# Patient Record
Sex: FEMALE | Race: OTHER | HISPANIC OR LATINO
[De-identification: names, ages, dates, MRNs, and addresses within clinical notes are randomized per-mention and may not be internally consistent; named-entity substitution may affect disease eponyms.]

---

## 2017-06-19 ENCOUNTER — APPOINTMENT (OUTPATIENT)
Dept: INTERNAL MEDICINE | Facility: CLINIC | Age: 47
End: 2017-06-19

## 2017-06-19 ENCOUNTER — OUTPATIENT (OUTPATIENT)
Dept: OUTPATIENT SERVICES | Facility: HOSPITAL | Age: 47
LOS: 1 days | Discharge: HOME | End: 2017-06-19

## 2017-06-19 ENCOUNTER — RESULT REVIEW (OUTPATIENT)
Age: 47
End: 2017-06-19

## 2017-06-19 VITALS
HEART RATE: 78 BPM | DIASTOLIC BLOOD PRESSURE: 76 MMHG | WEIGHT: 100 LBS | HEIGHT: 59 IN | TEMPERATURE: 97 F | SYSTOLIC BLOOD PRESSURE: 113 MMHG | BODY MASS INDEX: 20.16 KG/M2

## 2017-06-19 DIAGNOSIS — R73.9 HYPERGLYCEMIA, UNSPECIFIED: ICD-10-CM

## 2017-06-19 LAB — GLUCOSE SERPL-MCNC: 347 MG/DL

## 2017-06-20 LAB
ALBUMIN SERPL-MCNC: 4.1 G/DL
ALBUMIN/GLOB SERPL: 1.17
ALP SERPL-CCNC: 82 IU/L
ALT SERPL-CCNC: 21 IU/L
ANION GAP SERPL CALC-SCNC: 11 MEQ/L
AST SERPL-CCNC: 21 IU/L
BASOPHILS # BLD: 0.04 TH/MM3
BASOPHILS NFR BLD: 0.5 %
BILIRUB SERPL-MCNC: 0.5 MG/DL
BUN SERPL-MCNC: 17 MG/DL
BUN/CREAT SERPL: 30.9 %
CALCIUM SERPL-MCNC: 9.6 MG/DL
CHLORIDE SERPL-SCNC: 98 MEQ/L
CHOLEST SERPL-MCNC: 292 MG/DL
CO2 SERPL-SCNC: 25 MEQ/L
CREAT SERPL-MCNC: 0.55 MG/DL
CREAT UR-MCNC: 30 MG/DL
DIFFERENTIAL METHOD BLD: NORMAL
EOSINOPHIL # BLD: 0.06 TH/MM3
EOSINOPHIL NFR BLD: 0.7 %
ERYTHROCYTE [DISTWIDTH] IN BLOOD BY AUTOMATED COUNT: 19.1 %
ESTIMATED AVERGAGE GLUCOSE (NORTH): 384 MG/DL
FERRITIN SERPL-MCNC: 4 NG/ML
GFR SERPL CREATININE-BSD FRML MDRD: 118
GLUCOSE SERPL-MCNC: 293 MG/DL
GRANULOCYTES # BLD: 5.33 TH/MM3
GRANULOCYTES NFR BLD: 66.2 %
HBA1C MFR BLD: 15 %
HCT VFR BLD AUTO: 31.8 %
HDLC SERPL-MCNC: 87 MG/DL
HDLC SERPL: 3.36
HGB BLD-MCNC: 9 G/DL
IMM GRANULOCYTES # BLD: 0.01 TH/MM3
IMM GRANULOCYTES NFR BLD: 0.1 %
IRON SERPL-MCNC: 9 UG/DL
LDLC SERPL DIRECT ASSAY-MCNC: 189 MG/DL
LYMPHOCYTES # BLD: 2.11 TH/MM3
LYMPHOCYTES NFR BLD: 26.2 %
MCH RBC QN AUTO: 18.4 PG
MCHC RBC AUTO-ENTMCNC: 28.3 G/DL
MCV RBC AUTO: 65 FL
MICROALBUMIN UR-MCNC: 58.5 MG/DL
MICROALBUMIN/CREAT UR-RTO: 1950 MG/G
MONOCYTES # BLD: 0.51 TH/MM3
MONOCYTES NFR BLD: 6.3 %
PLATELET # BLD: 409 TH/MM3
PMV BLD AUTO: 10.2 FL
POTASSIUM SERPL-SCNC: 4.3 MMOL/L
PROT SERPL-MCNC: 7.6 G/DL
RBC # BLD AUTO: 4.89 MIL/MM3
SODIUM SERPL-SCNC: 134 MEQ/L
TIBC SERPL-MCNC: 611 UG/DL
TRIGL SERPL-MCNC: 94 MG/DL
VLDLC SERPL-MCNC: 18 MG/DL
WBC # BLD: 8.06 TH/MM3

## 2017-06-28 DIAGNOSIS — D64.9 ANEMIA, UNSPECIFIED: ICD-10-CM

## 2017-06-28 DIAGNOSIS — R51 HEADACHE: ICD-10-CM

## 2017-06-28 DIAGNOSIS — E11.9 TYPE 2 DIABETES MELLITUS WITHOUT COMPLICATIONS: ICD-10-CM

## 2017-06-28 DIAGNOSIS — Z23 ENCOUNTER FOR IMMUNIZATION: ICD-10-CM

## 2017-07-05 ENCOUNTER — OUTPATIENT (OUTPATIENT)
Dept: OUTPATIENT SERVICES | Facility: HOSPITAL | Age: 47
LOS: 1 days | Discharge: HOME | End: 2017-07-05

## 2017-07-05 ENCOUNTER — RESULT REVIEW (OUTPATIENT)
Age: 47
End: 2017-07-05

## 2017-07-05 ENCOUNTER — APPOINTMENT (OUTPATIENT)
Dept: ENDOCRINOLOGY | Facility: CLINIC | Age: 47
End: 2017-07-05

## 2017-07-05 VITALS
WEIGHT: 105 LBS | SYSTOLIC BLOOD PRESSURE: 103 MMHG | HEIGHT: 59 IN | HEART RATE: 79 BPM | BODY MASS INDEX: 21.17 KG/M2 | DIASTOLIC BLOOD PRESSURE: 66 MMHG

## 2017-07-06 LAB — GLUCOSE SERPL-MCNC: 174 MG/DL

## 2017-07-14 ENCOUNTER — OUTPATIENT (OUTPATIENT)
Dept: OUTPATIENT SERVICES | Facility: HOSPITAL | Age: 47
LOS: 1 days | Discharge: HOME | End: 2017-07-14

## 2017-08-02 ENCOUNTER — OUTPATIENT (OUTPATIENT)
Dept: OUTPATIENT SERVICES | Facility: HOSPITAL | Age: 47
LOS: 1 days | Discharge: HOME | End: 2017-08-02

## 2017-08-02 ENCOUNTER — OTHER (OUTPATIENT)
Age: 47
End: 2017-08-02

## 2017-08-02 ENCOUNTER — APPOINTMENT (OUTPATIENT)
Dept: PODIATRY | Facility: CLINIC | Age: 47
End: 2017-08-02

## 2017-08-02 VITALS — DIASTOLIC BLOOD PRESSURE: 80 MMHG | TEMPERATURE: 96.5 F | SYSTOLIC BLOOD PRESSURE: 137 MMHG | HEART RATE: 71 BPM

## 2017-08-02 VITALS — HEIGHT: 59 IN | BODY MASS INDEX: 21.17 KG/M2 | WEIGHT: 105 LBS

## 2017-08-24 ENCOUNTER — OUTPATIENT (OUTPATIENT)
Dept: OUTPATIENT SERVICES | Facility: HOSPITAL | Age: 47
LOS: 1 days | Discharge: HOME | End: 2017-08-24

## 2017-08-24 ENCOUNTER — APPOINTMENT (OUTPATIENT)
Dept: INTERNAL MEDICINE | Facility: CLINIC | Age: 47
End: 2017-08-24

## 2017-08-24 ENCOUNTER — RESULT REVIEW (OUTPATIENT)
Age: 47
End: 2017-08-24

## 2017-08-24 VITALS
DIASTOLIC BLOOD PRESSURE: 84 MMHG | SYSTOLIC BLOOD PRESSURE: 159 MMHG | WEIGHT: 103 LBS | BODY MASS INDEX: 20.76 KG/M2 | HEIGHT: 59 IN | HEART RATE: 71 BPM

## 2017-08-24 DIAGNOSIS — E78.00 PURE HYPERCHOLESTEROLEMIA, UNSPECIFIED: ICD-10-CM

## 2017-08-24 DIAGNOSIS — R10.11 RIGHT UPPER QUADRANT PAIN: ICD-10-CM

## 2017-08-24 DIAGNOSIS — E11.65 TYPE 2 DIABETES MELLITUS WITH HYPERGLYCEMIA: ICD-10-CM

## 2017-08-24 DIAGNOSIS — I10 ESSENTIAL (PRIMARY) HYPERTENSION: ICD-10-CM

## 2017-08-24 DIAGNOSIS — R51 HEADACHE: ICD-10-CM

## 2017-08-24 RX ORDER — BLOOD-GLUCOSE METER
W/DEVICE KIT MISCELLANEOUS
Qty: 1 | Refills: 0 | Status: ACTIVE | COMMUNITY
Start: 2017-08-03 | End: 1900-01-01

## 2017-08-24 RX ORDER — BLOOD SUGAR DIAGNOSTIC
STRIP MISCELLANEOUS
Qty: 60 | Refills: 5 | Status: COMPLETED | COMMUNITY
Start: 2017-08-24 | End: 2017-08-24

## 2017-08-25 LAB
CHOLEST SERPL-MCNC: 249 MG/DL
ESTIMATED AVERGAGE GLUCOSE (NORTH): 186 MG/DL
GLUCOSE SERPL-MCNC: 132 MG/DL
HBA1C MFR BLD: 8.1 %
HDLC SERPL-MCNC: 83 MG/DL
HDLC SERPL: 3
LDLC SERPL DIRECT ASSAY-MCNC: 140 MG/DL
TRIGL SERPL-MCNC: 147 MG/DL
VLDLC SERPL-MCNC: 29 MG/DL

## 2017-09-01 ENCOUNTER — RESULT REVIEW (OUTPATIENT)
Age: 47
End: 2017-09-01

## 2017-09-22 ENCOUNTER — OUTPATIENT (OUTPATIENT)
Dept: OUTPATIENT SERVICES | Facility: HOSPITAL | Age: 47
LOS: 1 days | Discharge: HOME | End: 2017-09-22

## 2017-09-22 ENCOUNTER — CLINICAL ADVICE (OUTPATIENT)
Age: 47
End: 2017-09-22

## 2017-09-22 DIAGNOSIS — R10.11 RIGHT UPPER QUADRANT PAIN: ICD-10-CM

## 2017-09-22 DIAGNOSIS — Z12.31 ENCOUNTER FOR SCREENING MAMMOGRAM FOR MALIGNANT NEOPLASM OF BREAST: ICD-10-CM

## 2017-09-27 ENCOUNTER — RESULT REVIEW (OUTPATIENT)
Age: 47
End: 2017-09-27

## 2017-10-06 ENCOUNTER — CLINICAL ADVICE (OUTPATIENT)
Age: 47
End: 2017-10-06

## 2017-10-13 ENCOUNTER — APPOINTMENT (OUTPATIENT)
Dept: GASTROENTEROLOGY | Facility: CLINIC | Age: 47
End: 2017-10-13

## 2017-10-13 ENCOUNTER — OUTPATIENT (OUTPATIENT)
Dept: OUTPATIENT SERVICES | Facility: HOSPITAL | Age: 47
LOS: 1 days | Discharge: HOME | End: 2017-10-13

## 2017-10-13 VITALS
WEIGHT: 106 LBS | SYSTOLIC BLOOD PRESSURE: 132 MMHG | DIASTOLIC BLOOD PRESSURE: 76 MMHG | BODY MASS INDEX: 21.41 KG/M2 | HEART RATE: 87 BPM

## 2017-10-13 DIAGNOSIS — R10.9 UNSPECIFIED ABDOMINAL PAIN: ICD-10-CM

## 2017-11-08 ENCOUNTER — APPOINTMENT (OUTPATIENT)
Dept: PODIATRY | Facility: CLINIC | Age: 47
End: 2017-11-08

## 2017-11-29 ENCOUNTER — RESULT REVIEW (OUTPATIENT)
Age: 47
End: 2017-11-29

## 2017-11-30 ENCOUNTER — OUTPATIENT (OUTPATIENT)
Dept: OUTPATIENT SERVICES | Facility: HOSPITAL | Age: 47
LOS: 1 days | Discharge: HOME | End: 2017-11-30

## 2017-11-30 ENCOUNTER — APPOINTMENT (OUTPATIENT)
Dept: INTERNAL MEDICINE | Facility: CLINIC | Age: 47
End: 2017-11-30

## 2017-11-30 VITALS
BODY MASS INDEX: 21.37 KG/M2 | HEIGHT: 59 IN | HEART RATE: 81 BPM | SYSTOLIC BLOOD PRESSURE: 164 MMHG | DIASTOLIC BLOOD PRESSURE: 93 MMHG | WEIGHT: 106 LBS

## 2017-11-30 VITALS — SYSTOLIC BLOOD PRESSURE: 160 MMHG | DIASTOLIC BLOOD PRESSURE: 90 MMHG

## 2017-11-30 VITALS
HEIGHT: 59 IN | HEART RATE: 81 BPM | SYSTOLIC BLOOD PRESSURE: 160 MMHG | BODY MASS INDEX: 21.37 KG/M2 | WEIGHT: 106 LBS | DIASTOLIC BLOOD PRESSURE: 90 MMHG

## 2017-11-30 VITALS — HEIGHT: 59 IN | HEART RATE: 76 BPM | WEIGHT: 106 LBS | BODY MASS INDEX: 21.37 KG/M2

## 2017-11-30 RX ORDER — BLOOD-GLUCOSE METER
W/DEVICE KIT MISCELLANEOUS
Qty: 1 | Refills: 0 | Status: ACTIVE | COMMUNITY
Start: 2017-11-30 | End: 1900-01-01

## 2017-12-01 LAB
ALBUMIN SERPL-MCNC: 4 G/DL
ALBUMIN/GLOB SERPL: 1.21
ALP SERPL-CCNC: 65 IU/L
ALT SERPL-CCNC: 22 IU/L
ANION GAP SERPL CALC-SCNC: 8 MEQ/L
AST SERPL-CCNC: 21 IU/L
BASOPHILS # BLD: 0.02 TH/MM3
BASOPHILS NFR BLD: 0.2 %
BILIRUB SERPL-MCNC: 0.7 MG/DL
BUN SERPL-MCNC: 15 MG/DL
BUN/CREAT SERPL: 28.3 %
CALCIUM SERPL-MCNC: 10 MG/DL
CHLORIDE SERPL-SCNC: 102 MEQ/L
CO2 SERPL-SCNC: 30 MEQ/L
CREAT SERPL-MCNC: 0.53 MG/DL
DIFFERENTIAL METHOD BLD: NORMAL
EOSINOPHIL # BLD: 0.04 TH/MM3
EOSINOPHIL NFR BLD: 0.4 %
ERYTHROCYTE [DISTWIDTH] IN BLOOD BY AUTOMATED COUNT: 13.6 %
ESTIMATED AVERGAGE GLUCOSE (NORTH): 171 MG/DL
GFR SERPL CREATININE-BSD FRML MDRD: 123
GLUCOSE SERPL-MCNC: 137 MG/DL
GRANULOCYTES # BLD: 7.47 TH/MM3
GRANULOCYTES NFR BLD: 72.8 %
HBA1C MFR BLD: 7.6 %
HCT VFR BLD AUTO: 40.9 %
HGB BLD-MCNC: 13.4 G/DL
IMM GRANULOCYTES # BLD: 0.02 TH/MM3
IMM GRANULOCYTES NFR BLD: 0.2 %
LYMPHOCYTES # BLD: 2.12 TH/MM3
LYMPHOCYTES NFR BLD: 20.6 %
MCH RBC QN AUTO: 30.5 PG
MCHC RBC AUTO-ENTMCNC: 32.8 G/DL
MCV RBC AUTO: 93.2 FL
MONOCYTES # BLD: 0.6 TH/MM3
MONOCYTES NFR BLD: 5.8 %
PLATELET # BLD: 256 TH/MM3
PMV BLD AUTO: 11 FL
POTASSIUM SERPL-SCNC: 3.6 MMOL/L
PROT SERPL-MCNC: 7.3 G/DL
RBC # BLD AUTO: 4.39 MIL/MM3
SODIUM SERPL-SCNC: 140 MEQ/L
WBC # BLD: 10.27 TH/MM3

## 2017-12-04 ENCOUNTER — RESULT REVIEW (OUTPATIENT)
Age: 47
End: 2017-12-04

## 2017-12-09 ENCOUNTER — OUTPATIENT (OUTPATIENT)
Dept: OUTPATIENT SERVICES | Facility: HOSPITAL | Age: 47
LOS: 1 days | Discharge: HOME | End: 2017-12-09

## 2017-12-09 DIAGNOSIS — N92.0 EXCESSIVE AND FREQUENT MENSTRUATION WITH REGULAR CYCLE: ICD-10-CM

## 2017-12-09 DIAGNOSIS — D64.9 ANEMIA, UNSPECIFIED: ICD-10-CM

## 2017-12-11 DIAGNOSIS — D64.9 ANEMIA, UNSPECIFIED: ICD-10-CM

## 2017-12-14 LAB — HUMAN PAPILLOMA VIRUS HR(SIUH): NOT DETECTED

## 2017-12-27 ENCOUNTER — OUTPATIENT (OUTPATIENT)
Dept: OUTPATIENT SERVICES | Facility: HOSPITAL | Age: 47
LOS: 1 days | Discharge: HOME | End: 2017-12-27

## 2017-12-28 LAB
INR PPP: 1
PROTHROMBIN TIME: 10.9 SEC

## 2017-12-29 DIAGNOSIS — D50.9 IRON DEFICIENCY ANEMIA, UNSPECIFIED: ICD-10-CM

## 2017-12-29 DIAGNOSIS — R12 HEARTBURN: ICD-10-CM

## 2017-12-29 DIAGNOSIS — D12.0 BENIGN NEOPLASM OF CECUM: ICD-10-CM

## 2017-12-29 DIAGNOSIS — E78.00 PURE HYPERCHOLESTEROLEMIA, UNSPECIFIED: ICD-10-CM

## 2017-12-29 DIAGNOSIS — D12.3 BENIGN NEOPLASM OF TRANSVERSE COLON: ICD-10-CM

## 2017-12-29 DIAGNOSIS — D12.8 BENIGN NEOPLASM OF RECTUM: ICD-10-CM

## 2017-12-29 DIAGNOSIS — I10 ESSENTIAL (PRIMARY) HYPERTENSION: ICD-10-CM

## 2017-12-29 DIAGNOSIS — E11.42 TYPE 2 DIABETES MELLITUS WITH DIABETIC POLYNEUROPATHY: ICD-10-CM

## 2017-12-29 DIAGNOSIS — K64.8 OTHER HEMORRHOIDS: ICD-10-CM

## 2018-01-02 DIAGNOSIS — K62.1 RECTAL POLYP: ICD-10-CM

## 2018-01-19 ENCOUNTER — OUTPATIENT (OUTPATIENT)
Dept: OUTPATIENT SERVICES | Facility: HOSPITAL | Age: 48
LOS: 1 days | Discharge: HOME | End: 2018-01-19

## 2018-01-19 ENCOUNTER — APPOINTMENT (OUTPATIENT)
Dept: GASTROENTEROLOGY | Facility: CLINIC | Age: 48
End: 2018-01-19

## 2018-01-19 VITALS
WEIGHT: 106 LBS | HEIGHT: 58 IN | BODY MASS INDEX: 22.25 KG/M2 | SYSTOLIC BLOOD PRESSURE: 119 MMHG | DIASTOLIC BLOOD PRESSURE: 71 MMHG | HEART RATE: 68 BPM

## 2018-02-01 DIAGNOSIS — D12.6 BENIGN NEOPLASM OF COLON, UNSPECIFIED: ICD-10-CM

## 2018-02-01 DIAGNOSIS — K29.70 GASTRITIS, UNSPECIFIED, WITHOUT BLEEDING: ICD-10-CM

## 2018-02-27 ENCOUNTER — OUTPATIENT (OUTPATIENT)
Dept: OUTPATIENT SERVICES | Facility: HOSPITAL | Age: 48
LOS: 1 days | Discharge: HOME | End: 2018-02-27

## 2018-02-27 ENCOUNTER — APPOINTMENT (OUTPATIENT)
Dept: OBGYN | Facility: CLINIC | Age: 48
End: 2018-02-27

## 2018-02-27 VITALS
HEIGHT: 58 IN | WEIGHT: 106.38 LBS | DIASTOLIC BLOOD PRESSURE: 68 MMHG | BODY MASS INDEX: 22.33 KG/M2 | SYSTOLIC BLOOD PRESSURE: 120 MMHG

## 2018-02-28 DIAGNOSIS — N93.9 ABNORMAL UTERINE AND VAGINAL BLEEDING, UNSPECIFIED: ICD-10-CM

## 2018-03-15 ENCOUNTER — APPOINTMENT (OUTPATIENT)
Dept: INTERNAL MEDICINE | Facility: CLINIC | Age: 48
End: 2018-03-15

## 2018-03-15 ENCOUNTER — OUTPATIENT (OUTPATIENT)
Dept: OUTPATIENT SERVICES | Facility: HOSPITAL | Age: 48
LOS: 1 days | Discharge: HOME | End: 2018-03-15

## 2018-03-15 VITALS
WEIGHT: 102 LBS | HEART RATE: 80 BPM | BODY MASS INDEX: 21.41 KG/M2 | SYSTOLIC BLOOD PRESSURE: 175 MMHG | HEIGHT: 58 IN | DIASTOLIC BLOOD PRESSURE: 95 MMHG

## 2018-03-15 VITALS — SYSTOLIC BLOOD PRESSURE: 150 MMHG | DIASTOLIC BLOOD PRESSURE: 80 MMHG

## 2018-03-15 DIAGNOSIS — E11.9 TYPE 2 DIABETES MELLITUS WITHOUT COMPLICATIONS: ICD-10-CM

## 2018-03-15 DIAGNOSIS — I10 ESSENTIAL (PRIMARY) HYPERTENSION: ICD-10-CM

## 2018-03-15 DIAGNOSIS — G62.9 POLYNEUROPATHY, UNSPECIFIED: ICD-10-CM

## 2018-03-15 RX ORDER — AMOXICILLIN 500 MG/1
500 TABLET, FILM COATED ORAL TWICE DAILY
Qty: 56 | Refills: 0 | Status: DISCONTINUED | COMMUNITY
Start: 2018-01-19 | End: 2018-03-15

## 2018-03-15 RX ORDER — GLIPIZIDE 2.5 MG/1
2.5 TABLET, FILM COATED, EXTENDED RELEASE ORAL DAILY
Qty: 30 | Refills: 3 | Status: DISCONTINUED | COMMUNITY
Start: 2017-12-01 | End: 2018-03-15

## 2018-03-15 RX ORDER — BISACODYL 5 MG/1
5 TABLET, COATED ORAL
Qty: 4 | Refills: 0 | Status: DISCONTINUED | COMMUNITY
Start: 2017-10-13 | End: 2018-03-15

## 2018-03-15 RX ORDER — CLARITHROMYCIN 500 MG/1
500 TABLET, FILM COATED ORAL TWICE DAILY
Qty: 28 | Refills: 0 | Status: DISCONTINUED | COMMUNITY
Start: 2018-01-19 | End: 2018-03-15

## 2018-03-15 RX ORDER — PANTOPRAZOLE 40 MG/1
40 TABLET, DELAYED RELEASE ORAL DAILY
Qty: 30 | Refills: 2 | Status: DISCONTINUED | COMMUNITY
Start: 2018-01-19 | End: 2018-03-15

## 2018-03-16 ENCOUNTER — OUTPATIENT (OUTPATIENT)
Dept: OUTPATIENT SERVICES | Facility: HOSPITAL | Age: 48
LOS: 1 days | Discharge: HOME | End: 2018-03-16

## 2018-03-16 ENCOUNTER — APPOINTMENT (OUTPATIENT)
Dept: GASTROENTEROLOGY | Facility: CLINIC | Age: 48
End: 2018-03-16

## 2018-03-16 VITALS
BODY MASS INDEX: 22.57 KG/M2 | HEART RATE: 82 BPM | SYSTOLIC BLOOD PRESSURE: 142 MMHG | DIASTOLIC BLOOD PRESSURE: 83 MMHG | WEIGHT: 108 LBS

## 2018-03-20 ENCOUNTER — OUTPATIENT (OUTPATIENT)
Dept: OUTPATIENT SERVICES | Facility: HOSPITAL | Age: 48
LOS: 1 days | Discharge: HOME | End: 2018-03-20

## 2018-03-20 DIAGNOSIS — R92.2 INCONCLUSIVE MAMMOGRAM: ICD-10-CM

## 2018-03-22 LAB — HIV1+2 AB SPEC QL IA.RAPID: NONREACTIVE

## 2018-03-27 ENCOUNTER — OTHER (OUTPATIENT)
Age: 48
End: 2018-03-27

## 2018-04-03 ENCOUNTER — LABORATORY RESULT (OUTPATIENT)
Age: 48
End: 2018-04-03

## 2018-04-03 ENCOUNTER — OUTPATIENT (OUTPATIENT)
Dept: OUTPATIENT SERVICES | Facility: HOSPITAL | Age: 48
LOS: 1 days | Discharge: HOME | End: 2018-04-03

## 2018-04-03 ENCOUNTER — APPOINTMENT (OUTPATIENT)
Dept: OBGYN | Facility: CLINIC | Age: 48
End: 2018-04-03

## 2018-04-03 ENCOUNTER — RESULT CHARGE (OUTPATIENT)
Age: 48
End: 2018-04-03

## 2018-04-03 VITALS — WEIGHT: 109 LBS | BODY MASS INDEX: 22.78 KG/M2 | SYSTOLIC BLOOD PRESSURE: 100 MMHG | DIASTOLIC BLOOD PRESSURE: 60 MMHG

## 2018-04-03 LAB
HCG UR QL: NEGATIVE
QUALITY CONTROL: YES

## 2018-04-05 DIAGNOSIS — N93.9 ABNORMAL UTERINE AND VAGINAL BLEEDING, UNSPECIFIED: ICD-10-CM

## 2018-04-17 ENCOUNTER — APPOINTMENT (OUTPATIENT)
Dept: OBGYN | Facility: CLINIC | Age: 48
End: 2018-04-17

## 2018-04-17 ENCOUNTER — OUTPATIENT (OUTPATIENT)
Dept: OUTPATIENT SERVICES | Facility: HOSPITAL | Age: 48
LOS: 1 days | Discharge: HOME | End: 2018-04-17

## 2018-04-17 VITALS — SYSTOLIC BLOOD PRESSURE: 108 MMHG | WEIGHT: 107 LBS | BODY MASS INDEX: 22.36 KG/M2 | DIASTOLIC BLOOD PRESSURE: 60 MMHG

## 2018-04-19 DIAGNOSIS — N93.9 ABNORMAL UTERINE AND VAGINAL BLEEDING, UNSPECIFIED: ICD-10-CM

## 2018-04-20 ENCOUNTER — APPOINTMENT (OUTPATIENT)
Dept: GASTROENTEROLOGY | Facility: CLINIC | Age: 48
End: 2018-04-20

## 2018-04-20 ENCOUNTER — EMERGENCY (EMERGENCY)
Facility: HOSPITAL | Age: 48
LOS: 0 days | Discharge: HOME | End: 2018-04-21
Attending: EMERGENCY MEDICINE | Admitting: EMERGENCY MEDICINE

## 2018-04-20 VITALS
TEMPERATURE: 98 F | SYSTOLIC BLOOD PRESSURE: 177 MMHG | DIASTOLIC BLOOD PRESSURE: 78 MMHG | OXYGEN SATURATION: 97 % | RESPIRATION RATE: 18 BRPM | HEART RATE: 79 BPM

## 2018-04-20 VITALS
RESPIRATION RATE: 17 BRPM | WEIGHT: 107.81 LBS | DIASTOLIC BLOOD PRESSURE: 74 MMHG | TEMPERATURE: 98 F | SYSTOLIC BLOOD PRESSURE: 158 MMHG | HEART RATE: 79 BPM | OXYGEN SATURATION: 99 %

## 2018-04-20 DIAGNOSIS — R10.31 RIGHT LOWER QUADRANT PAIN: ICD-10-CM

## 2018-04-20 LAB
ALBUMIN SERPL ELPH-MCNC: 4.1 G/DL — SIGNIFICANT CHANGE UP (ref 3.5–5.2)
ALP SERPL-CCNC: 62 U/L — SIGNIFICANT CHANGE UP (ref 30–115)
ALT FLD-CCNC: 16 U/L — SIGNIFICANT CHANGE UP (ref 0–41)
APPEARANCE UR: CLEAR — SIGNIFICANT CHANGE UP
AST SERPL-CCNC: 16 U/L — SIGNIFICANT CHANGE UP (ref 0–41)
BILIRUB SERPL-MCNC: <0.2 MG/DL — SIGNIFICANT CHANGE UP (ref 0.2–1.2)
BILIRUB UR-MCNC: NEGATIVE — SIGNIFICANT CHANGE UP
BUN SERPL-MCNC: 18 MG/DL — SIGNIFICANT CHANGE UP (ref 10–20)
CALCIUM SERPL-MCNC: 9.3 MG/DL — SIGNIFICANT CHANGE UP (ref 8.5–10.1)
CHLORIDE SERPL-SCNC: 98 MMOL/L — SIGNIFICANT CHANGE UP (ref 98–110)
CO2 SERPL-SCNC: 29 MMOL/L — SIGNIFICANT CHANGE UP (ref 17–32)
COLOR SPEC: YELLOW — SIGNIFICANT CHANGE UP
CREAT SERPL-MCNC: 0.6 MG/DL — LOW (ref 0.7–1.5)
DIFF PNL FLD: (no result)
GLUCOSE SERPL-MCNC: 320 MG/DL — HIGH (ref 70–99)
GLUCOSE UR QL: >=1000 MG/DL
KETONES UR-MCNC: NEGATIVE — SIGNIFICANT CHANGE UP
LEUKOCYTE ESTERASE UR-ACNC: NEGATIVE — SIGNIFICANT CHANGE UP
LIDOCAIN IGE QN: 74 U/L — HIGH (ref 7–60)
NITRITE UR-MCNC: NEGATIVE — SIGNIFICANT CHANGE UP
PH UR: 6.5 — SIGNIFICANT CHANGE UP (ref 5–8)
POTASSIUM SERPL-MCNC: 4.2 MMOL/L — SIGNIFICANT CHANGE UP (ref 3.5–5)
POTASSIUM SERPL-SCNC: 4.2 MMOL/L — SIGNIFICANT CHANGE UP (ref 3.5–5)
PROT SERPL-MCNC: 7 G/DL — SIGNIFICANT CHANGE UP (ref 6–8)
PROT UR-MCNC: 100 MG/DL
SODIUM SERPL-SCNC: 137 MMOL/L — SIGNIFICANT CHANGE UP (ref 135–146)
SP GR SPEC: 1.02 — SIGNIFICANT CHANGE UP (ref 1.01–1.03)
UROBILINOGEN FLD QL: 0.2 MG/DL — SIGNIFICANT CHANGE UP (ref 0.2–0.2)

## 2018-04-20 RX ORDER — MORPHINE SULFATE 50 MG/1
6 CAPSULE, EXTENDED RELEASE ORAL ONCE
Qty: 0 | Refills: 0 | Status: DISCONTINUED | OUTPATIENT
Start: 2018-04-20 | End: 2018-04-20

## 2018-04-20 RX ORDER — SODIUM CHLORIDE 9 MG/ML
1000 INJECTION INTRAMUSCULAR; INTRAVENOUS; SUBCUTANEOUS ONCE
Qty: 0 | Refills: 0 | Status: COMPLETED | OUTPATIENT
Start: 2018-04-20 | End: 2018-04-20

## 2018-04-20 RX ADMIN — SODIUM CHLORIDE 3000 MILLILITER(S): 9 INJECTION INTRAMUSCULAR; INTRAVENOUS; SUBCUTANEOUS at 21:00

## 2018-04-20 RX ADMIN — MORPHINE SULFATE 6 MILLIGRAM(S): 50 CAPSULE, EXTENDED RELEASE ORAL at 21:00

## 2018-04-20 RX ADMIN — MORPHINE SULFATE 6 MILLIGRAM(S): 50 CAPSULE, EXTENDED RELEASE ORAL at 23:17

## 2018-04-20 NOTE — ED PROVIDER NOTE - PHYSICAL EXAMINATION
AOx4, Non toxic appearing, NAD. Skin  warm and dry, no acute rash. PERRLA/EOM, conjunctiva and sclera clear. MM moist, no nasal discharge.  Pharynx and TM's unremarkable. Neck supple nt, no meningeal signs. Heart RRR s1s2 nl, no rub/murmur. Lungs- BS equal, CTAB. Abdomen rlq ttp no r/g. Extremities- moves all, +equal distal pulses, sensation wnl, normal ROM. No LE edema, calves nttp b/l.

## 2018-04-20 NOTE — ED ADULT NURSE NOTE - CHPI ED SYMPTOMS NEG
no dysuria/no fever/no hematuria/no diarrhea/no blood in stool/no burning urination/no nausea/no chills/no abdominal distension/no vomiting

## 2018-04-20 NOTE — ED PROVIDER NOTE - NS ED ROS FT
Pt denied fvr/chills, HA, visual changes, dizziness, light headedness, presyncope, LOC, CP, LAMB, PND, SOB, cough, hemoptysis, changes in bowel or bladder habits, LE edema, numbness, weakness, changes in gait.  The pt also denied recent travel outside of the country, recent illnesses, sick contacts, recent airplane trips or periods of immobility/bedbound.

## 2018-04-20 NOTE — ED PROVIDER NOTE - ATTENDING CONTRIBUTION TO CARE
pt c/o 1 week lower ab pain, dysuria. no n, v, d, fever or chills.  no cp or sob. no vb or dc. pt in nad, comfortable, anicteric, neck sup, ctab, rrr, ab soft, tender b/l LQ, no grd or rbt. no cvat. no rash. will check labs and imaging.

## 2018-04-21 LAB
ANION GAP SERPL CALC-SCNC: 10 MMOL/L — SIGNIFICANT CHANGE UP (ref 7–14)
BASOPHILS # BLD AUTO: 0.03 K/UL — SIGNIFICANT CHANGE UP (ref 0–0.2)
BASOPHILS NFR BLD AUTO: 0.5 % — SIGNIFICANT CHANGE UP (ref 0–1)
EOSINOPHIL # BLD AUTO: 0.06 K/UL — SIGNIFICANT CHANGE UP (ref 0–0.7)
EOSINOPHIL NFR BLD AUTO: 0.9 % — SIGNIFICANT CHANGE UP (ref 0–8)
HCT VFR BLD CALC: 37 % — SIGNIFICANT CHANGE UP (ref 37–47)
HGB BLD-MCNC: 12.7 G/DL — SIGNIFICANT CHANGE UP (ref 12–16)
IMM GRANULOCYTES NFR BLD AUTO: 0.3 % — SIGNIFICANT CHANGE UP (ref 0.1–0.3)
LACTATE SERPL-SCNC: 1.2 MMOL/L — SIGNIFICANT CHANGE UP (ref 0.5–2.2)
LYMPHOCYTES # BLD AUTO: 2.12 K/UL — SIGNIFICANT CHANGE UP (ref 1.2–3.4)
LYMPHOCYTES # BLD AUTO: 33.1 % — SIGNIFICANT CHANGE UP (ref 20.5–51.1)
MCHC RBC-ENTMCNC: 30.5 PG — SIGNIFICANT CHANGE UP (ref 27–31)
MCHC RBC-ENTMCNC: 34.3 G/DL — SIGNIFICANT CHANGE UP (ref 32–37)
MCV RBC AUTO: 88.7 FL — SIGNIFICANT CHANGE UP (ref 81–99)
MONOCYTES # BLD AUTO: 0.57 K/UL — SIGNIFICANT CHANGE UP (ref 0.1–0.6)
MONOCYTES NFR BLD AUTO: 8.9 % — SIGNIFICANT CHANGE UP (ref 1.7–9.3)
NEUTROPHILS # BLD AUTO: 3.61 K/UL — SIGNIFICANT CHANGE UP (ref 1.4–6.5)
NEUTROPHILS NFR BLD AUTO: 56.3 % — SIGNIFICANT CHANGE UP (ref 42.2–75.2)
PLATELET # BLD AUTO: 276 K/UL — SIGNIFICANT CHANGE UP (ref 130–400)
RBC # BLD: 4.17 M/UL — LOW (ref 4.2–5.4)
RBC # FLD: 12.7 % — SIGNIFICANT CHANGE UP (ref 11.5–14.5)
WBC # BLD: 6.41 K/UL — SIGNIFICANT CHANGE UP (ref 4.8–10.8)
WBC # FLD AUTO: 6.41 K/UL — SIGNIFICANT CHANGE UP (ref 4.8–10.8)

## 2018-04-21 RX ORDER — ONDANSETRON 8 MG/1
4 TABLET, FILM COATED ORAL ONCE
Qty: 0 | Refills: 0 | Status: DISCONTINUED | OUTPATIENT
Start: 2018-04-21 | End: 2018-04-21

## 2018-05-18 LAB
ANION GAP SERPL CALC-SCNC: 14 MMOL/L
BASOPHILS # BLD AUTO: 0.02 K/UL
BASOPHILS NFR BLD AUTO: 0.2 %
BUN SERPL-MCNC: 14 MG/DL
CALCIUM SERPL-MCNC: 9.7 MG/DL
CHLORIDE SERPL-SCNC: 98 MMOL/L
CHOLEST SERPL-MCNC: 351 MG/DL
CHOLEST/HDLC SERPL: 4.2 RATIO
CO2 SERPL-SCNC: 29 MMOL/L
CREAT SERPL-MCNC: 0.6 MG/DL
CREAT SPEC-SCNC: 90 MG/DL
EOSINOPHIL # BLD AUTO: 0.07 K/UL
EOSINOPHIL NFR BLD AUTO: 0.9 %
GLUCOSE SERPL-MCNC: 166 MG/DL
HBA1C MFR BLD HPLC: 10.4 %
HCT VFR BLD CALC: 41.2 %
HDLC SERPL-MCNC: 84 MG/DL
HGB BLD-MCNC: 13.8 G/DL
IMM GRANULOCYTES NFR BLD AUTO: 0.4 %
LDLC SERPL CALC-MCNC: 264 MG/DL
LYMPHOCYTES # BLD AUTO: 2.76 K/UL
LYMPHOCYTES NFR BLD AUTO: 34.1 %
MAN DIFF?: NORMAL
MCHC RBC-ENTMCNC: 31.1 PG
MCHC RBC-ENTMCNC: 33.5 GM/DL
MCV RBC AUTO: 92.8 FL
MICROALBUMIN 24H UR DL<=1MG/L-MCNC: 137.3 MG/DL
MICROALBUMIN/CREAT 24H UR-RTO: 1526 MG/G
MONOCYTES # BLD AUTO: 0.47 K/UL
MONOCYTES NFR BLD AUTO: 5.8 %
NEUTROPHILS # BLD AUTO: 4.75 K/UL
NEUTROPHILS NFR BLD AUTO: 58.6 %
PLATELET # BLD AUTO: 281 K/UL
POTASSIUM SERPL-SCNC: 3.9 MMOL/L
RBC # BLD: 4.44 M/UL
RBC # FLD: 13.1 %
SODIUM SERPL-SCNC: 141 MMOL/L
TRIGL SERPL-MCNC: 129 MG/DL
WBC # FLD AUTO: 8.1 K/UL

## 2018-09-11 ENCOUNTER — OUTPATIENT (OUTPATIENT)
Dept: OUTPATIENT SERVICES | Facility: HOSPITAL | Age: 48
LOS: 1 days | Discharge: HOME | End: 2018-09-11

## 2018-09-11 ENCOUNTER — APPOINTMENT (OUTPATIENT)
Dept: INTERNAL MEDICINE | Facility: CLINIC | Age: 48
End: 2018-09-11

## 2018-09-11 VITALS
BODY MASS INDEX: 21.62 KG/M2 | TEMPERATURE: 97.1 F | DIASTOLIC BLOOD PRESSURE: 80 MMHG | SYSTOLIC BLOOD PRESSURE: 131 MMHG | HEIGHT: 58 IN | HEART RATE: 80 BPM | WEIGHT: 103 LBS

## 2018-09-11 DIAGNOSIS — Z23 ENCOUNTER FOR IMMUNIZATION: ICD-10-CM

## 2018-09-11 DIAGNOSIS — E11.65 TYPE 2 DIABETES MELLITUS WITH HYPERGLYCEMIA: ICD-10-CM

## 2018-09-11 DIAGNOSIS — R10.31 RIGHT LOWER QUADRANT PAIN: ICD-10-CM

## 2018-09-11 DIAGNOSIS — I10 ESSENTIAL (PRIMARY) HYPERTENSION: ICD-10-CM

## 2018-09-11 DIAGNOSIS — Z87.42 PERSONAL HISTORY OF OTHER DISEASES OF THE FEMALE GENITAL TRACT: ICD-10-CM

## 2018-09-11 DIAGNOSIS — E78.00 PURE HYPERCHOLESTEROLEMIA, UNSPECIFIED: ICD-10-CM

## 2018-09-11 LAB — GLUCOSE BLDC GLUCOMTR-MCNC: 284 MG/DL — HIGH (ref 70–99)

## 2018-09-11 NOTE — REVIEW OF SYSTEMS
[Abdominal Pain] : abdominal pain [Negative] : Heme/Lymph [FreeTextEntry7] : right lower quadrant abdominal pain

## 2018-09-11 NOTE — HISTORY OF PRESENT ILLNESS
[FreeTextEntry1] : 47 YO F with PMH of HTN, DLD, DM II, presents for a routine f/u. Patient says her menstrual bleeding is not as heavy anymore and she has a mammogram scheduled for later this month. She still has some epigastric pain but that has improved from before. Currently, patient has no other complaints. \par

## 2018-09-11 NOTE — ASSESSMENT
[FreeTextEntry1] : 47 YO F with PMH of HTN, DLD, DM II, presents for a routine f/u.\par \par \par H.pylori gastritis on EGD\par - s/p triple therapy\par - Patient needs to repeat stool test to check for H.pylori eradication. \par -Advised to stop omeprazole 2 weeks before taking stool test and not take famotidine one day before test. \par \par HTN\par - BP improved and is now 131/80\par - Patient was advised to take ramipril 2,5 mg from daily to BID on last visit but was taking once daily. \par - recommended low salt diet, monitor BP at home,\par \par DM II with neuropathy\par - Patient reports that she still has burning sensation on b/l feet at night. \par - Last HbA1c was 10.4 in 03/2018.Recommend diabetic education program. \par - c/w metformin, and Actos. Will add glimepiride 1 mg as fingerstick is high (284) in clinic\par - patient was started gabapentin but wasn't taking it. Will resend gabapentin again. \par - Ophthalmology and Podiatry referral provided\par - Will repeat HbA1c and urine microalbumin/Cr\par -diabetic eye exam\par -endocrinology consult\par \par DLD\par - lipid profile Aug 2017: Total cholesterol 351, , HDL 84, .\par - c/w lipitor \par - will repeat lipid profile\par \par Iron Deficiency Anemia likely 2/2 heavy periods.\par - Resolved\par - Last Hb is wnl. \par - c/w Ferrous sulfate\par - f/u Ob/Gyn . \par - Will repeat CBC. \par \par RLQ abdominal pain (improving)\par -Advised to get CT abdomen done which was recommended by GI.\par -s/p EGD in Dec 2017: shows H.pylori gastritis.\par - f/u GI for eradication of H.pylori post triple therapy. \par \par HCM\par - recommended Mammogram and bilateral breast sono\par - Colonoscopy 12/2017: few polyps but normal mucosa on histology, one hyperplastic polyp. No family hx of Colon CA. GI recommended to repeat colonoscopy in 10 years. \par - PAP smear Dec 2017: normal. \par - Will give Flu shot\par - RTC in 6 months. \par

## 2018-09-11 NOTE — PHYSICAL EXAM
[No Acute Distress] : no acute distress [No Respiratory Distress] : no respiratory distress  [Clear to Auscultation] : lungs were clear to auscultation bilaterally [No Accessory Muscle Use] : no accessory muscle use [Normal Rate] : normal rate  [Regular Rhythm] : with a regular rhythm [Normal S1, S2] : normal S1 and S2 [No Edema] : there was no peripheral edema [Soft] : abdomen soft [Non-distended] : non-distended [Normal Bowel Sounds] : normal bowel sounds [No Focal Deficits] : no focal deficits [Normal Affect] : the affect was normal [Supple] : supple [de-identified] : RLQ and epigastric tenderness to palpation  [de-identified] : Lower back pain.

## 2018-09-25 ENCOUNTER — LABORATORY RESULT (OUTPATIENT)
Age: 48
End: 2018-09-25

## 2018-09-25 ENCOUNTER — OUTPATIENT (OUTPATIENT)
Dept: OUTPATIENT SERVICES | Facility: HOSPITAL | Age: 48
LOS: 1 days | Discharge: HOME | End: 2018-09-25

## 2018-09-25 DIAGNOSIS — R92.8 OTHER ABNORMAL AND INCONCLUSIVE FINDINGS ON DIAGNOSTIC IMAGING OF BREAST: ICD-10-CM

## 2018-09-26 LAB
ALBUMIN SERPL ELPH-MCNC: 4.3 G/DL
ALP BLD-CCNC: 71 U/L
ALT SERPL-CCNC: 16 U/L
ANION GAP SERPL CALC-SCNC: 15 MMOL/L
AST SERPL-CCNC: 17 U/L
BASOPHILS # BLD AUTO: 0.03 K/UL
BASOPHILS NFR BLD AUTO: 0.4 %
BILIRUB SERPL-MCNC: 0.3 MG/DL
BUN SERPL-MCNC: 15 MG/DL
CALCIUM SERPL-MCNC: 9.3 MG/DL
CHLORIDE SERPL-SCNC: 102 MMOL/L
CHOLEST SERPL-MCNC: 287 MG/DL
CHOLEST/HDLC SERPL: 3.9 RATIO
CO2 SERPL-SCNC: 24 MMOL/L
CREAT SERPL-MCNC: 0.6 MG/DL
CREAT SPEC-SCNC: 72 MG/DL
EOSINOPHIL # BLD AUTO: 0.06 K/UL
EOSINOPHIL NFR BLD AUTO: 0.9 %
GLUCOSE SERPL-MCNC: 141 MG/DL
HCT VFR BLD CALC: 41.7 %
HDLC SERPL-MCNC: 73 MG/DL
HGB BLD-MCNC: 13.5 G/DL
IMM GRANULOCYTES NFR BLD AUTO: 0.3 %
LDLC SERPL CALC-MCNC: 215 MG/DL
LYMPHOCYTES # BLD AUTO: 2.21 K/UL
LYMPHOCYTES NFR BLD AUTO: 31.6 %
MAN DIFF?: NORMAL
MCHC RBC-ENTMCNC: 29.2 PG
MCHC RBC-ENTMCNC: 32.4 G/DL
MCV RBC AUTO: 90.1 FL
MICROALBUMIN 24H UR DL<=1MG/L-MCNC: 116.2 MG/DL
MICROALBUMIN/CREAT 24H UR-RTO: 1607 MG/G
MONOCYTES # BLD AUTO: 0.5 K/UL
MONOCYTES NFR BLD AUTO: 7.2 %
NEUTROPHILS # BLD AUTO: 4.17 K/UL
NEUTROPHILS NFR BLD AUTO: 59.6 %
PLATELET # BLD AUTO: 302 K/UL
POTASSIUM SERPL-SCNC: 3.9 MMOL/L
PROT SERPL-MCNC: 7.7 G/DL
RBC # BLD: 4.63 M/UL
RBC # FLD: 14 %
SODIUM SERPL-SCNC: 141 MMOL/L
TRIGL SERPL-MCNC: 165 MG/DL
WBC # FLD AUTO: 6.99 K/UL

## 2018-09-28 LAB — H PYLORI AG STL QL: NEGATIVE

## 2018-10-02 ENCOUNTER — OUTPATIENT (OUTPATIENT)
Dept: OUTPATIENT SERVICES | Facility: HOSPITAL | Age: 48
LOS: 1 days | Discharge: HOME | End: 2018-10-02

## 2018-10-02 ENCOUNTER — OTHER (OUTPATIENT)
Age: 48
End: 2018-10-02

## 2018-10-02 DIAGNOSIS — R10.31 RIGHT LOWER QUADRANT PAIN: ICD-10-CM

## 2018-10-03 ENCOUNTER — EMERGENCY (EMERGENCY)
Facility: HOSPITAL | Age: 48
LOS: 0 days | Discharge: HOME | End: 2018-10-03
Attending: EMERGENCY MEDICINE | Admitting: EMERGENCY MEDICINE

## 2018-10-03 VITALS
TEMPERATURE: 96 F | DIASTOLIC BLOOD PRESSURE: 82 MMHG | HEART RATE: 98 BPM | SYSTOLIC BLOOD PRESSURE: 182 MMHG | RESPIRATION RATE: 20 BRPM | OXYGEN SATURATION: 100 %

## 2018-10-03 VITALS
RESPIRATION RATE: 18 BRPM | DIASTOLIC BLOOD PRESSURE: 78 MMHG | HEART RATE: 82 BPM | OXYGEN SATURATION: 98 % | TEMPERATURE: 97 F | SYSTOLIC BLOOD PRESSURE: 160 MMHG

## 2018-10-03 DIAGNOSIS — Z79.84 LONG TERM (CURRENT) USE OF ORAL HYPOGLYCEMIC DRUGS: ICD-10-CM

## 2018-10-03 DIAGNOSIS — Z98.891 HISTORY OF UTERINE SCAR FROM PREVIOUS SURGERY: ICD-10-CM

## 2018-10-03 DIAGNOSIS — E78.00 PURE HYPERCHOLESTEROLEMIA, UNSPECIFIED: ICD-10-CM

## 2018-10-03 DIAGNOSIS — I10 ESSENTIAL (PRIMARY) HYPERTENSION: ICD-10-CM

## 2018-10-03 DIAGNOSIS — K29.70 GASTRITIS, UNSPECIFIED, WITHOUT BLEEDING: ICD-10-CM

## 2018-10-03 DIAGNOSIS — Z98.891 HISTORY OF UTERINE SCAR FROM PREVIOUS SURGERY: Chronic | ICD-10-CM

## 2018-10-03 DIAGNOSIS — R10.9 UNSPECIFIED ABDOMINAL PAIN: ICD-10-CM

## 2018-10-03 DIAGNOSIS — E11.9 TYPE 2 DIABETES MELLITUS WITHOUT COMPLICATIONS: ICD-10-CM

## 2018-10-03 DIAGNOSIS — Z79.899 OTHER LONG TERM (CURRENT) DRUG THERAPY: ICD-10-CM

## 2018-10-03 LAB
ANION GAP SERPL CALC-SCNC: 17 MMOL/L — HIGH (ref 7–14)
BASE EXCESS BLDV CALC-SCNC: 5.4 MMOL/L — HIGH (ref -2–2)
BASOPHILS # BLD AUTO: 0.05 K/UL — SIGNIFICANT CHANGE UP (ref 0–0.2)
BASOPHILS NFR BLD AUTO: 0.3 % — SIGNIFICANT CHANGE UP (ref 0–1)
BUN SERPL-MCNC: 15 MG/DL — SIGNIFICANT CHANGE UP (ref 10–20)
CA-I SERPL-SCNC: 1.18 MMOL/L — SIGNIFICANT CHANGE UP (ref 1.12–1.3)
CALCIUM SERPL-MCNC: 9.7 MG/DL — SIGNIFICANT CHANGE UP (ref 8.5–10.1)
CHLORIDE SERPL-SCNC: 94 MMOL/L — LOW (ref 98–110)
CO2 SERPL-SCNC: 26 MMOL/L — SIGNIFICANT CHANGE UP (ref 17–32)
CREAT SERPL-MCNC: 0.7 MG/DL — SIGNIFICANT CHANGE UP (ref 0.7–1.5)
EOSINOPHIL # BLD AUTO: 0 K/UL — SIGNIFICANT CHANGE UP (ref 0–0.7)
EOSINOPHIL NFR BLD AUTO: 0 % — SIGNIFICANT CHANGE UP (ref 0–8)
GAS PNL BLDV: 140 MMOL/L — SIGNIFICANT CHANGE UP (ref 136–145)
GAS PNL BLDV: SIGNIFICANT CHANGE UP
GLUCOSE SERPL-MCNC: 198 MG/DL — HIGH (ref 70–99)
HCO3 BLDV-SCNC: 33 MMOL/L — HIGH (ref 22–29)
HCT VFR BLD CALC: 37.1 % — SIGNIFICANT CHANGE UP (ref 37–47)
HCT VFR BLDA CALC: 51.3 % — HIGH (ref 34–44)
HGB BLD CALC-MCNC: 16.7 G/DL — SIGNIFICANT CHANGE UP (ref 14–18)
HGB BLD-MCNC: 12.5 G/DL — SIGNIFICANT CHANGE UP (ref 12–16)
IMM GRANULOCYTES NFR BLD AUTO: 0.6 % — HIGH (ref 0.1–0.3)
LACTATE BLDV-MCNC: 1.2 MMOL/L — SIGNIFICANT CHANGE UP (ref 0.5–1.6)
LACTATE SERPL-SCNC: 1.3 MMOL/L — SIGNIFICANT CHANGE UP (ref 0.5–2.2)
LIDOCAIN IGE QN: 47 U/L — SIGNIFICANT CHANGE UP (ref 7–60)
LYMPHOCYTES # BLD AUTO: 1.15 K/UL — LOW (ref 1.2–3.4)
LYMPHOCYTES # BLD AUTO: 6.5 % — LOW (ref 20.5–51.1)
MCHC RBC-ENTMCNC: 28.7 PG — SIGNIFICANT CHANGE UP (ref 27–31)
MCHC RBC-ENTMCNC: 33.7 G/DL — SIGNIFICANT CHANGE UP (ref 32–37)
MCV RBC AUTO: 85.3 FL — SIGNIFICANT CHANGE UP (ref 81–99)
MONOCYTES # BLD AUTO: 0.62 K/UL — HIGH (ref 0.1–0.6)
MONOCYTES NFR BLD AUTO: 3.5 % — SIGNIFICANT CHANGE UP (ref 1.7–9.3)
NEUTROPHILS # BLD AUTO: 15.9 K/UL — HIGH (ref 1.4–6.5)
NEUTROPHILS NFR BLD AUTO: 89.1 % — HIGH (ref 42.2–75.2)
NRBC # BLD: 0 /100 WBCS — SIGNIFICANT CHANGE UP (ref 0–0)
PCO2 BLDV: 58 MMHG — HIGH (ref 41–51)
PH BLDV: 7.36 — SIGNIFICANT CHANGE UP (ref 7.26–7.43)
PLATELET # BLD AUTO: 260 K/UL — SIGNIFICANT CHANGE UP (ref 130–400)
PO2 BLDV: 25 MMHG — SIGNIFICANT CHANGE UP (ref 20–40)
POTASSIUM BLDV-SCNC: 4.6 MMOL/L — SIGNIFICANT CHANGE UP (ref 3.3–5.6)
POTASSIUM SERPL-MCNC: 4.6 MMOL/L — SIGNIFICANT CHANGE UP (ref 3.5–5)
POTASSIUM SERPL-SCNC: 4.6 MMOL/L — SIGNIFICANT CHANGE UP (ref 3.5–5)
RBC # BLD: 4.35 M/UL — SIGNIFICANT CHANGE UP (ref 4.2–5.4)
RBC # FLD: 13.2 % — SIGNIFICANT CHANGE UP (ref 11.5–14.5)
SAO2 % BLDV: 40 % — SIGNIFICANT CHANGE UP
SODIUM SERPL-SCNC: 137 MMOL/L — SIGNIFICANT CHANGE UP (ref 135–146)
WBC # BLD: 17.82 K/UL — HIGH (ref 4.8–10.8)
WBC # FLD AUTO: 17.82 K/UL — HIGH (ref 4.8–10.8)

## 2018-10-03 RX ORDER — ONDANSETRON 8 MG/1
4 TABLET, FILM COATED ORAL ONCE
Qty: 0 | Refills: 0 | Status: COMPLETED | OUTPATIENT
Start: 2018-10-03 | End: 2018-10-03

## 2018-10-03 RX ORDER — FAMOTIDINE 10 MG/ML
1 INJECTION INTRAVENOUS
Qty: 10 | Refills: 0
Start: 2018-10-03 | End: 2018-10-12

## 2018-10-03 RX ORDER — SODIUM CHLORIDE 9 MG/ML
1000 INJECTION INTRAMUSCULAR; INTRAVENOUS; SUBCUTANEOUS ONCE
Qty: 0 | Refills: 0 | Status: DISCONTINUED | OUTPATIENT
Start: 2018-10-03 | End: 2018-10-03

## 2018-10-03 RX ORDER — FAMOTIDINE 10 MG/ML
20 INJECTION INTRAVENOUS ONCE
Qty: 0 | Refills: 0 | Status: COMPLETED | OUTPATIENT
Start: 2018-10-03 | End: 2018-10-03

## 2018-10-03 RX ORDER — SODIUM CHLORIDE 9 MG/ML
1000 INJECTION INTRAMUSCULAR; INTRAVENOUS; SUBCUTANEOUS ONCE
Qty: 0 | Refills: 0 | Status: COMPLETED | OUTPATIENT
Start: 2018-10-03 | End: 2018-10-03

## 2018-10-03 RX ADMIN — ONDANSETRON 4 MILLIGRAM(S): 8 TABLET, FILM COATED ORAL at 16:42

## 2018-10-03 RX ADMIN — SODIUM CHLORIDE 2000 MILLILITER(S): 9 INJECTION INTRAMUSCULAR; INTRAVENOUS; SUBCUTANEOUS at 16:42

## 2018-10-03 RX ADMIN — FAMOTIDINE 20 MILLIGRAM(S): 10 INJECTION INTRAVENOUS at 16:40

## 2018-10-03 NOTE — ED ADULT NURSE REASSESSMENT NOTE - NS ED NURSE REASSESS COMMENT FT1
Pt reassessed A.O times 4 Vs stable report filling slight better ,denies no abdomen pain no SOB no N.V , ambulate steady ,pt is seen evaluate by ED attending clear to go home ready to go home with family members NAD noted at this time . Offered and patient declined

## 2018-10-03 NOTE — ED PROVIDER NOTE - OBJECTIVE STATEMENT
47 female here for multiple complaints. Admits to dizziness and headache with nausea and vomiting x 6 today, constant. Feels lightheaded with walking. Admits to being a poorly controlled diabetic with recent workup for chronic abdominal pain including CT abd / pelvis done yesterday.     PMH: DM HTN iron deficiency anemia migraines  PSH: c section many years ago

## 2018-10-03 NOTE — ED PROVIDER NOTE - MEDICAL DECISION MAKING DETAILS
47 female here for intractable vomiting got labs imaging supportive care and will discharge. Has known untreated H Pylori from prior EGD several months ago, suggest close outpatient Follow up to get results and possible initiation of triple therapy.

## 2018-10-03 NOTE — ED ADULT NURSE NOTE - NSIMPLEMENTINTERV_GEN_ALL_ED
Implemented All Universal Safety Interventions:  [place] to call system. Call bell, personal items and telephone within reach. Instruct patient to call for assistance. Room bathroom lighting operational. Non-slip footwear when patient is off stretcher. Physically safe environment: no spills, clutter or unnecessary equipment. Stretcher in lowest position, wheels locked, appropriate side rails in place.

## 2018-10-03 NOTE — ED PROVIDER NOTE - CARDIOVASCULAR NEGATIVE STATEMENT, MLM
[facility], [place] [address] [facility] [address] CC:parent  no chest pain and no edema.

## 2018-10-05 ENCOUNTER — APPOINTMENT (OUTPATIENT)
Dept: PODIATRY | Facility: CLINIC | Age: 48
End: 2018-10-05

## 2018-10-16 PROBLEM — I10 ESSENTIAL (PRIMARY) HYPERTENSION: Chronic | Status: ACTIVE | Noted: 2018-10-03

## 2018-10-16 PROBLEM — E11.9 TYPE 2 DIABETES MELLITUS WITHOUT COMPLICATIONS: Chronic | Status: ACTIVE | Noted: 2018-10-03

## 2018-10-16 PROBLEM — G62.9 POLYNEUROPATHY, UNSPECIFIED: Chronic | Status: ACTIVE | Noted: 2018-10-03

## 2018-10-16 PROBLEM — E78.00 PURE HYPERCHOLESTEROLEMIA, UNSPECIFIED: Chronic | Status: ACTIVE | Noted: 2018-10-03

## 2018-10-25 ENCOUNTER — OUTPATIENT (OUTPATIENT)
Dept: OUTPATIENT SERVICES | Facility: HOSPITAL | Age: 48
LOS: 1 days | Discharge: HOME | End: 2018-10-25

## 2018-10-25 ENCOUNTER — APPOINTMENT (OUTPATIENT)
Dept: ENDOCRINOLOGY | Facility: CLINIC | Age: 48
End: 2018-10-25

## 2018-10-25 VITALS
HEART RATE: 75 BPM | WEIGHT: 102 LBS | DIASTOLIC BLOOD PRESSURE: 83 MMHG | HEIGHT: 58 IN | BODY MASS INDEX: 21.41 KG/M2 | SYSTOLIC BLOOD PRESSURE: 185 MMHG

## 2018-10-25 DIAGNOSIS — Z98.891 HISTORY OF UTERINE SCAR FROM PREVIOUS SURGERY: Chronic | ICD-10-CM

## 2018-10-31 ENCOUNTER — OTHER (OUTPATIENT)
Age: 48
End: 2018-10-31

## 2018-11-02 ENCOUNTER — APPOINTMENT (OUTPATIENT)
Dept: GASTROENTEROLOGY | Facility: CLINIC | Age: 48
End: 2018-11-02

## 2018-11-02 ENCOUNTER — OUTPATIENT (OUTPATIENT)
Dept: OUTPATIENT SERVICES | Facility: HOSPITAL | Age: 48
LOS: 1 days | Discharge: HOME | End: 2018-11-02

## 2018-11-02 VITALS
WEIGHT: 105 LBS | BODY MASS INDEX: 22.04 KG/M2 | HEART RATE: 89 BPM | SYSTOLIC BLOOD PRESSURE: 150 MMHG | HEIGHT: 58 IN | TEMPERATURE: 97.5 F | DIASTOLIC BLOOD PRESSURE: 82 MMHG

## 2018-11-02 DIAGNOSIS — R10.9 UNSPECIFIED ABDOMINAL PAIN: ICD-10-CM

## 2018-11-02 DIAGNOSIS — Z98.891 HISTORY OF UTERINE SCAR FROM PREVIOUS SURGERY: Chronic | ICD-10-CM

## 2018-11-13 ENCOUNTER — OUTPATIENT (OUTPATIENT)
Dept: OUTPATIENT SERVICES | Facility: HOSPITAL | Age: 48
LOS: 1 days | Discharge: HOME | End: 2018-11-13

## 2018-11-13 ENCOUNTER — APPOINTMENT (OUTPATIENT)
Dept: PODIATRY | Facility: CLINIC | Age: 48
End: 2018-11-13
Payer: COMMERCIAL

## 2018-11-13 VITALS — HEIGHT: 58 IN | WEIGHT: 106 LBS | BODY MASS INDEX: 22.25 KG/M2

## 2018-11-13 VITALS — HEART RATE: 76 BPM | SYSTOLIC BLOOD PRESSURE: 159 MMHG | DIASTOLIC BLOOD PRESSURE: 89 MMHG

## 2018-11-13 DIAGNOSIS — Z98.891 HISTORY OF UTERINE SCAR FROM PREVIOUS SURGERY: Chronic | ICD-10-CM

## 2018-11-13 DIAGNOSIS — G57.90 TYPE 2 DIABETES MELLITUS WITH DIABETIC MONONEUROPATHY: ICD-10-CM

## 2018-11-13 DIAGNOSIS — M79.671 PAIN IN RIGHT FOOT: ICD-10-CM

## 2018-11-13 DIAGNOSIS — E11.41 TYPE 2 DIABETES MELLITUS WITH DIABETIC MONONEUROPATHY: ICD-10-CM

## 2018-11-13 PROCEDURE — 99203 OFFICE O/P NEW LOW 30 MIN: CPT

## 2018-11-20 PROBLEM — E11.41: Status: ACTIVE | Noted: 2017-08-02

## 2018-11-26 DIAGNOSIS — M79.672 PAIN IN LEFT FOOT: ICD-10-CM

## 2018-11-26 DIAGNOSIS — E11.41 TYPE 2 DIABETES MELLITUS WITH DIABETIC MONONEUROPATHY: ICD-10-CM

## 2018-11-26 DIAGNOSIS — Z86.39 PERSONAL HISTORY OF OTHER ENDOCRINE, NUTRITIONAL AND METABOLIC DISEASE: ICD-10-CM

## 2018-11-26 DIAGNOSIS — M79.671 PAIN IN RIGHT FOOT: ICD-10-CM

## 2018-11-26 DIAGNOSIS — M65.9 SYNOVITIS AND TENOSYNOVITIS, UNSPECIFIED: ICD-10-CM

## 2018-11-26 DIAGNOSIS — Z86.19 PERSONAL HISTORY OF OTHER INFECTIOUS AND PARASITIC DISEASES: ICD-10-CM

## 2018-11-26 DIAGNOSIS — M77.9 ENTHESOPATHY, UNSPECIFIED: ICD-10-CM

## 2018-11-27 ENCOUNTER — OUTPATIENT (OUTPATIENT)
Dept: OUTPATIENT SERVICES | Facility: HOSPITAL | Age: 48
LOS: 1 days | Discharge: HOME | End: 2018-11-27

## 2018-11-27 DIAGNOSIS — Z98.891 HISTORY OF UTERINE SCAR FROM PREVIOUS SURGERY: Chronic | ICD-10-CM

## 2018-11-27 DIAGNOSIS — M79.671 PAIN IN RIGHT FOOT: ICD-10-CM

## 2018-11-28 LAB
ALBUMIN SERPL ELPH-MCNC: 4 G/DL
ALP BLD-CCNC: 74 U/L
ALT SERPL-CCNC: 18 U/L
ANION GAP SERPL CALC-SCNC: 16 MMOL/L
AST SERPL-CCNC: 17 U/L
BASOPHILS # BLD AUTO: 0.04 K/UL
BASOPHILS NFR BLD AUTO: 0.6 %
BILIRUB SERPL-MCNC: 0.2 MG/DL
BUN SERPL-MCNC: 11 MG/DL
CALCIUM SERPL-MCNC: 9.2 MG/DL
CHLORIDE SERPL-SCNC: 98 MMOL/L
CO2 SERPL-SCNC: 24 MMOL/L
CREAT SERPL-MCNC: 0.5 MG/DL
EOSINOPHIL # BLD AUTO: 0.06 K/UL
EOSINOPHIL NFR BLD AUTO: 0.9 %
GLUCOSE SERPL-MCNC: 165 MG/DL
HCT VFR BLD CALC: 41.4 %
HGB BLD-MCNC: 13.2 G/DL
IMM GRANULOCYTES NFR BLD AUTO: 0.3 %
LYMPHOCYTES # BLD AUTO: 1.93 K/UL
LYMPHOCYTES NFR BLD AUTO: 28.1 %
MAN DIFF?: NORMAL
MCHC RBC-ENTMCNC: 29.1 PG
MCHC RBC-ENTMCNC: 31.9 G/DL
MCV RBC AUTO: 91.4 FL
MONOCYTES # BLD AUTO: 0.41 K/UL
MONOCYTES NFR BLD AUTO: 6 %
NEUTROPHILS # BLD AUTO: 4.42 K/UL
NEUTROPHILS NFR BLD AUTO: 64.1 %
PLATELET # BLD AUTO: 283 K/UL
POTASSIUM SERPL-SCNC: 4.1 MMOL/L
PROT SERPL-MCNC: 7.3 G/DL
RBC # BLD: 4.53 M/UL
RBC # FLD: 13.4 %
SODIUM SERPL-SCNC: 138 MMOL/L
WBC # FLD AUTO: 6.88 K/UL

## 2018-12-05 ENCOUNTER — OUTPATIENT (OUTPATIENT)
Dept: OUTPATIENT SERVICES | Facility: HOSPITAL | Age: 48
LOS: 1 days | Discharge: HOME | End: 2018-12-05

## 2018-12-05 ENCOUNTER — APPOINTMENT (OUTPATIENT)
Dept: PODIATRY | Facility: CLINIC | Age: 48
End: 2018-12-05
Payer: COMMERCIAL

## 2018-12-05 VITALS
HEIGHT: 58 IN | WEIGHT: 105 LBS | DIASTOLIC BLOOD PRESSURE: 93 MMHG | BODY MASS INDEX: 22.04 KG/M2 | SYSTOLIC BLOOD PRESSURE: 160 MMHG | HEART RATE: 71 BPM

## 2018-12-05 DIAGNOSIS — Z98.891 HISTORY OF UTERINE SCAR FROM PREVIOUS SURGERY: Chronic | ICD-10-CM

## 2018-12-05 PROCEDURE — 99213 OFFICE O/P EST LOW 20 MIN: CPT

## 2018-12-11 ENCOUNTER — OUTPATIENT (OUTPATIENT)
Dept: OUTPATIENT SERVICES | Facility: HOSPITAL | Age: 48
LOS: 1 days | Discharge: HOME | End: 2018-12-11

## 2018-12-11 DIAGNOSIS — Z98.891 HISTORY OF UTERINE SCAR FROM PREVIOUS SURGERY: Chronic | ICD-10-CM

## 2018-12-12 DIAGNOSIS — M79.671 PAIN IN RIGHT FOOT: ICD-10-CM

## 2018-12-12 DIAGNOSIS — E11.42 TYPE 2 DIABETES MELLITUS WITH DIABETIC POLYNEUROPATHY: ICD-10-CM

## 2018-12-12 DIAGNOSIS — M79.672 PAIN IN LEFT FOOT: ICD-10-CM

## 2018-12-12 DIAGNOSIS — B35.1 TINEA UNGUIUM: ICD-10-CM

## 2018-12-19 ENCOUNTER — APPOINTMENT (OUTPATIENT)
Dept: ENDOCRINOLOGY | Facility: CLINIC | Age: 48
End: 2018-12-19

## 2019-01-02 ENCOUNTER — OUTPATIENT (OUTPATIENT)
Dept: OUTPATIENT SERVICES | Facility: HOSPITAL | Age: 49
LOS: 1 days | Discharge: HOME | End: 2019-01-02

## 2019-01-02 ENCOUNTER — APPOINTMENT (OUTPATIENT)
Dept: PODIATRY | Facility: CLINIC | Age: 49
End: 2019-01-02
Payer: COMMERCIAL

## 2019-01-02 VITALS
BODY MASS INDEX: 22.04 KG/M2 | HEART RATE: 78 BPM | WEIGHT: 105 LBS | DIASTOLIC BLOOD PRESSURE: 89 MMHG | HEIGHT: 58 IN | SYSTOLIC BLOOD PRESSURE: 147 MMHG

## 2019-01-02 DIAGNOSIS — Z98.891 HISTORY OF UTERINE SCAR FROM PREVIOUS SURGERY: Chronic | ICD-10-CM

## 2019-01-02 DIAGNOSIS — M77.9 ENTHESOPATHY, UNSPECIFIED: ICD-10-CM

## 2019-01-02 PROCEDURE — 99213 OFFICE O/P EST LOW 20 MIN: CPT

## 2019-01-14 PROBLEM — M77.9 TENDONITIS WITH ENTHESOPATHY: Status: ACTIVE | Noted: 2018-11-20

## 2019-01-15 ENCOUNTER — APPOINTMENT (OUTPATIENT)
Dept: INTERNAL MEDICINE | Facility: CLINIC | Age: 49
End: 2019-01-15

## 2019-01-17 DIAGNOSIS — M79.672 PAIN IN LEFT FOOT: ICD-10-CM

## 2019-01-17 DIAGNOSIS — M65.9 SYNOVITIS AND TENOSYNOVITIS, UNSPECIFIED: ICD-10-CM

## 2019-01-17 DIAGNOSIS — G56.92 UNSPECIFIED MONONEUROPATHY OF LEFT UPPER LIMB: ICD-10-CM

## 2019-01-17 DIAGNOSIS — M77.9 ENTHESOPATHY, UNSPECIFIED: ICD-10-CM

## 2019-02-14 ENCOUNTER — APPOINTMENT (OUTPATIENT)
Dept: PLASTIC SURGERY | Facility: CLINIC | Age: 49
End: 2019-02-14
Payer: SUBSIDIZED

## 2019-02-14 VITALS — HEIGHT: 60 IN | BODY MASS INDEX: 20.62 KG/M2 | WEIGHT: 105 LBS

## 2019-02-14 PROCEDURE — 20550 NJX 1 TENDON SHEATH/LIGAMENT: CPT | Mod: FA

## 2019-02-14 PROCEDURE — 99202 OFFICE O/P NEW SF 15 MIN: CPT | Mod: 25

## 2019-02-14 RX ORDER — SYRINGE-NEEDLE,INSULIN,0.5 ML 28GX1/2"
30G X 1/2" SYRINGE, EMPTY DISPOSABLE MISCELLANEOUS
Qty: 100 | Refills: 1 | Status: DISCONTINUED | COMMUNITY
Start: 2018-10-25 | End: 2019-02-14

## 2019-02-14 RX ORDER — HUMAN INSULIN 100 [IU]/ML
100 INJECTION, SUSPENSION SUBCUTANEOUS TWICE DAILY
Qty: 1 | Refills: 3 | Status: DISCONTINUED | COMMUNITY
Start: 2018-10-25 | End: 2019-02-14

## 2019-02-14 NOTE — PHYSICAL EXAM
[de-identified] : well-developed female, NAD [de-identified] : NC/AT [de-identified] : PERRL [de-identified] : supple [de-identified] : AGUILAR [de-identified] : soft, nontender  [de-identified] : bilateral hands without any evidence of trauma or lacerations\par Left hand- positive Finkelstein test with tenderness to palpation over radial styloid and MC, FROM, sensory exam intact\par Right hand- tenderness to palpation over ulnar wrist, pain with compression on ulnar deviated; painful pronation and supination otherwise FROM \par Negative grind test bilateral thumb base

## 2019-02-14 NOTE — ASSESSMENT
[FreeTextEntry1] : 49 yo RHD diabetic F with bilateral hand pain- left likely related to left De Quervain TS. \par \par - recommend Kenalog injection \par - thumb spica splint\par - NSAIDs\par - bilateral hand X-Ray before next office visit to evaluate suspected OA, rule out ulnar impaction syndrome (right)\par - all questions were answered\par - follow up in 6 weeks

## 2019-02-14 NOTE — HISTORY OF PRESENT ILLNESS
[FreeTextEntry1] : 49 yo RHD F with PMH of HTN, HLD, NIDDM with neuropathy, Anemia, GERD and IBS who presents today for evaluation of bilateral hand pain for the past 3 months. Patient reports left thumb and right wrist episodic pain and stiffness mostly in the morning and at night not related to hand use. Also c/o hand weakness and limited ROM. Denies any hand trauma, tingling of digits or paresthesia. \par \par Occupation: cleaning lady

## 2019-02-14 NOTE — PROCEDURE
[Nl] : None [FreeTextEntry1] : left thumb Dequervain's syndrome [FreeTextEntry2] : kenalog injection of left thumb Dequervain's syndrome [FreeTextEntry3] : cold refrigerant [FreeTextEntry6] : The benefits, risks, and outcomes of kenalog injection were discussed. The risks include infection, hypopigmentation, poor wound healing, fat atrophy, hyperglycemia and dissatisfaction with outcome. The patient understood the risks and elected to proceed with steroid injection.\par \par The left Dequervain's tendon sheath was prepared in sterile fashion. \par The injection site was identified and marked. \par Triamcinolone 1 ml was injected into the extensor tendon sheath without issue. \par The patient tolerated the procedure.

## 2019-03-07 ENCOUNTER — FORM ENCOUNTER (OUTPATIENT)
Age: 49
End: 2019-03-07

## 2019-03-08 ENCOUNTER — OUTPATIENT (OUTPATIENT)
Dept: OUTPATIENT SERVICES | Facility: HOSPITAL | Age: 49
LOS: 1 days | Discharge: HOME | End: 2019-03-08

## 2019-03-08 ENCOUNTER — APPOINTMENT (OUTPATIENT)
Dept: PODIATRY | Facility: CLINIC | Age: 49
End: 2019-03-08
Payer: COMMERCIAL

## 2019-03-08 VITALS
WEIGHT: 106 LBS | HEART RATE: 82 BPM | HEIGHT: 60 IN | BODY MASS INDEX: 20.81 KG/M2 | SYSTOLIC BLOOD PRESSURE: 132 MMHG | DIASTOLIC BLOOD PRESSURE: 79 MMHG

## 2019-03-08 DIAGNOSIS — Z98.891 HISTORY OF UTERINE SCAR FROM PREVIOUS SURGERY: Chronic | ICD-10-CM

## 2019-03-08 DIAGNOSIS — M65.9 SYNOVITIS AND TENOSYNOVITIS, UNSPECIFIED: ICD-10-CM

## 2019-03-08 DIAGNOSIS — M79.641 PAIN IN RIGHT HAND: ICD-10-CM

## 2019-03-08 PROCEDURE — 99213 OFFICE O/P EST LOW 20 MIN: CPT

## 2019-03-15 NOTE — PHYSICAL EXAM
[General Appearance - Alert] : alert [General Appearance - In No Acute Distress] : in no acute distress [Sclera] : the sclera and conjunctiva were normal [PERRL With Normal Accommodation] : pupils were equal in size, round, and reactive to light [Skin Color & Pigmentation] : normal skin color and pigmentation [Skin Turgor] : normal skin turgor [] : no rash [Normal in Appearance] : normal in appearance [Full ROM] : with full range of motion [#1 Diminished] : number 1 was diminished [#2 Diminished] : number 2 was diminished [#6 Diminished] : number 6 was diminished [Deep Tendon Reflexes (DTR)] : deep tendon reflexes were 2+ and symmetric [Sensation] : the sensory exam was normal to light touch and pinprick [No Focal Deficits] : no focal deficits [Oriented To Time, Place, And Person] : oriented to person, place, and time [Impaired Insight] : insight and judgment were intact [Affect] : the affect was normal [2+] : left 2+ [Normal Station and Gait] : the gait and station were normal [Normal] : motor strength was normal in all muscle groups [Normal Motor Tone] : the muscle tone was normal [Involuntary Movements] : no involuntary movements were seen [Vibration Dec.] : normal vibratory sensation at the level of the toes [Position Sense Dec.] : normal position sense at the level of the toes [Diminished Throughout Right Foot] : normal tactile sensation with monofilament testing throughout right foot [Diminished Throughout Left Foot] : normal tactile sensation with monofilament testing throughout left foot [#5 Diminished] : number 5 was normal [#7 Diminished] : number 7 was normal [#8 Diminished] : number 8 was normal [#3 Diminished] : number 3 was normal [#4 Diminished] : number 4 was normal [#9 Diminished] : number 9 was normal [#10 Diminished] : number 10 was normal [Toes] :  capillary refill of the toes was normal [Left Toes] :  capillary refill of the left toes was normal [Right Toes] :  capillary refill of the right toes was normal

## 2019-03-15 NOTE — PROCEDURE
[FreeTextEntry1] : Patient examined, history and chart reviewed\par Rx hepatic function for possible Lamisil treatment \par patient educated on  foot health and maintenance at length \par Follow up in 3 months or PRN\par \par

## 2019-03-15 NOTE — HISTORY OF PRESENT ILLNESS
[FreeTextEntry1] : IZA TIAN   presents on 03/08/2019  for a  foot examination, patient was referred by PCP. Patient complains of n pain of both feet and denies an acute injury.  Patient also complains of thickened nails x 10 that are painful in shoes. Patient denies NVFC and denies SOB.\par

## 2019-03-19 ENCOUNTER — APPOINTMENT (OUTPATIENT)
Dept: PODIATRY | Facility: CLINIC | Age: 49
End: 2019-03-19

## 2019-03-25 ENCOUNTER — APPOINTMENT (OUTPATIENT)
Dept: INTERNAL MEDICINE | Facility: CLINIC | Age: 49
End: 2019-03-25

## 2019-03-25 ENCOUNTER — OUTPATIENT (OUTPATIENT)
Dept: OUTPATIENT SERVICES | Facility: HOSPITAL | Age: 49
LOS: 1 days | Discharge: HOME | End: 2019-03-25

## 2019-03-25 ENCOUNTER — FORM ENCOUNTER (OUTPATIENT)
Age: 49
End: 2019-03-25

## 2019-03-25 VITALS
TEMPERATURE: 97.1 F | DIASTOLIC BLOOD PRESSURE: 88 MMHG | SYSTOLIC BLOOD PRESSURE: 162 MMHG | HEIGHT: 60 IN | WEIGHT: 101 LBS | HEART RATE: 79 BPM | BODY MASS INDEX: 19.83 KG/M2

## 2019-03-25 VITALS — DIASTOLIC BLOOD PRESSURE: 89 MMHG | SYSTOLIC BLOOD PRESSURE: 163 MMHG

## 2019-03-25 VITALS — DIASTOLIC BLOOD PRESSURE: 90 MMHG | SYSTOLIC BLOOD PRESSURE: 154 MMHG

## 2019-03-25 DIAGNOSIS — Z98.891 HISTORY OF UTERINE SCAR FROM PREVIOUS SURGERY: Chronic | ICD-10-CM

## 2019-03-25 DIAGNOSIS — B35.1 TINEA UNGUIUM: ICD-10-CM

## 2019-03-25 LAB
ALBUMIN SERPL ELPH-MCNC: 4.2 G/DL
ALP BLD-CCNC: 85 U/L
ALT SERPL-CCNC: 14 U/L
AST SERPL-CCNC: 13 U/L
BILIRUB DIRECT SERPL-MCNC: <0.2 MG/DL
BILIRUB INDIRECT SERPL-MCNC: >0 MG/DL
BILIRUB SERPL-MCNC: 0.2 MG/DL
GLUCOSE BLDC GLUCOMTR-MCNC: 264 MG/DL — HIGH (ref 70–99)
PROT SERPL-MCNC: 7.2 G/DL

## 2019-03-25 RX ORDER — GLIMEPIRIDE 1 MG/1
1 TABLET ORAL DAILY
Qty: 30 | Refills: 3 | Status: DISCONTINUED | COMMUNITY
Start: 2018-09-11 | End: 2019-03-25

## 2019-03-25 RX ORDER — DICYCLOMINE HYDROCHLORIDE 10 MG/1
10 CAPSULE ORAL 3 TIMES DAILY
Qty: 90 | Refills: 0 | Status: DISCONTINUED | COMMUNITY
Start: 2018-11-02 | End: 2019-03-25

## 2019-03-25 RX ORDER — MELOXICAM 15 MG/1
15 TABLET ORAL DAILY
Qty: 30 | Refills: 2 | Status: DISCONTINUED | COMMUNITY
Start: 2018-12-05 | End: 2019-03-25

## 2019-03-25 RX ORDER — AVOBENZONE, HOMOSALATE, OCTISALATE, OCTOCRYLENE, AND OXYBENZONE 29.4; 147; 49; 25.4; 58.8 MG/ML; MG/ML; MG/ML; MG/ML; MG/ML
51.7 LOTION TOPICAL DAILY
Qty: 30 | Refills: 2 | Status: DISCONTINUED | COMMUNITY
Start: 2018-11-02 | End: 2019-03-25

## 2019-03-25 RX ORDER — DICLOFENAC SODIUM 10 MG/G
1 GEL TOPICAL DAILY
Qty: 1 | Refills: 2 | Status: DISCONTINUED | COMMUNITY
Start: 2018-11-13 | End: 2019-03-25

## 2019-03-25 RX ORDER — DICLOFENAC SODIUM 10 MG/G
1 GEL TOPICAL DAILY
Qty: 1 | Refills: 5 | Status: DISCONTINUED | COMMUNITY
Start: 2019-03-08 | End: 2019-03-25

## 2019-03-25 RX ORDER — OMEPRAZOLE 40 MG/1
40 CAPSULE, DELAYED RELEASE ORAL
Qty: 30 | Refills: 5 | Status: DISCONTINUED | COMMUNITY
Start: 2017-10-13 | End: 2019-03-25

## 2019-03-25 RX ORDER — DICYCLOMINE HYDROCHLORIDE 10 MG/1
10 CAPSULE ORAL 3 TIMES DAILY
Qty: 90 | Refills: 3 | Status: DISCONTINUED | COMMUNITY
Start: 2018-03-16 | End: 2019-03-25

## 2019-03-25 RX ORDER — RAMIPRIL 2.5 MG/1
2.5 CAPSULE ORAL
Qty: 60 | Refills: 5 | Status: DISCONTINUED | COMMUNITY
Start: 2017-06-20 | End: 2019-03-25

## 2019-03-25 RX ORDER — LANCETS 33 GAUGE
EACH MISCELLANEOUS
Qty: 100 | Refills: 5 | Status: DISCONTINUED | COMMUNITY
Start: 2017-11-30 | End: 2019-03-25

## 2019-03-25 RX ORDER — MELOXICAM 15 MG/1
15 TABLET ORAL DAILY
Qty: 30 | Refills: 2 | Status: DISCONTINUED | COMMUNITY
Start: 2018-11-13 | End: 2019-03-25

## 2019-03-25 RX ORDER — PSYLLIUM SEED
28.3 PACKET (EA) ORAL
Qty: 30 | Refills: 2 | Status: DISCONTINUED | COMMUNITY
Start: 2018-03-16 | End: 2019-03-25

## 2019-03-26 ENCOUNTER — OUTPATIENT (OUTPATIENT)
Dept: OUTPATIENT SERVICES | Facility: HOSPITAL | Age: 49
LOS: 1 days | Discharge: HOME | End: 2019-03-26

## 2019-03-26 DIAGNOSIS — Z98.891 HISTORY OF UTERINE SCAR FROM PREVIOUS SURGERY: Chronic | ICD-10-CM

## 2019-03-26 DIAGNOSIS — R92.8 OTHER ABNORMAL AND INCONCLUSIVE FINDINGS ON DIAGNOSTIC IMAGING OF BREAST: ICD-10-CM

## 2019-03-26 LAB
ALBUMIN SERPL ELPH-MCNC: 4.1 G/DL
ALP BLD-CCNC: 84 U/L
ALT SERPL-CCNC: 14 U/L
ANION GAP SERPL CALC-SCNC: 11 MMOL/L
AST SERPL-CCNC: 13 U/L
BASOPHILS # BLD AUTO: 0.04 K/UL
BASOPHILS NFR BLD AUTO: 0.5 %
BILIRUB SERPL-MCNC: 0.2 MG/DL
BUN SERPL-MCNC: 15 MG/DL
CALCIUM SERPL-MCNC: 9.3 MG/DL
CHLORIDE SERPL-SCNC: 101 MMOL/L
CHOLEST SERPL-MCNC: 273 MG/DL
CHOLEST/HDLC SERPL: 3.2 RATIO
CO2 SERPL-SCNC: 27 MMOL/L
CREAT SERPL-MCNC: 0.6 MG/DL
EOSINOPHIL # BLD AUTO: 0.06 K/UL
EOSINOPHIL NFR BLD AUTO: 0.8 %
ESTIMATED AVERAGE GLUCOSE: 338 MG/DL
GLUCOSE SERPL-MCNC: 252 MG/DL
HBA1C MFR BLD HPLC: 13.4 %
HCT VFR BLD CALC: 38.5 %
HDLC SERPL-MCNC: 86 MG/DL
HGB BLD-MCNC: 12.6 G/DL
IMM GRANULOCYTES NFR BLD AUTO: 0.3 %
LDLC SERPL CALC-MCNC: 186 MG/DL
LYMPHOCYTES # BLD AUTO: 2.04 K/UL
LYMPHOCYTES NFR BLD AUTO: 26.1 %
MAN DIFF?: NORMAL
MCHC RBC-ENTMCNC: 28.1 PG
MCHC RBC-ENTMCNC: 32.7 G/DL
MCV RBC AUTO: 85.9 FL
MONOCYTES # BLD AUTO: 0.53 K/UL
MONOCYTES NFR BLD AUTO: 6.8 %
NEUTROPHILS # BLD AUTO: 5.13 K/UL
NEUTROPHILS NFR BLD AUTO: 65.5 %
PLATELET # BLD AUTO: 352 K/UL
POTASSIUM SERPL-SCNC: 4.6 MMOL/L
PROT SERPL-MCNC: 7.2 G/DL
RBC # BLD: 4.48 M/UL
RBC # FLD: 12.7 %
SODIUM SERPL-SCNC: 139 MMOL/L
TRIGL SERPL-MCNC: 108 MG/DL
WBC # FLD AUTO: 7.82 K/UL

## 2019-03-26 RX ORDER — LANCETS 33 GAUGE
EACH MISCELLANEOUS
Qty: 1 | Refills: 5 | Status: ACTIVE | COMMUNITY
Start: 2017-08-24 | End: 1900-01-01

## 2019-03-26 RX ORDER — ISOPROPYL ALCOHOL 0.7 ML/ML
SWAB TOPICAL
Qty: 100 | Refills: 5 | Status: ACTIVE | COMMUNITY
Start: 2017-11-30 | End: 1900-01-01

## 2019-03-28 ENCOUNTER — APPOINTMENT (OUTPATIENT)
Dept: PLASTIC SURGERY | Facility: CLINIC | Age: 49
End: 2019-03-28
Payer: SUBSIDIZED

## 2019-03-28 PROCEDURE — 99213 OFFICE O/P EST LOW 20 MIN: CPT

## 2019-03-28 NOTE — PLAN
[FreeTextEntry1] : H.pylori gastritis on EGD\par - s/p triple therapy\par - H pylori treated stool Ag (-)\par \par HTN\par -bp in office 162/88, HR 79\par - c/w lisinopril 40mg od \par -pt did not take her bp meds before coming here\par -she needs to come back in 1 month and take her bp meds before coming\par \par DM II with neuropathy\par -c/w metformin 1000mg bid, pioglitazone 30mg od, glimeperide 2mg od \par -will start januvia 100mg q24\par - Ophthalmology and Podiatry following, needs Ophthalomogy appt now\par - HbA1c 12.3 in Sept 2018, FS in office 260s\par -repeat bloodwork, pt educated to check FS daily\par -will check anti sukhi 65 and IA-2 since pt is very skinny and has difficulty gaining weight\par \par -last saw endocrinology in Oct 2018, will send again\par -c/w gabapentin 100mg q8 for peripheral neuropathy\par \par DLD\par - lipid profile Aug 2018: Total cholesterol 287, , HDL 73, \par - c/w lipitor 40mg q24\par \par Iron Deficiency Anemia likely 2/2 heavy periods.\par - c/w Ferrous sulfate, hgb wnl from Nov 2018\par \par B/L Hand Pain-left likely related to left deQuervain TS\par -follows with Plastics\par -xray shows inflammatory arthropathy\par \par \par GERD: c/w pepcid 20mg q24\par \par HCM\par - Mammogram last sept 2018, repeat now, birads 3\par - Colonoscopy 12/2017: few polyps but normal mucosa on histology, one hyperplastic polyp. No family hx of Colon CA. GI recommended to repeat colonoscopy in 10 years. \par - PAP smear Dec 2017: normal, needs repeat pap smear\par -check tsh\par - RTC in 1 month\par

## 2019-03-28 NOTE — DATA REVIEWED
[FreeTextEntry1] : \par EXAM: XR HAND MIN 3 VIEWS BI \par \par \par PROCEDURE DATE: 03/08/2019 \par \par \par \par \par INTERPRETATION: CLINICAL HISTORY: Pain. \par \par COMPARISON: None. \par \par TECHNIQUE: 3 views of each hand. \par \par \par FINDINGS: \par \par Right hand: \par No acute fracture or dislocation. Large degenerative cyst/erosion within the \par scaphoid waist. Moderate degenerative change in the radiocarpal and \par triscaphe joints. Moderate to severe degenerative change at the basal joint. \par Severe degenerative change at the third DIP joint with associated marginal \par erosions. Moderate degenerative change in the remaining IP joints. \par \par Vascular calcifications. \par \par Left hand: \par No acute fracture or dislocation. Mild degenerative change in the \par radiocarpal joints, mild to moderate degenerative change at the basal joint. \par Moderate to severe degenerative change throughout the IP joints, most \par significant at the third DIP joint where there are marginal/subchondral \par erosions. Questionable erosions also noted in the second PIP joint. \par \par Vascular calcifications. \par \par \par \par IMPRESSION: \par \par 1. No acute osseous abnormality. \par 2. Erosive degenerative changes in the bilateral third DIP joints, most \par consistent with an underlying inflammatory arthropathy. \par 3. Additional degenerative change in both hands as above. \par \par \par \par \par \par \par ROWDY HULL M.D., ATTENDING RADIOLOGIST \par This document has been electronically signed. Mar 8 2019 2:49PM \par \par \par EXAM: XR FOREARM 2 VIEWS BI \par \par \par PROCEDURE DATE: 03/08/2019 \par \par \par \par \par INTERPRETATION: CLINICAL HISTORY: Pain. \par \par COMPARISON: None. \par \par TECHNIQUE: 2 views of each forearm. \par \par \par FINDINGS: \par \par No acute fracture or dislocation. No significant elbow joint effusion. \par Degenerative changes partially imaged in the wrists and elbows. \par Enthesophytes at the olecranon and lateral epicondyles bilaterally. \par \par Vascular calcifications. \par \par IMPRESSION: \par No acute osseous abnormality \par \par \par \par \par \par \par ROWDY HULL M.D., ATTENDING RADIOLOGIST \par This document has been electronically signed. Mar 8 2019 2:51PM

## 2019-03-28 NOTE — PHYSICAL EXAM
--------------- APPROVED REPORT --------------





EKG Measurement

Heart [id number]

MD [id number]

[id number]

[id number]

[id number]



<Conclusion>

Normal sinus rhythm

Voltage criteria for left ventricular hypertrophy

Nonspecific T wave abnormality

Prolonged QT

Abnormal ECG [No Acute Distress] : no acute distress [Well Nourished] : well nourished [Normal Sclera/Conjunctiva] : normal sclera/conjunctiva [Normal Outer Ear/Nose] : the outer ears and nose were normal in appearance [No Respiratory Distress] : no respiratory distress  [Clear to Auscultation] : lungs were clear to auscultation bilaterally [Normal Rate] : normal rate  [Regular Rhythm] : with a regular rhythm [No Edema] : there was no peripheral edema [Soft] : abdomen soft [Non Tender] : non-tender [No HSM] : no HSM [Normal Bowel Sounds] : normal bowel sounds [No CVA Tenderness] : no CVA  tenderness [No Joint Swelling] : no joint swelling [No Rash] : no rash [Normal Gait] : normal gait [Coordination Grossly Intact] : coordination grossly intact [de-identified] : not overweight

## 2019-03-28 NOTE — HISTORY OF PRESENT ILLNESS
[FreeTextEntry1] : 49 yo RHD F with PMH of HTN, HLD, NIDDM with neuropathy, Anemia, GERD and IBS who presents today for evaluation of bilateral hand pain for the past 3 months. Patient reports left thumb and right wrist episodic pain and stiffness mostly in the morning and at night not related to hand use. Also c/o hand weakness and limited ROM. Denies any hand trauma, tingling of digits or paresthesia. \par \par Occupation: cleaning lady\par \par ---\par Interval history (3/28/19):  #[id number]. here for follow up after bilateral hand and right forearm x-ray.  she reports resolution of left thumb pain s/p 1st extensor compartment kenalog injection.  Complaint of right lateral ulnar-carpal pain.  Denies use of NSAIDS recently

## 2019-03-28 NOTE — PHYSICAL EXAM
[de-identified] : well-appearing, NAD [de-identified] : left hand: negative Finkelstein test without tenderness or radial styloid, FROM\par right hand: tenderness of ulnar wrist at carpus, no crepitus

## 2019-03-28 NOTE — REVIEW OF SYSTEMS
[Dizziness] : dizziness [Fever] : no fever [Night Sweats] : no night sweats [Vision Problems] : no vision problems [Chest Pain] : no chest pain [Palpitations] : no palpitations [Orthopnea] : no orthopnea [Shortness Of Breath] : no shortness of breath [Wheezing] : no wheezing [Cough] : no cough [Abdominal Pain] : no abdominal pain [Joint Pain] : no joint pain [Headache] : no headache [Suicidal] : not suicidal [Insomnia] : no insomnia

## 2019-03-28 NOTE — ASSESSMENT
[FreeTextEntry1] : 49 yo RHD diabetic F with bilateral hand pain related to OA\par Asymptomatic bilateral thumb basal joint\par Resolution of left DeQuervains's syndrome\par \par - c/w thumb spica splint for comfort PRN\par - recommend right soft wrist splint for support, pain likely related to ulnar-carpal OA\par - recommend to start NSAIDs for pain control\par - all questions were answered\par - follow up PRN after NSAID trial

## 2019-03-28 NOTE — HISTORY OF PRESENT ILLNESS
[FreeTextEntry1] : routine followup [de-identified] : 49 YO F with PMH of HTN, DLD, uncontrolled DM II, presents for a routine f/u. She denies any new complaints. She does not take any insulin or measure her FS at home. She states episodes of dizziness where it feels as if she is spinning. It lasts for 20min and is worse with movement. She also endorses some blurry vision, but states it is less now than usual.

## 2019-04-02 LAB
GAD65 AB SER-MCNC: 0.09 NMOL/L
PANC ISLET CELL AB SER QL: NORMAL

## 2019-05-02 ENCOUNTER — APPOINTMENT (OUTPATIENT)
Dept: INTERNAL MEDICINE | Facility: CLINIC | Age: 49
End: 2019-05-02

## 2019-05-22 ENCOUNTER — OTHER (OUTPATIENT)
Age: 49
End: 2019-05-22

## 2019-05-23 ENCOUNTER — APPOINTMENT (OUTPATIENT)
Dept: INTERNAL MEDICINE | Facility: CLINIC | Age: 49
End: 2019-05-23

## 2019-05-23 ENCOUNTER — OUTPATIENT (OUTPATIENT)
Dept: OUTPATIENT SERVICES | Facility: HOSPITAL | Age: 49
LOS: 1 days | Discharge: HOME | End: 2019-05-23

## 2019-05-23 VITALS
SYSTOLIC BLOOD PRESSURE: 159 MMHG | WEIGHT: 101 LBS | TEMPERATURE: 96.3 F | BODY MASS INDEX: 19.83 KG/M2 | DIASTOLIC BLOOD PRESSURE: 98 MMHG | HEART RATE: 73 BPM | HEIGHT: 60 IN

## 2019-05-23 DIAGNOSIS — Z98.891 HISTORY OF UTERINE SCAR FROM PREVIOUS SURGERY: Chronic | ICD-10-CM

## 2019-05-23 NOTE — CHIEF COMPLAINT
[FreeTextEntry1] : \par 49 y/o f   with PMHx HTN, RAMÍREZ, perimenopausal female with a hx of abnormal bleeding s/p endometrial biopsy that showed secretory endometrium- no evidence of hyperplasia or malignancy\par \par Pt was  offered endometrial ablation vs. Mirena IUD at the GYN, however she did not desire treatment. Last PAP 2017 with HPV testing that was negative. She denied any acute abnormalities. She uses 6-8 pads now from 10 last year. Her menses have improved. \par \par Targeted Left Breast Sonogram (3/19)\par \par The below probably benign masses are stable and are as follows:\par \par 2:00 location 3 cm from nipple measuring 0.9 cm x 0.7 cm x 0.6 cm,\par \par 1:00 location 5 to 6 cm from the nipple measuring 0.5 cm x 0.5 cm x 0.2 \par cm,\par \par 10:00 location 5 cm from the nipple measuring 1.2 cm x 0.9 cm x 0.3 cm,\par \par 12:00 location 5 cm from the nipple measuring 0.7 cm x 0.8 cm x 0.3 cm and\par \par retroareolar region measuring 0.8 cm x 1.1 cm x 0.4 cm.\par \par Impression: Stable probably benign mammographically occult left breast \par masses as above.\par \par Recommendation: Follow-up bilateral diagnostic mammogram and ultrasound \par in 6 months.\par \par BI-RADS category 3: Probably Benign\par \par Mammogram ( 3/19)\par Impression: Stable probably benign mammographically occult left breast \par masses as above.\par \par Recommendation: Follow-up bilateral diagnostic mammogram and ultrasound \par in 6 months.\par \par BI-RADS category 3: Probably Benign\par \par \par

## 2019-05-23 NOTE — HISTORY OF PRESENT ILLNESS
[Regular Cycle Intervals] : periods have been regular [Mass (___cm)] : no palpable mass [Nipple Discharge] : no nipple discharge [Normal Amount/Duration] : was abnormal [Spotting Between  Menses] : no spotting between menses

## 2019-05-23 NOTE — DISCUSSION/SUMMARY
[FreeTextEntry1] : 49 y/o f   with PMHx HTN, RAMÍREZ, perimenopausal female with a hx of abnormal bleeding s/p endometrial biopsy\par \par \par # hx of abnormal mammogram Birads 3\par -  On physical exam no masses or findings Robbi\par - repeat diagnostic Mammo and sono in 6 months\par \par # GYN - pelvic exam done, NL adult female\par follow up pap and HPV test\par \par

## 2019-06-25 ENCOUNTER — APPOINTMENT (OUTPATIENT)
Dept: INTERNAL MEDICINE | Facility: CLINIC | Age: 49
End: 2019-06-25

## 2019-06-25 ENCOUNTER — OUTPATIENT (OUTPATIENT)
Dept: OUTPATIENT SERVICES | Facility: HOSPITAL | Age: 49
LOS: 1 days | Discharge: HOME | End: 2019-06-25

## 2019-06-25 VITALS
BODY MASS INDEX: 20.03 KG/M2 | HEIGHT: 60 IN | HEART RATE: 84 BPM | WEIGHT: 102 LBS | SYSTOLIC BLOOD PRESSURE: 116 MMHG | TEMPERATURE: 97.2 F | DIASTOLIC BLOOD PRESSURE: 78 MMHG

## 2019-06-25 DIAGNOSIS — Z86.2 PERSONAL HISTORY OF DISEASES OF THE BLOOD AND BLOOD-FORMING ORGANS AND CERTAIN DISORDERS INVOLVING THE IMMUNE MECHANISM: ICD-10-CM

## 2019-06-25 DIAGNOSIS — M79.641 PAIN IN RIGHT HAND: ICD-10-CM

## 2019-06-25 DIAGNOSIS — Z86.39 PERSONAL HISTORY OF OTHER ENDOCRINE, NUTRITIONAL AND METABOLIC DISEASE: ICD-10-CM

## 2019-06-25 DIAGNOSIS — I10 ESSENTIAL (PRIMARY) HYPERTENSION: ICD-10-CM

## 2019-06-25 DIAGNOSIS — Z98.891 HISTORY OF UTERINE SCAR FROM PREVIOUS SURGERY: Chronic | ICD-10-CM

## 2019-06-25 RX ORDER — GLIMEPIRIDE 2 MG/1
2 TABLET ORAL
Qty: 30 | Refills: 5 | Status: DISCONTINUED | COMMUNITY
Start: 2018-09-26 | End: 2019-06-25

## 2019-06-28 NOTE — PHYSICAL EXAM
[No Acute Distress] : no acute distress [Well Nourished] : well nourished [Well Developed] : well developed [PERRL] : pupils equal round and reactive to light [EOMI] : extraocular movements intact [Supple] : supple [No Lymphadenopathy] : no lymphadenopathy [No Respiratory Distress] : no respiratory distress  [Clear to Auscultation] : lungs were clear to auscultation bilaterally [Regular Rhythm] : with a regular rhythm [Normal S1, S2] : normal S1 and S2 [de-identified] : there is limited range of motion on the left wrist, left PIP, Left DIP due to pain

## 2019-06-28 NOTE — HISTORY OF PRESENT ILLNESS
[[employer] ] : provided by [employer]   [FreeTextEntry1] : [id number] [de-identified] : 49 YO F with PMH of HTN, DLD, uncontrolled DM II, presents for a routine f/u. She mentioned Bilateral hand pain that is worse on the left side. the pain started 3 months ago and is burning in nature. the pain does not radiate anywhere. it is associated with numbness on the left side. its worse in the morning and at night and is associated with morning stiffness that last approximately 15 minutes and then resolve on its own. there is no known exacerbating or relieving factors. She does not take any insulin or measure her FS at home because she is afraid to use the needle on herself. She endorses some blurry vision, but states it is less now than usual.

## 2019-06-28 NOTE — REVIEW OF SYSTEMS
[Vision Problems] : vision problems [Negative] : Cardiovascular [FreeTextEntry3] : occasional blurry vision, will be refered to opthalmology

## 2019-06-28 NOTE — ASSESSMENT
[FreeTextEntry1] : 49 YO F with PMH of HTN, DLD, uncontrolled DM II presented complaining of bilateral wrist pain more on the left side of burning quality and associated with numbness.

## 2019-06-28 NOTE — PLAN
[FreeTextEntry1] : -Stop glimepiride \par -opthalmology referral\par -podiatry referral\par -work up for RA\par -follow up in 3 months

## 2019-07-22 ENCOUNTER — LABORATORY RESULT (OUTPATIENT)
Age: 49
End: 2019-07-22

## 2019-07-22 ENCOUNTER — OUTPATIENT (OUTPATIENT)
Dept: OUTPATIENT SERVICES | Facility: HOSPITAL | Age: 49
LOS: 1 days | Discharge: HOME | End: 2019-07-22
Payer: SUBSIDIZED

## 2019-07-22 DIAGNOSIS — Z98.891 HISTORY OF UTERINE SCAR FROM PREVIOUS SURGERY: Chronic | ICD-10-CM

## 2019-07-22 PROCEDURE — 99213 OFFICE O/P EST LOW 20 MIN: CPT

## 2019-07-25 LAB
M TB IFN-G BLD-IMP: POSITIVE
QUANTIFERON TB PLUS MITOGEN MINUS NIL: 8.38 IU/ML
QUANTIFERON TB PLUS NIL: 0.12 IU/ML
QUANTIFERON TB PLUS TB1 MINUS NIL: 2.91 IU/ML
QUANTIFERON TB PLUS TB2 MINUS NIL: 3.76 IU/ML

## 2019-07-31 DIAGNOSIS — E08.3299: ICD-10-CM

## 2019-07-31 DIAGNOSIS — H52.4 PRESBYOPIA: ICD-10-CM

## 2019-07-31 DIAGNOSIS — H52.13 MYOPIA, BILATERAL: ICD-10-CM

## 2019-08-05 ENCOUNTER — OUTPATIENT (OUTPATIENT)
Dept: OUTPATIENT SERVICES | Facility: HOSPITAL | Age: 49
LOS: 1 days | Discharge: HOME | End: 2019-08-05

## 2019-08-05 ENCOUNTER — APPOINTMENT (OUTPATIENT)
Dept: PODIATRY | Facility: CLINIC | Age: 49
End: 2019-08-05
Payer: SUBSIDIZED

## 2019-08-05 VITALS
WEIGHT: 102 LBS | HEIGHT: 60 IN | BODY MASS INDEX: 20.03 KG/M2 | DIASTOLIC BLOOD PRESSURE: 95 MMHG | HEART RATE: 74 BPM | SYSTOLIC BLOOD PRESSURE: 168 MMHG

## 2019-08-05 DIAGNOSIS — Z98.891 HISTORY OF UTERINE SCAR FROM PREVIOUS SURGERY: Chronic | ICD-10-CM

## 2019-08-05 PROCEDURE — 99212 OFFICE O/P EST SF 10 MIN: CPT

## 2019-08-06 ENCOUNTER — OUTPATIENT (OUTPATIENT)
Dept: OUTPATIENT SERVICES | Facility: HOSPITAL | Age: 49
LOS: 1 days | Discharge: HOME | End: 2019-08-06
Payer: SUBSIDIZED

## 2019-08-06 DIAGNOSIS — Z98.891 HISTORY OF UTERINE SCAR FROM PREVIOUS SURGERY: Chronic | ICD-10-CM

## 2019-08-06 DIAGNOSIS — R76.12 NONSPECIFIC REACTION TO CELL MEDIATED IMMUNITY MEASUREMENT OF GAMMA INTERFERON ANTIGEN RESPONSE WITHOUT ACTIVE TUBERCULOSIS: ICD-10-CM

## 2019-08-06 PROCEDURE — 71048 X-RAY EXAM CHEST 4+ VIEWS: CPT | Mod: 26

## 2019-08-06 NOTE — PHYSICAL EXAM
[General Appearance - Alert] : alert [Sclera] : the sclera and conjunctiva were normal [General Appearance - In No Acute Distress] : in no acute distress [Skin Color & Pigmentation] : normal skin color and pigmentation [PERRL With Normal Accommodation] : pupils were equal in size, round, and reactive to light [] : no rash [Skin Turgor] : normal skin turgor [Normal in Appearance] : normal in appearance [Full ROM] : with full range of motion [#2 Diminished] : number 2 was diminished [#1 Diminished] : number 1 was diminished [#6 Diminished] : number 6 was diminished [Deep Tendon Reflexes (DTR)] : deep tendon reflexes were 2+ and symmetric [No Focal Deficits] : no focal deficits [Sensation] : the sensory exam was normal to light touch and pinprick [Oriented To Time, Place, And Person] : oriented to person, place, and time [Impaired Insight] : insight and judgment were intact [Affect] : the affect was normal [2+] : left 2+ [Normal Station and Gait] : the gait and station were normal [Normal] : motor strength was normal in all muscle groups [Normal Motor Tone] : the muscle tone was normal [Involuntary Movements] : no involuntary movements were seen [Vibration Dec.] : normal vibratory sensation at the level of the toes [Position Sense Dec.] : normal position sense at the level of the toes [Diminished Throughout Right Foot] : normal tactile sensation with monofilament testing throughout right foot [Diminished Throughout Left Foot] : normal tactile sensation with monofilament testing throughout left foot [#5 Diminished] : number 5 was normal [#7 Diminished] : number 7 was normal [#8 Diminished] : number 8 was normal [#3 Diminished] : number 3 was normal [#4 Diminished] : number 4 was normal [#9 Diminished] : number 9 was normal [#10 Diminished] : number 10 was normal [Toes] :  capillary refill of the toes was normal [Left Toes] :  capillary refill of the left toes was normal [Right Toes] :  capillary refill of the right toes was normal

## 2019-08-06 NOTE — HISTORY OF PRESENT ILLNESS
[FreeTextEntry1] : IZA TIAN   presents for foot examination, patient was referred by PCP. Patient  Patient also complains of thickened nails x 10 that are painful in shoes. Patient denies NVFC and denies SOB.\par

## 2019-08-06 NOTE — PROCEDURE
[FreeTextEntry1] : Patient examined, history and chart reviewed\par Rx clotrimazole \par patient educated on  foot health and maintenance at length \par Follow up in 3 months for possible PRN \par \par

## 2019-08-09 ENCOUNTER — OUTPATIENT (OUTPATIENT)
Dept: OUTPATIENT SERVICES | Facility: HOSPITAL | Age: 49
LOS: 1 days | Discharge: HOME | End: 2019-08-09

## 2019-08-09 ENCOUNTER — APPOINTMENT (OUTPATIENT)
Dept: INTERNAL MEDICINE | Facility: CLINIC | Age: 49
End: 2019-08-09

## 2019-08-09 VITALS
BODY MASS INDEX: 20.03 KG/M2 | WEIGHT: 102 LBS | HEART RATE: 70 BPM | DIASTOLIC BLOOD PRESSURE: 96 MMHG | SYSTOLIC BLOOD PRESSURE: 193 MMHG | TEMPERATURE: 97.4 F | HEIGHT: 60 IN

## 2019-08-09 DIAGNOSIS — Z98.891 HISTORY OF UTERINE SCAR FROM PREVIOUS SURGERY: Chronic | ICD-10-CM

## 2019-08-27 NOTE — REVIEW OF SYSTEMS
[Fatigue] : fatigue [Cough] : cough [Abdominal Pain] : abdominal pain [Joint Pain] : joint pain [Joint Stiffness] : joint stiffness [Negative] : Neurological [Fever] : no fever [Night Sweats] : no night sweats

## 2019-08-27 NOTE — ASSESSMENT
[FreeTextEntry1] : 49 YO F with PMH of HTN, DLD, uncontrolled DM II comes to the office for follow up visit.\par \par Right middle lobe opacity on xray chest:\par quantiferon positive\par cough+, wt loss+\par no fever night sweats\par ID referral to rule out Active TB\par \par Joint pain:\par Anti CCP positive\par RA +\par NSAIDS for now\par will refer to Rheumatology after TB work up is done\par \par Uncontrolled Dm:\par continue with pioglitazone, metformin and glimepride, januvia\par follow HbA1c\par referred to Dr Kauffman (Endocrine)\par \par Uncontrolled HTN:\par continue with amlodipine and lisinopril\par patient endorses increased BP contributed by joint pain\par \par DLD\par cw lipitor\par \par RAMÍREZ:\par cw ferrous sulphate\par repeat lab work next visit\par \par peripheral neuropathy:\par gabapentin\par \par HCM:\par mammogram 4 month back BIRADS 2, follow up next month for mammo\par PAP up to date\par follow up in 3 months\par \par \par \par \par \par  \par

## 2019-08-27 NOTE — PHYSICAL EXAM
[No Acute Distress] : no acute distress [Normal] : no jugular venous distention, supple, no lymphadenopathy and the thyroid was normal and there were no nodules present [Clear to Auscultation] : lungs were clear to auscultation bilaterally [Normal S1, S2] : normal S1 and S2 [Declined Breast Exam] : declined breast exam  [Soft] : abdomen soft [Non Tender] : non-tender [Normal Bowel Sounds] : normal bowel sounds [Coordination Grossly Intact] : coordination grossly intact [de-identified] : tenderness present in b/l wrist, rt first DIP swollen, ROM painful.

## 2019-08-27 NOTE — HISTORY OF PRESENT ILLNESS
[de-identified] : 47 YO F with PMH of HTN, DLD, uncontrolled DM II, presents for a routine f/u.\par She was called from the office to follow up today because she was found to have opacity in the chest xray.\par patient was seen 3 months back for multiple joint pain and ordered labs and imaging.\par She still has b/l joint pain, small joints associated with morning stiffness.\par she has been losing weight not documented\par Reports dry cough for last 2 months more in the evening, no fever or chest pain.\par compliant with the medication.\par patient gets occasional abdominal pain, evaluated before with negative study results.\par She had mammogram 4 months back BIRADS 2, has next appointment on september this year.\par \par \par

## 2019-08-29 ENCOUNTER — OUTPATIENT (OUTPATIENT)
Dept: OUTPATIENT SERVICES | Facility: HOSPITAL | Age: 49
LOS: 1 days | Discharge: HOME | End: 2019-08-29

## 2019-08-29 ENCOUNTER — APPOINTMENT (OUTPATIENT)
Dept: RHEUMATOLOGY | Facility: CLINIC | Age: 49
End: 2019-08-29

## 2019-08-29 VITALS
DIASTOLIC BLOOD PRESSURE: 93 MMHG | WEIGHT: 103 LBS | HEIGHT: 60 IN | SYSTOLIC BLOOD PRESSURE: 164 MMHG | BODY MASS INDEX: 20.22 KG/M2 | HEART RATE: 78 BPM | TEMPERATURE: 97.8 F

## 2019-08-29 DIAGNOSIS — M65.9 SYNOVITIS AND TENOSYNOVITIS, UNSPECIFIED: ICD-10-CM

## 2019-08-29 DIAGNOSIS — A04.8 OTHER SPECIFIED BACTERIAL INTESTINAL INFECTIONS: ICD-10-CM

## 2019-08-29 DIAGNOSIS — R76.11 NONSPECIFIC REACTION TO TUBERCULIN SKIN TEST WITHOUT ACTIVE TUBERCULOSIS: ICD-10-CM

## 2019-08-29 DIAGNOSIS — N92.0 EXCESSIVE AND FREQUENT MENSTRUATION WITH REGULAR CYCLE: ICD-10-CM

## 2019-08-29 DIAGNOSIS — Z98.891 HISTORY OF UTERINE SCAR FROM PREVIOUS SURGERY: Chronic | ICD-10-CM

## 2019-08-29 NOTE — ASSESSMENT
[FreeTextEntry1] : Rheumatoid arthritis\par \par 47 YO F with PMH of HTN, DLD, uncontrolled DM II comes to the office for follow up visit.\par \par \par Joint pain:\par Anti CCP positive\par RA +\par NSAIDS for now by PCP\par pending ID consult to rule out active TB because she was unable to get appointment on phone\par we are helping her to schedule for this week \par \par Right middle lobe opacity on xray chest:\par quantiferon positive\par cough+, wt loss+\par no fever night sweats\par ID referral to rule out Active TB\par

## 2019-08-29 NOTE — PHYSICAL EXAM
[General Appearance - Alert] : alert [General Appearance - Well Nourished] : well nourished [Sclera] : the sclera and conjunctiva were normal [PERRL With Normal Accommodation] : pupils were equal in size, round, and reactive to light [Strabismus] : no strabismus was seen [Neck Appearance] : the appearance of the neck was normal [] : the neck was supple [Neck Cervical Mass (___cm)] : no neck mass was observed [Exaggerated Use Of Accessory Muscles For Inspiration] : no accessory muscle use [Respiration, Rhythm And Depth] : normal respiratory rhythm and effort [Auscultation Breath Sounds / Voice Sounds] : lungs were clear to auscultation bilaterally [Heart Sounds] : normal S1 and S2 [Apical Impulse] : the apical impulse was normal [Bowel Sounds] : normal bowel sounds [No CVA Tenderness] : no ~M costovertebral angle tenderness [No Spinal Tenderness] : no spinal tenderness [Skin Color & Pigmentation] : normal skin color and pigmentation [No Focal Deficits] : no focal deficits [Oriented To Time, Place, And Person] : oriented to person, place, and time [Affect] : the affect was normal [Mood] : the mood was normal [FreeTextEntry1] : + TTP over radial styloid (L)

## 2019-08-29 NOTE — HISTORY OF PRESENT ILLNESS
[Arthralgias] : arthralgias [Morning Stiffness] : morning stiffness [None] : The patient is currently asymptomatic [FreeTextEntry1] : 47 YO F with PMH of HTN, DLD, uncontrolled DM II, presents for a referral and initial evaluation of an existing Dx of RA. Anti CCP + and RF +\par patient was seen 3 months back for multiple joint pain and ordered labs and imaging.\par But she has a lung opacity in Xray and quantiferon positive cough+, wt loss+ 2-3 lbs, no fever or night sweats, ID referral to rule out Active TB - that she was unable to make it\par She still has b/l joint pain, small joints associated with morning stiffness.\par she has been losing weight not documented\par Reported dry cough previously for last 2 months more in the evening, no fever or chest pain.\par compliant with the medication.\par patient gets occasional abdominal pain, evaluated before with negative study results.\par She had mammogram 4 months back BIRADS 2, has next appointment on september this year.\par \par

## 2019-08-29 NOTE — REASON FOR VISIT
[Initial Eval - Existing Diagnosis] : an initial evaluation of an existing diagnosis [Family Member] : family member [Source: ______] : History obtained from [unfilled]

## 2019-08-29 NOTE — END OF VISIT
[] : Resident [FreeTextEntry3] : 49 yo female with hx of seropositive RA  sent to ID for eval of latent vs active?? TB. Does have mild dry cough, though no fevers, chills, night sweats. Weight loss of 2- 3 lbs over months. Discussed tx for RA with potential DMARDs once ID eval complete this week.

## 2019-08-30 LAB
CREAT SPEC-SCNC: 52 MG/DL
ESTIMATED AVERAGE GLUCOSE: 237 MG/DL
HBA1C MFR BLD HPLC: 9.9 %
MICROALBUMIN 24H UR DL<=1MG/L-MCNC: 106.9 MG/DL
MICROALBUMIN/CREAT 24H UR-RTO: 2045 MG/G

## 2019-09-05 ENCOUNTER — APPOINTMENT (OUTPATIENT)
Dept: ENDOCRINOLOGY | Facility: CLINIC | Age: 49
End: 2019-09-05

## 2019-09-05 ENCOUNTER — OUTPATIENT (OUTPATIENT)
Dept: OUTPATIENT SERVICES | Facility: HOSPITAL | Age: 49
LOS: 1 days | Discharge: HOME | End: 2019-09-05

## 2019-09-05 VITALS
SYSTOLIC BLOOD PRESSURE: 160 MMHG | WEIGHT: 102.3 LBS | DIASTOLIC BLOOD PRESSURE: 91 MMHG | HEIGHT: 60 IN | HEART RATE: 76 BPM | BODY MASS INDEX: 20.08 KG/M2

## 2019-09-05 DIAGNOSIS — Z98.891 HISTORY OF UTERINE SCAR FROM PREVIOUS SURGERY: Chronic | ICD-10-CM

## 2019-09-05 LAB — GLUCOSE BLDC GLUCOMTR-MCNC: 172 MG/DL — HIGH (ref 70–99)

## 2019-09-05 RX ORDER — PEN NEEDLE, DIABETIC 31 GX3/16"
31G X 5 MM NEEDLE, DISPOSABLE MISCELLANEOUS
Qty: 100 | Refills: 2 | Status: ACTIVE | COMMUNITY
Start: 2019-09-05 | End: 1900-01-01

## 2019-09-06 NOTE — HISTORY OF PRESENT ILLNESS
[FreeTextEntry1] : patient's mother took insulin and  from diabetes, after amputation. patient did not follow my advice one year ago, and never started insulin.

## 2019-09-06 NOTE — ASSESSMENT
[Carbohydrate Consistent Diet] : carbohydrate consistent diet [FreeTextEntry1] : start on a sample of tresiba insulin, patient did not have $ 50. for lantus at [facility], but sasid, she would come back for it. refer for diabetes education. [Diabetes Foot Care] : diabetes foot care [Long Term Vascular Complications] : long term vascular complications of diabetes [Action and use of Insulin] : action and use of short and long-acting insulin [Importance of Diet and Exercise] : importance of diet and exercise to improve glycemic control, achieve weight loss and improve cardiovascular health

## 2019-09-06 NOTE — PHYSICAL EXAM
[Alert] : alert [No Acute Distress] : no acute distress [Well Nourished] : well nourished [Well Developed] : well developed [Normal Sclera/Conjunctiva] : normal sclera/conjunctiva [EOMI] : extra ocular movement intact [Normal Oropharynx] : the oropharynx was normal [No Proptosis] : no proptosis [Thyroid Not Enlarged] : the thyroid was not enlarged [No Thyroid Nodules] : there were no palpable thyroid nodules [No Accessory Muscle Use] : no accessory muscle use [No Respiratory Distress] : no respiratory distress [Clear to Auscultation] : lungs were clear to auscultation bilaterally [Normal Rate] : heart rate was normal  [Normal S1, S2] : normal S1 and S2 [Pedal Pulses Normal] : the pedal pulses are present [Regular Rhythm] : with a regular rhythm [No Edema] : there was no peripheral edema [Normal Bowel Sounds] : normal bowel sounds [Soft] : abdomen soft [Not Tender] : non-tender [Post Cervical Nodes] : posterior cervical nodes [Not Distended] : not distended [Anterior Cervical Nodes] : anterior cervical nodes [Axillary Nodes] : axillary nodes [Normal] : normal and non tender [No Spinal Tenderness] : no spinal tenderness [Spine Straight] : spine straight [Normal Gait] : normal gait [No Stigmata of Cushings Syndrome] : no stigmata of cushings syndrome [Normal Strength/Tone] : muscle strength and tone were normal [No Rash] : no rash [Acanthosis Nigricans] : no acanthosis nigricans [Normal Reflexes] : deep tendon reflexes were 2+ and symmetric [No Tremors] : no tremors [Oriented x3] : oriented to person, place, and time

## 2019-09-09 DIAGNOSIS — E11.22 TYPE 2 DIABETES MELLITUS WITH DIABETIC CHRONIC KIDNEY DISEASE: ICD-10-CM

## 2019-09-09 DIAGNOSIS — E11.65 TYPE 2 DIABETES MELLITUS WITH HYPERGLYCEMIA: ICD-10-CM

## 2019-09-09 DIAGNOSIS — I10 ESSENTIAL (PRIMARY) HYPERTENSION: ICD-10-CM

## 2019-09-10 ENCOUNTER — OUTPATIENT (OUTPATIENT)
Dept: OUTPATIENT SERVICES | Facility: HOSPITAL | Age: 49
LOS: 1 days | Discharge: HOME | End: 2019-09-10
Payer: SUBSIDIZED

## 2019-09-10 DIAGNOSIS — Z98.891 HISTORY OF UTERINE SCAR FROM PREVIOUS SURGERY: Chronic | ICD-10-CM

## 2019-09-10 DIAGNOSIS — R52 PAIN, UNSPECIFIED: ICD-10-CM

## 2019-09-10 PROCEDURE — 71046 X-RAY EXAM CHEST 2 VIEWS: CPT | Mod: 26

## 2019-09-13 ENCOUNTER — OUTPATIENT (OUTPATIENT)
Dept: OUTPATIENT SERVICES | Facility: HOSPITAL | Age: 49
LOS: 1 days | Discharge: HOME | End: 2019-09-13

## 2019-09-13 ENCOUNTER — APPOINTMENT (OUTPATIENT)
Dept: GASTROENTEROLOGY | Facility: CLINIC | Age: 49
End: 2019-09-13
Payer: COMMERCIAL

## 2019-09-13 VITALS
WEIGHT: 102 LBS | DIASTOLIC BLOOD PRESSURE: 79 MMHG | HEIGHT: 60 IN | BODY MASS INDEX: 20.03 KG/M2 | SYSTOLIC BLOOD PRESSURE: 146 MMHG | TEMPERATURE: 97.8 F | HEART RATE: 82 BPM

## 2019-09-13 DIAGNOSIS — Z98.891 HISTORY OF UTERINE SCAR FROM PREVIOUS SURGERY: Chronic | ICD-10-CM

## 2019-09-13 PROCEDURE — 99214 OFFICE O/P EST MOD 30 MIN: CPT

## 2019-09-13 NOTE — HISTORY OF PRESENT ILLNESS
[de-identified] : 47 Y/O F is here for the follow up.\par \par She is having abdominal pain in the all the 9 quadrants radiating to back, Comes on and off, not associated with any kind of food, Does not wake up in the night, No weight loss.\par \par She underwent EGD and Colonoscopy in 2017.\par EGD - Normal esophageal mucosa, Gastritis with Biopsies from the antrum and body showing H.pylori (s/p treatment, eradication confirmed by stool antigen test), mild duodenal inflammation.\par \par Colonoscopy - Rectal hyperplastic polyp. \par Abnormal projection of ileal mucosa in the caecum, for which biopsy were negative.\par \par CT scan of the abdomen and pelvis negative for any intraabdominal pathology in oct 2018\par -US RUQ negative for gallstones\par \par Patient continuous to have abdominal pain

## 2019-09-13 NOTE — ASSESSMENT
[FreeTextEntry1] : 47 Y/O F is here for the follow up.\par \par She is having abdominal pain in the all the 9 quadrants radiating to back, Comes on and off, not associated with any kind of food, Does not wake up in the night, No weight loss.\par \par She underwent EGD and Colonoscopy in 2017.\par EGD - Normal esophageal mucosa, Gastritis with Biopsies from the antrum and body showing H.pylori (s/p treatment, eradication confirmed by stool antigen test), mild duodenal inflammation.\par \par Colonoscopy - Rectal hyperplastic polyp. \par Abnormal projection of ileal mucosa in the caecum, for which biopsy were negative.\par \par CT scan of the abdomen and pelvis negative for any intraabdominal pathology in oct 2018\par -US RUQ negative for gallstones\par \par Patient continuous to have abdominal pain\par \par Looks most likely centrally mediated pain syndrome\par rec: bentyl 10 mg PO tid\par colonoscopy in 5-7 years\par F/up with PMD

## 2019-09-13 NOTE — PHYSICAL EXAM
[General Appearance - Alert] : alert [General Appearance - In No Acute Distress] : in no acute distress [Sclera] : the sclera and conjunctiva were normal [Neck Appearance] : the appearance of the neck was normal [Outer Ear] : the ears and nose were normal in appearance [Apical Impulse] : the apical impulse was normal [] : no respiratory distress [Arterial Pulses Carotid] : carotid pulses were normal with no bruits [Bowel Sounds] : normal bowel sounds [Abdomen Soft] : soft [No CVA Tenderness] : no ~M costovertebral angle tenderness [Abnormal Walk] : normal gait [Skin Color & Pigmentation] : normal skin color and pigmentation [Oriented To Time, Place, And Person] : oriented to person, place, and time [Cranial Nerves] : cranial nerves 2-12 were intact

## 2019-09-19 ENCOUNTER — OUTPATIENT (OUTPATIENT)
Dept: OUTPATIENT SERVICES | Facility: HOSPITAL | Age: 49
LOS: 1 days | Discharge: HOME | End: 2019-09-19

## 2019-09-19 ENCOUNTER — APPOINTMENT (OUTPATIENT)
Dept: RHEUMATOLOGY | Facility: CLINIC | Age: 49
End: 2019-09-19
Payer: COMMERCIAL

## 2019-09-19 VITALS
SYSTOLIC BLOOD PRESSURE: 173 MMHG | HEART RATE: 72 BPM | HEIGHT: 60 IN | TEMPERATURE: 97.5 F | BODY MASS INDEX: 20.62 KG/M2 | WEIGHT: 105 LBS | DIASTOLIC BLOOD PRESSURE: 90 MMHG

## 2019-09-19 DIAGNOSIS — Z98.891 HISTORY OF UTERINE SCAR FROM PREVIOUS SURGERY: Chronic | ICD-10-CM

## 2019-09-19 PROCEDURE — 99214 OFFICE O/P EST MOD 30 MIN: CPT

## 2019-09-20 LAB
ALBUMIN SERPL ELPH-MCNC: 4.4 G/DL
ALP BLD-CCNC: 74 U/L
ALT SERPL-CCNC: 17 U/L
ANION GAP SERPL CALC-SCNC: 14 MMOL/L
AST SERPL-CCNC: 20 U/L
BASOPHILS # BLD AUTO: 0.04 K/UL
BASOPHILS NFR BLD AUTO: 0.5 %
BILIRUB SERPL-MCNC: 0.3 MG/DL
BUN SERPL-MCNC: 18 MG/DL
CALCIUM SERPL-MCNC: 10 MG/DL
CHLORIDE SERPL-SCNC: 100 MMOL/L
CO2 SERPL-SCNC: 26 MMOL/L
CREAT SERPL-MCNC: 0.6 MG/DL
EOSINOPHIL # BLD AUTO: 0.03 K/UL
EOSINOPHIL NFR BLD AUTO: 0.4 %
ERYTHROCYTE [SEDIMENTATION RATE] IN BLOOD BY WESTERGREN METHOD: 61 MM/HR
GLUCOSE SERPL-MCNC: 114 MG/DL
HCT VFR BLD CALC: 40.1 %
HGB BLD-MCNC: 12.9 G/DL
IMM GRANULOCYTES NFR BLD AUTO: 0.3 %
LYMPHOCYTES # BLD AUTO: 2.54 K/UL
LYMPHOCYTES NFR BLD AUTO: 34.4 %
MAN DIFF?: NORMAL
MCHC RBC-ENTMCNC: 27.7 PG
MCHC RBC-ENTMCNC: 32.2 G/DL
MCV RBC AUTO: 86.2 FL
MONOCYTES # BLD AUTO: 0.61 K/UL
MONOCYTES NFR BLD AUTO: 8.3 %
NEUTROPHILS # BLD AUTO: 4.14 K/UL
NEUTROPHILS NFR BLD AUTO: 56.1 %
PLATELET # BLD AUTO: 298 K/UL
POTASSIUM SERPL-SCNC: 4.3 MMOL/L
PROT SERPL-MCNC: 7.9 G/DL
RBC # BLD: 4.65 M/UL
RBC # FLD: 15.4 %
SODIUM SERPL-SCNC: 140 MMOL/L
WBC # FLD AUTO: 7.38 K/UL

## 2019-09-20 NOTE — PHYSICAL EXAM
[General Appearance - Alert] : alert [General Appearance - In No Acute Distress] : in no acute distress [General Appearance - Well Nourished] : well nourished [General Appearance - Well Developed] : well developed [Sclera] : the sclera and conjunctiva were normal [Outer Ear] : the ears and nose were normal in appearance [Hearing Threshold Finger Rub Not Love] : hearing was normal [Both Tympanic Membranes Were Examined] : both tympanic membranes were normal [Nasal Cavity] : the nasal mucosa and septum were normal [] : no respiratory distress [Respiration, Rhythm And Depth] : normal respiratory rhythm and effort [Auscultation Breath Sounds / Voice Sounds] : lungs were clear to auscultation bilaterally [Heart Sounds] : normal S1 and S2 [Skin Color & Pigmentation] : normal skin color and pigmentation [Skin Turgor] : normal skin turgor [Cranial Nerves] : cranial nerves 2-12 were intact [Sensation] : the sensory exam was normal to light touch and pinprick [Oriented To Time, Place, And Person] : oriented to person, place, and time [Impaired Insight] : insight and judgment were intact [Affect] : the affect was normal [Memory Recent] : recent memory was not impaired [PERRL With Normal Accommodation] : pupils were equal in size, round, and reactive to light [Heart Rate And Rhythm] : heart rate was normal and rhythm regular [FreeTextEntry1] : TJ 11, SJ 2. + swelling at R 3rd and 4th PIP, tenderness at R 1st-5th PIPs, L 2nd-5th PIPs, bilateral wrists. Normal examination of elbows, shoulders, knees, ankles. No tenderness with MTP squeeze bilaterally.

## 2019-09-20 NOTE — END OF VISIT
[FreeTextEntry3] : 47 yo F with seropositive, erosive RA without prior treatment returning for follow up. CDAI 28. At last OV provider noted + quant gold with abnormal pulmonary imaging and active pulmonary symptoms - referred to ID. Clear CXR when repeated with resolution of symptoms. Missed ID appointment. + active RA but able to complete ADLs and function well. No need for low dose steroids to control symptoms more acutely - will start HCQ as this is not immunosuppressive. HCQ may help with symptoms but has not been shown to slow radiographic progression of disease. Will need advancement in therapy after eval by ID. Reviewed potential side effects with patient. Referred to ophthalmology. Follow up 4-6 weeks.  [] : Resident

## 2019-09-20 NOTE — ASSESSMENT
[FreeTextEntry1] : # RA\par -Patient still symptomatic not responding to NSAIDs\par - Will start Hydroxychloroquine and refer to ophthalmology for checkup\par - Will need to follow up with ID for potential treatment of latent TB before advancing immunosuppression (important especially if TNFi are needed)\par -Follow up in 4-6 weeks\par

## 2019-09-20 NOTE — HISTORY OF PRESENT ILLNESS
[___ Month(s) Ago] : [unfilled] month(s) ago [FreeTextEntry1] : 48 year old lady with PMH of HTN, DLD, DM II, recently diagnosed seropositive erosive RA presenting for follow up.\par \par Patient still complains of multiple bilateral hand joint pain that does not improve with NSAIDs.\par Patient was found to have positive QuantiFERON-TB and right lung opacity with cough 3 months ago. There was a concern that she may have active TB and she was referred to Dr Roman. He ordered a CXR which showed resolution of opacity and patient is no longer having cough. \par She has not seen him since CXR was done (skipped appointment).\par PGA 9/10

## 2019-09-29 ENCOUNTER — FORM ENCOUNTER (OUTPATIENT)
Age: 49
End: 2019-09-29

## 2019-09-30 ENCOUNTER — OUTPATIENT (OUTPATIENT)
Dept: OUTPATIENT SERVICES | Facility: HOSPITAL | Age: 49
LOS: 1 days | Discharge: HOME | End: 2019-09-30
Payer: OTHER GOVERNMENT

## 2019-09-30 DIAGNOSIS — R92.8 OTHER ABNORMAL AND INCONCLUSIVE FINDINGS ON DIAGNOSTIC IMAGING OF BREAST: ICD-10-CM

## 2019-09-30 DIAGNOSIS — Z98.891 HISTORY OF UTERINE SCAR FROM PREVIOUS SURGERY: Chronic | ICD-10-CM

## 2019-09-30 PROCEDURE — G0279: CPT | Mod: 26

## 2019-09-30 PROCEDURE — 77066 DX MAMMO INCL CAD BI: CPT | Mod: 26

## 2019-09-30 PROCEDURE — 76642 ULTRASOUND BREAST LIMITED: CPT | Mod: 26,LT

## 2019-10-08 ENCOUNTER — OUTPATIENT (OUTPATIENT)
Dept: OUTPATIENT SERVICES | Facility: HOSPITAL | Age: 49
LOS: 1 days | Discharge: HOME | End: 2019-10-08

## 2019-10-08 ENCOUNTER — APPOINTMENT (OUTPATIENT)
Dept: INTERNAL MEDICINE | Facility: CLINIC | Age: 49
End: 2019-10-08
Payer: COMMERCIAL

## 2019-10-08 VITALS
WEIGHT: 101 LBS | SYSTOLIC BLOOD PRESSURE: 151 MMHG | BODY MASS INDEX: 19.83 KG/M2 | HEART RATE: 74 BPM | DIASTOLIC BLOOD PRESSURE: 82 MMHG | HEIGHT: 60 IN | TEMPERATURE: 96.8 F

## 2019-10-08 DIAGNOSIS — I10 ESSENTIAL (PRIMARY) HYPERTENSION: ICD-10-CM

## 2019-10-08 DIAGNOSIS — Z98.891 HISTORY OF UTERINE SCAR FROM PREVIOUS SURGERY: Chronic | ICD-10-CM

## 2019-10-08 PROCEDURE — 99213 OFFICE O/P EST LOW 20 MIN: CPT | Mod: GC

## 2019-10-08 RX ORDER — HYDROCHLOROTHIAZIDE 12.5 MG/1
12.5 TABLET ORAL
Qty: 90 | Refills: 3 | Status: DISCONTINUED | COMMUNITY
Start: 2019-09-05 | End: 2019-10-08

## 2019-10-08 NOTE — PHYSICAL EXAM
[No Acute Distress] : no acute distress [Well Nourished] : well nourished [Well Developed] : well developed [Normal Sclera/Conjunctiva] : normal sclera/conjunctiva [EOMI] : extraocular movements intact [PERRL] : pupils equal round and reactive to light [No JVD] : no jugular venous distention [No Lymphadenopathy] : no lymphadenopathy [No Respiratory Distress] : no respiratory distress  [No Accessory Muscle Use] : no accessory muscle use [Clear to Auscultation] : lungs were clear to auscultation bilaterally [Normal Rate] : normal rate  [Regular Rhythm] : with a regular rhythm [Normal S1, S2] : normal S1 and S2 [No Murmur] : no murmur heard [Soft] : abdomen soft [Non Tender] : non-tender [Non-distended] : non-distended

## 2019-10-08 NOTE — ASSESSMENT
[FreeTextEntry1] : This is a 49 YO F patient with PMHx of DLD, DM, HTN, RA presenting to the hospital for F/U\par \par Rheumatoid arthritis\par -Continue with the hydroxychloroquine\par -patient is improving clinically\par -latent TB? positive quantiferon TB test\par - ID referral\par -f/u ophthalmo\par \par DM:\par -Decrease lantus to 22 unit at bed time\par -F/U HBA1C in 5 weeks\par \par HTN:\par -Continue with amlodipine 10 mg and lisinopril 40 mg\par -stop Hydrochlorothiazide and start chlorothalidone 25 mg PO once dauly \par \par -Flu shot today \par -uptodate on mammogram\par -uptodate on vaccination

## 2019-10-08 NOTE — HISTORY OF PRESENT ILLNESS
[FreeTextEntry1] : F/U [de-identified] : 49 YO F with PMH of HTN, DLD, DM II, presents for a routine f/u.\par The patient has been complaining of joint pain, swelling and morning stiffness for few hours. She has been following up with the rheumatology clinic who made the diagnosis of seropositive RA and started her on hydrochloroquine. The patient experienced improvement of her symptoms but still complaining of right first metatarsal head pain most likely secondary to osteoarthritic changes. The patient had a previous positive Quantiferon TB test and might need treatment for latent tb before advancement with the treatment for RA if she is still symptomatic. She had a BIRAD2 mammogram results few months back. But as per the pateint she is following up with it. as per the patient said that there is no need for any intervention.

## 2019-10-31 ENCOUNTER — OUTPATIENT (OUTPATIENT)
Dept: OUTPATIENT SERVICES | Facility: HOSPITAL | Age: 49
LOS: 1 days | Discharge: HOME | End: 2019-10-31

## 2019-10-31 DIAGNOSIS — Z98.891 HISTORY OF UTERINE SCAR FROM PREVIOUS SURGERY: Chronic | ICD-10-CM
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
## 2019-10-31 DIAGNOSIS — Z00.01 ENCOUNTER FOR GENERAL ADULT MEDICAL EXAMINATION WITH ABNORMAL FINDINGS: ICD-10-CM

## 2019-11-01 LAB
ESTIMATED AVERAGE GLUCOSE: 174 MG/DL
HBA1C MFR BLD HPLC: 7.7 %

## 2019-11-07 ENCOUNTER — OTHER (OUTPATIENT)
Age: 49
End: 2019-11-07

## 2019-11-11 ENCOUNTER — OUTPATIENT (OUTPATIENT)
Dept: OUTPATIENT SERVICES | Facility: HOSPITAL | Age: 49
LOS: 1 days | Discharge: HOME | End: 2019-11-11

## 2019-11-11 ENCOUNTER — APPOINTMENT (OUTPATIENT)
Dept: PODIATRY | Facility: CLINIC | Age: 49
End: 2019-11-11
Payer: COMMERCIAL

## 2019-11-11 VITALS
DIASTOLIC BLOOD PRESSURE: 90 MMHG | SYSTOLIC BLOOD PRESSURE: 158 MMHG | WEIGHT: 101 LBS | HEIGHT: 60 IN | BODY MASS INDEX: 19.83 KG/M2 | HEART RATE: 97 BPM

## 2019-11-11 DIAGNOSIS — Z98.891 HISTORY OF UTERINE SCAR FROM PREVIOUS SURGERY: Chronic | ICD-10-CM

## 2019-11-11 PROCEDURE — 99213 OFFICE O/P EST LOW 20 MIN: CPT

## 2019-11-12 ENCOUNTER — RX CHANGE (OUTPATIENT)
Age: 49
End: 2019-11-12

## 2019-11-12 RX ORDER — CLOTRIMAZOLE 1 %
1 SPRAY, NON-AEROSOL (ML) TOPICAL TWICE DAILY
Qty: 1 | Refills: 1 | Status: DISCONTINUED | COMMUNITY
Start: 2019-08-05 | End: 2019-11-12

## 2019-11-13 ENCOUNTER — OUTPATIENT (OUTPATIENT)
Dept: OUTPATIENT SERVICES | Facility: HOSPITAL | Age: 49
LOS: 1 days | Discharge: HOME | End: 2019-11-13

## 2019-11-13 ENCOUNTER — APPOINTMENT (OUTPATIENT)
Dept: INTERNAL MEDICINE | Facility: CLINIC | Age: 49
End: 2019-11-13
Payer: COMMERCIAL

## 2019-11-13 VITALS
SYSTOLIC BLOOD PRESSURE: 163 MMHG | RESPIRATION RATE: 16 BRPM | DIASTOLIC BLOOD PRESSURE: 83 MMHG | BODY MASS INDEX: 19.83 KG/M2 | WEIGHT: 101 LBS | HEIGHT: 60 IN | HEART RATE: 80 BPM

## 2019-11-13 DIAGNOSIS — Z83.3 FAMILY HISTORY OF DIABETES MELLITUS: ICD-10-CM

## 2019-11-13 DIAGNOSIS — Z98.891 HISTORY OF UTERINE SCAR FROM PREVIOUS SURGERY: Chronic | ICD-10-CM

## 2019-11-13 PROCEDURE — 99213 OFFICE O/P EST LOW 20 MIN: CPT | Mod: GC

## 2019-11-13 RX ORDER — PIOGLITAZONE HYDROCHLORIDE 30 MG/1
30 TABLET ORAL DAILY
Qty: 30 | Refills: 5 | Status: DISCONTINUED | COMMUNITY
Start: 2017-06-20 | End: 2019-11-13

## 2019-11-13 RX ORDER — BLOOD SUGAR DIAGNOSTIC
STRIP MISCELLANEOUS
Qty: 1 | Refills: 5 | Status: ACTIVE | COMMUNITY
Start: 2017-11-30 | End: 1900-01-01

## 2019-11-14 NOTE — ASSESSMENT
[FreeTextEntry1] : This is a 48 year old F pt with PMHx of DLD, DM, HTN, RA presenting to the clinic for a follow up\par \par #RA\par  -The patient is currently asymptomatic\par -F/U with rheum\par \par #DMII\par -Well control \par -HBA1c is 7.7 will repeat in 3 months\par \par #HTN\par -Not well controlled\par -Added Atenolol 25 mg PO daily\par -Secdondary HTN work up \par \par #Health maintainance\par -F/U in 3 months\par -Vaccine up to sourav\par -Mammogram up to date

## 2019-11-14 NOTE — HISTORY OF PRESENT ILLNESS
[FreeTextEntry1] : F/U [de-identified] : 49 YO F with PMH of HTN, DLD, DM II and seropositive rheumatoid arthritis on hydroxychloroquine.  presents for a routine f/u.. The patient experienced improvement of her symptoms significantly since the last time. She is currently asymptomatic and does not complain of anything \par

## 2019-11-18 DIAGNOSIS — Z83.3 FAMILY HISTORY OF DIABETES MELLITUS: ICD-10-CM

## 2019-11-20 DIAGNOSIS — E11.9 TYPE 2 DIABETES MELLITUS WITHOUT COMPLICATIONS: ICD-10-CM

## 2019-11-20 NOTE — PROCEDURE
[FreeTextEntry1] : Patient examined, history and chart reviewed\par Rx penlac \par patient educated on  foot health and maintenance at length \par Follow up in 3 months for possible PRN \par \par

## 2019-11-20 NOTE — END OF VISIT
[] : Resident [Resident] : Resident [FreeTextEntry3] : agree with above note.  \par Was present and instructing resident during visit. \par All Procedure performed under direct supervision.  \par MIKE Boyd DPM\par  [FreeTextEntry2] : agree with above note.  \par Was present and instructing resident during visit. \par All Procedure performed under direct supervision.  \par IMKE Boyd DPM\par

## 2019-11-20 NOTE — HISTORY OF PRESENT ILLNESS
[FreeTextEntry1] : IZA TIAN   presents for foot examination, patient was referred by PCP. Patient  Patient also complains of thickened nails x 10 that are painful in shoes. Patient denies NVFC and denies SOB.\par 
osteoarthritis right hip  CAPRINI SCORE    AGE RELATED RISK FACTORS                                                       MOBILITY RELATED FACTORS  [ ] Age 41-60 years                                            (1 Point)                  [ ] Bed rest                                                        (1 Point)  [x ] Age: 61-74 years                                           (2 Points)                [ ] Plaster cast                                                   (2 Points)  [ ] Age= 75 years                                              (3 Points)                 [ ] Bed bound for more than 72 hours                   (2 Points)    DISEASE RELATED RISK FACTORS                                               GENDER SPECIFIC FACTORS  [ ] Edema in the lower extremities                       (1 Point)                  [ ] Pregnancy                                                     (1 Point)  [ ] Varicose veins                                               (1 Point)                  [ ] Post-partum < 6 weeks                                   (1 Point)             [ x] BMI > 25 Kg/m2                                            (1 Point)                  [ ] Hormonal therapy  or oral contraception            (1 Point)                 [ ] Sepsis (in the previous month)                        (1 Point)                  [ ] History of pregnancy complications  [ ] Pneumonia or serious lung disease                                               [ ] Unexplained or recurrent                       (1 Point)           (in the previous month)                               (1 Point)  [ ] Abnormal pulmonary function test                     (1 Point)                 SURGERY RELATED RISK FACTORS  [ ] Acute myocardial infarction                              (1 Point)                 [ ]  Section                                            (1 Point)  [ ] Congestive heart failure (in the previous month)  (1 Point)                 [ ] Minor surgery                                                 (1 Point)   [ ] Inflammatory bowel disease                             (1 Point)                 [ ] Arthroscopic surgery                                        (2 Points)  [ ] Central venous access                                    (2 Points)                [ ] General surgery lasting more than 45 minutes   (2 Points)       [ ] Stroke (in the previous month)                          (5 Points)               [x ] Elective arthroplasty                                        (5 Points)                                                                                                                                               HEMATOLOGY RELATED FACTORS                                                 TRAUMA RELATED RISK FACTORS  [ ] Prior episodes of VTE                                     (3 Points)                 [ ] Fracture of the hip, pelvis, or leg                       (5 Points)  [ ] Positive family history for VTE                         (3 Points)                 [ ] Acute spinal cord injury (in the previous month)  (5 Points)  [ ] Prothrombin 09503 A                                      (3 Points)                 [ ] Paralysis  (less than 1 month)                          (5 Points)  [ ] Factor V Leiden                                             (3 Points)                 [ ] Multiple Trauma within 1 month                         (5 Points)  [ ] Lupus anticoagulants                                     (3 Points)                                                           [ ] Anticardiolipin antibodies                                (3 Points)                                                       [ ] High homocysteine in the blood                      (3 Points)                                             [ ] Other congenital or acquired thrombophilia       (3 Points)                                                [ ] Heparin induced thrombocytopenia                  (3 Points)                                          Total Score [     8     ]

## 2019-11-20 NOTE — PHYSICAL EXAM
[General Appearance - Alert] : alert [General Appearance - In No Acute Distress] : in no acute distress [Sclera] : the sclera and conjunctiva were normal [PERRL With Normal Accommodation] : pupils were equal in size, round, and reactive to light [Skin Color & Pigmentation] : normal skin color and pigmentation [] : no rash [Skin Turgor] : normal skin turgor [Normal in Appearance] : normal in appearance [Full ROM] : with full range of motion [#1 Diminished] : number 1 was diminished [#2 Diminished] : number 2 was diminished [#6 Diminished] : number 6 was diminished [Sensation] : the sensory exam was normal to light touch and pinprick [Deep Tendon Reflexes (DTR)] : deep tendon reflexes were 2+ and symmetric [No Focal Deficits] : no focal deficits [Oriented To Time, Place, And Person] : oriented to person, place, and time [Impaired Insight] : insight and judgment were intact [Affect] : the affect was normal [2+] : left 2+ [Normal] : motor strength was normal in all muscle groups [Normal Motor Tone] : the muscle tone was normal [Normal Station and Gait] : the gait and station were normal [Involuntary Movements] : no involuntary movements were seen [Vibration Dec.] : normal vibratory sensation at the level of the toes [Position Sense Dec.] : normal position sense at the level of the toes [Diminished Throughout Left Foot] : normal tactile sensation with monofilament testing throughout left foot [Diminished Throughout Right Foot] : normal tactile sensation with monofilament testing throughout right foot [#5 Diminished] : number 5 was normal [#8 Diminished] : number 8 was normal [#7 Diminished] : number 7 was normal [#3 Diminished] : number 3 was normal [#9 Diminished] : number 9 was normal [#4 Diminished] : number 4 was normal [#10 Diminished] : number 10 was normal [Toes] :  capillary refill of the toes was normal [Left Toes] :  capillary refill of the left toes was normal [Right Toes] :  capillary refill of the right toes was normal

## 2019-11-21 ENCOUNTER — OUTPATIENT (OUTPATIENT)
Dept: OUTPATIENT SERVICES | Facility: HOSPITAL | Age: 49
LOS: 1 days | Discharge: HOME | End: 2019-11-21

## 2019-11-21 ENCOUNTER — APPOINTMENT (OUTPATIENT)
Dept: RHEUMATOLOGY | Facility: CLINIC | Age: 49
End: 2019-11-21
Payer: COMMERCIAL

## 2019-11-21 VITALS
WEIGHT: 102 LBS | SYSTOLIC BLOOD PRESSURE: 159 MMHG | BODY MASS INDEX: 20.03 KG/M2 | HEART RATE: 80 BPM | DIASTOLIC BLOOD PRESSURE: 90 MMHG | TEMPERATURE: 97.7 F | HEIGHT: 60 IN

## 2019-11-21 DIAGNOSIS — Z98.891 HISTORY OF UTERINE SCAR FROM PREVIOUS SURGERY: Chronic | ICD-10-CM

## 2019-11-21 PROCEDURE — 99214 OFFICE O/P EST MOD 30 MIN: CPT

## 2019-11-21 NOTE — PHYSICAL EXAM
[General Appearance - Alert] : alert [General Appearance - In No Acute Distress] : in no acute distress [Sclera] : the sclera and conjunctiva were normal [Extraocular Movements] : extraocular movements were intact [Neck Appearance] : the appearance of the neck was normal [Respiration, Rhythm And Depth] : normal respiratory rhythm and effort [Auscultation Breath Sounds / Voice Sounds] : lungs were clear to auscultation bilaterally [Heart Rate And Rhythm] : heart rate was normal and rhythm regular [Heart Sounds] : normal S1 and S2 [Abdomen Soft] : soft [Abdomen Tenderness] : non-tender [Motor Tone] : muscle strength and tone were normal [] : no rash [No Focal Deficits] : no focal deficits [Oriented To Time, Place, And Person] : oriented to person, place, and time [FreeTextEntry1] : Normal bulk and tone

## 2019-11-21 NOTE — END OF VISIT
[] : Resident [FreeTextEntry3] : 49 yo F seropositive, erosive RA without prior treatment returning for follow up. PGA 4/10/PhGA 3/10, TJ 2 (L wrist, R 4th PIP), SJ 1 (R 4th PIP) = CDAI 10. Not yet seen by ID for + quant gold with abnormal pulmonary imaging (repeated clear CXR). Still has cough intermittently, but no b symptoms. Pt will be given number for ID appointment again at checkout. Referred to ophthalmology at last OV. Given Prevnar 13 vaccine today (already had flu shot this season). Will give HCQ more time since she has benefited so far from addition after just 1 month of therapy (may take 3-6 months for full effect), but given seropositive erosive nature of disease will likely add MTX to regimen at next OV in 2 months (HCQ not shown to slow radiographic progression of RA).

## 2019-11-21 NOTE — ASSESSMENT
[FreeTextEntry1] : 48 year old female with a PMH of HTN, DM2, DLD, presents for follow up of seropositive erosive RA. \par \par #Seropositive Erosive Rheumatoid Arthritis\par -symtpoms improving on hydrochloroquine, will continue with therapy\par -may consider addition of methotrexate as patient has erosive RA\par -rule out TB, patient referred to ID\par -patient given Prevnar 13 vaccine today\par -patient received flu vaccine in October \par \par RTC: 2 months or PRN

## 2019-11-21 NOTE — HISTORY OF PRESENT ILLNESS
[FreeTextEntry1] : 48 year old female with a PMH of HTN, DM2, DLD, presents for follow up of seropositive erosive RA. \par Pt states her hand pain has improved significantly since commencing therapy with hydroxychloroquine. \par The duration of morning stiffness has gone down to 10-15min, and the pain is now a 4/10 (down from a 8/10). She states the pain is in her MCP b/l, particularly the MCP on the right (4th digit). She works as a ; states pain improves when she works. Pt also has concern over TB; positive quantifieron in past with opacity. Patient still needs to follow up with I/D. \par \par

## 2019-11-21 NOTE — REVIEW OF SYSTEMS
[Cough] : cough [As Noted in HPI] : as noted in HPI [Arthralgias] : arthralgias [Joint Pain] : joint pain [Joint Swelling] : joint swelling [Joint Stiffness] : joint stiffness [Fever] : no fever [Chills] : no chills [Recent Weight Gain (___ Lbs)] : no recent weight gain [Recent Weight Loss (___ Lbs)] : no recent weight loss [Eye Pain] : no eye pain [Red Eyes] : eyes not red [Sore Throat] : no sore throat [Shortness Of Breath] : no shortness of breath [Wheezing] : no wheezing [Abdominal Pain] : no abdominal pain [Vomiting] : no vomiting [Proptosis] : no proptosis [Hot Flashes] : no hot flashes [Easy Bleeding] : no tendency for easy bleeding [Easy Bruising] : no tendency for easy bruising [FreeTextEntry9] : improving

## 2019-11-22 DIAGNOSIS — E11.9 TYPE 2 DIABETES MELLITUS WITHOUT COMPLICATIONS: ICD-10-CM

## 2019-11-23 ENCOUNTER — FORM ENCOUNTER (OUTPATIENT)
Age: 49
End: 2019-11-23

## 2019-11-24 ENCOUNTER — OUTPATIENT (OUTPATIENT)
Dept: OUTPATIENT SERVICES | Facility: HOSPITAL | Age: 49
LOS: 1 days | Discharge: HOME | End: 2019-11-24
Payer: SUBSIDIZED

## 2019-11-24 DIAGNOSIS — Z98.891 HISTORY OF UTERINE SCAR FROM PREVIOUS SURGERY: Chronic | ICD-10-CM

## 2019-11-24 DIAGNOSIS — I10 ESSENTIAL (PRIMARY) HYPERTENSION: ICD-10-CM

## 2019-11-24 PROCEDURE — 76770 US EXAM ABDO BACK WALL COMP: CPT | Mod: 26,59

## 2019-11-24 PROCEDURE — 93976 VASCULAR STUDY: CPT | Mod: 26

## 2019-11-26 ENCOUNTER — OUTPATIENT (OUTPATIENT)
Dept: OUTPATIENT SERVICES | Facility: HOSPITAL | Age: 49
LOS: 1 days | Discharge: HOME | End: 2019-11-26

## 2019-11-26 DIAGNOSIS — E11.9 TYPE 2 DIABETES MELLITUS WITHOUT COMPLICATIONS: ICD-10-CM

## 2019-11-26 DIAGNOSIS — B35.1 TINEA UNGUIUM: ICD-10-CM

## 2019-11-26 DIAGNOSIS — Z98.891 HISTORY OF UTERINE SCAR FROM PREVIOUS SURGERY: Chronic | ICD-10-CM

## 2019-11-26 DIAGNOSIS — E11.42 TYPE 2 DIABETES MELLITUS WITH DIABETIC POLYNEUROPATHY: ICD-10-CM

## 2019-12-12 ENCOUNTER — OUTPATIENT (OUTPATIENT)
Dept: OUTPATIENT SERVICES | Facility: HOSPITAL | Age: 49
LOS: 1 days | Discharge: HOME | End: 2019-12-12

## 2019-12-12 ENCOUNTER — APPOINTMENT (OUTPATIENT)
Dept: ENDOCRINOLOGY | Facility: CLINIC | Age: 49
End: 2019-12-12

## 2019-12-12 VITALS
SYSTOLIC BLOOD PRESSURE: 151 MMHG | HEART RATE: 80 BPM | BODY MASS INDEX: 20.22 KG/M2 | WEIGHT: 103 LBS | DIASTOLIC BLOOD PRESSURE: 85 MMHG | HEIGHT: 60 IN

## 2019-12-12 DIAGNOSIS — Z86.39 PERSONAL HISTORY OF OTHER ENDOCRINE, NUTRITIONAL AND METABOLIC DISEASE: ICD-10-CM

## 2019-12-12 DIAGNOSIS — Z98.891 HISTORY OF UTERINE SCAR FROM PREVIOUS SURGERY: Chronic | ICD-10-CM

## 2019-12-12 NOTE — ASSESSMENT
[FreeTextEntry1] : 48 year old female with PMH of HTN, DLD, and DM2 presenting for three month follow up.  She was started on insulin at her last visit due to uncontrolled DM.\par \par #DM\par -cont insulin 10 units daily, cont to monitor sugars twice daily\par -counseled on diabetes education\par -cont metformin,and pioglitazone\par -following with podiatry and eye doctor\par -HbA1c improved to 7.7 from 9.9\par \par #HTN\par -cont current meds\par \par #DLD\par -cont statin\par \par Return visit in 6 months with lab work and HbA1c [Carbohydrate Consistent Diet] : carbohydrate consistent diet [Diabetes Foot Care] : diabetes foot care [Long Term Vascular Complications] : long term vascular complications of diabetes [Importance of Diet and Exercise] : importance of diet and exercise to improve glycemic control, achieve weight loss and improve cardiovascular health

## 2019-12-12 NOTE — PHYSICAL EXAM
[Alert] : alert [No Acute Distress] : no acute distress [Well Nourished] : well nourished [Well Developed] : well developed [Normal Sclera/Conjunctiva] : normal sclera/conjunctiva [EOMI] : extra ocular movement intact [No Proptosis] : no proptosis [Normal Oropharynx] : the oropharynx was normal [Thyroid Not Enlarged] : the thyroid was not enlarged [No Thyroid Nodules] : there were no palpable thyroid nodules [No Respiratory Distress] : no respiratory distress [No Accessory Muscle Use] : no accessory muscle use [Clear to Auscultation] : lungs were clear to auscultation bilaterally [Normal Rate] : heart rate was normal  [Normal S1, S2] : normal S1 and S2 [Regular Rhythm] : with a regular rhythm [Pedal Pulses Normal] : the pedal pulses are present [No Edema] : there was no peripheral edema [Normal Bowel Sounds] : normal bowel sounds [Soft] : abdomen soft [Not Tender] : non-tender [Not Distended] : not distended [Post Cervical Nodes] : posterior cervical nodes [Anterior Cervical Nodes] : anterior cervical nodes [Axillary Nodes] : axillary nodes [Normal] : normal and non tender [No Spinal Tenderness] : no spinal tenderness [Spine Straight] : spine straight [No Stigmata of Cushings Syndrome] : no stigmata of cushings syndrome [Normal Gait] : normal gait [Normal Strength/Tone] : muscle strength and tone were normal [No Rash] : no rash [Normal Reflexes] : deep tendon reflexes were 2+ and symmetric [No Tremors] : no tremors [Oriented x3] : oriented to person, place, and time [Acanthosis Nigricans] : no acanthosis nigricans

## 2019-12-12 NOTE — HISTORY OF PRESENT ILLNESS
[FreeTextEntry1] : 48 year old female with PMH of HTN, DLD, and DM2 presenting for three month follow up.  She was started on insulin at her last visit due to uncontrolled DM.  She was injecting 14 units of lantus but experienced episodes of hypoglycemia so she is now injecting 10 units daily.  She reports no more episodes of hypoglycemia.  Her fasting numbers are between 100-110 in the AM and 130-150 two hours after meals.  She is also on Metformin 1000mg BID, Pioglitizone 30mg daily, .  She reports taking all of her medications as prescribed.  Her HbA1c was 9 .9 in September and came down to 7.7 at the end of October.  She also follows with the podiatrist, eye doctor, and nutritionist.\par

## 2020-01-30 ENCOUNTER — OUTPATIENT (OUTPATIENT)
Dept: OUTPATIENT SERVICES | Facility: HOSPITAL | Age: 50
LOS: 1 days | Discharge: HOME | End: 2020-01-30
Payer: SUBSIDIZED

## 2020-01-30 DIAGNOSIS — Z98.891 HISTORY OF UTERINE SCAR FROM PREVIOUS SURGERY: Chronic | ICD-10-CM

## 2020-01-30 LAB
BASOPHILS # BLD AUTO: 0.04 K/UL
BASOPHILS NFR BLD AUTO: 0.7 %
CHOLEST SERPL-MCNC: 281 MG/DL
EOSINOPHIL # BLD AUTO: 0.07 K/UL
EOSINOPHIL NFR BLD AUTO: 1.2 %
ESTIMATED AVERAGE GLUCOSE: 249 MG/DL
HBA1C MFR BLD HPLC: 10.3 %
HCT VFR BLD CALC: 38.7 %
HGB BLD-MCNC: 12.3 G/DL
IMM GRANULOCYTES NFR BLD AUTO: 0.3 %
LYMPHOCYTES # BLD AUTO: 1.89 K/UL
LYMPHOCYTES NFR BLD AUTO: 32 %
MAN DIFF?: NORMAL
MCHC RBC-ENTMCNC: 27 PG
MCHC RBC-ENTMCNC: 31.8 G/DL
MCV RBC AUTO: 84.9 FL
MONOCYTES # BLD AUTO: 0.48 K/UL
MONOCYTES NFR BLD AUTO: 8.1 %
NEUTROPHILS # BLD AUTO: 3.4 K/UL
NEUTROPHILS NFR BLD AUTO: 57.7 %
PLATELET # BLD AUTO: 337 K/UL
RBC # BLD: 4.56 M/UL
RBC # FLD: 13 %
WBC # FLD AUTO: 5.9 K/UL

## 2020-01-30 PROCEDURE — 92014 COMPRE OPH EXAM EST PT 1/>: CPT

## 2020-01-31 LAB — ALDOSTERONE SERUM: 16.2 NG/DL

## 2020-02-04 ENCOUNTER — OUTPATIENT (OUTPATIENT)
Dept: OUTPATIENT SERVICES | Facility: HOSPITAL | Age: 50
LOS: 1 days | Discharge: HOME | End: 2020-02-04

## 2020-02-04 ENCOUNTER — APPOINTMENT (OUTPATIENT)
Dept: PODIATRY | Facility: CLINIC | Age: 50
End: 2020-02-04
Payer: COMMERCIAL

## 2020-02-04 DIAGNOSIS — Z98.891 HISTORY OF UTERINE SCAR FROM PREVIOUS SURGERY: Chronic | ICD-10-CM

## 2020-02-04 DIAGNOSIS — E11.42 TYPE 2 DIABETES MELLITUS WITH DIABETIC POLYNEUROPATHY: ICD-10-CM

## 2020-02-04 DIAGNOSIS — M79.671 PAIN IN RIGHT FOOT: ICD-10-CM

## 2020-02-04 DIAGNOSIS — M79.672 PAIN IN LEFT FOOT: ICD-10-CM

## 2020-02-04 PROCEDURE — 99213 OFFICE O/P EST LOW 20 MIN: CPT

## 2020-02-07 ENCOUNTER — EMERGENCY (EMERGENCY)
Facility: HOSPITAL | Age: 50
LOS: 0 days | Discharge: HOME | End: 2020-02-07
Attending: EMERGENCY MEDICINE | Admitting: EMERGENCY MEDICINE
Payer: SUBSIDIZED

## 2020-02-07 VITALS
SYSTOLIC BLOOD PRESSURE: 150 MMHG | DIASTOLIC BLOOD PRESSURE: 74 MMHG | HEART RATE: 99 BPM | RESPIRATION RATE: 18 BRPM | TEMPERATURE: 98 F | OXYGEN SATURATION: 99 %

## 2020-02-07 DIAGNOSIS — J18.9 PNEUMONIA, UNSPECIFIED ORGANISM: ICD-10-CM

## 2020-02-07 DIAGNOSIS — R10.12 LEFT UPPER QUADRANT PAIN: ICD-10-CM

## 2020-02-07 DIAGNOSIS — I10 ESSENTIAL (PRIMARY) HYPERTENSION: ICD-10-CM

## 2020-02-07 DIAGNOSIS — Z98.891 HISTORY OF UTERINE SCAR FROM PREVIOUS SURGERY: Chronic | ICD-10-CM

## 2020-02-07 DIAGNOSIS — N30.90 CYSTITIS, UNSPECIFIED WITHOUT HEMATURIA: ICD-10-CM

## 2020-02-07 LAB
ALBUMIN SERPL ELPH-MCNC: 3.8 G/DL — SIGNIFICANT CHANGE UP (ref 3.5–5.2)
ALP SERPL-CCNC: 82 U/L — SIGNIFICANT CHANGE UP (ref 30–115)
ALT FLD-CCNC: 15 U/L — SIGNIFICANT CHANGE UP (ref 0–41)
ANION GAP SERPL CALC-SCNC: 11 MMOL/L — SIGNIFICANT CHANGE UP (ref 7–14)
APPEARANCE UR: CLEAR — SIGNIFICANT CHANGE UP
AST SERPL-CCNC: 15 U/L — SIGNIFICANT CHANGE UP (ref 0–41)
BACTERIA # UR AUTO: ABNORMAL
BASE EXCESS BLDV CALC-SCNC: 2.9 MMOL/L — HIGH (ref -2–2)
BASOPHILS # BLD AUTO: 0.02 K/UL — SIGNIFICANT CHANGE UP (ref 0–0.2)
BASOPHILS NFR BLD AUTO: 0.2 % — SIGNIFICANT CHANGE UP (ref 0–1)
BILIRUB SERPL-MCNC: <0.2 MG/DL — SIGNIFICANT CHANGE UP (ref 0.2–1.2)
BILIRUB UR-MCNC: NEGATIVE — SIGNIFICANT CHANGE UP
BUN SERPL-MCNC: 25 MG/DL — HIGH (ref 10–20)
CA-I SERPL-SCNC: 1.23 MMOL/L — SIGNIFICANT CHANGE UP (ref 1.12–1.3)
CALCIUM SERPL-MCNC: 9.1 MG/DL — SIGNIFICANT CHANGE UP (ref 8.5–10.1)
CHLORIDE SERPL-SCNC: 102 MMOL/L — SIGNIFICANT CHANGE UP (ref 98–110)
CO2 SERPL-SCNC: 24 MMOL/L — SIGNIFICANT CHANGE UP (ref 17–32)
COLOR SPEC: YELLOW — SIGNIFICANT CHANGE UP
CREAT SERPL-MCNC: 0.8 MG/DL — SIGNIFICANT CHANGE UP (ref 0.7–1.5)
DIFF PNL FLD: ABNORMAL
EOSINOPHIL # BLD AUTO: 0.03 K/UL — SIGNIFICANT CHANGE UP (ref 0–0.7)
EOSINOPHIL NFR BLD AUTO: 0.3 % — SIGNIFICANT CHANGE UP (ref 0–8)
EPI CELLS # UR: 1 /HPF — SIGNIFICANT CHANGE UP (ref 0–5)
GAS PNL BLDV: 139 MMOL/L — SIGNIFICANT CHANGE UP (ref 136–145)
GAS PNL BLDV: SIGNIFICANT CHANGE UP
GLUCOSE SERPL-MCNC: 319 MG/DL — HIGH (ref 70–99)
GLUCOSE UR QL: ABNORMAL
HCG SERPL QL: NEGATIVE — SIGNIFICANT CHANGE UP
HCO3 BLDV-SCNC: 29 MMOL/L — SIGNIFICANT CHANGE UP (ref 22–29)
HCT VFR BLD CALC: 34.8 % — LOW (ref 37–47)
HCT VFR BLDA CALC: 37.6 % — SIGNIFICANT CHANGE UP (ref 34–44)
HGB BLD CALC-MCNC: 12.3 G/DL — LOW (ref 14–18)
HGB BLD-MCNC: 11.3 G/DL — LOW (ref 12–16)
HYALINE CASTS # UR AUTO: 3 /LPF — SIGNIFICANT CHANGE UP (ref 0–7)
IMM GRANULOCYTES NFR BLD AUTO: 0.3 % — SIGNIFICANT CHANGE UP (ref 0.1–0.3)
KETONES UR-MCNC: NEGATIVE — SIGNIFICANT CHANGE UP
LACTATE BLDV-MCNC: 0.7 MMOL/L — SIGNIFICANT CHANGE UP (ref 0.5–1.6)
LACTATE SERPL-SCNC: 0.7 MMOL/L — SIGNIFICANT CHANGE UP (ref 0.7–2)
LEUKOCYTE ESTERASE UR-ACNC: ABNORMAL
LIDOCAIN IGE QN: 56 U/L — SIGNIFICANT CHANGE UP (ref 7–60)
LYMPHOCYTES # BLD AUTO: 1.4 K/UL — SIGNIFICANT CHANGE UP (ref 1.2–3.4)
LYMPHOCYTES # BLD AUTO: 13.8 % — LOW (ref 20.5–51.1)
MCHC RBC-ENTMCNC: 27.8 PG — SIGNIFICANT CHANGE UP (ref 27–31)
MCHC RBC-ENTMCNC: 32.5 G/DL — SIGNIFICANT CHANGE UP (ref 32–37)
MCV RBC AUTO: 85.5 FL — SIGNIFICANT CHANGE UP (ref 81–99)
MONOCYTES # BLD AUTO: 0.6 K/UL — SIGNIFICANT CHANGE UP (ref 0.1–0.6)
MONOCYTES NFR BLD AUTO: 5.9 % — SIGNIFICANT CHANGE UP (ref 1.7–9.3)
NEUTROPHILS # BLD AUTO: 8.03 K/UL — HIGH (ref 1.4–6.5)
NEUTROPHILS NFR BLD AUTO: 79.5 % — HIGH (ref 42.2–75.2)
NITRITE UR-MCNC: NEGATIVE — SIGNIFICANT CHANGE UP
NRBC # BLD: 0 /100 WBCS — SIGNIFICANT CHANGE UP (ref 0–0)
PCO2 BLDV: 52 MMHG — HIGH (ref 41–51)
PH BLDV: 7.36 — SIGNIFICANT CHANGE UP (ref 7.26–7.43)
PH UR: 6 — SIGNIFICANT CHANGE UP (ref 5–8)
PLATELET # BLD AUTO: 265 K/UL — SIGNIFICANT CHANGE UP (ref 130–400)
PO2 BLDV: 30 MMHG — SIGNIFICANT CHANGE UP (ref 20–40)
POTASSIUM BLDV-SCNC: 4.1 MMOL/L — SIGNIFICANT CHANGE UP (ref 3.3–5.6)
POTASSIUM SERPL-MCNC: 4.2 MMOL/L — SIGNIFICANT CHANGE UP (ref 3.5–5)
POTASSIUM SERPL-SCNC: 4.2 MMOL/L — SIGNIFICANT CHANGE UP (ref 3.5–5)
PROT SERPL-MCNC: 7 G/DL — SIGNIFICANT CHANGE UP (ref 6–8)
PROT UR-MCNC: ABNORMAL
RBC # BLD: 4.07 M/UL — LOW (ref 4.2–5.4)
RBC # FLD: 12.8 % — SIGNIFICANT CHANGE UP (ref 11.5–14.5)
RBC CASTS # UR COMP ASSIST: 25 /HPF — HIGH (ref 0–4)
RENIN ACTIVITY, PLASMA: 1.62 NG/ML/HR
SAO2 % BLDV: 52 % — SIGNIFICANT CHANGE UP
SODIUM SERPL-SCNC: 137 MMOL/L — SIGNIFICANT CHANGE UP (ref 135–146)
SP GR SPEC: 1.02 — SIGNIFICANT CHANGE UP (ref 1.01–1.02)
UROBILINOGEN FLD QL: SIGNIFICANT CHANGE UP
WBC # BLD: 10.11 K/UL — SIGNIFICANT CHANGE UP (ref 4.8–10.8)
WBC # FLD AUTO: 10.11 K/UL — SIGNIFICANT CHANGE UP (ref 4.8–10.8)
WBC UR QL: 43 /HPF — HIGH (ref 0–5)

## 2020-02-07 PROCEDURE — 99285 EMERGENCY DEPT VISIT HI MDM: CPT

## 2020-02-07 PROCEDURE — 74177 CT ABD & PELVIS W/CONTRAST: CPT | Mod: 26

## 2020-02-07 RX ORDER — MORPHINE SULFATE 50 MG/1
4 CAPSULE, EXTENDED RELEASE ORAL ONCE
Refills: 0 | Status: DISCONTINUED | OUTPATIENT
Start: 2020-02-07 | End: 2020-02-07

## 2020-02-07 RX ORDER — ONDANSETRON 8 MG/1
4 TABLET, FILM COATED ORAL ONCE
Refills: 0 | Status: COMPLETED | OUTPATIENT
Start: 2020-02-07 | End: 2020-02-07

## 2020-02-07 RX ORDER — CEFPODOXIME PROXETIL 100 MG
1 TABLET ORAL
Qty: 14 | Refills: 0
Start: 2020-02-07 | End: 2020-02-13

## 2020-02-07 RX ORDER — SODIUM CHLORIDE 9 MG/ML
1000 INJECTION INTRAMUSCULAR; INTRAVENOUS; SUBCUTANEOUS ONCE
Refills: 0 | Status: COMPLETED | OUTPATIENT
Start: 2020-02-07 | End: 2020-02-07

## 2020-02-07 RX ADMIN — ONDANSETRON 4 MILLIGRAM(S): 8 TABLET, FILM COATED ORAL at 14:50

## 2020-02-07 RX ADMIN — SODIUM CHLORIDE 1000 MILLILITER(S): 9 INJECTION INTRAMUSCULAR; INTRAVENOUS; SUBCUTANEOUS at 14:50

## 2020-02-07 RX ADMIN — MORPHINE SULFATE 4 MILLIGRAM(S): 50 CAPSULE, EXTENDED RELEASE ORAL at 14:50

## 2020-02-07 NOTE — ED PROVIDER NOTE - CLINICAL SUMMARY MEDICAL DECISION MAKING FREE TEXT BOX
50 y/o F with HTN, dyslipidemia, DM, iron deficiency, anemia, presents with abd pain x 3 days. According to pt, the abd pain is a gradual onset that is R sided radiating to the L mid-abd; the pain is intermittent with no exacerbating or relieving factors. No fevers, n/v/d. Pt has a hx of . No other abd surgeries. labs imaging reviewed. 48 y/o F with HTN, dyslipidemia, DM, iron deficiency, anemia, presents with abd pain x 3 days. According to pt, the abd pain is a gradual onset that is R sided radiating to the L mid-abd; the pain is intermittent with no exacerbating or relieving factors. No fevers, n/v/d. Pt has a hx of . No other abd surgeries. labs imaging reviewed. Pt feeling improved, tolerating PO, abdomen soft, non-tender, non-distended, no rebound, no guarding. Aware of all results, given a copy of all available results, comfortable with discharge and follow-up outpatient, strict return precautions given. Endorses understanding of all of this and aware that they can return at any time for new or concerning symptoms. No further questions or concerns at this time

## 2020-02-07 NOTE — ED ADULT NURSE NOTE - NSIMPLEMENTINTERV_GEN_ALL_ED
Implemented All Universal Safety Interventions:  [place] to call system. Call bell, personal items and telephone within reach. Instruct patient to call for assistance. Room bathroom lighting operational. Non-slip footwear when patient is off stretcher. Physically safe environment: no spills, clutter or unnecessary equipment. Stretcher in lowest position, wheels locked, appropriate side rails in place.

## 2020-02-07 NOTE — ED PROVIDER NOTE - PATIENT PORTAL LINK FT
You can access the FollowMyHealth Patient Portal offered by [facility] by registering at the following website: [email or web address]. By joining [facility]’s FollowMyHealth portal, you will also be able to view your health information using other applications (apps) compatible with our system.

## 2020-02-07 NOTE — ED PROVIDER NOTE - PHYSICAL EXAMINATION
Constitutional: Well appearing. No acute distress. Non toxic.   Eyes: PERRLA. Extraocular movements intact, no entrapment. Conjunctiva normal.   ENT: No nasal discharge. Moist mucus membranes.  Neck: Supple, non tender, full range of motion.  CV: RRR no murmurs, rubs, or gallops. +S1S2.   Pulm: Clear to auscultation bilaterally. Normal work of breathing.  Abd: Soft, ND, TTP on periumbilical area and LUQ, +BS.   Ext: Warm and well perfused x4, moving all extremities, no edema.   Psy: Cooperative, appropriate.   Skin: Warm, dry, no rash  Neuro: CN2-12 grossly intact no sensory or motor deficits throughout, no drift, no ataxia.

## 2020-02-07 NOTE — ED PROVIDER NOTE - OBJECTIVE STATEMENT
50 y/o F with HTN, dyslipidemia, DM, iron deficiency, anemia, presents with abd pain x 3 days. According to pt, the abd pain is a gradual onset that is R sided radiating to the L mid-abd; the pain is intermittent with no exacerbating or relieving factors. No fevers, n/v/d. Pt has a hx of . No other abd surgeries.

## 2020-02-07 NOTE — ED PROVIDER NOTE - NS ED ROS FT
Constitutional:  See HPI.   Eyes:  No visual changes, eye pain or discharge.  ENMT:  No hearing changes, pain, discharge or infections. No neck pain or stiffness.  Cardiac:  No chest pain, SOB or edema. No chest pain with exertion.  Respiratory:  No cough or respiratory distress. No hemoptysis.  GI:  + abdominal pain. No nausea, vomiting, diarrhea,  :  No dysuria, frequency, hematuria  MS:  No joint pain or back pain.  Neuro:  No LOC. No headache or weakness.    Skin:  No skin rash.  Except as in HPI, all other review of systems is negative

## 2020-02-07 NOTE — ED ADULT NURSE NOTE - PAIN RATING/NUMBER SCALE (0-10): REST
Patient: Rosetta Hemphill    Procedure Summary     Date Anesthesia Start Anesthesia Stop Room / Location    08/18/17 1350 1540 [facility]       Procedure Diagnosis Surgeon Provider    PARTIAL HIP REPLACEMENT (Left Hip) Subcapital fracture of hip, left, closed, initial encounter  (Subcapital fracture of hip, left, closed, initial encounter [S72.012A]) MD Julián Kessler MD          Anesthesia Type: general  Last vitals  BP   121/59 (08/18/17 1615)    Temp   36.5 °C (97.7 °F) (08/18/17 1545)    Pulse   71 (08/18/17 1545)   Resp   16 (08/18/17 1615)    SpO2   94 % (08/18/17 1545)      Post Anesthesia Care and Evaluation    Patient location during evaluation: PACU  Patient participation: complete - patient participated  Level of consciousness: awake and alert  Pain management: adequate  Airway patency: patent  Anesthetic complications: No anesthetic complications    Cardiovascular status: acceptable  Respiratory status: acceptable  Hydration status: acceptable    Comments: --------------------            08/18/17 1615     --------------------   BP:       121/59     Pulse:               Resp:       16       Temp:                SpO2:               --------------------      
5

## 2020-02-07 NOTE — ED PROVIDER NOTE - NSFOLLOWUPINSTRUCTIONS_ED_ALL_ED_FT
Urinary Tract Infection    A urinary tract infection (UTI) is an infection of any part of the urinary tract, which includes the kidneys, ureters, bladder, and urethra. Risk factors include ignoring your need to urinate, wiping back to front if female, being an uncircumcised male, and having diabetes or a weak immune system. Symptoms include frequent urination, pain or burning with urination, foul smelling urine, cloudy urine, pain in the lower abdomen, blood in the urine, and fever. If you were prescribed an antibiotic medicine, take it as told by your health care provider. Do not stop taking the antibiotic even if you start to feel better.    SEEK IMMEDIATE MEDICAL CARE IF YOU HAVE THE FOLLOWING SYMPTOMS: severe back or abdominal pain, inability to keep fluids or medicine down, dizziness/lightheadedness, or a change in mental status.        Pneumonia    Pneumonia is an infection of the lungs. Pneumonia may be caused by bacteria, viruses, or funguses. Symptoms include coughing, fever, chest pain when breathing deeply or coughing, shortness of breath, fatigue, or muscle aches. Pneumonia can be diagnosed with a medical history and physical exam, as well as other tests which may include a chest X-ray. If you were prescribed an antibiotic medicine, take it as told by your health care provider and do not stop taking the antibiotic even if you start to feel better. Do not use tobacco products, including cigarettes, chewing tobacco, and e-cigarettes.    SEEK IMMEDIATE MEDICAL CARE IF YOU HAVE THE FOLLOWING SYMPTOMS: worsening shortness of breath, worsening chest pain, coughing up blood, change in mental status, lightheadedness/dizziness.

## 2020-02-11 NOTE — END OF VISIT
[] : Resident [Resident] : Resident [FreeTextEntry2] : agree with above note.  \par Was present and instructing resident during visit. \par All Procedure performed under direct supervision.  \par MIKE Boyd DPM\par  [FreeTextEntry3] : agree with above note.  \par Was present and instructing resident during visit. \par All Procedure performed under direct supervision.  \par MIKE Boyd DPM\par

## 2020-02-11 NOTE — PHYSICAL EXAM
[Sclera] : the sclera and conjunctiva were normal [General Appearance - In No Acute Distress] : in no acute distress [General Appearance - Alert] : alert [Skin Turgor] : normal skin turgor [Skin Color & Pigmentation] : normal skin color and pigmentation [PERRL With Normal Accommodation] : pupils were equal in size, round, and reactive to light [] : no rash [Normal in Appearance] : normal in appearance [Full ROM] : with full range of motion [#1 Diminished] : number 1 was diminished [#2 Diminished] : number 2 was diminished [#6 Diminished] : number 6 was diminished [Sensation] : the sensory exam was normal to light touch and pinprick [Deep Tendon Reflexes (DTR)] : deep tendon reflexes were 2+ and symmetric [No Focal Deficits] : no focal deficits [Oriented To Time, Place, And Person] : oriented to person, place, and time [Affect] : the affect was normal [Impaired Insight] : insight and judgment were intact [2+] : left 2+ [Normal Station and Gait] : the gait and station were normal [Normal] : motor strength was normal in all muscle groups [Normal Motor Tone] : the muscle tone was normal [Involuntary Movements] : no involuntary movements were seen [Vibration Dec.] : normal vibratory sensation at the level of the toes [Position Sense Dec.] : normal position sense at the level of the toes [Diminished Throughout Right Foot] : normal tactile sensation with monofilament testing throughout right foot [Diminished Throughout Left Foot] : normal tactile sensation with monofilament testing throughout left foot [#7 Diminished] : number 7 was normal [#5 Diminished] : number 5 was normal [#8 Diminished] : number 8 was normal [#3 Diminished] : number 3 was normal [#4 Diminished] : number 4 was normal [#9 Diminished] : number 9 was normal [#10 Diminished] : number 10 was normal [Toes] :  capillary refill of the toes was normal [Left Toes] :  capillary refill of the left toes was normal [Right Toes] :  capillary refill of the right toes was normal

## 2020-02-20 ENCOUNTER — APPOINTMENT (OUTPATIENT)
Dept: RHEUMATOLOGY | Facility: CLINIC | Age: 50
End: 2020-02-20
Payer: COMMERCIAL

## 2020-02-20 ENCOUNTER — OUTPATIENT (OUTPATIENT)
Dept: OUTPATIENT SERVICES | Facility: HOSPITAL | Age: 50
LOS: 1 days | Discharge: HOME | End: 2020-02-20

## 2020-02-20 VITALS
TEMPERATURE: 97.9 F | WEIGHT: 100 LBS | SYSTOLIC BLOOD PRESSURE: 154 MMHG | DIASTOLIC BLOOD PRESSURE: 86 MMHG | BODY MASS INDEX: 19.63 KG/M2 | HEART RATE: 79 BPM | HEIGHT: 60 IN

## 2020-02-20 DIAGNOSIS — Z98.891 HISTORY OF UTERINE SCAR FROM PREVIOUS SURGERY: Chronic | ICD-10-CM

## 2020-02-20 DIAGNOSIS — R76.12 NONSPECIFIC REACTION TO CELL MEDIATED IMMUNITY MEASUREMENT OF GAMMA INTERFERON ANTIGEN RESPONSE WITHOUT ACTIVE TUBERCULOSIS: ICD-10-CM

## 2020-02-20 DIAGNOSIS — M06.9 RHEUMATOID ARTHRITIS, UNSPECIFIED: ICD-10-CM

## 2020-02-20 PROCEDURE — 99214 OFFICE O/P EST MOD 30 MIN: CPT | Mod: GC

## 2020-02-20 NOTE — HISTORY OF PRESENT ILLNESS
[FreeTextEntry1] : 47 yo F with a PMH of HTN, DM2, DLD, presents for follow up of seropositive erosive RA here today for f/u. Last seen Nov 2019. \par \par Pt states her hand pain has improved significantly since commencing therapy with hydroxychloroquine. \par The duration of morning stiffness ~ 10-15min, with minimal pain. She states the pain is in her MCP b/l, particularly the MCP on the right (4th digit). She works as a ; states pain improves when she works. Pt also has concern over TB; positive Quantifieron in past with opacity. Pt reportedly followed up with ID in January (note not in our system) without recommendations for LTBI treatment. \par

## 2020-02-20 NOTE — PHYSICAL EXAM
[General Appearance - Well Nourished] : well nourished [General Appearance - In No Acute Distress] : in no acute distress [General Appearance - Alert] : alert [General Appearance - Well Developed] : well developed [General Appearance - Well-Appearing] : healthy appearing [Heart Sounds] : normal S1 and S2 [Auscultation Breath Sounds / Voice Sounds] : lungs were clear to auscultation bilaterally [Abdomen Tenderness] : non-tender [Bowel Sounds] : normal bowel sounds [Motor Tone] : muscle strength and tone were normal [Sclera] : the sclera and conjunctiva were normal [Neck Appearance] : the appearance of the neck was normal [Respiration, Rhythm And Depth] : normal respiratory rhythm and effort [Heart Rate And Rhythm] : heart rate was normal and rhythm regular [Skin Lesions] : no skin lesions [] : no rash [Mood] : the mood was normal [FreeTextEntry1] : Normal muscle bulk/tone

## 2020-02-20 NOTE — END OF VISIT
[] : Resident [FreeTextEntry3] : 50 yo F with seropositive, erosive RA returning for follow up doing well on HCQ monotherapy without reports of prolonged AM stiffness, joint swelling, and has minimal arthralgias. Reports being seen by ID for + quant gold with abnormal pulmonary imaging (repeated clear CXR) - hadn't been advised to have LTBI treatment although OV still needed. Had updated ophthalmology exam in January with recommendation for f/u in 6 months. Given Prevnar 13 vaccine at last OV and had flu shot this season - should have pneumovax at next OV. Pt prefers to stay on HCQ given + benefit - will continue for now (consider dose decrease at next OV for weight adjustment), but given seropositive erosive nature of disease would highly consider transition to/addition of MTX to regimen in the future. Given mammogram referral, should maintain age appropriate malignancy screenings. Follow up 3 months.

## 2020-02-20 NOTE — ASSESSMENT
[FreeTextEntry1] : 47 yo F with a PMH of HTN, DM2, DLD and seropositive erosive RA. \par \par #Seropositive Erosive Rheumatoid Arthritis\par -Symptoms improving on hydrochloroquine, will continue with therapy. Pt would like to stay on hydrochloroquine.\par -Will highly consider switching to methotrexate in the future as patient has erosive RA although will continue HCQ monotherapy 2/2 pt preference \par -will need OV note from ID (note not in our system) - per patient no treatment needed for LTBI\par -Following with opthalmology, seen in January\par -Received Prevnar 13 vaccine Nov 2019\par -Will need Prevnar 23 next visit \par -patient received flu vaccine in October 2019\par - Referred for mammography, otherwise UTD age appropriate malignancy screenings\par \par RTC: 3 months or PRN. \par

## 2020-02-24 ENCOUNTER — APPOINTMENT (OUTPATIENT)
Dept: INTERNAL MEDICINE | Facility: CLINIC | Age: 50
End: 2020-02-24
Payer: COMMERCIAL

## 2020-02-24 ENCOUNTER — OUTPATIENT (OUTPATIENT)
Dept: OUTPATIENT SERVICES | Facility: HOSPITAL | Age: 50
LOS: 1 days | Discharge: HOME | End: 2020-02-24

## 2020-02-24 VITALS
TEMPERATURE: 96.8 F | HEART RATE: 89 BPM | HEIGHT: 60 IN | DIASTOLIC BLOOD PRESSURE: 92 MMHG | BODY MASS INDEX: 19.63 KG/M2 | SYSTOLIC BLOOD PRESSURE: 160 MMHG | WEIGHT: 100 LBS

## 2020-02-24 DIAGNOSIS — R10.13 EPIGASTRIC PAIN: ICD-10-CM

## 2020-02-24 DIAGNOSIS — Z98.891 HISTORY OF UTERINE SCAR FROM PREVIOUS SURGERY: Chronic | ICD-10-CM

## 2020-02-24 PROCEDURE — 99213 OFFICE O/P EST LOW 20 MIN: CPT | Mod: GC

## 2020-02-24 NOTE — PHYSICAL EXAM
[No Acute Distress] : no acute distress [Well Developed] : well developed [Normal Outer Ear/Nose] : the outer ears and nose were normal in appearance [Normal Oropharynx] : the oropharynx was normal [No JVD] : no jugular venous distention [No Respiratory Distress] : no respiratory distress  [No Lymphadenopathy] : no lymphadenopathy [No Accessory Muscle Use] : no accessory muscle use [Clear to Auscultation] : lungs were clear to auscultation bilaterally [Normal Rate] : normal rate  [Regular Rhythm] : with a regular rhythm [Normal S1, S2] : normal S1 and S2 [Non Tender] : non-tender [Soft] : abdomen soft [Non-distended] : non-distended

## 2020-02-25 DIAGNOSIS — R10.13 EPIGASTRIC PAIN: ICD-10-CM

## 2020-02-25 DIAGNOSIS — R10.9 UNSPECIFIED ABDOMINAL PAIN: ICD-10-CM

## 2020-02-25 LAB
ALBUMIN SERPL ELPH-MCNC: 4.1 G/DL
ALP BLD-CCNC: 86 U/L
ALT SERPL-CCNC: 13 U/L
ANION GAP SERPL CALC-SCNC: 14 MMOL/L
AST SERPL-CCNC: 16 U/L
BASOPHILS # BLD AUTO: 0.05 K/UL
BASOPHILS NFR BLD AUTO: 0.8 %
BILIRUB SERPL-MCNC: 0.3 MG/DL
BUN SERPL-MCNC: 22 MG/DL
CALCIUM SERPL-MCNC: 9.5 MG/DL
CHLORIDE SERPL-SCNC: 96 MMOL/L
CO2 SERPL-SCNC: 25 MMOL/L
CREAT SERPL-MCNC: 0.8 MG/DL
EOSINOPHIL # BLD AUTO: 0.04 K/UL
EOSINOPHIL NFR BLD AUTO: 0.6 %
ESTIMATED AVERAGE GLUCOSE: 309 MG/DL
GLUCOSE SERPL-MCNC: 362 MG/DL
HBA1C MFR BLD HPLC: 12.4 %
HCT VFR BLD CALC: 38.6 %
HGB BLD-MCNC: 12.1 G/DL
IMM GRANULOCYTES NFR BLD AUTO: 0.2 %
LYMPHOCYTES # BLD AUTO: 1.9 K/UL
LYMPHOCYTES NFR BLD AUTO: 29.5 %
MAN DIFF?: NORMAL
MCHC RBC-ENTMCNC: 26.5 PG
MCHC RBC-ENTMCNC: 31.3 G/DL
MCV RBC AUTO: 84.5 FL
MONOCYTES # BLD AUTO: 0.42 K/UL
MONOCYTES NFR BLD AUTO: 6.5 %
NEUTROPHILS # BLD AUTO: 4.01 K/UL
NEUTROPHILS NFR BLD AUTO: 62.4 %
PLATELET # BLD AUTO: 307 K/UL
POTASSIUM SERPL-SCNC: 4.6 MMOL/L
PROT SERPL-MCNC: 7.5 G/DL
RBC # BLD: 4.57 M/UL
RBC # FLD: 13.2 %
SODIUM SERPL-SCNC: 135 MMOL/L
WBC # FLD AUTO: 6.43 K/UL

## 2020-02-26 NOTE — ASSESSMENT
[FreeTextEntry1] : This is a 48 year old F pt with PMHx of DLD, DM, HTN, RA presenting to the clinic for a follow up\par \par #RA\par  -The patient is currently asymptomatic\par -On Hydroxychloroquine\par -Follow up with Rheum\par -The patient is being treated \par \par #DMII\par -Poorly controlled \par -HBA1c is 10.3 today will repeat in 3 months\par -Counseled regarding diet \par \par #HTN\par -Not well controlled\par -Added Atenolol 50 mg PO daily\par -Secdondary HTN work up \par \par #Health maintainance\par -F/U in 3 months\par -Vaccine up to date\par -Mammogram scheduled for march \par

## 2020-02-26 NOTE — HISTORY OF PRESENT ILLNESS
[de-identified] : 49 YO F with PMH of HTN, DLD, uncontrolled DM II, presents for a routine f/u.\par She still has b/l joint pain, small joints associated with morning stiffness. and she was diagnosed with seropositive RA and she is following with the rheum clinic and currently on hydroxychloroquine \par she has been losing weight not documented\par patient gets occasional abdominal pain, evaluated before with negative study results.\par She was sent for mammography. and she is up to date.

## 2020-02-26 NOTE — REVIEW OF SYSTEMS
[Abdominal Pain] : abdominal pain [Negative] : Genitourinary [Nausea] : no nausea [Constipation] : no constipation [Diarrhea] : diarrhea [Vomiting] : no vomiting [Heartburn] : no heartburn [Melena] : no melena [FreeTextEntry7] : generalized abdominal pain

## 2020-03-31 ENCOUNTER — RESULT REVIEW (OUTPATIENT)
Age: 50
End: 2020-03-31

## 2020-03-31 ENCOUNTER — OUTPATIENT (OUTPATIENT)
Dept: OUTPATIENT SERVICES | Facility: HOSPITAL | Age: 50
LOS: 1 days | Discharge: HOME | End: 2020-03-31
Payer: OTHER GOVERNMENT

## 2020-03-31 DIAGNOSIS — Z98.891 HISTORY OF UTERINE SCAR FROM PREVIOUS SURGERY: Chronic | ICD-10-CM

## 2020-03-31 DIAGNOSIS — R92.8 OTHER ABNORMAL AND INCONCLUSIVE FINDINGS ON DIAGNOSTIC IMAGING OF BREAST: ICD-10-CM

## 2020-03-31 PROCEDURE — 76642 ULTRASOUND BREAST LIMITED: CPT | Mod: 26,LT

## 2020-05-06 ENCOUNTER — APPOINTMENT (OUTPATIENT)
Dept: PODIATRY | Facility: CLINIC | Age: 50
End: 2020-05-06

## 2020-05-07 ENCOUNTER — APPOINTMENT (OUTPATIENT)
Dept: INTERNAL MEDICINE | Facility: CLINIC | Age: 50
End: 2020-05-07

## 2020-05-29 ENCOUNTER — APPOINTMENT (OUTPATIENT)
Dept: RHEUMATOLOGY | Facility: CLINIC | Age: 50
End: 2020-05-29
Payer: COMMERCIAL

## 2020-05-29 ENCOUNTER — OUTPATIENT (OUTPATIENT)
Dept: OUTPATIENT SERVICES | Facility: HOSPITAL | Age: 50
LOS: 1 days | Discharge: HOME | End: 2020-05-29

## 2020-05-29 DIAGNOSIS — Z98.891 HISTORY OF UTERINE SCAR FROM PREVIOUS SURGERY: Chronic | ICD-10-CM

## 2020-05-29 DIAGNOSIS — M06.9 RHEUMATOID ARTHRITIS, UNSPECIFIED: ICD-10-CM

## 2020-05-29 PROCEDURE — 99443: CPT | Mod: GC

## 2020-06-11 ENCOUNTER — OUTPATIENT (OUTPATIENT)
Dept: OUTPATIENT SERVICES | Facility: HOSPITAL | Age: 50
LOS: 1 days | Discharge: HOME | End: 2020-06-11

## 2020-06-11 ENCOUNTER — APPOINTMENT (OUTPATIENT)
Dept: ENDOCRINOLOGY | Facility: CLINIC | Age: 50
End: 2020-06-11

## 2020-06-11 DIAGNOSIS — M54.5 LOW BACK PAIN: ICD-10-CM

## 2020-06-11 DIAGNOSIS — Z98.891 HISTORY OF UTERINE SCAR FROM PREVIOUS SURGERY: Chronic | ICD-10-CM

## 2020-06-11 RX ORDER — SITAGLIPTIN 100 MG/1
100 TABLET, FILM COATED ORAL DAILY
Qty: 30 | Refills: 5 | Status: DISCONTINUED | COMMUNITY
Start: 2019-03-25 | End: 2020-06-11

## 2020-06-13 NOTE — HISTORY OF PRESENT ILLNESS
[FreeTextEntry1] : telephonic  visit. patient lost her job, stopped taking all medications including insulin, and has no money to get insulin from [employer] which is $50.00 for 5 pens.

## 2020-06-13 NOTE — ASSESSMENT
[Diabetes Foot Care] : diabetes foot care [Long Term Vascular Complications] : long term vascular complications of diabetes [Carbohydrate Consistent Diet] : carbohydrate consistent diet [Importance of Diet and Exercise] : importance of diet and exercise to improve glycemic control, achieve weight loss and improve cardiovascular health [Hypoglycemia Management] : hypoglycemia management [FreeTextEntry1] : patient stopped all prescriptions due to lack of money. wants physical therapy referral for low backache, and chest xray for chest pain.

## 2020-07-06 NOTE — ED ADULT NURSE NOTE - CINV DISCH MEDS REVIEWED YN
Yes
Pt will ambulate with RW independently 150 feet in 3-4 sessions . Negotiate 15 steps with 1 handrail and cane independently WBAT  LE in 3-4 sessions

## 2020-08-17 ENCOUNTER — RESULT CHARGE (OUTPATIENT)
Age: 50
End: 2020-08-17

## 2020-08-18 ENCOUNTER — APPOINTMENT (OUTPATIENT)
Dept: INTERNAL MEDICINE | Facility: CLINIC | Age: 50
End: 2020-08-18
Payer: COMMERCIAL

## 2020-08-18 ENCOUNTER — OUTPATIENT (OUTPATIENT)
Dept: OUTPATIENT SERVICES | Facility: HOSPITAL | Age: 50
LOS: 1 days | Discharge: HOME | End: 2020-08-18
Payer: SUBSIDIZED

## 2020-08-18 VITALS
BODY MASS INDEX: 20.22 KG/M2 | HEIGHT: 60 IN | DIASTOLIC BLOOD PRESSURE: 85 MMHG | TEMPERATURE: 96.9 F | SYSTOLIC BLOOD PRESSURE: 143 MMHG | HEART RATE: 78 BPM | WEIGHT: 103 LBS

## 2020-08-18 DIAGNOSIS — Z98.891 HISTORY OF UTERINE SCAR FROM PREVIOUS SURGERY: Chronic | ICD-10-CM

## 2020-08-18 DIAGNOSIS — G89.29 LEFT LOWER QUADRANT PAIN: ICD-10-CM

## 2020-08-18 DIAGNOSIS — R10.32 LEFT LOWER QUADRANT PAIN: ICD-10-CM

## 2020-08-18 DIAGNOSIS — Z86.2 PERSONAL HISTORY OF DISEASES OF THE BLOOD AND BLOOD-FORMING ORGANS AND CERTAIN DISORDERS INVOLVING THE IMMUNE MECHANISM: ICD-10-CM

## 2020-08-18 DIAGNOSIS — R10.11 RIGHT UPPER QUADRANT PAIN: ICD-10-CM

## 2020-08-18 DIAGNOSIS — Z86.39 PERSONAL HISTORY OF OTHER ENDOCRINE, NUTRITIONAL AND METABOLIC DISEASE: ICD-10-CM

## 2020-08-18 LAB — GLUCOSE BLDC GLUCOMTR-MCNC: 178 MG/DL — HIGH (ref 70–99)

## 2020-08-18 PROCEDURE — 93010 ELECTROCARDIOGRAM REPORT: CPT

## 2020-08-18 PROCEDURE — 99213 OFFICE O/P EST LOW 20 MIN: CPT | Mod: GC

## 2020-08-26 ENCOUNTER — APPOINTMENT (OUTPATIENT)
Dept: NUTRITION | Facility: CLINIC | Age: 50
End: 2020-08-26

## 2020-08-26 ENCOUNTER — OUTPATIENT (OUTPATIENT)
Dept: OUTPATIENT SERVICES | Facility: HOSPITAL | Age: 50
LOS: 1 days | Discharge: HOME | End: 2020-08-26

## 2020-08-26 DIAGNOSIS — Z98.891 HISTORY OF UTERINE SCAR FROM PREVIOUS SURGERY: Chronic | ICD-10-CM

## 2020-08-27 LAB
ALBUMIN SERPL ELPH-MCNC: 4.1 G/DL
ALP BLD-CCNC: 68 U/L
ALT SERPL-CCNC: 14 U/L
ANION GAP SERPL CALC-SCNC: 14 MMOL/L
AST SERPL-CCNC: 17 U/L
BASOPHILS # BLD AUTO: 0.02 K/UL
BASOPHILS NFR BLD AUTO: 0.4 %
BILIRUB SERPL-MCNC: <0.2 MG/DL
BUN SERPL-MCNC: 26 MG/DL
CALCIUM SERPL-MCNC: 9.1 MG/DL
CHLORIDE SERPL-SCNC: 104 MMOL/L
CO2 SERPL-SCNC: 21 MMOL/L
CREAT SERPL-MCNC: 0.8 MG/DL
EOSINOPHIL # BLD AUTO: 0.07 K/UL
EOSINOPHIL NFR BLD AUTO: 1.3 %
ESTIMATED AVERAGE GLUCOSE: 249 MG/DL
GLUCOSE SERPL-MCNC: 190 MG/DL
HBA1C MFR BLD HPLC: 10.3 %
HCT VFR BLD CALC: 34.3 %
HGB BLD-MCNC: 11 G/DL
IMM GRANULOCYTES NFR BLD AUTO: 0.2 %
LYMPHOCYTES # BLD AUTO: 1.94 K/UL
LYMPHOCYTES NFR BLD AUTO: 35.4 %
MAN DIFF?: NORMAL
MCHC RBC-ENTMCNC: 27.9 PG
MCHC RBC-ENTMCNC: 32.1 G/DL
MCV RBC AUTO: 87.1 FL
MONOCYTES # BLD AUTO: 0.66 K/UL
MONOCYTES NFR BLD AUTO: 12 %
NEUTROPHILS # BLD AUTO: 2.78 K/UL
NEUTROPHILS NFR BLD AUTO: 50.7 %
PLATELET # BLD AUTO: 267 K/UL
POTASSIUM SERPL-SCNC: 4.7 MMOL/L
PROT SERPL-MCNC: 6.9 G/DL
RBC # BLD: 3.94 M/UL
RBC # FLD: 13.8 %
SODIUM SERPL-SCNC: 139 MMOL/L
WBC # FLD AUTO: 5.48 K/UL

## 2020-08-28 DIAGNOSIS — R10.32 LEFT LOWER QUADRANT PAIN: ICD-10-CM

## 2020-08-28 DIAGNOSIS — Z86.2 PERSONAL HISTORY OF DISEASES OF THE BLOOD AND BLOOD-FORMING ORGANS AND CERTAIN DISORDERS INVOLVING THE IMMUNE MECHANISM: ICD-10-CM

## 2020-08-28 DIAGNOSIS — R10.11 RIGHT UPPER QUADRANT PAIN: ICD-10-CM

## 2020-08-28 DIAGNOSIS — I10 ESSENTIAL (PRIMARY) HYPERTENSION: ICD-10-CM

## 2020-08-28 DIAGNOSIS — Z86.39 PERSONAL HISTORY OF OTHER ENDOCRINE, NUTRITIONAL AND METABOLIC DISEASE: ICD-10-CM

## 2020-09-02 DIAGNOSIS — E11.9 TYPE 2 DIABETES MELLITUS WITHOUT COMPLICATIONS: ICD-10-CM

## 2020-09-04 NOTE — ASSESSMENT
[FreeTextEntry1] : This is a 48 year old F pt with PMHx of DLD, DM, HTN, RA presenting to the clinic for a follow up\par \par #Chest pain\par -Most likely costochondritis, as the pain is reproducible \par -patient does not have typical chest pain, EKG normal \par -Start on Motrin for the pain \par \par #RA\par  -The patient is currently asymptomatic\par -On Hydroxychloroquine\par -Follow up with Rheum\par \par \par #DMII\par -Poorly controlled \par -HBA1c is 12.4 in Feb 2020. Will need a repeat\par -Counseled regarding diet \par \par #HTN\par -Not well controlled\par -Added Atenolol 50 mg PO daily\par -Continue with lisinopril  \par \par #Health maintainance\par -F/U in 3 months\par -Vaccine up to date\par -Mammogram referral given  \par

## 2020-09-04 NOTE — HISTORY OF PRESENT ILLNESS
[de-identified] : 49 YO F with PMH of HTN, DLD, uncontrolled DM II, presents for a routine f/u.\par She does not complain of joint pain any more \par she complains of chest sided that is left sided chest pain that feels like tightness the start at the left side and then radiate to the arm bit. sometimes its felt on the right side too. The patient stated that it can develop with exertion or with rest. Not associated with any other symptoms. pain is not exacerbated by anything and patient stated that it disappears on it own. Patient stated that sometimes she feels palpitations that she did not seek medical attention for

## 2020-09-04 NOTE — PHYSICAL EXAM
[No Acute Distress] : no acute distress [Well Nourished] : well nourished [No JVD] : no jugular venous distention [Well Developed] : well developed [Supple] : supple [No Lymphadenopathy] : no lymphadenopathy [No Respiratory Distress] : no respiratory distress  [Clear to Auscultation] : lungs were clear to auscultation bilaterally [Normal Rate] : normal rate  [Normal S1, S2] : normal S1 and S2 [No CVA Tenderness] : no CVA  tenderness [No Joint Swelling] : no joint swelling [de-identified] : Reproducible chest wall pain

## 2020-09-04 NOTE — REVIEW OF SYSTEMS
[Chest Pain] : chest pain [Palpitations] : palpitations [Negative] : Gastrointestinal [Fever] : no fever [Chills] : no chills [Fatigue] : no fatigue [Leg Claudication] : no leg claudication [Lower Ext Edema] : no lower extremity edema [Shortness Of Breath] : no shortness of breath [Wheezing] : no wheezing [Cough] : no cough [FreeTextEntry5] : as in HPI

## 2020-09-15 ENCOUNTER — APPOINTMENT (OUTPATIENT)
Dept: PODIATRY | Facility: CLINIC | Age: 50
End: 2020-09-15
Payer: COMMERCIAL

## 2020-09-15 ENCOUNTER — OUTPATIENT (OUTPATIENT)
Dept: OUTPATIENT SERVICES | Facility: HOSPITAL | Age: 50
LOS: 1 days | Discharge: HOME | End: 2020-09-15

## 2020-09-15 DIAGNOSIS — E11.21 TYPE 2 DIABETES MELLITUS WITH DIABETIC NEPHROPATHY: ICD-10-CM

## 2020-09-15 DIAGNOSIS — M79.672 PAIN IN LEFT FOOT: ICD-10-CM

## 2020-09-15 DIAGNOSIS — B35.1 TINEA UNGUIUM: ICD-10-CM

## 2020-09-15 DIAGNOSIS — M79.671 PAIN IN RIGHT FOOT: ICD-10-CM

## 2020-09-15 DIAGNOSIS — M77.41 METATARSALGIA, RIGHT FOOT: ICD-10-CM

## 2020-09-15 DIAGNOSIS — Z98.891 HISTORY OF UTERINE SCAR FROM PREVIOUS SURGERY: Chronic | ICD-10-CM

## 2020-09-15 PROCEDURE — 99213 OFFICE O/P EST LOW 20 MIN: CPT | Mod: 25

## 2020-09-15 PROCEDURE — 11721 DEBRIDE NAIL 6 OR MORE: CPT

## 2020-09-27 NOTE — PROCEDURE
[FreeTextEntry1] : Patient examined, history and chart reviewed\par rx  xray for right foot pain \par Debridement of all diseased nails x 10\par Patient tolerated procedure well\par patient educated on diabetic foot health and maintenance at length \par Follow up in 3 months or PRN\par \par

## 2020-09-27 NOTE — PHYSICAL EXAM
[General Appearance - Alert] : alert [General Appearance - In No Acute Distress] : in no acute distress [Sclera] : the sclera and conjunctiva were normal [PERRL With Normal Accommodation] : pupils were equal in size, round, and reactive to light [Skin Color & Pigmentation] : normal skin color and pigmentation [Skin Turgor] : normal skin turgor [] : no rash [Normal in Appearance] : normal in appearance [Full ROM] : with full range of motion [Vibration Dec.] : normal vibratory sensation at the level of the toes [Position Sense Dec.] : normal position sense at the level of the toes [Diminished Throughout Right Foot] : normal tactile sensation with monofilament testing throughout right foot [Diminished Throughout Left Foot] : normal tactile sensation with monofilament testing throughout left foot [#1 Diminished] : number 1 was diminished [#2 Diminished] : number 2 was diminished [#3 Diminished] : number 3 was normal [#4 Diminished] : number 4 was normal [#5 Diminished] : number 5 was diminished [#6 Diminished] : number 6 was diminished [#7 Diminished] : number 7 was diminished [#8 Diminished] : number 8 was diminished [#9 Diminished] : number 9 was normal [#10 Diminished] : number 10 was normal [Deep Tendon Reflexes (DTR)] : deep tendon reflexes were 2+ and symmetric [Sensation] : the sensory exam was normal to light touch and pinprick [No Focal Deficits] : no focal deficits [Oriented To Time, Place, And Person] : oriented to person, place, and time [Impaired Insight] : insight and judgment were intact [Affect] : the affect was normal [2+] : left 2+ [Toes] :  capillary refill of the toes was normal [Left Toes] :  capillary refill of the left toes was normal [Right Toes] :  capillary refill of the right toes was normal [Normal Station and Gait] : the gait and station were normal [Normal] : motor strength was normal in all muscle groups [Normal Motor Tone] : the muscle tone was normal [Involuntary Movements] : no involuntary movements were seen

## 2020-09-30 ENCOUNTER — OUTPATIENT (OUTPATIENT)
Dept: OUTPATIENT SERVICES | Facility: HOSPITAL | Age: 50
LOS: 1 days | Discharge: HOME | End: 2020-09-30
Payer: SUBSIDIZED

## 2020-09-30 ENCOUNTER — APPOINTMENT (OUTPATIENT)
Dept: NUTRITION | Facility: CLINIC | Age: 50
End: 2020-09-30

## 2020-09-30 ENCOUNTER — OUTPATIENT (OUTPATIENT)
Dept: OUTPATIENT SERVICES | Facility: HOSPITAL | Age: 50
LOS: 1 days | Discharge: HOME | End: 2020-09-30

## 2020-09-30 DIAGNOSIS — M79.671 PAIN IN RIGHT FOOT: ICD-10-CM

## 2020-09-30 DIAGNOSIS — Z98.891 HISTORY OF UTERINE SCAR FROM PREVIOUS SURGERY: Chronic | ICD-10-CM

## 2020-09-30 DIAGNOSIS — M79.672 PAIN IN LEFT FOOT: ICD-10-CM

## 2020-09-30 DIAGNOSIS — E11.9 TYPE 2 DIABETES MELLITUS WITHOUT COMPLICATIONS: ICD-10-CM

## 2020-09-30 DIAGNOSIS — M77.41 METATARSALGIA, RIGHT FOOT: ICD-10-CM

## 2020-09-30 PROCEDURE — 73630 X-RAY EXAM OF FOOT: CPT | Mod: 26,50

## 2020-10-03 ENCOUNTER — OUTPATIENT (OUTPATIENT)
Dept: OUTPATIENT SERVICES | Facility: HOSPITAL | Age: 50
LOS: 1 days | Discharge: HOME | End: 2020-10-03
Payer: OTHER GOVERNMENT

## 2020-10-03 ENCOUNTER — RESULT REVIEW (OUTPATIENT)
Age: 50
End: 2020-10-03

## 2020-10-03 DIAGNOSIS — Z98.891 HISTORY OF UTERINE SCAR FROM PREVIOUS SURGERY: Chronic | ICD-10-CM

## 2020-10-03 DIAGNOSIS — Z12.31 ENCOUNTER FOR SCREENING MAMMOGRAM FOR MALIGNANT NEOPLASM OF BREAST: ICD-10-CM

## 2020-10-03 PROCEDURE — 77063 BREAST TOMOSYNTHESIS BI: CPT | Mod: 26

## 2020-10-03 PROCEDURE — 77067 SCR MAMMO BI INCL CAD: CPT | Mod: 26

## 2020-10-21 ENCOUNTER — APPOINTMENT (OUTPATIENT)
Dept: PODIATRY | Facility: CLINIC | Age: 50
End: 2020-10-21
Payer: COMMERCIAL

## 2020-10-21 ENCOUNTER — OUTPATIENT (OUTPATIENT)
Dept: OUTPATIENT SERVICES | Facility: HOSPITAL | Age: 50
LOS: 1 days | Discharge: HOME | End: 2020-10-21

## 2020-10-21 DIAGNOSIS — Z98.891 HISTORY OF UTERINE SCAR FROM PREVIOUS SURGERY: Chronic | ICD-10-CM

## 2020-10-21 DIAGNOSIS — G56.92 UNSPECIFIED MONONEUROPATHY OF LEFT UPPER LIMB: ICD-10-CM

## 2020-10-21 DIAGNOSIS — M79.673 PAIN IN UNSPECIFIED FOOT: ICD-10-CM

## 2020-10-21 DIAGNOSIS — I73.9 PERIPHERAL VASCULAR DISEASE, UNSPECIFIED: ICD-10-CM

## 2020-10-21 PROCEDURE — 99213 OFFICE O/P EST LOW 20 MIN: CPT

## 2020-10-30 PROBLEM — M79.673 HEEL PAIN: Status: ACTIVE | Noted: 2020-10-30

## 2020-10-30 PROBLEM — G56.92: Status: ACTIVE | Noted: 2019-01-02

## 2020-10-30 NOTE — PHYSICAL EXAM
[General Appearance - Alert] : alert [General Appearance - In No Acute Distress] : in no acute distress [Sclera] : the sclera and conjunctiva were normal [PERRL With Normal Accommodation] : pupils were equal in size, round, and reactive to light [Skin Color & Pigmentation] : normal skin color and pigmentation [Skin Turgor] : normal skin turgor [] : no rash [Normal in Appearance] : normal in appearance [Full ROM] : with full range of motion [2+] : 2+ in the dorsalis pedis [Vibration Dec.] : normal vibratory sensation at the level of the toes [Position Sense Dec.] : normal position sense at the level of the toes [Diminished Throughout Right Foot] : normal tactile sensation with monofilament testing throughout right foot [Diminished Throughout Left Foot] : normal tactile sensation with monofilament testing throughout left foot [#1 Diminished] : number 1 was diminished [#2 Diminished] : number 2 was diminished [#3 Diminished] : number 3 was normal [#4 Diminished] : number 4 was normal [#5 Diminished] : number 5 was diminished [#6 Diminished] : number 6 was diminished [#7 Diminished] : number 7 was diminished [#8 Diminished] : number 8 was diminished [#9 Diminished] : number 9 was normal [#10 Diminished] : number 10 was normal [Deep Tendon Reflexes (DTR)] : deep tendon reflexes were 2+ and symmetric [Sensation] : the sensory exam was normal to light touch and pinprick [No Focal Deficits] : no focal deficits [Oriented To Time, Place, And Person] : oriented to person, place, and time [Impaired Insight] : insight and judgment were intact [Affect] : the affect was normal [1+] : left 1+ [Toes] :  capillary refill of the toes was normal [Left Toes] :  capillary refill of the left toes was normal [Right Toes] :  capillary refill of the right toes was normal [Normal Station and Gait] : the gait and station were normal [Normal] : motor strength was normal in all muscle groups [Normal Motor Tone] : the muscle tone was normal [Involuntary Movements] : no involuntary movements were seen

## 2020-10-30 NOTE — HISTORY OF PRESENT ILLNESS
[FreeTextEntry1] : IZA HAYANDRYBHANU   presents for foot examination, patient was referred by PCP. Patient. Patient complains of pain of b/l Plantar heels.   Patient denies NVFC and denies SOB.\par

## 2020-11-04 ENCOUNTER — APPOINTMENT (OUTPATIENT)
Dept: NUTRITION | Facility: CLINIC | Age: 50
End: 2020-11-04

## 2020-11-08 NOTE — REVIEW OF SYSTEMS
Encounter Date: 2020       History     Chief Complaint   Patient presents with    CORC     39 yo  at 39 weeks presents for outpatient cervical ripening. She reports good fetal movement, no contractions, loss of fluid or vaginal bleeding.  R/B/A of CORC trial discussed, consents signed after questions answered.        Review of patient's allergies indicates:  No Known Allergies  Past Medical History:   Diagnosis Date    Abnormal Pap smear of cervix 2014    colposcopy     Past Surgical History:   Procedure Laterality Date    BREAST BIOPSY  2011    left    COLPOSCOPY       Family History   Problem Relation Age of Onset    Coronary artery disease Maternal Grandfather     Breast cancer Neg Hx     Colon cancer Neg Hx     Ovarian cancer Neg Hx      Social History     Tobacco Use    Smoking status: Never Smoker    Smokeless tobacco: Never Used   Substance Use Topics    Alcohol use: Not Currently     Alcohol/week: 1.8 standard drinks     Types: 1 Glasses of wine, 1 Standard drinks or equivalent per week    Drug use: No     Review of Systems   Constitutional: Negative for fever.   HENT: Negative for sore throat.    Respiratory: Negative for shortness of breath.    Cardiovascular: Negative for chest pain.   Gastrointestinal: Positive for abdominal distention (Gravid). Negative for abdominal pain and nausea.   Genitourinary: Negative for dysuria, vaginal bleeding and vaginal discharge.   Musculoskeletal: Negative for back pain.   Skin: Negative for rash.   Neurological: Negative for weakness.   Hematological: Does not bruise/bleed easily.       Physical Exam     Initial Vitals   BP Pulse Resp Temp SpO2   -- -- -- -- --      MAP       --         Physical Exam    Constitutional: She appears well-developed and well-nourished. No distress.   HENT:   Head: Normocephalic and atraumatic.   Cardiovascular: Normal rate and regular rhythm.   Pulmonary/Chest: No respiratory distress.   Abdominal: Soft.    Genitourinary:    Vagina normal.     Neurological: She is alert and oriented to person, place, and time.   Psychiatric: She has a normal mood and affect.     OB LABOR EXAM:   Pre-Term Labor: No.   Membranes ruptured: No.   Method: Sterile vaginal exam per MD.   Vaginal Bleeding: none present.   Engagement: engaged.   Dilatation: 1.     Effacement: 90%.   Amniotic Fluid Color: no fluid.     Comments: FHT:reassuring, 135 bpm, mod BTB variability, accels present, no decels  TOCO: occasional       ED Course   Obtain Fetal nonstress test (NST)    Date/Time: 2020 1:29 PM  Performed by: Brigitte Avalos MD  Authorized by: Brigitte Avalos MD     Nonstress Test:     Variability:  6-25 BPM    Decelerations:  None    Accelerations:  15 bpm    Baseline:  135    Uterine Irritability: No      Contractions:  Not present  Biophysical Profile:     Nonstress Test Interpretation: reactive        Labs Reviewed   SARS-COV-2 RNA AMPLIFICATION, QUAL          Imaging Results    None          Medical Decision Making:   Initial Assessment:   39 yo  at 39 for CORC  -COVID negative  -reactive NST  -randomized to rangel placed at 1:15 pain 6/10  -discharged to home, will await call from L+D charge                             Clinical Impression:       ICD-10-CM ICD-9-CM   1. Encounter for elective induction of labor  Z34.90 V22.1   2. 39 weeks gestation of pregnancy  Z3A.39 V22.2                          ED Disposition Condition    Discharge Stable        ED Prescriptions     None        Follow-up Information    None                                      Brigitte Avalos MD  [id number]     [Negative] : Heme/Lymph

## 2020-11-16 ENCOUNTER — OUTPATIENT (OUTPATIENT)
Dept: OUTPATIENT SERVICES | Facility: HOSPITAL | Age: 50
LOS: 1 days | Discharge: HOME | End: 2020-11-16
Payer: SUBSIDIZED

## 2020-11-16 ENCOUNTER — APPOINTMENT (OUTPATIENT)
Dept: OPHTHALMOLOGY | Facility: CLINIC | Age: 50
End: 2020-11-16

## 2020-11-16 DIAGNOSIS — Z98.891 HISTORY OF UTERINE SCAR FROM PREVIOUS SURGERY: Chronic | ICD-10-CM

## 2020-11-16 PROCEDURE — 92134 CPTRZ OPH DX IMG PST SGM RTA: CPT | Mod: 26

## 2020-11-16 PROCEDURE — 92014 COMPRE OPH EXAM EST PT 1/>: CPT

## 2020-11-18 ENCOUNTER — APPOINTMENT (OUTPATIENT)
Dept: PODIATRY | Facility: CLINIC | Age: 50
End: 2020-11-18
Payer: COMMERCIAL

## 2020-11-18 ENCOUNTER — OUTPATIENT (OUTPATIENT)
Dept: OUTPATIENT SERVICES | Facility: HOSPITAL | Age: 50
LOS: 1 days | Discharge: HOME | End: 2020-11-18

## 2020-11-18 DIAGNOSIS — Z98.891 HISTORY OF UTERINE SCAR FROM PREVIOUS SURGERY: Chronic | ICD-10-CM

## 2020-11-18 DIAGNOSIS — E11.3291 TYPE 2 DIABETES MELLITUS WITH MILD NONPROLIFERATIVE DIABETIC RETINOPATHY WITHOUT MACULAR EDEMA, RIGHT EYE: ICD-10-CM

## 2020-11-18 DIAGNOSIS — R25.2 CRAMP AND SPASM: ICD-10-CM

## 2020-11-18 DIAGNOSIS — H40.019 OPEN ANGLE WITH BORDERLINE FINDINGS, LOW RISK, UNSPECIFIED EYE: ICD-10-CM

## 2020-11-18 DIAGNOSIS — E10.65 TYPE 1 DIABETES MELLITUS WITH HYPERGLYCEMIA: ICD-10-CM

## 2020-11-18 DIAGNOSIS — H04.123 DRY EYE SYNDROME OF BILATERAL LACRIMAL GLANDS: ICD-10-CM

## 2020-11-18 PROCEDURE — 99213 OFFICE O/P EST LOW 20 MIN: CPT

## 2020-11-20 ENCOUNTER — APPOINTMENT (OUTPATIENT)
Dept: RHEUMATOLOGY | Facility: CLINIC | Age: 50
End: 2020-11-20
Payer: COMMERCIAL

## 2020-11-20 ENCOUNTER — OUTPATIENT (OUTPATIENT)
Dept: OUTPATIENT SERVICES | Facility: HOSPITAL | Age: 50
LOS: 1 days | Discharge: HOME | End: 2020-11-20

## 2020-11-20 VITALS
HEART RATE: 86 BPM | BODY MASS INDEX: 19.63 KG/M2 | SYSTOLIC BLOOD PRESSURE: 160 MMHG | HEIGHT: 60 IN | DIASTOLIC BLOOD PRESSURE: 83 MMHG | WEIGHT: 100 LBS

## 2020-11-20 DIAGNOSIS — Z98.891 HISTORY OF UTERINE SCAR FROM PREVIOUS SURGERY: Chronic | ICD-10-CM

## 2020-11-20 PROCEDURE — 99215 OFFICE O/P EST HI 40 MIN: CPT | Mod: GC

## 2020-11-20 NOTE — HISTORY OF PRESENT ILLNESS
[FreeTextEntry1] : 49 year old female with a history of seropositive erosive RA on Plaquenil here for follow up.\par \par \par Patient reports pain in her left 1st CMC area, right elbow and wrist along with MCPs and PIPs. Pain is 8/10, AM stiffness around 30-45 minutes and she notices swelling as well. OTC nsaids provide some relief. Activity makes it worse. Denies visual disturbances, skin rashes, dyspnea or chest pain. She has an appointment upcoming to see the eye doctor for annual Plaquenil screen.

## 2020-11-20 NOTE — ASSESSMENT
[FreeTextEntry1] : 49 year old female with a history of seropositive, erosive RA on Plaquenil here for follow up.\par \par \par 1. RA, seropositive, erosive\par \par TJ: 8 SJ: 0 Patient global: 8 Physician global: 4 CDAI: 20 moderate disease activity\par -Start on Methotrexate 15 mg weekly and folic acid 1 mg daily. Risks and benefits discussed in detail and she agrees to start\par -Continue Plaquenil\par -F/u 1 month and check labs\par

## 2020-11-20 NOTE — PHYSICAL EXAM
[General Appearance - Alert] : alert [General Appearance - In No Acute Distress] : in no acute distress [Sclera] : the sclera and conjunctiva were normal [PERRL With Normal Accommodation] : pupils were equal in size, round, and reactive to light [Extraocular Movements] : extraocular movements were intact [Outer Ear] : the ears and nose were normal in appearance [Oropharynx] : the oropharynx was normal [Neck Appearance] : the appearance of the neck was normal [Neck Cervical Mass (___cm)] : no neck mass was observed [Jugular Venous Distention Increased] : there was no jugular-venous distention [Thyroid Diffuse Enlargement] : the thyroid was not enlarged [Thyroid Nodule] : there were no palpable thyroid nodules [Auscultation Breath Sounds / Voice Sounds] : lungs were clear to auscultation bilaterally [Heart Rate And Rhythm] : heart rate was normal and rhythm regular [Heart Sounds] : normal S1 and S2 [Heart Sounds Gallop] : no gallops [Murmurs] : no murmurs [Heart Sounds Pericardial Friction Rub] : no pericardial rub [Full Pulse] : the pedal pulses are present [Edema] : there was no peripheral edema [Bowel Sounds] : normal bowel sounds [Abdomen Soft] : soft [Abdomen Tenderness] : non-tender [Abdomen Mass (___ Cm)] : no abdominal mass palpated [FreeTextEntry1] : Right MCP 2-5 tender. Left 2nd MCP and PIP tender. Bilateral wrists tender. No obvious swelling otherwise. [Skin Color & Pigmentation] : normal skin color and pigmentation [Skin Turgor] : normal skin turgor [] : no rash [Sensation] : the sensory exam was normal to light touch and pinprick [No Focal Deficits] : no focal deficits [Affect] : the affect was normal [Mood] : the mood was normal

## 2020-11-24 NOTE — PROCEDURE
[FreeTextEntry1] : Patient examined, history and chart reviewed\par Discussed risk of DM2 including Calluses, Pre-ulcerative lesions, ulcers, infections, bone infections, and amputation\par patient educated on diabetic foot health and maintenance at length \par Follow up in 3 months or PRN\par \par \par

## 2020-11-24 NOTE — HISTORY OF PRESENT ILLNESS
[FreeTextEntry1] : IZA TIAN   presents for foot examination, patient was referred by PCP. Patient. Patient complains of pain of b/l Plantar heels.   Patient also complains of bilateral leg cramping Patient denies NVFC and denies SOB.\par

## 2020-11-30 ENCOUNTER — APPOINTMENT (OUTPATIENT)
Dept: VASCULAR SURGERY | Facility: CLINIC | Age: 50
End: 2020-11-30
Payer: COMMERCIAL

## 2020-11-30 VITALS — SYSTOLIC BLOOD PRESSURE: 163 MMHG | DIASTOLIC BLOOD PRESSURE: 91 MMHG | TEMPERATURE: 97.1 F

## 2020-11-30 DIAGNOSIS — M79.672 PAIN IN LEFT FOOT: ICD-10-CM

## 2020-11-30 DIAGNOSIS — E11.9 TYPE 2 DIABETES MELLITUS WITHOUT COMPLICATIONS: ICD-10-CM

## 2020-11-30 DIAGNOSIS — R25.2 CRAMP AND SPASM: ICD-10-CM

## 2020-11-30 DIAGNOSIS — I70.213 ATHEROSCLEROSIS OF NATIVE ARTERIES OF EXTREMITIES WITH INTERMITTENT CLAUDICATION, BILATERAL LEGS: ICD-10-CM

## 2020-11-30 DIAGNOSIS — M79.671 PAIN IN RIGHT FOOT: ICD-10-CM

## 2020-11-30 PROCEDURE — 93925 LOWER EXTREMITY STUDY: CPT

## 2020-11-30 PROCEDURE — 99203 OFFICE O/P NEW LOW 30 MIN: CPT

## 2020-11-30 PROCEDURE — 99072 ADDL SUPL MATRL&STAF TM PHE: CPT

## 2020-11-30 NOTE — HISTORY OF PRESENT ILLNESS
[FreeTextEntry1] : The patient is a 48 yo female who has left worse than right calf pain when she walks

## 2020-12-04 DIAGNOSIS — M05.79 RHEUMATOID ARTHRITIS WITH RHEUMATOID FACTOR OF MULTIPLE SITES WITHOUT ORGAN OR SYSTEMS INVOLVEMENT: ICD-10-CM

## 2020-12-04 DIAGNOSIS — Z79.899 OTHER LONG TERM (CURRENT) DRUG THERAPY: ICD-10-CM

## 2020-12-04 DIAGNOSIS — M25.50 PAIN IN UNSPECIFIED JOINT: ICD-10-CM

## 2020-12-09 ENCOUNTER — OUTPATIENT (OUTPATIENT)
Dept: OUTPATIENT SERVICES | Facility: HOSPITAL | Age: 50
LOS: 1 days | Discharge: HOME | End: 2020-12-09

## 2020-12-09 ENCOUNTER — APPOINTMENT (OUTPATIENT)
Dept: INTERNAL MEDICINE | Facility: CLINIC | Age: 50
End: 2020-12-09
Payer: COMMERCIAL

## 2020-12-09 VITALS
OXYGEN SATURATION: 98 % | SYSTOLIC BLOOD PRESSURE: 173 MMHG | DIASTOLIC BLOOD PRESSURE: 88 MMHG | WEIGHT: 97 LBS | HEIGHT: 60 IN | HEART RATE: 80 BPM | BODY MASS INDEX: 19.04 KG/M2 | TEMPERATURE: 97.6 F

## 2020-12-09 DIAGNOSIS — Z86.2 PERSONAL HISTORY OF DISEASES OF THE BLOOD AND BLOOD-FORMING ORGANS AND CERTAIN DISORDERS INVOLVING THE IMMUNE MECHANISM: ICD-10-CM

## 2020-12-09 DIAGNOSIS — Z98.891 HISTORY OF UTERINE SCAR FROM PREVIOUS SURGERY: Chronic | ICD-10-CM

## 2020-12-09 DIAGNOSIS — Z87.898 PERSONAL HISTORY OF OTHER SPECIFIED CONDITIONS: ICD-10-CM

## 2020-12-09 DIAGNOSIS — R07.89 OTHER CHEST PAIN: ICD-10-CM

## 2020-12-09 PROCEDURE — 99213 OFFICE O/P EST LOW 20 MIN: CPT | Mod: GC

## 2020-12-09 RX ORDER — ATORVASTATIN CALCIUM 40 MG/1
40 TABLET, FILM COATED ORAL
Qty: 90 | Refills: 3 | Status: DISCONTINUED | COMMUNITY
Start: 2017-06-20 | End: 2020-12-09

## 2020-12-09 RX ORDER — CICLOPIROX 80 MG/ML
8 SOLUTION TOPICAL
Qty: 1 | Refills: 5 | Status: DISCONTINUED | COMMUNITY
Start: 2019-11-12 | End: 2020-12-09

## 2020-12-09 RX ORDER — AMLODIPINE BESYLATE 10 MG/1
10 TABLET ORAL DAILY
Qty: 90 | Refills: 2 | Status: DISCONTINUED | COMMUNITY
Start: 2019-09-05 | End: 2020-12-09

## 2020-12-09 RX ORDER — ATENOLOL 50 MG/1
50 TABLET ORAL DAILY
Qty: 30 | Refills: 3 | Status: DISCONTINUED | COMMUNITY
Start: 2020-02-24 | End: 2020-12-09

## 2020-12-09 RX ORDER — FAMOTIDINE 40 MG/1
40 TABLET, FILM COATED ORAL DAILY
Qty: 30 | Refills: 5 | Status: DISCONTINUED | COMMUNITY
Start: 2018-09-11 | End: 2020-12-09

## 2020-12-09 RX ORDER — GABAPENTIN 100 MG/1
100 CAPSULE ORAL 3 TIMES DAILY
Qty: 90 | Refills: 5 | Status: DISCONTINUED | COMMUNITY
Start: 2017-11-30 | End: 2020-12-09

## 2020-12-09 RX ORDER — DICYCLOMINE HYDROCHLORIDE 10 MG/1
10 CAPSULE ORAL 3 TIMES DAILY
Qty: 90 | Refills: 3 | Status: DISCONTINUED | COMMUNITY
Start: 2019-09-13 | End: 2020-12-09

## 2020-12-09 RX ORDER — ATENOLOL 25 MG/1
25 TABLET ORAL DAILY
Qty: 30 | Refills: 3 | Status: DISCONTINUED | COMMUNITY
Start: 2019-11-13 | End: 2020-12-09

## 2020-12-09 RX ORDER — NAPROXEN 500 MG/1
500 TABLET ORAL 3 TIMES DAILY
Qty: 90 | Refills: 0 | Status: DISCONTINUED | COMMUNITY
Start: 2019-08-09 | End: 2020-12-09

## 2020-12-09 RX ORDER — INSULIN GLARGINE 100 [IU]/ML
100 INJECTION, SOLUTION SUBCUTANEOUS
Qty: 1 | Refills: 5 | Status: DISCONTINUED | COMMUNITY
Start: 2019-09-05 | End: 2020-12-09

## 2020-12-10 DIAGNOSIS — M06.9 RHEUMATOID ARTHRITIS, UNSPECIFIED: ICD-10-CM

## 2020-12-10 DIAGNOSIS — R07.89 OTHER CHEST PAIN: ICD-10-CM

## 2020-12-10 DIAGNOSIS — E11.21 TYPE 2 DIABETES MELLITUS WITH DIABETIC NEPHROPATHY: ICD-10-CM

## 2020-12-10 DIAGNOSIS — Z00.00 ENCOUNTER FOR GENERAL ADULT MEDICAL EXAMINATION WITHOUT ABNORMAL FINDINGS: ICD-10-CM

## 2020-12-10 DIAGNOSIS — Z86.2 PERSONAL HISTORY OF DISEASES OF THE BLOOD AND BLOOD-FORMING ORGANS AND CERTAIN DISORDERS INVOLVING THE IMMUNE MECHANISM: ICD-10-CM

## 2020-12-10 DIAGNOSIS — I10 ESSENTIAL (PRIMARY) HYPERTENSION: ICD-10-CM

## 2020-12-31 NOTE — HISTORY OF PRESENT ILLNESS
[Family Member] : family member [FreeTextEntry1] : Follow up [de-identified] : Patient is a 50yo female with PMH of hypertension, diabetes mellitus (uncontrolled), dyslipidemia, and rheumatoid arthritis who presented to the clinic today for 3 month follow-up. During her previous visit, she complained of chest pain that was reproducible on exam. The chest pain has since resolved. She does complain of

## 2020-12-31 NOTE — ASSESSMENT
[FreeTextEntry1] : Patient is a 50yo female with PMH of hypertension, diabetes mellitus (uncontrolled), dyslipidemia, and rheumatoid arthritis who presented to the clinic today for 3 month follow-up.\par \par #History of anemia\par - Hemoglobin has been slowly trending down since 3/2018 from 13.8 to 11.0\par - Patient was previously taking ferrous sulfate 325mg once daily but is no longer taking it\par - Will repeat blood work at next appointment in 3 months, including ferritin, TIBC, and CBC\par \par #Atypical chest pain, resolved\par - Patient denies any chest pain at this time\par \par #Hypertension, uncontrolled\par - Patient is currently on lisinopril 40mg, chlorthalidone 25mg, and atenolol 50mg\par - US Renal showed no evidence of renal artery stenosis\par - Renin and angiotensin were within normal limits\par - Increase atenolol to 100mg once daily\par \par #Diabetes mellitus, uncontrolled\par - Hemoglobin A1c 8/18/2020: 10.3\par - A1c is trending down (12.4 on 2/24/2020)\par - Continue with metformin 1000mg twice daily and pioglitazone 45mg once daily\par - Recently followed up with endocrinology (Dr. Kauffman) and podiatry (Dr. Boyd)\par - Repeat A1c at next appointment\par \par #Rheumatoid arthritis, seropositive, erosive\par - Patient complaining of active arthritic joint pain today\par - Continue hydroxychloroquine 200mg twice daily\par - Patient was recently started on methotrexate and folic acid with prednisone taper by Dr. Mayo\par - Patient to follow up with Dr. Mayo in 2 weeks\par \par #Jonathon care maintenance\par - Mammogram 10/3/2020: BiRads 2\par - Patient received influenza vaccine today\par - Pap smear performed 2019: within normal limits\par - Colonoscopy 2017: within normal limits. Repeat in 2027\par - Return to clinic in 3 months or PRN

## 2020-12-31 NOTE — PHYSICAL EXAM
[No Acute Distress] : no acute distress [Well Nourished] : well nourished [Well Developed] : well developed [Well-Appearing] : well-appearing [Normal Sclera/Conjunctiva] : normal sclera/conjunctiva [PERRL] : pupils equal round and reactive to light [EOMI] : extraocular movements intact [Normal Outer Ear/Nose] : the outer ears and nose were normal in appearance [Normal Oropharynx] : the oropharynx was normal [No JVD] : no jugular venous distention [No Lymphadenopathy] : no lymphadenopathy [Supple] : supple [Thyroid Normal, No Nodules] : the thyroid was normal and there were no nodules present [No Respiratory Distress] : no respiratory distress  [No Accessory Muscle Use] : no accessory muscle use [Clear to Auscultation] : lungs were clear to auscultation bilaterally [Normal Rate] : normal rate  [Regular Rhythm] : with a regular rhythm [Normal S1, S2] : normal S1 and S2 [No Murmur] : no murmur heard [No Carotid Bruits] : no carotid bruits [No Abdominal Bruit] : a ~M bruit was not heard ~T in the abdomen [No Varicosities] : no varicosities [Pedal Pulses Present] : the pedal pulses are present [No Edema] : there was no peripheral edema [No Palpable Aorta] : no palpable aorta [No Extremity Clubbing/Cyanosis] : no extremity clubbing/cyanosis [Soft] : abdomen soft [Non Tender] : non-tender [Non-distended] : non-distended [No Masses] : no abdominal mass palpated [No HSM] : no HSM [Normal Bowel Sounds] : normal bowel sounds [Normal Posterior Cervical Nodes] : no posterior cervical lymphadenopathy [Normal Anterior Cervical Nodes] : no anterior cervical lymphadenopathy [No CVA Tenderness] : no CVA  tenderness [No Spinal Tenderness] : no spinal tenderness [Grossly Normal Strength/Tone] : grossly normal strength/tone [No Rash] : no rash [Coordination Grossly Intact] : coordination grossly intact [No Focal Deficits] : no focal deficits [Normal Gait] : normal gait [Normal Affect] : the affect was normal [Normal Insight/Judgement] : insight and judgment were intact [de-identified] : Wrists tender bilaterally, right MCPs tender, one left MCP tender

## 2021-02-23 ENCOUNTER — OUTPATIENT (OUTPATIENT)
Dept: OUTPATIENT SERVICES | Facility: HOSPITAL | Age: 51
LOS: 1 days | Discharge: HOME | End: 2021-02-23

## 2021-02-23 ENCOUNTER — APPOINTMENT (OUTPATIENT)
Dept: PODIATRY | Facility: CLINIC | Age: 51
End: 2021-02-23
Payer: COMMERCIAL

## 2021-02-23 DIAGNOSIS — Z98.891 HISTORY OF UTERINE SCAR FROM PREVIOUS SURGERY: Chronic | ICD-10-CM

## 2021-02-23 PROCEDURE — 99213 OFFICE O/P EST LOW 20 MIN: CPT

## 2021-02-26 NOTE — PROCEDURE
[FreeTextEntry1] : Patient examined, history and chart reviewed\par Rx muscle relaxer for leg cramping\par Discussed risk of DM2 including Calluses, Pre-ulcerative lesions, ulcers, infections, bone infections, and amputation\par patient educated on diabetic foot health and maintenance at length \par Follow up in 3 months or PRN\par \par \par

## 2021-03-15 ENCOUNTER — APPOINTMENT (OUTPATIENT)
Dept: OPHTHALMOLOGY | Facility: CLINIC | Age: 51
End: 2021-03-15

## 2021-03-15 ENCOUNTER — OUTPATIENT (OUTPATIENT)
Dept: OUTPATIENT SERVICES | Facility: HOSPITAL | Age: 51
LOS: 1 days | Discharge: HOME | End: 2021-03-15
Payer: SUBSIDIZED

## 2021-03-15 DIAGNOSIS — Z98.891 HISTORY OF UTERINE SCAR FROM PREVIOUS SURGERY: Chronic | ICD-10-CM

## 2021-03-15 PROCEDURE — 92012 INTRM OPH EXAM EST PATIENT: CPT

## 2021-03-15 PROCEDURE — 92134 CPTRZ OPH DX IMG PST SGM RTA: CPT | Mod: 26

## 2021-03-18 DIAGNOSIS — E11.3291 TYPE 2 DIABETES MELLITUS WITH MILD NONPROLIFERATIVE DIABETIC RETINOPATHY WITHOUT MACULAR EDEMA, RIGHT EYE: ICD-10-CM

## 2021-03-18 DIAGNOSIS — H40.019 OPEN ANGLE WITH BORDERLINE FINDINGS, LOW RISK, UNSPECIFIED EYE: ICD-10-CM

## 2021-03-18 DIAGNOSIS — H04.123 DRY EYE SYNDROME OF BILATERAL LACRIMAL GLANDS: ICD-10-CM

## 2021-03-18 DIAGNOSIS — E10.65 TYPE 1 DIABETES MELLITUS WITH HYPERGLYCEMIA: ICD-10-CM

## 2021-03-30 ENCOUNTER — OUTPATIENT (OUTPATIENT)
Dept: OUTPATIENT SERVICES | Facility: HOSPITAL | Age: 51
LOS: 1 days | Discharge: HOME | End: 2021-03-30

## 2021-03-30 ENCOUNTER — APPOINTMENT (OUTPATIENT)
Dept: PODIATRY | Facility: CLINIC | Age: 51
End: 2021-03-30
Payer: MEDICAID

## 2021-03-30 ENCOUNTER — APPOINTMENT (OUTPATIENT)
Dept: INTERNAL MEDICINE | Facility: CLINIC | Age: 51
End: 2021-03-30
Payer: COMMERCIAL

## 2021-03-30 VITALS
HEIGHT: 60 IN | OXYGEN SATURATION: 99 % | SYSTOLIC BLOOD PRESSURE: 122 MMHG | WEIGHT: 96.9 LBS | BODY MASS INDEX: 19.02 KG/M2 | DIASTOLIC BLOOD PRESSURE: 80 MMHG | HEART RATE: 81 BPM | TEMPERATURE: 97.6 F

## 2021-03-30 DIAGNOSIS — Z98.891 HISTORY OF UTERINE SCAR FROM PREVIOUS SURGERY: Chronic | ICD-10-CM

## 2021-03-30 PROCEDURE — 99213 OFFICE O/P EST LOW 20 MIN: CPT

## 2021-03-30 PROCEDURE — 99213 OFFICE O/P EST LOW 20 MIN: CPT | Mod: GC

## 2021-03-31 DIAGNOSIS — E11.42 TYPE 2 DIABETES MELLITUS WITH DIABETIC POLYNEUROPATHY: ICD-10-CM

## 2021-03-31 DIAGNOSIS — E11.9 TYPE 2 DIABETES MELLITUS WITHOUT COMPLICATIONS: ICD-10-CM

## 2021-03-31 DIAGNOSIS — M72.2 PLANTAR FASCIAL FIBROMATOSIS: ICD-10-CM

## 2021-03-31 NOTE — PHYSICAL EXAM
[General Appearance - Alert] : alert [General Appearance - In No Acute Distress] : in no acute distress [Sclera] : the sclera and conjunctiva were normal [PERRL With Normal Accommodation] : pupils were equal in size, round, and reactive to light [Skin Color & Pigmentation] : normal skin color and pigmentation [Skin Turgor] : normal skin turgor [] : no rash [Normal in Appearance] : normal in appearance [Full ROM] : with full range of motion [2+] : 2+ in the dorsalis pedis [#1 Diminished] : number 1 was diminished [#2 Diminished] : number 2 was diminished [#6 Diminished] : number 6 was diminished [Sensation] : the sensory exam was normal to light touch and pinprick [Deep Tendon Reflexes (DTR)] : deep tendon reflexes were 2+ and symmetric [No Focal Deficits] : no focal deficits [Oriented To Time, Place, And Person] : oriented to person, place, and time [Impaired Insight] : insight and judgment were intact [Affect] : the affect was normal [1+] : left 1+ [Normal Station and Gait] : the gait and station were normal [Normal] : motor strength was normal in all muscle groups [Normal Motor Tone] : the muscle tone was normal [Involuntary Movements] : no involuntary movements were seen [Vibration Dec.] : normal vibratory sensation at the level of the toes [Position Sense Dec.] : normal position sense at the level of the toes [Diminished Throughout Right Foot] : normal tactile sensation with monofilament testing throughout right foot [Diminished Throughout Left Foot] : normal tactile sensation with monofilament testing throughout left foot [#5 Diminished] : number 5 was normal [#7 Diminished] : number 7 was normal [#8 Diminished] : number 8 was normal [#3 Diminished] : number 3 was normal [#4 Diminished] : number 4 was normal [#9 Diminished] : number 9 was normal [#10 Diminished] : number 10 was normal [Toes] :  capillary refill of the toes was normal [Left Toes] :  capillary refill of the left toes was normal [Right Toes] :  capillary refill of the right toes was normal

## 2021-03-31 NOTE — PROCEDURE
[FreeTextEntry1] : Patient examined, history and chart reviewed\par Rx Diclofenac; Advised to apply as needed; \par Discussed risk of DM2 including Calluses, Pre-ulcerative lesions, ulcers, infections, bone infections, and amputation\par patient educated on diabetic foot health and maintenance at length \par Follow up in 3 months or PRN\par \par \par

## 2021-04-02 ENCOUNTER — APPOINTMENT (OUTPATIENT)
Dept: OPHTHALMOLOGY | Facility: CLINIC | Age: 51
End: 2021-04-02

## 2021-04-02 ENCOUNTER — OUTPATIENT (OUTPATIENT)
Dept: OUTPATIENT SERVICES | Facility: HOSPITAL | Age: 51
LOS: 1 days | Discharge: HOME | End: 2021-04-02
Payer: MEDICAID

## 2021-04-02 DIAGNOSIS — Z98.891 HISTORY OF UTERINE SCAR FROM PREVIOUS SURGERY: Chronic | ICD-10-CM

## 2021-04-02 PROCEDURE — 92004 COMPRE OPH EXAM NEW PT 1/>: CPT

## 2021-04-02 PROCEDURE — 92202 OPSCPY EXTND ON/MAC DRAW: CPT

## 2021-04-02 PROCEDURE — 92134 CPTRZ OPH DX IMG PST SGM RTA: CPT | Mod: 26

## 2021-04-05 NOTE — HISTORY OF PRESENT ILLNESS
[FreeTextEntry1] : "pain in my neck." [de-identified] : 49 y/o F with PMHx of Type II Diabetes, HTN, Rheumatoid Arthritis, Anemia, DLD, presents for follow up and complaining of neck pain. Pt states that she has had intermittent neck pain x3 weeks, located to the back of her left neck, described as aching, 6/10, non-radiating, worsened by movement, with no relieving factors. She states she also has chronic joint pain from her rheumatoid arthritis for which she follows with Rheumatologist Dr. Mayo. Pt denies any new HAs, vision changes, ear pain, numbness, tingling, neck masses, difficulty breathing, chest pain, palpitations, abdominal pain, nausea, vomiting, diarrhea, urinary frequency, dysuria, or muscle weakness.

## 2021-04-05 NOTE — ASSESSMENT
[FreeTextEntry1] : 49 y/o F with PMHx of Type II Diabetes, HTN, Rheumatoid Arthritis, Anemia, DLD, presents for follow up and complaining of neck pain.\par \par #Neck Pain\par -Muscular strain vs ligamentous instability due to rheumatoid arthritis. \par -Counseled on rest and ice/applying heating pads.\par -Will send prescription for muscle relaxant to take as needed.  \par \par #HTN, controlled\par -BP in the office 122/80 (reports similar readings at home)\par -Pt is tolerating atenolol 50 mg and Lisinopril 40 mg well. \par -C/w current medication regimen and measuring BP at home. \par \par #Diabetes mellitus, uncontrolled\par - Hemoglobin A1c 8/18/2020: 10.3\par - A1c is trending down (12.4 on 2/24/2020)\par - Continue with metformin 1000mg twice daily and pioglitazone 45mg once daily\par - Followed up with Podiatry today and Follows with Dr. Kauffman for endocrinology. \par -Saw Opthalmology last week.\par -Instruct to continue with yearly opthalmology and podiatry appointments. \par - Will repeat A1c. \par -Will refill her diabetes medications. \par \par #Rheumatoid arthritis, seropositive, erosive\par - Pt complains of ongoing joint pain but sxs improving. \par -Pt finished Prednisone taper and methotrexate course. \par -Next appointment with Rheumatology is in 2 days. \par \par #Jonathon care maintenance\par - Mammogram 10/3/2020: BiRads 2\par - Patient received influenza vaccine today\par - Pap smear performed 2019: within normal limits\par - Colonoscopy 2017: within normal limits. Repeat in 2027\par -Will repeat cbc, cmp, lipid panel, HbA1c, iron studies. \par - Return to clinic in 3 months or PRN. \par

## 2021-04-05 NOTE — PHYSICAL EXAM
[Well Nourished] : well nourished [Well Developed] : well developed [Normal Sclera/Conjunctiva] : normal sclera/conjunctiva [PERRL] : pupils equal round and reactive to light [Normal Outer Ear/Nose] : the outer ears and nose were normal in appearance [No JVD] : no jugular venous distention [No Respiratory Distress] : no respiratory distress  [No Accessory Muscle Use] : no accessory muscle use [Clear to Auscultation] : lungs were clear to auscultation bilaterally [Normal Rate] : normal rate  [Regular Rhythm] : with a regular rhythm [No Varicosities] : no varicosities [No Edema] : there was no peripheral edema [Soft] : abdomen soft [Non Tender] : non-tender [Grossly Normal Strength/Tone] : grossly normal strength/tone [No Rash] : no rash [Normal Affect] : the affect was normal [de-identified] : + TTP to left suboccipital muscles with associated hypertonicity.  [de-identified] : wrists TTP bilaterally, DIP joints TTP, bilateral knees TTP, and right elbow is TTP.

## 2021-04-05 NOTE — REVIEW OF SYSTEMS
[Joint Pain] : joint pain [Muscle Pain] : muscle pain [Fever] : no fever [Chills] : no chills [Night Sweats] : no night sweats [Pain] : no pain [Earache] : no earache [Sore Throat] : no sore throat [Chest Pain] : no chest pain [Palpitations] : no palpitations [Lower Ext Edema] : no lower extremity edema [Shortness Of Breath] : no shortness of breath [Wheezing] : no wheezing [Cough] : no cough [Abdominal Pain] : no abdominal pain [Nausea] : no nausea [Constipation] : no constipation [Diarrhea] : diarrhea [Vomiting] : no vomiting [Dysuria] : no dysuria [Skin Rash] : no skin rash [Dizziness] : no dizziness [Easy Bleeding] : no easy bleeding [Easy Bruising] : no easy bruising

## 2021-04-07 DIAGNOSIS — E78.00 PURE HYPERCHOLESTEROLEMIA, UNSPECIFIED: ICD-10-CM

## 2021-04-07 DIAGNOSIS — I10 ESSENTIAL (PRIMARY) HYPERTENSION: ICD-10-CM

## 2021-04-07 DIAGNOSIS — E11.42 TYPE 2 DIABETES MELLITUS WITH DIABETIC POLYNEUROPATHY: ICD-10-CM

## 2021-04-08 DIAGNOSIS — H04.123 DRY EYE SYNDROME OF BILATERAL LACRIMAL GLANDS: ICD-10-CM

## 2021-04-08 DIAGNOSIS — E11.3291 TYPE 2 DIABETES MELLITUS WITH MILD NONPROLIFERATIVE DIABETIC RETINOPATHY WITHOUT MACULAR EDEMA, RIGHT EYE: ICD-10-CM

## 2021-04-08 DIAGNOSIS — E10.65 TYPE 1 DIABETES MELLITUS WITH HYPERGLYCEMIA: ICD-10-CM

## 2021-04-08 DIAGNOSIS — H40.019 OPEN ANGLE WITH BORDERLINE FINDINGS, LOW RISK, UNSPECIFIED EYE: ICD-10-CM

## 2021-04-14 NOTE — PROCEDURE
English
[FreeTextEntry1] : Patient examined, history and chart reviewed\par Discussed foot care in detail \par Rx Medrol dose pack for heel pain \par Given Referral for vascular due to exercise intolerance \par Follow up in 1 month \par

## 2021-04-23 ENCOUNTER — APPOINTMENT (OUTPATIENT)
Dept: OPHTHALMOLOGY | Facility: CLINIC | Age: 51
End: 2021-04-23

## 2021-05-28 ENCOUNTER — APPOINTMENT (OUTPATIENT)
Dept: OPHTHALMOLOGY | Facility: CLINIC | Age: 51
End: 2021-05-28

## 2021-06-07 LAB
ALBUMIN SERPL ELPH-MCNC: 4.3 G/DL
ALP BLD-CCNC: 79 U/L
ALT SERPL-CCNC: 16 U/L
ANION GAP SERPL CALC-SCNC: 15 MMOL/L
AST SERPL-CCNC: 17 U/L
BASOPHILS # BLD AUTO: 0.02 K/UL
BASOPHILS NFR BLD AUTO: 0.3 %
BILIRUB SERPL-MCNC: <0.2 MG/DL
BUN SERPL-MCNC: 25 MG/DL
CALCIUM SERPL-MCNC: 10.1 MG/DL
CHLORIDE SERPL-SCNC: 97 MMOL/L
CHOLEST SERPL-MCNC: 307 MG/DL
CO2 SERPL-SCNC: 25 MMOL/L
CREAT SERPL-MCNC: 0.9 MG/DL
EOSINOPHIL # BLD AUTO: 0.06 K/UL
EOSINOPHIL NFR BLD AUTO: 0.9 %
ESTIMATED AVERAGE GLUCOSE: 338 MG/DL
GLUCOSE SERPL-MCNC: 295 MG/DL
HBA1C MFR BLD HPLC: 13.4 %
HCT VFR BLD CALC: 34.9 %
HDLC SERPL-MCNC: 85 MG/DL
HGB BLD-MCNC: 11 G/DL
IMM GRANULOCYTES NFR BLD AUTO: 0.3 %
LDLC SERPL CALC-MCNC: 197 MG/DL
LYMPHOCYTES # BLD AUTO: 2.14 K/UL
LYMPHOCYTES NFR BLD AUTO: 30.6 %
MAN DIFF?: NORMAL
MCHC RBC-ENTMCNC: 27 PG
MCHC RBC-ENTMCNC: 31.5 G/DL
MCV RBC AUTO: 85.7 FL
MONOCYTES # BLD AUTO: 0.42 K/UL
MONOCYTES NFR BLD AUTO: 6 %
NEUTROPHILS # BLD AUTO: 4.33 K/UL
NEUTROPHILS NFR BLD AUTO: 61.9 %
NONHDLC SERPL-MCNC: 222 MG/DL
PLATELET # BLD AUTO: 280 K/UL
POTASSIUM SERPL-SCNC: 5.4 MMOL/L
PROT SERPL-MCNC: 7.6 G/DL
RBC # BLD: 4.07 M/UL
RBC # FLD: 14.1 %
SODIUM SERPL-SCNC: 137 MMOL/L
TRIGL SERPL-MCNC: 238 MG/DL
TSH SERPL-ACNC: 1.01 UIU/ML
WBC # FLD AUTO: 6.99 K/UL

## 2021-07-29 ENCOUNTER — NON-APPOINTMENT (OUTPATIENT)
Age: 51
End: 2021-07-29

## 2021-07-29 ENCOUNTER — APPOINTMENT (OUTPATIENT)
Dept: INTERNAL MEDICINE | Facility: CLINIC | Age: 51
End: 2021-07-29
Payer: COMMERCIAL

## 2021-07-29 ENCOUNTER — OUTPATIENT (OUTPATIENT)
Dept: OUTPATIENT SERVICES | Facility: HOSPITAL | Age: 51
LOS: 1 days | Discharge: HOME | End: 2021-07-29

## 2021-07-29 VITALS
BODY MASS INDEX: 18.65 KG/M2 | WEIGHT: 95 LBS | TEMPERATURE: 97 F | SYSTOLIC BLOOD PRESSURE: 162 MMHG | OXYGEN SATURATION: 97 % | HEART RATE: 76 BPM | HEIGHT: 60 IN | DIASTOLIC BLOOD PRESSURE: 91 MMHG

## 2021-07-29 DIAGNOSIS — E11.21 TYPE 2 DIABETES MELLITUS WITH DIABETIC NEPHROPATHY: ICD-10-CM

## 2021-07-29 DIAGNOSIS — I10 ESSENTIAL (PRIMARY) HYPERTENSION: ICD-10-CM

## 2021-07-29 DIAGNOSIS — Z98.891 HISTORY OF UTERINE SCAR FROM PREVIOUS SURGERY: Chronic | ICD-10-CM

## 2021-07-29 DIAGNOSIS — Z00.00 ENCOUNTER FOR GENERAL ADULT MEDICAL EXAMINATION WITHOUT ABNORMAL FINDINGS: ICD-10-CM

## 2021-07-29 DIAGNOSIS — E78.00 PURE HYPERCHOLESTEROLEMIA, UNSPECIFIED: ICD-10-CM

## 2021-07-29 PROCEDURE — 99213 OFFICE O/P EST LOW 20 MIN: CPT | Mod: GC

## 2021-07-29 RX ORDER — CHLORTHALIDONE 25 MG/1
25 TABLET ORAL DAILY
Qty: 60 | Refills: 0 | Status: COMPLETED | COMMUNITY
Start: 2019-10-08 | End: 2021-07-29

## 2021-07-29 RX ORDER — METHOCARBAMOL 500 MG/1
500 TABLET, FILM COATED ORAL
Qty: 30 | Refills: 2 | Status: COMPLETED | COMMUNITY
Start: 2020-11-18 | End: 2021-07-29

## 2021-07-29 RX ORDER — GLIPIZIDE 5 MG/1
5 TABLET ORAL DAILY
Qty: 60 | Refills: 3 | Status: DISCONTINUED | COMMUNITY
Start: 2021-07-29 | End: 2021-07-29

## 2021-07-29 RX ORDER — CHLORHEXIDINE GLUCONATE 4 %
325 (65 FE) LIQUID (ML) TOPICAL TWICE DAILY
Qty: 60 | Refills: 5 | Status: COMPLETED | COMMUNITY
Start: 2017-06-20 | End: 2021-07-29

## 2021-08-06 ENCOUNTER — OUTPATIENT (OUTPATIENT)
Dept: OUTPATIENT SERVICES | Facility: HOSPITAL | Age: 51
LOS: 1 days | Discharge: HOME | End: 2021-08-06

## 2021-08-06 ENCOUNTER — APPOINTMENT (OUTPATIENT)
Dept: RHEUMATOLOGY | Facility: CLINIC | Age: 51
End: 2021-08-06
Payer: MEDICAID

## 2021-08-06 ENCOUNTER — NON-APPOINTMENT (OUTPATIENT)
Age: 51
End: 2021-08-06

## 2021-08-06 DIAGNOSIS — Z79.899 OTHER LONG TERM (CURRENT) DRUG THERAPY: ICD-10-CM

## 2021-08-06 DIAGNOSIS — Z98.891 HISTORY OF UTERINE SCAR FROM PREVIOUS SURGERY: Chronic | ICD-10-CM

## 2021-08-06 DIAGNOSIS — M18.12 UNILATERAL PRIMARY OSTEOARTHRITIS OF FIRST CARPOMETACARPAL JOINT, LEFT HAND: ICD-10-CM

## 2021-08-06 DIAGNOSIS — M05.741 RHEUMATOID ARTHRITIS WITH RHEUMATOID FACTOR OF RIGHT HAND WITHOUT ORGAN OR SYSTEMS INVOLVEMENT: ICD-10-CM

## 2021-08-06 DIAGNOSIS — M77.11 LATERAL EPICONDYLITIS, RIGHT ELBOW: ICD-10-CM

## 2021-08-06 PROCEDURE — 99214 OFFICE O/P EST MOD 30 MIN: CPT | Mod: GC

## 2021-08-06 RX ORDER — PREDNISONE 5 MG/1
5 TABLET ORAL
Qty: 70 | Refills: 0 | Status: DISCONTINUED | COMMUNITY
Start: 2020-11-20 | End: 2021-08-06

## 2021-08-06 RX ORDER — FOLIC ACID 1 MG/1
1 TABLET ORAL
Qty: 90 | Refills: 3 | Status: DISCONTINUED | COMMUNITY
Start: 2020-11-20 | End: 2021-08-06

## 2021-08-06 RX ORDER — METHOTREXATE 2.5 MG/1
2.5 TABLET ORAL
Qty: 24 | Refills: 2 | Status: DISCONTINUED | COMMUNITY
Start: 2020-11-20 | End: 2021-08-06

## 2021-08-06 RX ORDER — HYDROXYCHLOROQUINE SULFATE 200 MG/1
200 TABLET, FILM COATED ORAL DAILY
Qty: 90 | Refills: 1 | Status: ACTIVE | OUTPATIENT
Start: 2019-09-19

## 2021-08-07 NOTE — HISTORY OF PRESENT ILLNESS
[FreeTextEntry1] : followup [de-identified] : 49 y/o F with PMHx of Type II Diabetes, HTN, Rheumatoid Arthritis, Anemia, DLD, presents for follow up. Patient was complaining of neck pain last visit but says its much better now. \par \par Last tuesday patient had an episode of dizziness described as room spinning. 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 This does not happen often but happens sometimes and worsened by certain head movements. \par \par A1c and lipid profile worsened. Patient does not exercise much and eats a lot of rice.

## 2021-08-07 NOTE — ASSESSMENT
[FreeTextEntry1] : 51 y/o F with PMHx of Type II Diabetes, HTN, Rheumatoid Arthritis, Anemia, DLD, presents for follow up and complaining of neck pain.\par \par #HTN, controlled\par -BP in the office 122/80 (reports similar readings at home)\par -Pt is tolerating atenolol 50 mg and Lisinopril 40 mg well. \par -C/w current medication regimen and measuring BP at home. \par \par #HLD \par - , , \par - start lipitor 80mg hs \par \par #Diabetes mellitus, uncontrolled\par - Hemoglobin A1c 3/2021: 13.4 (worsened) \par - c/w metformin 1000mg twice daily and pioglitazone 45mg once daily\par - instructed to follow up with endocrine Dr. Kauffman \par - counselled on diet and lowering carbs\par - Instruct to continue with yearly opthalmology and podiatry appointments. \par - Will repeat A1c. \par \par #dizziness likely secondary to BPPV \par - worsened by certain head movements\par - counselled on avoiding rapid head movements \par \par #Rheumatoid arthritis, seropositive, erosive\par - Pt complains of ongoing joint pain but sxs improving. \par - on methotrexate 15mg weekly \par \par #Neck Pain - improved\par -Muscular strain vs ligamentous instability due to rheumatoid arthritis. \par -Counseled on rest and ice/applying heating pads.\par -was taking muscle relaxants \par \par #Jonathon care maintenance\par - Mammogram 10/3/2020: BiRads 2; repeat ordered \par - Pap smear performed 2019: within normal limits; will give referral \par - Colonoscopy 2017: within normal limits. Repeat in 2027\par - Return to clinic in 3 months or PRN. \par \par summary: f/u 3 months, routine labs, start chlorthalidone/atorvastatin, endo f/u, gyn referral, mammogram

## 2021-09-14 ENCOUNTER — APPOINTMENT (OUTPATIENT)
Dept: PODIATRY | Facility: CLINIC | Age: 51
End: 2021-09-14
Payer: MEDICAID

## 2021-09-14 ENCOUNTER — OUTPATIENT (OUTPATIENT)
Dept: OUTPATIENT SERVICES | Facility: HOSPITAL | Age: 51
LOS: 1 days | Discharge: HOME | End: 2021-09-14

## 2021-09-14 DIAGNOSIS — M77.42 METATARSALGIA, RIGHT FOOT: ICD-10-CM

## 2021-09-14 DIAGNOSIS — R25.2 CRAMP AND SPASM: ICD-10-CM

## 2021-09-14 DIAGNOSIS — M77.41 METATARSALGIA, RIGHT FOOT: ICD-10-CM

## 2021-09-14 DIAGNOSIS — Z98.891 HISTORY OF UTERINE SCAR FROM PREVIOUS SURGERY: Chronic | ICD-10-CM

## 2021-09-14 PROCEDURE — 99213 OFFICE O/P EST LOW 20 MIN: CPT

## 2021-09-20 PROBLEM — R25.2 CRAMPING OF FEET: Status: ACTIVE | Noted: 2020-11-18

## 2021-09-20 PROBLEM — M77.41 METATARSALGIA OF BOTH FEET: Status: ACTIVE | Noted: 2020-09-15

## 2021-09-20 NOTE — PHYSICAL EXAM
[General Appearance - Alert] : alert [General Appearance - In No Acute Distress] : in no acute distress [General Appearance - Well Nourished] : well nourished [General Appearance - Well Developed] : well developed [Sensation] : the sensory exam was normal to light touch and pinprick [No Focal Deficits] : no focal deficits [Deep Tendon Reflexes (DTR)] : deep tendon reflexes were 2+ and symmetric [de-identified] : No pain on BL feet [FreeTextEntry1] : Mild xerosis on BL feet;

## 2021-09-20 NOTE — HISTORY OF PRESENT ILLNESS
[FreeTextEntry1] : CC: "I am here to check my both feet for diabetic foot check up"\par HPI:\par Mrs. Díaz is a 50 yr old female pt who presents for regular diabetic foot check up; eval BL feet;

## 2021-09-20 NOTE — ASSESSMENT
[FreeTextEntry1] : Assessment: Mild xerosis on R plantar foot;\par Plan:\par Pt seen and eval\par Rx Amlactin for R foot xerosis\par F/u in 6 month for diabetic foot check up;

## 2021-09-21 DIAGNOSIS — R25.2 CRAMP AND SPASM: ICD-10-CM

## 2021-09-21 DIAGNOSIS — B35.1 TINEA UNGUIUM: ICD-10-CM

## 2021-09-21 DIAGNOSIS — I73.9 PERIPHERAL VASCULAR DISEASE, UNSPECIFIED: ICD-10-CM

## 2021-09-21 DIAGNOSIS — M77.41 METATARSALGIA, RIGHT FOOT: ICD-10-CM

## 2021-09-21 DIAGNOSIS — M79.671 PAIN IN RIGHT FOOT: ICD-10-CM

## 2021-09-21 DIAGNOSIS — M79.672 PAIN IN LEFT FOOT: ICD-10-CM

## 2021-09-21 DIAGNOSIS — E11.42 TYPE 2 DIABETES MELLITUS WITH DIABETIC POLYNEUROPATHY: ICD-10-CM

## 2021-10-18 ENCOUNTER — RESULT REVIEW (OUTPATIENT)
Age: 51
End: 2021-10-18

## 2021-10-18 ENCOUNTER — OUTPATIENT (OUTPATIENT)
Dept: OUTPATIENT SERVICES | Facility: HOSPITAL | Age: 51
LOS: 1 days | Discharge: HOME | End: 2021-10-18
Payer: MEDICAID

## 2021-10-18 DIAGNOSIS — Z98.891 HISTORY OF UTERINE SCAR FROM PREVIOUS SURGERY: Chronic | ICD-10-CM

## 2021-10-18 DIAGNOSIS — Z12.31 ENCOUNTER FOR SCREENING MAMMOGRAM FOR MALIGNANT NEOPLASM OF BREAST: ICD-10-CM

## 2021-10-18 PROCEDURE — 77063 BREAST TOMOSYNTHESIS BI: CPT | Mod: 26

## 2021-10-18 PROCEDURE — 77067 SCR MAMMO BI INCL CAD: CPT | Mod: 26

## 2021-10-28 ENCOUNTER — OUTPATIENT (OUTPATIENT)
Dept: OUTPATIENT SERVICES | Facility: HOSPITAL | Age: 51
LOS: 1 days | Discharge: HOME | End: 2021-10-28

## 2021-10-28 ENCOUNTER — APPOINTMENT (OUTPATIENT)
Dept: ENDOCRINOLOGY | Facility: CLINIC | Age: 51
End: 2021-10-28

## 2021-10-28 DIAGNOSIS — E11.42 TYPE 2 DIABETES MELLITUS WITH DIABETIC POLYNEUROPATHY: ICD-10-CM

## 2021-10-28 DIAGNOSIS — M77.41 METATARSALGIA, RIGHT FOOT: ICD-10-CM

## 2021-10-28 DIAGNOSIS — I73.9 PERIPHERAL VASCULAR DISEASE, UNSPECIFIED: ICD-10-CM

## 2021-10-28 DIAGNOSIS — R25.2 CRAMP AND SPASM: ICD-10-CM

## 2021-10-28 DIAGNOSIS — B35.1 TINEA UNGUIUM: ICD-10-CM

## 2021-10-28 DIAGNOSIS — Z98.891 HISTORY OF UTERINE SCAR FROM PREVIOUS SURGERY: Chronic | ICD-10-CM

## 2021-10-28 LAB — GLUCOSE BLDC GLUCOMTR-MCNC: 203 MG/DL — HIGH (ref 70–99)

## 2021-10-28 NOTE — HISTORY OF PRESENT ILLNESS
[FreeTextEntry1] : gross weight loss and cachexia, most likely due to insulin deficiency. patient has no health insurance, and no ability to buy insulin from [facility].

## 2021-10-28 NOTE — ASSESSMENT
[FreeTextEntry1] : referred to [facility] for free diabetes medications. [Diabetes Foot Care] : diabetes foot care [Long Term Vascular Complications] : long term vascular complications of diabetes [Carbohydrate Consistent Diet] : carbohydrate consistent diet [Importance of Diet and Exercise] : importance of diet and exercise to improve glycemic control, achieve weight loss and improve cardiovascular health [Retinopathy Screening] : Patient was referred to ophthalmology for retinopathy screening

## 2021-11-03 ENCOUNTER — APPOINTMENT (OUTPATIENT)
Dept: INTERNAL MEDICINE | Facility: CLINIC | Age: 51
End: 2021-11-03

## 2021-11-09 ENCOUNTER — RESULT REVIEW (OUTPATIENT)
Age: 51
End: 2021-11-09

## 2021-11-09 ENCOUNTER — OUTPATIENT (OUTPATIENT)
Dept: OUTPATIENT SERVICES | Facility: HOSPITAL | Age: 51
LOS: 1 days | Discharge: HOME | End: 2021-11-09
Payer: MEDICAID

## 2021-11-09 DIAGNOSIS — R92.8 OTHER ABNORMAL AND INCONCLUSIVE FINDINGS ON DIAGNOSTIC IMAGING OF BREAST: ICD-10-CM

## 2021-11-09 DIAGNOSIS — Z98.891 HISTORY OF UTERINE SCAR FROM PREVIOUS SURGERY: Chronic | ICD-10-CM

## 2021-11-09 PROCEDURE — 77065 DX MAMMO INCL CAD UNI: CPT | Mod: 26,LT

## 2021-11-09 PROCEDURE — 76642 ULTRASOUND BREAST LIMITED: CPT | Mod: 26,LT

## 2021-11-09 PROCEDURE — G0279: CPT | Mod: 26

## 2021-11-10 ENCOUNTER — NON-APPOINTMENT (OUTPATIENT)
Age: 51
End: 2021-11-10

## 2021-11-11 ENCOUNTER — APPOINTMENT (OUTPATIENT)
Dept: INTERNAL MEDICINE | Facility: CLINIC | Age: 51
End: 2021-11-11
Payer: COMMERCIAL

## 2021-11-11 ENCOUNTER — OUTPATIENT (OUTPATIENT)
Dept: OUTPATIENT SERVICES | Facility: HOSPITAL | Age: 51
LOS: 1 days | Discharge: HOME | End: 2021-11-11

## 2021-11-11 VITALS
WEIGHT: 91 LBS | HEART RATE: 73 BPM | TEMPERATURE: 97.3 F | SYSTOLIC BLOOD PRESSURE: 167 MMHG | HEIGHT: 60 IN | DIASTOLIC BLOOD PRESSURE: 88 MMHG | OXYGEN SATURATION: 99 % | BODY MASS INDEX: 17.87 KG/M2

## 2021-11-11 DIAGNOSIS — N92.4 EXCESSIVE BLEEDING IN THE PREMENOPAUSAL PERIOD: ICD-10-CM

## 2021-11-11 DIAGNOSIS — Z98.891 HISTORY OF UTERINE SCAR FROM PREVIOUS SURGERY: Chronic | ICD-10-CM

## 2021-11-11 PROCEDURE — 99213 OFFICE O/P EST LOW 20 MIN: CPT | Mod: GC

## 2021-11-11 NOTE — PHYSICAL EXAM
[Appropriately responsive] : appropriately responsive [No Lymphadenopathy] : no lymphadenopathy [Regular Rate Rhythm] : regular rate rhythm [No Murmurs] : no murmurs [Clear to Auscultation B/L] : clear to auscultation bilaterally [Soft] : soft [Non-tender] : non-tender [Non-distended] : non-distended [No Mass] : no mass [Oriented x3] : oriented x3

## 2021-11-12 ENCOUNTER — OUTPATIENT (OUTPATIENT)
Dept: OUTPATIENT SERVICES | Facility: HOSPITAL | Age: 51
LOS: 1 days | Discharge: HOME | End: 2021-11-12

## 2021-11-12 ENCOUNTER — APPOINTMENT (OUTPATIENT)
Dept: RHEUMATOLOGY | Facility: CLINIC | Age: 51
End: 2021-11-12
Payer: MEDICAID

## 2021-11-12 VITALS
HEIGHT: 60 IN | BODY MASS INDEX: 17.87 KG/M2 | OXYGEN SATURATION: 99 % | HEART RATE: 77 BPM | DIASTOLIC BLOOD PRESSURE: 69 MMHG | TEMPERATURE: 97.4 F | WEIGHT: 91 LBS | SYSTOLIC BLOOD PRESSURE: 125 MMHG

## 2021-11-12 DIAGNOSIS — M79.642 PAIN IN RIGHT HAND: ICD-10-CM

## 2021-11-12 DIAGNOSIS — Z98.891 HISTORY OF UTERINE SCAR FROM PREVIOUS SURGERY: Chronic | ICD-10-CM

## 2021-11-12 DIAGNOSIS — M79.641 PAIN IN RIGHT HAND: ICD-10-CM

## 2021-11-12 PROCEDURE — 99214 OFFICE O/P EST MOD 30 MIN: CPT | Mod: GC

## 2021-11-12 RX ORDER — FOLIC ACID 1 MG/1
1 TABLET ORAL
Qty: 90 | Refills: 0 | Status: ACTIVE | COMMUNITY
Start: 2021-11-12 | End: 1900-01-01

## 2021-11-12 NOTE — END OF VISIT
[] : Resident [FreeTextEntry3] : 50 year old female with seropositive, erosive RA here for follow up. She hand pains with prolonged AM stiffness. Continue Plaquenil 200 mg daily, re-start MTX 6 tabs weekly with FA 1 mg daily. Use Voltaren, continue Tylenol PRN. F/u in 3 months and check monitoring labs at that time [Time Spent: ___ minutes] : I have spent [unfilled] minutes of time on the encounter.

## 2021-11-12 NOTE — ASSESSMENT
[FreeTextEntry1] : 50 F, O[place]i speaking, w/ PMHx of seropositive (+RF, +CCP) erosive RA on Plaquenil , HTN, DMII, HLD, and anemia presents for follow up. \par \par 1. RA, seropositive (RF, CCP), erosive\par - c/w Plaquenil 200 mg daily.\par - Will resume MTX (6 pills daily with FA)\par - Voltaren gel PRN for pain\par - repeat blood work within three months \par \par 2. Tennis elbow, right\par - Elbow brace\par \par 3. 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 \par \par RTC in three months

## 2021-11-12 NOTE — HISTORY OF PRESENT ILLNESS
[FreeTextEntry1] : 50 F, Belizean speaking, w/ PMHx of seropositive (+RF, +CCP) erosive RA on Plaquenil , HTN, DMII, HLD, Tennis Elbow, and anemia presents for three month follow up. She continues to experience morning stiffness greater 30 mins daily with MCP joint pain and Right hand thumb pain. Patient also endorses chronic right elbow pain but states that it improves with PRN Tylenol. She otherwise denies any acute weight loss, fevers, fatigue, sob, ulcers, or recent illness.

## 2021-11-12 NOTE — PHYSICAL EXAM
[General Appearance - Alert] : alert [General Appearance - In No Acute Distress] : in no acute distress [Sclera] : the sclera and conjunctiva were normal [Outer Ear] : the ears and nose were normal in appearance [Hearing Threshold Finger Rub Not [place]] : hearing was normal [Neck Appearance] : the appearance of the neck was normal [Exaggerated Use Of Accessory Muscles For Inspiration] : no accessory muscle use [FreeTextEntry1] : Left 3rd DIP heberden node. No swelling or synovitis. Left 1st CMC tender [] : no rash

## 2021-11-13 NOTE — HISTORY OF PRESENT ILLNESS
[FreeTextEntry1] : 50 F w/  w/ PMHx of HTN, DMII, RA, HLD, and anemia presents for Routine pap smear\par - Menarche  at age 9, LMP 10/13/2021\par - reports heavy flow with every menstrual cycle, no pain\par - she was offered endometrial ablation vs Mirena IUD but pt refused

## 2021-11-13 NOTE — PLAN
[FreeTextEntry1] : 50 F w/  w/ PMHx of HTN, DMII, RA, HLD, and anemia presents for Routine pap smear\par \par # menorrhagia\par - Menarche  at age 9, LMP 10/13/2021\par - reports heavy flow with every menstrual cycle, no pain\par - she was offered endometrial ablation vs Mirena IUD but pt refused; Today refused again\par - s/p endometrial bx secretory endometrium- no evidence of hyperplasia or malignancy\par - Transvaginal US\par - repeat Endometrial Bx\par \par # weight loss\par - colonoscopy  - polypectomy, abnormal ilieal mucosal projection s/p bx - negative \par \par # Papsmear screening \par - Thin prep and HPV\par \par # abnormal mammogram \par - Mammogram and US 2021 on left: BiRADS 3 - repeat q6 mo\par \par # HTN - uncontrolled\par - on lisinopril 40mg qd, on chlorthalidone 25mg qd, atenolol 100mg qd\par - reduced atenolol to 50mg qd\par - start nifedipine 30mg qd

## 2021-11-13 NOTE — REVIEW OF SYSTEMS
[Fever] : no fever [Chills] : no chills [Fatigue] : no fatigue [Dyspnea] : no dyspnea [Chest Pain] : no chest pain [Palpitations] : no palpitations [Lower Ext Edema] : no lower extremity edema [Abdominal Pain] : no abdominal pain [Constipation] : no constipation [Diarrhea] : diarrhea [Vomiting] : no vomiting [Melena] : no melena [Bloating] : no bloating [Urgency] : no urgency [Nausea] : no nausea [Frequency] : no frequency [Dysuria] : no dysuria [Pelvic pain] : no pelvic pain [Abn Vaginal bleeding] : no abnormal vaginal bleeding [Breast Pain] : no breast pain [Back Pain] : no back pain [Headache] : no headache [Dizziness] : no dizziness

## 2021-11-15 ENCOUNTER — APPOINTMENT (OUTPATIENT)
Dept: INTERNAL MEDICINE | Facility: CLINIC | Age: 51
End: 2021-11-15

## 2021-12-26 LAB
CYTOLOGY CVX/VAG DOC THIN PREP: NORMAL
HPV HIGH+LOW RISK DNA PNL CVX: NOT DETECTED

## 2022-03-15 ENCOUNTER — OUTPATIENT (OUTPATIENT)
Dept: OUTPATIENT SERVICES | Facility: HOSPITAL | Age: 52
LOS: 1 days | Discharge: HOME | End: 2022-03-15

## 2022-03-15 ENCOUNTER — APPOINTMENT (OUTPATIENT)
Dept: PODIATRY | Facility: CLINIC | Age: 52
End: 2022-03-15
Payer: MEDICAID

## 2022-03-15 DIAGNOSIS — M79.671 PAIN IN RIGHT FOOT: ICD-10-CM

## 2022-03-15 DIAGNOSIS — L20.9 ATOPIC DERMATITIS, UNSPECIFIED: ICD-10-CM

## 2022-03-15 DIAGNOSIS — L29.9 PRURITUS, UNSPECIFIED: ICD-10-CM

## 2022-03-15 DIAGNOSIS — Z98.891 HISTORY OF UTERINE SCAR FROM PREVIOUS SURGERY: Chronic | ICD-10-CM

## 2022-03-15 PROCEDURE — 99213 OFFICE O/P EST LOW 20 MIN: CPT

## 2022-03-15 NOTE — REVIEW OF SYSTEMS
[As Noted in HPI] : as noted in HPI [Skin Lesions] : skin lesion [Itching] : itching [Negative] : Musculoskeletal

## 2022-03-15 NOTE — HISTORY OF PRESENT ILLNESS
[Sneakers] : jem [FreeTextEntry1] : CC: "Redness and foot check-up"\par HPI:\par -Mrs. Díaz is a 50 yr old female pt who presents for regular diabetic foot check up; eval BL feet. \par -Also c/o new redness R foot, onset 1 week ago.  Reports itchiness. \par -States rendess has increased and decreased in size. Denies pain/change in shoegear.\par

## 2022-03-15 NOTE — PHYSICAL EXAM
[General Appearance - Alert] : alert [General Appearance - In No Acute Distress] : in no acute distress [General Appearance - Well Nourished] : well nourished [General Appearance - Well Developed] : well developed [Ankle Swelling Bilaterally] : bilaterally  [2+] : left foot dorsalis pedis 2+ [Sensation] : the sensory exam was normal to light touch and pinprick [No Focal Deficits] : no focal deficits [Deep Tendon Reflexes (DTR)] : deep tendon reflexes were 2+ and symmetric [Oriented To Time, Place, And Person] : oriented to person, place, and time [Impaired Insight] : insight and judgment were intact [Affect] : the affect was normal [Ankle Swelling (On Exam)] : not present [Varicose Veins Of Lower Extremities] : not present [] : not present [de-identified] : No pain on BL feet [Foot Ulcer] : no foot ulcer [Skin Induration] : no skin induration [FreeTextEntry1] : Mild xerosis on BL feet; \par Erythema with scaling, Right posterolateral heel\par

## 2022-03-15 NOTE — ASSESSMENT
[Verbal] : verbal [Good - alert, interested, motivated] : Good - alert, interested, motivated [Demonstrates independently] : demonstrates independently [FreeTextEntry1] : Assessment: \par -Xerosis\par -Contact Dermatitis, R foot\par \par Plan:\par -Pt seen and eval\par -Rx Amlactin\par -Rx oral & topical steroid (to be sent in to Pharmacy by Dr. Boyd) for dermatitis \par -RTC 2 weeks

## 2022-04-01 ENCOUNTER — APPOINTMENT (OUTPATIENT)
Dept: PODIATRY | Facility: CLINIC | Age: 52
End: 2022-04-01

## 2022-05-06 ENCOUNTER — OUTPATIENT (OUTPATIENT)
Dept: OUTPATIENT SERVICES | Facility: HOSPITAL | Age: 52
LOS: 1 days | Discharge: HOME | End: 2022-05-06

## 2022-05-06 ENCOUNTER — APPOINTMENT (OUTPATIENT)
Dept: PODIATRY | Facility: CLINIC | Age: 52
End: 2022-05-06
Payer: MEDICAID

## 2022-05-06 VITALS
DIASTOLIC BLOOD PRESSURE: 81 MMHG | HEART RATE: 84 BPM | OXYGEN SATURATION: 97 % | HEIGHT: 60 IN | SYSTOLIC BLOOD PRESSURE: 151 MMHG

## 2022-05-06 DIAGNOSIS — B35.1 TINEA UNGUIUM: ICD-10-CM

## 2022-05-06 DIAGNOSIS — L30.9 DERMATITIS, UNSPECIFIED: ICD-10-CM

## 2022-05-06 DIAGNOSIS — M79.671 PAIN IN RIGHT FOOT: ICD-10-CM

## 2022-05-06 DIAGNOSIS — B35.3 TINEA PEDIS: ICD-10-CM

## 2022-05-06 DIAGNOSIS — Z98.891 HISTORY OF UTERINE SCAR FROM PREVIOUS SURGERY: Chronic | ICD-10-CM

## 2022-05-06 PROCEDURE — 99213 OFFICE O/P EST LOW 20 MIN: CPT

## 2022-05-06 NOTE — HISTORY OF PRESENT ILLNESS
[Sneakers] : jem [FreeTextEntry1] : CC: "Redness and foot check-up"\par HPI:\par -50F c/o lesions to b/l feet, onset 3 weeks ago\par -Denies any new contacts\par -Reports itchiness & pain 8-9/10, minor swelling\par -Has not taken any pain medication\par -States betamethasone and oral steroid did not help\par -No other pedal complaints

## 2022-05-06 NOTE — PHYSICAL EXAM
[General Appearance - Alert] : alert [General Appearance - In No Acute Distress] : in no acute distress [General Appearance - Well Nourished] : well nourished [General Appearance - Well Developed] : well developed [Ankle Swelling Bilaterally] : bilaterally  [2+] : left foot dorsalis pedis 2+ [Sensation] : the sensory exam was normal to light touch and pinprick [No Focal Deficits] : no focal deficits [Deep Tendon Reflexes (DTR)] : deep tendon reflexes were 2+ and symmetric [Oriented To Time, Place, And Person] : oriented to person, place, and time [Impaired Insight] : insight and judgment were intact [Affect] : the affect was normal [Normal Foot/Ankle] : Both lower extremities were exposed and visualized. Standing exam demonstrates normal foot posture and alignment. Hindfoot exam shows no hindfoot valgus or varus [Skin Turgor] : normal skin turgor [Ankle Swelling (On Exam)] : not present [Varicose Veins Of Lower Extremities] : not present [] : not present [de-identified] : No pain on BL feet [Foot Ulcer] : no foot ulcer [Skin Induration] : no skin induration [FreeTextEntry1] : Mild xerosis on BL feet; \par Erythema with scaling to Right medial ankle and posterior heel\par Scattered papule-like lesions to dorsal toes with hyperpigmentation to periphery;

## 2022-05-06 NOTE — ASSESSMENT
[Verbal] : verbal [Good - alert, interested, motivated] : Good - alert, interested, motivated [Demonstrates independently] : demonstrates independently [FreeTextEntry1] : Assessment: \par -Xerosis\par -Tinea pedis\par -Onychomycosis\par \par Plan:\par -Pt seen and eval\par -Rx topical Clotrimazole\par -Rx PO fluconazole\par -Refer to Dermatology \par -RTC 2 weeks

## 2022-05-10 ENCOUNTER — OUTPATIENT (OUTPATIENT)
Dept: OUTPATIENT SERVICES | Facility: HOSPITAL | Age: 52
LOS: 1 days | Discharge: HOME | End: 2022-05-10

## 2022-05-10 ENCOUNTER — APPOINTMENT (OUTPATIENT)
Dept: INTERNAL MEDICINE | Facility: CLINIC | Age: 52
End: 2022-05-10
Payer: MEDICAID

## 2022-05-10 VITALS
HEIGHT: 60 IN | SYSTOLIC BLOOD PRESSURE: 136 MMHG | TEMPERATURE: 98.1 F | HEART RATE: 90 BPM | WEIGHT: 84.44 LBS | DIASTOLIC BLOOD PRESSURE: 84 MMHG | OXYGEN SATURATION: 96 % | BODY MASS INDEX: 16.58 KG/M2

## 2022-05-10 DIAGNOSIS — Z98.891 HISTORY OF UTERINE SCAR FROM PREVIOUS SURGERY: Chronic | ICD-10-CM

## 2022-05-10 PROCEDURE — 99214 OFFICE O/P EST MOD 30 MIN: CPT | Mod: GC

## 2022-05-10 RX ORDER — NIFEDIPINE 30 MG/1
30 TABLET, EXTENDED RELEASE ORAL DAILY
Qty: 30 | Refills: 3 | Status: DISCONTINUED | COMMUNITY
Start: 2021-11-11 | End: 2022-05-10

## 2022-05-10 RX ORDER — CHLORTHALIDONE 25 MG/1
25 TABLET ORAL DAILY
Qty: 60 | Refills: 2 | Status: DISCONTINUED | COMMUNITY
Start: 2021-07-29 | End: 2022-05-10

## 2022-05-10 NOTE — ASSESSMENT
[FreeTextEntry1] : \par #HTN, uncontrolled\par -Pt is tolerating atenolol 50 mg and Lisinopril 40 mg well, restart\par \par \par #HLD \par - restart lipitor 80mg hs \par \par #Diabetes mellitus, uncontrolled\par - blood today, refilled meds\par - c/w metformin 1000mg twice daily and pioglitazone 45mg once daily\par \par #Rheumatoid arthritis, seropositive, erosive\par - see rheum\par - previously on methotrexate 15mg weekly \par \par #Neck Pain - improved\par -resolved\par \par #Jonathon care maintenance\par - Mammogram 11/21\par - Pap smear performed 2022\par - Colonoscopy 2017: within normal limits. Repeat in 2027\par - Return to clinic in 3 months or PRN. \par

## 2022-05-10 NOTE — HISTORY OF PRESENT ILLNESS
[FreeTextEntry1] : f/u htn [de-identified] : here for dm and htn f/u\par stoopped taking meds many months ago because she ran out\par didn’t call for refills\par

## 2022-05-11 ENCOUNTER — EMERGENCY (EMERGENCY)
Facility: HOSPITAL | Age: 52
LOS: 0 days | Discharge: HOME | End: 2022-05-11
Attending: STUDENT IN AN ORGANIZED HEALTH CARE EDUCATION/TRAINING PROGRAM | Admitting: STUDENT IN AN ORGANIZED HEALTH CARE EDUCATION/TRAINING PROGRAM
Payer: MEDICAID

## 2022-05-11 VITALS
RESPIRATION RATE: 16 BRPM | OXYGEN SATURATION: 98 % | DIASTOLIC BLOOD PRESSURE: 94 MMHG | TEMPERATURE: 97 F | HEART RATE: 92 BPM | SYSTOLIC BLOOD PRESSURE: 193 MMHG | WEIGHT: 84 LBS

## 2022-05-11 VITALS
SYSTOLIC BLOOD PRESSURE: 184 MMHG | TEMPERATURE: 98 F | HEART RATE: 82 BPM | DIASTOLIC BLOOD PRESSURE: 97 MMHG | RESPIRATION RATE: 16 BRPM | OXYGEN SATURATION: 98 %

## 2022-05-11 DIAGNOSIS — Z98.891 HISTORY OF UTERINE SCAR FROM PREVIOUS SURGERY: Chronic | ICD-10-CM

## 2022-05-11 DIAGNOSIS — I10 ESSENTIAL (PRIMARY) HYPERTENSION: ICD-10-CM

## 2022-05-11 DIAGNOSIS — E11.65 TYPE 2 DIABETES MELLITUS WITH HYPERGLYCEMIA: ICD-10-CM

## 2022-05-11 DIAGNOSIS — Z79.84 LONG TERM (CURRENT) USE OF ORAL HYPOGLYCEMIC DRUGS: ICD-10-CM

## 2022-05-11 DIAGNOSIS — R63.1 POLYDIPSIA: ICD-10-CM

## 2022-05-11 LAB
ALBUMIN SERPL ELPH-MCNC: 4 G/DL — SIGNIFICANT CHANGE UP (ref 3.5–5.2)
ALP SERPL-CCNC: 77 U/L — SIGNIFICANT CHANGE UP (ref 30–115)
ALT FLD-CCNC: 50 U/L — HIGH (ref 0–41)
ANION GAP SERPL CALC-SCNC: 13 MMOL/L — SIGNIFICANT CHANGE UP (ref 7–14)
AST SERPL-CCNC: 29 U/L — SIGNIFICANT CHANGE UP (ref 0–41)
B-OH-BUTYR SERPL-SCNC: 0.7 MMOL/L — HIGH
BASOPHILS # BLD AUTO: 0.04 K/UL — SIGNIFICANT CHANGE UP (ref 0–0.2)
BASOPHILS NFR BLD AUTO: 0.5 % — SIGNIFICANT CHANGE UP (ref 0–1)
BILIRUB SERPL-MCNC: 0.3 MG/DL — SIGNIFICANT CHANGE UP (ref 0.2–1.2)
BUN SERPL-MCNC: 33 MG/DL — HIGH (ref 10–20)
CALCIUM SERPL-MCNC: 10 MG/DL — SIGNIFICANT CHANGE UP (ref 8.5–10.1)
CHLORIDE SERPL-SCNC: 95 MMOL/L — LOW (ref 98–110)
CO2 SERPL-SCNC: 24 MMOL/L — SIGNIFICANT CHANGE UP (ref 17–32)
CREAT SERPL-MCNC: 0.6 MG/DL — LOW (ref 0.7–1.5)
EGFR: 107 ML/MIN/1.73M2 — SIGNIFICANT CHANGE UP
EOSINOPHIL # BLD AUTO: 0.02 K/UL — SIGNIFICANT CHANGE UP (ref 0–0.7)
EOSINOPHIL NFR BLD AUTO: 0.3 % — SIGNIFICANT CHANGE UP (ref 0–8)
GLUCOSE SERPL-MCNC: 386 MG/DL — HIGH (ref 70–99)
HCT VFR BLD CALC: 33.4 % — LOW (ref 37–47)
HGB BLD-MCNC: 11.7 G/DL — LOW (ref 12–16)
IMM GRANULOCYTES NFR BLD AUTO: 0.6 % — HIGH (ref 0.1–0.3)
LYMPHOCYTES # BLD AUTO: 1.48 K/UL — SIGNIFICANT CHANGE UP (ref 1.2–3.4)
LYMPHOCYTES # BLD AUTO: 18.6 % — LOW (ref 20.5–51.1)
MCHC RBC-ENTMCNC: 30.4 PG — SIGNIFICANT CHANGE UP (ref 27–31)
MCHC RBC-ENTMCNC: 35 G/DL — SIGNIFICANT CHANGE UP (ref 32–37)
MCV RBC AUTO: 86.8 FL — SIGNIFICANT CHANGE UP (ref 81–99)
MONOCYTES # BLD AUTO: 0.58 K/UL — SIGNIFICANT CHANGE UP (ref 0.1–0.6)
MONOCYTES NFR BLD AUTO: 7.3 % — SIGNIFICANT CHANGE UP (ref 1.7–9.3)
NEUTROPHILS # BLD AUTO: 5.8 K/UL — SIGNIFICANT CHANGE UP (ref 1.4–6.5)
NEUTROPHILS NFR BLD AUTO: 72.7 % — SIGNIFICANT CHANGE UP (ref 42.2–75.2)
NRBC # BLD: 0 /100 WBCS — SIGNIFICANT CHANGE UP (ref 0–0)
PLATELET # BLD AUTO: 228 K/UL — SIGNIFICANT CHANGE UP (ref 130–400)
POTASSIUM SERPL-MCNC: 4.5 MMOL/L — SIGNIFICANT CHANGE UP (ref 3.5–5)
POTASSIUM SERPL-SCNC: 4.5 MMOL/L — SIGNIFICANT CHANGE UP (ref 3.5–5)
PROT SERPL-MCNC: 6.8 G/DL — SIGNIFICANT CHANGE UP (ref 6–8)
RBC # BLD: 3.85 M/UL — LOW (ref 4.2–5.4)
RBC # FLD: 14.9 % — HIGH (ref 11.5–14.5)
SODIUM SERPL-SCNC: 132 MMOL/L — LOW (ref 135–146)
WBC # BLD: 7.97 K/UL — SIGNIFICANT CHANGE UP (ref 4.8–10.8)
WBC # FLD AUTO: 7.97 K/UL — SIGNIFICANT CHANGE UP (ref 4.8–10.8)

## 2022-05-11 PROCEDURE — 71046 X-RAY EXAM CHEST 2 VIEWS: CPT | Mod: 26

## 2022-05-11 PROCEDURE — 99284 EMERGENCY DEPT VISIT MOD MDM: CPT

## 2022-05-11 PROCEDURE — 93010 ELECTROCARDIOGRAM REPORT: CPT

## 2022-05-11 RX ORDER — SODIUM CHLORIDE 9 MG/ML
1000 INJECTION, SOLUTION INTRAVENOUS ONCE
Refills: 0 | Status: COMPLETED | OUTPATIENT
Start: 2022-05-11 | End: 2022-05-11

## 2022-05-11 RX ADMIN — SODIUM CHLORIDE 1000 MILLILITER(S): 9 INJECTION, SOLUTION INTRAVENOUS at 19:09

## 2022-05-11 NOTE — ED PROVIDER NOTE - ATTENDING APP SHARED VISIT CONTRIBUTION OF CARE
51 hx HTN, DM  pt was recently dx w/ DM and started on meds by pcp yesteray. pt had elevated FS today and was ref to ED for eval. + increased thirst. no polyuria. no f/c/cp/sob/n/v/d    vss  gen- NAD, aaox3  card-rrr  lungs-ctab, no wheezing or rhonchi  abd-sntnd, no guarding or rebound  neuro- full str/sensation, cn ii-xii grossly intact, normal coordination    overall well appearing  will get screening labs, r/o dka  will provide supportive care, serial exam and ED observation period

## 2022-05-11 NOTE — ED PROVIDER NOTE - CLINICAL SUMMARY MEDICAL DECISION MAKING FREE TEXT BOX
labs performed, pt hyperglycemic w/o e/o dka. glucose treated w/ fluids. encouraged pt to continue home meds and advised diet change and close pcp f/u

## 2022-05-11 NOTE — ED PROVIDER NOTE - NS ED ATTENDING STATEMENT MOD
This was a shared visit with the VINNIE. I reviewed and verified the documentation and independently performed the documented:

## 2022-05-11 NOTE — ED PROVIDER NOTE - PATIENT PORTAL LINK FT
You can access the FollowMyHealth Patient Portal offered by [facility] by registering at the following website: [email or web address]. By joining [facility]’s FollowMyHealth portal, you will also be able to view your health information using other applications (apps) compatible with our system.

## 2022-05-11 NOTE — ED ADULT TRIAGE NOTE - CHIEF COMPLAINT QUOTE
sent to ED by PMD for elevated blood sugar on blood work. denies any s/s of hyperglycemia or hypertension

## 2022-05-11 NOTE — ED PROVIDER NOTE - OBJECTIVE STATEMENT
51 year old female with pmhx of htn, and dm presents for elevated glucose at home. Pt was started on diabetes medication few days ago. + polydipsia. no abd pain, nausea, vomiting, diarrhea, fever, chills, urinary symptoms, chest pain or sob.

## 2022-05-11 NOTE — ED ADULT NURSE NOTE - OBJECTIVE STATEMENT
Pt a&ox3, in ED for hyperglycemia and hypertension, denies any other symptoms, no N/V/D, no SOB, unlabored breathing, equal rise & fall, able to speak in full sentences, denies pain, PMH of HTN, DM and HLD, on medications.

## 2022-05-12 ENCOUNTER — NON-APPOINTMENT (OUTPATIENT)
Age: 52
End: 2022-05-12

## 2022-05-12 DIAGNOSIS — I10 ESSENTIAL (PRIMARY) HYPERTENSION: ICD-10-CM

## 2022-05-18 LAB
25(OH)D3 SERPL-MCNC: 24 NG/ML
ALBUMIN SERPL ELPH-MCNC: 4.1 G/DL
ALBUMIN SERPL ELPH-MCNC: 4.2 G/DL
ALP BLD-CCNC: 74 U/L
ALP BLD-CCNC: 80 U/L
ALT SERPL-CCNC: 17 U/L
ALT SERPL-CCNC: 55 U/L
ANION GAP SERPL CALC-SCNC: 13 MMOL/L
ANION GAP SERPL CALC-SCNC: 16 MMOL/L
AST SERPL-CCNC: 18 U/L
AST SERPL-CCNC: 39 U/L
BASOPHILS # BLD AUTO: 0.04 K/UL
BASOPHILS # BLD AUTO: 0.04 K/UL
BASOPHILS NFR BLD AUTO: 0.6 %
BASOPHILS NFR BLD AUTO: 0.8 %
BILIRUB SERPL-MCNC: 0.3 MG/DL
BILIRUB SERPL-MCNC: 0.3 MG/DL
BUN SERPL-MCNC: 17 MG/DL
BUN SERPL-MCNC: 29 MG/DL
CALCIUM SERPL-MCNC: 10 MG/DL
CALCIUM SERPL-MCNC: 9.8 MG/DL
CHLORIDE SERPL-SCNC: 94 MMOL/L
CHLORIDE SERPL-SCNC: 97 MMOL/L
CHOLEST SERPL-MCNC: 330 MG/DL
CHOLEST SERPL-MCNC: 393 MG/DL
CO2 SERPL-SCNC: 25 MMOL/L
CO2 SERPL-SCNC: 26 MMOL/L
CREAT SERPL-MCNC: 0.7 MG/DL
CREAT SERPL-MCNC: 0.7 MG/DL
CREAT SPEC-SCNC: 84 MG/DL
CREAT/PROT UR: 4 RATIO
EGFR: 103 ML/MIN/1.73M2
EOSINOPHIL # BLD AUTO: 0 K/UL
EOSINOPHIL # BLD AUTO: 0.08 K/UL
EOSINOPHIL NFR BLD AUTO: 0 %
EOSINOPHIL NFR BLD AUTO: 1.6 %
ESTIMATED AVERAGE GLUCOSE: 309 MG/DL
ESTIMATED AVERAGE GLUCOSE: >398 MG/DL
GLUCOSE SERPL-MCNC: 204 MG/DL
GLUCOSE SERPL-MCNC: 505 MG/DL
HBA1C MFR BLD HPLC: 12.4 %
HBA1C MFR BLD HPLC: >15.5 %
HCT VFR BLD CALC: 35.1 %
HCT VFR BLD CALC: 38.7 %
HDLC SERPL-MCNC: 100 MG/DL
HDLC SERPL-MCNC: 125 MG/DL
HGB BLD-MCNC: 11.3 G/DL
HGB BLD-MCNC: 12.7 G/DL
IMM GRANULOCYTES NFR BLD AUTO: 0.2 %
IMM GRANULOCYTES NFR BLD AUTO: 0.4 %
LDLC SERPL CALC-MCNC: 227 MG/DL
LDLC SERPL CALC-MCNC: 248 MG/DL
LYMPHOCYTES # BLD AUTO: 1.15 K/UL
LYMPHOCYTES # BLD AUTO: 1.75 K/UL
LYMPHOCYTES NFR BLD AUTO: 16.4 %
LYMPHOCYTES NFR BLD AUTO: 34.4 %
MAN DIFF?: NORMAL
MAN DIFF?: NORMAL
MCHC RBC-ENTMCNC: 29.2 PG
MCHC RBC-ENTMCNC: 29.6 PG
MCHC RBC-ENTMCNC: 32.2 G/DL
MCHC RBC-ENTMCNC: 32.8 G/DL
MCV RBC AUTO: 89 FL
MCV RBC AUTO: 91.9 FL
MONOCYTES # BLD AUTO: 0.39 K/UL
MONOCYTES # BLD AUTO: 0.46 K/UL
MONOCYTES NFR BLD AUTO: 6.6 %
MONOCYTES NFR BLD AUTO: 7.7 %
NEUTROPHILS # BLD AUTO: 2.81 K/UL
NEUTROPHILS # BLD AUTO: 5.32 K/UL
NEUTROPHILS NFR BLD AUTO: 55.3 %
NEUTROPHILS NFR BLD AUTO: 76 %
NONHDLC SERPL-MCNC: 230 MG/DL
NONHDLC SERPL-MCNC: 268 MG/DL
PLATELET # BLD AUTO: 230 K/UL
PLATELET # BLD AUTO: 245 K/UL
POTASSIUM SERPL-SCNC: 4.3 MMOL/L
POTASSIUM SERPL-SCNC: 4.7 MMOL/L
PROT SERPL-MCNC: 7 G/DL
PROT SERPL-MCNC: 7.4 G/DL
PROT UR-MCNC: 335 MG/DLG/24H
RBC # BLD: 3.82 M/UL
RBC # BLD: 4.35 M/UL
RBC # FLD: 12.9 %
RBC # FLD: 15.4 %
SODIUM SERPL-SCNC: 132 MMOL/L
SODIUM SERPL-SCNC: 139 MMOL/L
TRIGL SERPL-MCNC: 104 MG/DL
TRIGL SERPL-MCNC: 117 MG/DL
TSH SERPL-ACNC: 0.59 UIU/ML
TSH SERPL-ACNC: 1.11 UIU/ML
WBC # FLD AUTO: 5.08 K/UL
WBC # FLD AUTO: 7 K/UL

## 2022-05-27 ENCOUNTER — OUTPATIENT (OUTPATIENT)
Dept: OUTPATIENT SERVICES | Facility: HOSPITAL | Age: 52
LOS: 1 days | Discharge: HOME | End: 2022-05-27

## 2022-05-27 ENCOUNTER — APPOINTMENT (OUTPATIENT)
Dept: PODIATRY | Facility: CLINIC | Age: 52
End: 2022-05-27
Payer: MEDICAID

## 2022-05-27 DIAGNOSIS — Z98.891 HISTORY OF UTERINE SCAR FROM PREVIOUS SURGERY: Chronic | ICD-10-CM

## 2022-05-27 PROCEDURE — 99213 OFFICE O/P EST LOW 20 MIN: CPT

## 2022-05-31 DIAGNOSIS — B35.1 TINEA UNGUIUM: ICD-10-CM

## 2022-05-31 DIAGNOSIS — B35.3 TINEA PEDIS: ICD-10-CM

## 2022-05-31 DIAGNOSIS — L03.90 CELLULITIS, UNSPECIFIED: ICD-10-CM

## 2022-05-31 NOTE — HISTORY OF PRESENT ILLNESS
[Sneakers] : jem [FreeTextEntry1] : CC: "Redness and foot check-up"\par HPI:\par -50F c/o lesions to b/l feet; was eval 3 weeks ago, clotromazole, fluconazole and diclofenac sent to [facility]\par -Per pt, pt could get diclofenac but was never be able to get fluconazole and clotromazole; \par -Reports itchiness & pain 8-9/10, minor swelling\par -No other pedal complaints

## 2022-05-31 NOTE — PHYSICAL EXAM
[General Appearance - Alert] : alert [General Appearance - In No Acute Distress] : in no acute distress [General Appearance - Well Nourished] : well nourished [General Appearance - Well Developed] : well developed [Ankle Swelling Bilaterally] : bilaterally  [2+] : left foot dorsalis pedis 2+ [Normal Foot/Ankle] : Both lower extremities were exposed and visualized. Standing exam demonstrates normal foot posture and alignment. Hindfoot exam shows no hindfoot valgus or varus [Skin Turgor] : normal skin turgor [Sensation] : the sensory exam was normal to light touch and pinprick [No Focal Deficits] : no focal deficits [Deep Tendon Reflexes (DTR)] : deep tendon reflexes were 2+ and symmetric [Oriented To Time, Place, And Person] : oriented to person, place, and time [Impaired Insight] : insight and judgment were intact [Affect] : the affect was normal [Ankle Swelling (On Exam)] : not present [Varicose Veins Of Lower Extremities] : not present [] : not present [de-identified] : No pain on BL feet [Foot Ulcer] : no foot ulcer [Skin Induration] : no skin induration [FreeTextEntry1] : Mild xerosis on BL feet; \par Erythema with scaling to Right medial ankle and posterior heel\par Scattered papule-like lesions to dorsal toes with hyperpigmentation to periphery;

## 2022-05-31 NOTE — ASSESSMENT
[Verbal] : verbal [Good - alert, interested, motivated] : Good - alert, interested, motivated [Demonstrates independently] : demonstrates independently [FreeTextEntry1] : Assessment: \par -Xerosis\par -Tinea pedis\par -Onychomycosis\par \par Plan:\par -Pt seen and eval\par -Order for topical Clotrimazole and PO fluconazole still active; educated pt to pick them up on [facility] and use it daily basis\par -Pt will see Dermatologist in a week; will f/u;\par -F/u in 2 weeks, will discuss about dermatology eval and foot status

## 2022-06-03 ENCOUNTER — APPOINTMENT (OUTPATIENT)
Dept: RHEUMATOLOGY | Facility: CLINIC | Age: 52
End: 2022-06-03

## 2022-06-10 ENCOUNTER — OUTPATIENT (OUTPATIENT)
Dept: OUTPATIENT SERVICES | Facility: HOSPITAL | Age: 52
LOS: 1 days | Discharge: HOME | End: 2022-06-10

## 2022-06-10 ENCOUNTER — APPOINTMENT (OUTPATIENT)
Dept: PODIATRY | Facility: CLINIC | Age: 52
End: 2022-06-10
Payer: MEDICAID

## 2022-06-10 DIAGNOSIS — Z98.891 HISTORY OF UTERINE SCAR FROM PREVIOUS SURGERY: Chronic | ICD-10-CM

## 2022-06-10 PROCEDURE — 99213 OFFICE O/P EST LOW 20 MIN: CPT

## 2022-06-10 RX ORDER — UREA 40 G/100G
40 CREAM TOPICAL DAILY
Qty: 1 | Refills: 6 | Status: ACTIVE | COMMUNITY
Start: 2022-06-10 | End: 1900-01-01

## 2022-06-13 NOTE — ASSESSMENT
[FreeTextEntry1] : Assessment: \par -Xerosis\par -Tinea pedis\par -Onychomycosis\par \par Plan:\par -Pt seen and eval\par -Order for topical Clotrimazole and PO fluconazole still active; educated pt to pick them up on [facility] and use it daily basis\par -Pt will see Dermatologist in a week; will f/u;\par -F/u in 2 weeks, will discuss about dermatology eval and foot status [Verbal] : verbal [Good - alert, interested, motivated] : Good - alert, interested, motivated [Demonstrates independently] : demonstrates independently

## 2022-06-13 NOTE — PHYSICAL EXAM
[General Appearance - Alert] : alert [General Appearance - In No Acute Distress] : in no acute distress [General Appearance - Well Nourished] : well nourished [General Appearance - Well Developed] : well developed [Ankle Swelling (On Exam)] : not present [Ankle Swelling Bilaterally] : bilaterally  [Varicose Veins Of Lower Extremities] : bilaterally [] : on both lower extremities [2+] : left foot dorsalis pedis 2+ [Normal Foot/Ankle] : Both lower extremities were exposed and visualized. Standing exam demonstrates normal foot posture and alignment. Hindfoot exam shows no hindfoot valgus or varus [de-identified] : No pain on BL feet [Skin Turgor] : normal skin turgor [Foot Ulcer] : no foot ulcer [Skin Induration] : no skin induration [FreeTextEntry1] : Mild xerosis on BL feet; \par Erythema with scaling to Right medial ankle and posterior heel\par Scattered papule-like lesions to dorsal toes with hyperpigmentation to periphery; [Sensation] : the sensory exam was normal to light touch and pinprick [No Focal Deficits] : no focal deficits [Deep Tendon Reflexes (DTR)] : deep tendon reflexes were 2+ and symmetric [Oriented To Time, Place, And Person] : oriented to person, place, and time [Impaired Insight] : insight and judgment were intact [Affect] : the affect was normal

## 2022-06-14 DIAGNOSIS — B35.3 TINEA PEDIS: ICD-10-CM

## 2022-06-14 DIAGNOSIS — M79.671 PAIN IN RIGHT FOOT: ICD-10-CM

## 2022-06-14 DIAGNOSIS — M79.672 PAIN IN LEFT FOOT: ICD-10-CM

## 2022-06-14 DIAGNOSIS — L29.9 PRURITUS, UNSPECIFIED: ICD-10-CM

## 2022-07-08 ENCOUNTER — APPOINTMENT (OUTPATIENT)
Dept: PODIATRY | Facility: CLINIC | Age: 52
End: 2022-07-08

## 2022-07-29 ENCOUNTER — EMERGENCY (EMERGENCY)
Facility: HOSPITAL | Age: 52
LOS: 0 days | Discharge: HOME | End: 2022-07-29
Attending: EMERGENCY MEDICINE | Admitting: EMERGENCY MEDICINE

## 2022-07-29 VITALS
TEMPERATURE: 97 F | WEIGHT: 82.01 LBS | RESPIRATION RATE: 18 BRPM | HEART RATE: 101 BPM | SYSTOLIC BLOOD PRESSURE: 179 MMHG | OXYGEN SATURATION: 99 % | DIASTOLIC BLOOD PRESSURE: 83 MMHG

## 2022-07-29 DIAGNOSIS — R07.89 OTHER CHEST PAIN: ICD-10-CM
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
## 2022-07-29 DIAGNOSIS — Z98.891 HISTORY OF UTERINE SCAR FROM PREVIOUS SURGERY: Chronic | ICD-10-CM
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
## 2022-07-29 DIAGNOSIS — M25.512 PAIN IN LEFT SHOULDER: ICD-10-CM

## 2022-07-29 DIAGNOSIS — M25.521 PAIN IN RIGHT ELBOW: ICD-10-CM

## 2022-07-29 DIAGNOSIS — S49.90XA UNSPECIFIED INJURY OF SHOULDER AND UPPER ARM, UNSPECIFIED ARM, INITIAL ENCOUNTER: ICD-10-CM

## 2022-07-29 DIAGNOSIS — I10 ESSENTIAL (PRIMARY) HYPERTENSION: ICD-10-CM

## 2022-07-29 DIAGNOSIS — Z79.84 LONG TERM (CURRENT) USE OF ORAL HYPOGLYCEMIC DRUGS: ICD-10-CM

## 2022-07-29 DIAGNOSIS — E11.9 TYPE 2 DIABETES MELLITUS WITHOUT COMPLICATIONS: ICD-10-CM

## 2022-07-29 DIAGNOSIS — E78.5 HYPERLIPIDEMIA, UNSPECIFIED: ICD-10-CM

## 2022-07-29 DIAGNOSIS — G62.9 POLYNEUROPATHY, UNSPECIFIED: ICD-10-CM

## 2022-07-29 PROCEDURE — 73030 X-RAY EXAM OF SHOULDER: CPT | Mod: 26,RT

## 2022-07-29 PROCEDURE — 71046 X-RAY EXAM CHEST 2 VIEWS: CPT | Mod: 26

## 2022-07-29 PROCEDURE — 73080 X-RAY EXAM OF ELBOW: CPT | Mod: 26,RT

## 2022-07-29 PROCEDURE — 93010 ELECTROCARDIOGRAM REPORT: CPT

## 2022-07-29 PROCEDURE — 99284 EMERGENCY DEPT VISIT MOD MDM: CPT

## 2022-07-29 RX ORDER — IBUPROFEN 200 MG
600 TABLET ORAL ONCE
Refills: 0 | Status: COMPLETED | OUTPATIENT
Start: 2022-07-29 | End: 2022-07-29

## 2022-07-29 RX ORDER — KETOROLAC TROMETHAMINE 30 MG/ML
1 SYRINGE (ML) INJECTION
Qty: 20 | Refills: 0
Start: 2022-07-29 | End: 2022-08-07

## 2022-07-29 RX ADMIN — Medication 600 MILLIGRAM(S): at 17:09

## 2022-07-29 NOTE — ED PROCEDURE NOTE - NS ED PERI NEURO NEG
Pre-application: Motor, sensory, and vascular responses intact in the injured extremity./Post-application: Motor, sensory, and vascular responses intact in the injured extremity./The patient/caregiver verbalized understanding of how to care for the injured extremity with splint negative...

## 2022-07-29 NOTE — ED PROVIDER NOTE - CARE PROVIDER_API CALL
Hugo Rose)  Orthopaedic Surgery  [address] [place] [address]  Phone: [phone]  Fax: [phone]  Follow Up Time: 7-10 Days

## 2022-07-29 NOTE — ED ADULT NURSE NOTE - OBJECTIVE STATEMENT
Pt reports to intermittent RUE pain x3mo. States it has been constant for 3 days. Denies known trauma.

## 2022-07-29 NOTE — ED PROVIDER NOTE - PATIENT PORTAL LINK FT
You can access the FollowMyHealth Patient Portal offered by [facility] by registering at the following website: [email or web address]. By joining [employer]’s FollowMyHealth portal, you will also be able to view your health information using other applications (apps) compatible with our system.

## 2022-07-29 NOTE — ED PROVIDER NOTE - PHYSICAL EXAMINATION
VITAL SIGNS: I have reviewed nursing notes and confirm.  CONSTITUTIONAL: well-appearing, non-toxic, NAD  SKIN: Warm dry, normal skin turgor  HEAD: NCAT  EYES: EOMI, PERRLA, no scleral icterus  ENT: Moist mucous membranes, normal pharynx with no erythema or exudates  NECK: Supple; non tender. Full ROM. No cervical LAD  CARD: RRR, no murmurs, rubs or gallops  RESP: clear to ausculation b/l.  No rales, rhonchi, or wheezing.  ABD: soft, + BS, non-tender, non-distended, no rebound or guarding. No CVA tenderness  EXT: limited ROM of right shoulder due to pain. Trouble with extension and abduction. Passive ROm normal and this was improved upon re-examination after NSAID.  NEURO: normal motor. normal sensory.   PSYCH: Cooperative, appropriate.

## 2022-07-29 NOTE — ED ADULT NURSE NOTE - TEMPLATE LIST FOR HEAD TO TOE ASSESSMENT
Immediate Brief Procedure Note    Patient Name: Gurwinder Montenegro  YOB: 1993  DATE OF PROCEDURE: 2/5/2022  PROCEDURALIST: Jonatan Corral MD  ASSISTANT(S): None  ANESTHESIA TYPE: Moderate sedation  ANESTHESIOLOGIST: Anesthesiologist: Rohan Rose MD    PROCEDURE PERFORMED: Suprapubic Catheter Placement    Pre-procedure Dx:   Patient Active Problem List   Diagnosis   • Hemiparesis (CMS/HCC)   • Elevated CPK   • Elevated LFTs   • Chronic static encephalopathy   • SAS (sleep apnea syndrome)   • History of seizures   • Pressure ulcer   • Fever   • Sepsis, unspecified   • Altered mental status   • Autism       Post-procedure Dx: Same    Findings: Suprapubic catheter placement    Estimated Blood Loss: Less than 5 ml     Complications: None    Specimens Removed: None    
General

## 2022-07-29 NOTE — ED PROVIDER NOTE - NS ED ROS FT
Constitutional:  No fever, chills, lethargy, or abnormal weight loss  Eyes:  No eye pain or visual changes  ENMT: No nasal discharge, no toothache, no sore throat. No neck pain or stiffness  Cardiac:  No palpitations.  Respiratory:  No cough or respiratory distress.   GI:  No nausea, vomiting, diarrhea or abdominal pain.  :  No dysuria, frequency or burning.  MS:  + Right shoulder pain, + right elbow pain, + pain to right upper chest  Neuro:  No headache. No numbness, weakness, or tingling.   Skin:  No skin rash.  Except as documented in the HPI,  all other systems are negative.

## 2022-07-29 NOTE — ED PROVIDER NOTE - NSFOLLOWUPINSTRUCTIONS_ED_ALL_ED_FT
Henna instrucciones de cuidado  Puede lastimarse el hombro si lo usa demasiado jenise georgina actividad, jin la pesca o el béisbol. También puede ocurrir jin parte del desgaste diario del envejecimiento. Las lesiones en el hombro pueden tardar en sanar, adri agarwal hombro debería mejorar con el tiempo.    Agarwal médico puede recomendarle un cabestrillo para descansar agarwal hombro. Si se lesionó el hombro, es posible que necesite pruebas y tratamiento.    La atención de seguimiento es georgina parte clave de agarwal tratamiento y seguridad. Asegúrese de programar y asistir a todas las citas y llame a la línea de asesoramiento de agarwal médico o enfermera (811 en la mayoría de las provincias y territorios) si tiene problemas. También es georgina buena idea conocer los resultados de henna pruebas y mantener georgina lista de los medicamentos que sol.    ¿Cómo puedes cuidarte en casa?  [place] los analgésicos exactamente jin se le indique.  Si el médico le recetó un medicamento para el dolor, tómelo según lo prescrito.  Si no está tomando un analgésico recetado, pregúntele a agarwal médico si puede rui un medicamento de venta verónica.  No tome dos o más analgésicos al mismo tiempo a menos que el médico se lo indique. Muchos analgésicos contienen acetaminofén, que es Tylenol. Demasiado acetaminofén (Tylenol) puede ser dañino.  Si agarwal médico le recomienda que use un cabestrillo, úselo según las indicaciones. No se lo quite antes de que agarwal médico se lo indique.  Coloque hielo o georgina compresa fría en el área adolorida jenise 10 a 20 minutos a la vez. Coloque un paño melton entre el hielo y agarwal piel.  Si no hay hinchazón, puede colocar calor húmedo, georgina almohadilla térmica o un paño tibio en el hombro. Algunos médicos sugieren alternar entre frío y calor.  Descanse el hombro jenise unos días. Si agarwal médico lo recomienda, puede comenzar a ejercitar suavemente el hombro, adri no levante nada pesado.  ¿Cuándo debe llamar para pedir ayuda?    Llame al 911 en cualquier momento que crea que puede necesitar atención de emergencia. Por ejemplo, llame si:    Tiene dolor o presión en el pecho. [place] puede ocurrir con:  Transpiración.  Dificultad para respirar.  Náuseas o vómitos.  Dolor que se extiende desde el pecho hasta el shanell, la mandíbula o daisy o ambos hombros o brazos.  Mareos o aturdimiento.  Un pulso rápido o irregular.  Después de llamar al 911, mastique 1 aspirina para adultos. Espere georgina ambulancia. No intente conducir usted mismo.  Agarwal brazo o mano está frío o pálido o cambia de color.  Llame a la línea telefónica de agarwal médico o enfermera ahora o busque atención médica inmediata si:    Tiene signos de infección, jin:  Aumento del dolor, hinchazón, calor o enrojecimiento en el hombro.  Vetas valdez que salen de un lugar en agarwal hombro.  Pus que sale de un área de agarwal hombro.  Ganglios linfáticos inflamados en el shanell, las axilas o la jude.  Georgina fiebre.  Esté atento a los cambios en agarwal shelly y asegúrese de comunicarse con la línea telefónica de agarwal médico o enfermera si:    No puedes usar tu hombro.  Agarwal hombro no mejora jin se esperaba.      Your Care Instructions  You can hurt your shoulder by using it too much during an activity, such as fishing or baseball. It can also happen as part of the everyday wear and tear of getting older. Shoulder injuries can be slow to heal, but your shoulder should get better with time.    Your doctor may recommend a sling to rest your shoulder. If you have injured your shoulder, you may need testing and treatment.    Follow-up care is a key part of your treatment and safety. Be sure to make and go to all appointments, and call your doctor or nurse advice line (384 in most provinces and territories) if you are having problems. It's also a good idea to know your test results and keep a list of the medicines you take.    How can you care for yourself at home?  Take pain medicines exactly as directed.  If the doctor gave you a prescription medicine for pain, take it as prescribed.  If you are not taking a prescription pain medicine, ask your doctor if you can take an over-the-counter medicine.  Do not take two or more pain medicines at the same time unless the doctor told you to. Many pain medicines contain acetaminophen, which is Tylenol. Too much acetaminophen (Tylenol) can be harmful.  If your doctor recommends that you wear a sling, use it as directed. Do not take it off before your doctor tells you to.  Put ice or a cold pack on the sore area for 10 to 20 minutes at a time. Put a thin cloth between the ice and your skin.  If there is no swelling, you can put moist heat, a heating pad, or a warm cloth on your shoulder. Some doctors suggest alternating between hot and cold.  Rest your shoulder for a few days. If your doctor recommends it, you can then begin gentle exercise of the shoulder, but do not lift anything heavy.  When should you call for help?  	  Call 911 anytime you think you may need emergency care. For example, call if:    You have chest pain or pressure. This may occur with:  Sweating.  Shortness of breath.  Nausea or vomiting.  Pain that spreads from the chest to the neck, jaw, or one or both shoulders or arms.  Dizziness or light-headedness.  A fast or uneven pulse.  After calling 911, chew 1 adult-strength aspirin. Wait for an ambulance. Do not try to drive yourself.  Your arm or hand is cool or pale or changes colour.  Call your doctor or nurse call line now or seek immediate medical care if:    You have signs of infection, such as:  Increased pain, swelling, warmth, or redness in your shoulder.  Red streaks leading from a place on your shoulder.  Pus draining from an area of your shoulder.  Swollen lymph nodes in your neck, armpits, or groin.  A fever.  Watch closely for changes in your health, and be sure to contact your doctor or nurse call line if:    You cannot use your shoulder.  Your shoulder does not get better as expected.

## 2022-07-29 NOTE — ED PROVIDER NOTE - CARE PROVIDERS DIRECT ADDRESSES
[email or web address]

## 2022-07-29 NOTE — ED PROVIDER NOTE - CLINICAL SUMMARY MEDICAL DECISION MAKING FREE TEXT BOX
Patient presents with right shoulder pain and pain with painful range of motion.  Patient has pain with extension at shoulder and AB duction.  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  This is not related to ACS her pain is reproducible.  Passive range of motion is normal.  Patient on appropriate imaging that showed no acute fracture. This is likely from overuse.  RICE therapy discussed.  Will discharge with orthopedic follow-up and anti-inflammatory course.  Sling placed.    I discussed with patient need for possible MRI for further work up for their symptoms and how this was not possible in the Emergency Department at this time.  Follow up being provided to have further evaluation for this test given.

## 2022-07-29 NOTE — ED PROVIDER NOTE - OBJECTIVE STATEMENT
51-year-old female past medical history hypertension, diabetes, dyslipidemia who presents to the emergency department with right shoulder pain the past couple days.  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  No fever, chills, recent illness.  Patient denies any shortness of breath.  Patient also has pain to right elbow.  Patient has been compliant with medications denies any issues with these joints in the past.  No direct trauma.  Patient reports pain at 6 out of 10, sharp in nature, worse with movement, and improved with rest.

## 2022-08-16 ENCOUNTER — APPOINTMENT (OUTPATIENT)
Dept: INTERNAL MEDICINE | Facility: CLINIC | Age: 52
End: 2022-08-16

## 2022-08-16 ENCOUNTER — OUTPATIENT (OUTPATIENT)
Dept: OUTPATIENT SERVICES | Facility: HOSPITAL | Age: 52
LOS: 1 days | Discharge: HOME | End: 2022-08-16

## 2022-08-16 VITALS
BODY MASS INDEX: 16.69 KG/M2 | HEART RATE: 80 BPM | TEMPERATURE: 98 F | DIASTOLIC BLOOD PRESSURE: 80 MMHG | WEIGHT: 85 LBS | HEIGHT: 60 IN | SYSTOLIC BLOOD PRESSURE: 135 MMHG | OXYGEN SATURATION: 98 %

## 2022-08-16 DIAGNOSIS — Z98.891 HISTORY OF UTERINE SCAR FROM PREVIOUS SURGERY: Chronic | ICD-10-CM

## 2022-08-16 PROCEDURE — 99214 OFFICE O/P EST MOD 30 MIN: CPT | Mod: GC

## 2022-08-16 NOTE — HISTORY OF PRESENT ILLNESS
[FreeTextEntry1] : pt here for follow-up [de-identified] : Pt here for refills, says shes been taking the meds i prescribed. Endorses R delgado. R elbow. R wrist, B/L hand and b/l foot pains. Did not see rheum as requested

## 2022-08-16 NOTE — PHYSICAL EXAM
[No Acute Distress] : no acute distress [No Respiratory Distress] : no respiratory distress  [Normal Rate] : normal rate  [Soft] : abdomen soft [No HSM] : no HSM [No Rash] : no rash [Coordination Grossly Intact] : coordination grossly intact

## 2022-08-16 NOTE — ASSESSMENT
[FreeTextEntry1] : #HTN, uncontrolled\par - approaching goal, repeat bloodwork\par \par \par #HLD \par -  lipitor 80mg hs \par \par #Diabetes mellitus, uncontrolled\par - c/w metformin 1000mg twice daily and pioglitazone 45mg once daily\par - check a1c today now that on meds\par \par #Rheumatoid arthritis, seropositive, erosive\par - see rheum!!!\par - previously on methotrexate 15mg weekly but has not taken in a long time\par \par #Neck Pain\par -resolved\par \par #Jonathon care maintenance\par - Mammogram 11/21, refer again next visit\par - Pap smear performed 2022\par - Colonoscopy 2017: within normal limits. Repeat in 2027\par - Return to clinic in 3 months or PRN. \par

## 2022-08-17 LAB
ALBUMIN SERPL ELPH-MCNC: 4.3 G/DL
ALP BLD-CCNC: 115 U/L
ALT SERPL-CCNC: 19 U/L
ANION GAP SERPL CALC-SCNC: 14 MMOL/L
AST SERPL-CCNC: 18 U/L
BASOPHILS # BLD AUTO: 0.01 K/UL
BASOPHILS NFR BLD AUTO: 0.1 %
BILIRUB SERPL-MCNC: <0.2 MG/DL
BUN SERPL-MCNC: 41 MG/DL
CALCIUM SERPL-MCNC: 9.7 MG/DL
CHLORIDE SERPL-SCNC: 83 MMOL/L
CHOLEST SERPL-MCNC: 402 MG/DL
CO2 SERPL-SCNC: 24 MMOL/L
CREAT SERPL-MCNC: 0.9 MG/DL
CREAT SPEC-SCNC: 7 MG/DL
EGFR: 76 ML/MIN/1.73M2
EOSINOPHIL # BLD AUTO: 0 K/UL
EOSINOPHIL NFR BLD AUTO: 0 %
ESTIMATED AVERAGE GLUCOSE: >398 MG/DL
GLUCOSE SERPL-MCNC: 842 MG/DL
HBA1C MFR BLD HPLC: >15.5 %
HCT VFR BLD CALC: 33 %
HDLC SERPL-MCNC: 110 MG/DL
HGB BLD-MCNC: 10.8 G/DL
IMM GRANULOCYTES NFR BLD AUTO: 0.3 %
LDLC SERPL CALC-MCNC: 245 MG/DL
LYMPHOCYTES # BLD AUTO: 0.69 K/UL
LYMPHOCYTES NFR BLD AUTO: 7.1 %
MAN DIFF?: NORMAL
MCHC RBC-ENTMCNC: 29.3 PG
MCHC RBC-ENTMCNC: 32.7 G/DL
MCV RBC AUTO: 89.4 FL
MICROALBUMIN 24H UR DL<=1MG/L-MCNC: 12.4 MG/DL
MICROALBUMIN/CREAT 24H UR-RTO: 1825 MG/G
MONOCYTES # BLD AUTO: 0.2 K/UL
MONOCYTES NFR BLD AUTO: 2.1 %
NEUTROPHILS # BLD AUTO: 8.81 K/UL
NEUTROPHILS NFR BLD AUTO: 90.4 %
NONHDLC SERPL-MCNC: 292 MG/DL
PLATELET # BLD AUTO: 395 K/UL
POTASSIUM SERPL-SCNC: 5.6 MMOL/L
PROT SERPL-MCNC: 7.5 G/DL
RBC # BLD: 3.69 M/UL
RBC # FLD: 12.3 %
SODIUM SERPL-SCNC: 121 MMOL/L
TRIGL SERPL-MCNC: 248 MG/DL
TSH SERPL-ACNC: 0.45 UIU/ML
WBC # FLD AUTO: 9.74 K/UL

## 2022-08-19 DIAGNOSIS — I10 ESSENTIAL (PRIMARY) HYPERTENSION: ICD-10-CM

## 2022-08-19 DIAGNOSIS — E11.42 TYPE 2 DIABETES MELLITUS WITH DIABETIC POLYNEUROPATHY: ICD-10-CM

## 2022-08-19 DIAGNOSIS — E78.00 PURE HYPERCHOLESTEROLEMIA, UNSPECIFIED: ICD-10-CM

## 2022-08-19 DIAGNOSIS — E11.00 TYPE 2 DIABETES MELLITUS WITH HYPEROSMOLARITY WITHOUT NONKETOTIC HYPERGLYCEMIC-HYPEROSMOLAR COMA (NKHHC): ICD-10-CM

## 2022-08-24 ENCOUNTER — OUTPATIENT (OUTPATIENT)
Dept: OUTPATIENT SERVICES | Facility: HOSPITAL | Age: 52
LOS: 1 days | Discharge: HOME | End: 2022-08-24

## 2022-08-24 ENCOUNTER — APPOINTMENT (OUTPATIENT)
Dept: ENDOCRINOLOGY | Facility: CLINIC | Age: 52
End: 2022-08-24

## 2022-08-24 VITALS
HEIGHT: 60 IN | BODY MASS INDEX: 16.69 KG/M2 | WEIGHT: 85 LBS | SYSTOLIC BLOOD PRESSURE: 123 MMHG | HEART RATE: 77 BPM | DIASTOLIC BLOOD PRESSURE: 74 MMHG

## 2022-08-24 DIAGNOSIS — Z98.891 HISTORY OF UTERINE SCAR FROM PREVIOUS SURGERY: Chronic | ICD-10-CM

## 2022-08-24 LAB — GLUCOSE BLDC GLUCOMTR-MCNC: 498 MG/DL — CRITICAL HIGH (ref 70–99)

## 2022-08-24 RX ORDER — BLOOD SUGAR DIAGNOSTIC
STRIP MISCELLANEOUS 3 TIMES DAILY
Qty: 1 | Refills: 5 | Status: ACTIVE | COMMUNITY
Start: 2022-08-24 | End: 1900-01-01

## 2022-08-24 RX ORDER — LANCETS
EACH MISCELLANEOUS
Qty: 100 | Refills: 3 | Status: ACTIVE | COMMUNITY
Start: 2022-08-24 | End: 1900-01-01

## 2022-08-24 RX ORDER — BLOOD-GLUCOSE METER
W/DEVICE KIT MISCELLANEOUS
Qty: 1 | Refills: 0 | Status: ACTIVE | COMMUNITY
Start: 2022-08-24 | End: 1900-01-01

## 2022-08-24 NOTE — HISTORY OF PRESENT ILLNESS
[FreeTextEntry1] : incredible non compliance, not seen for 2.5 years, dr landaverde started her on insulin, several years ago, never took it, and is now in dire shape.

## 2022-08-24 NOTE — ASSESSMENT
[FreeTextEntry1] : TOTAL AND UTTER SELF NEGLECT BEFORE, BUT NOW START AGAIN WITH TRESIBA 10 UNITS DAILY, AND ADMELOG 3 TIMES DAILY.. she uses blood glucose readibngs to adjust insulin dosage according to sliding scale, and carbohydrate counting. [Diabetes Foot Care] : diabetes foot care [Long Term Vascular Complications] : long term vascular complications of diabetes [Carbohydrate Consistent Diet] : carbohydrate consistent diet [Importance of Diet and Exercise] : importance of diet and exercise to improve glycemic control, achieve weight loss and improve cardiovascular health [Exercise/Effect on Glucose] : exercise/effect on glucose [Hypoglycemia Management] : hypoglycemia management [Retinopathy Screening] : Patient was referred to ophthalmology for retinopathy screening

## 2022-09-13 ENCOUNTER — APPOINTMENT (OUTPATIENT)
Dept: ORTHOPEDIC SURGERY | Facility: CLINIC | Age: 52
End: 2022-09-13

## 2022-10-07 ENCOUNTER — APPOINTMENT (OUTPATIENT)
Dept: RHEUMATOLOGY | Facility: CLINIC | Age: 52
End: 2022-10-07

## 2022-10-07 ENCOUNTER — OUTPATIENT (OUTPATIENT)
Dept: OUTPATIENT SERVICES | Facility: HOSPITAL | Age: 52
LOS: 1 days | Discharge: HOME | End: 2022-10-07

## 2022-10-07 VITALS
TEMPERATURE: 98.4 F | HEIGHT: 60 IN | SYSTOLIC BLOOD PRESSURE: 157 MMHG | WEIGHT: 80 LBS | OXYGEN SATURATION: 97 % | DIASTOLIC BLOOD PRESSURE: 90 MMHG | HEART RATE: 53 BPM | BODY MASS INDEX: 15.71 KG/M2

## 2022-10-07 DIAGNOSIS — Z98.891 HISTORY OF UTERINE SCAR FROM PREVIOUS SURGERY: Chronic | ICD-10-CM

## 2022-10-07 DIAGNOSIS — M06.9 RHEUMATOID ARTHRITIS, UNSPECIFIED: ICD-10-CM

## 2022-10-07 PROCEDURE — 99215 OFFICE O/P EST HI 40 MIN: CPT | Mod: GC

## 2022-10-07 NOTE — PHYSICAL EXAM

## 2022-10-07 NOTE — REVIEW OF SYSTEMS
[Arthralgias] : arthralgias [Joint Pain] : joint pain [Joint Stiffness] : joint stiffness [Negative] : Genitourinary

## 2022-10-07 NOTE — ASSESSMENT
[FreeTextEntry1] : Patient is a 51 F, [place] citizen speaking, w/ PMHx of seropositive (+RF, +CCP) erosive RA on Plaquenil , HTN, DMII, HLD, Tennis Elbow, and anemia presents for rheum follow up.\par \par #Rheumatoid arthiritis , seropositive (RF, CCP), erosive\par -patient is not compliant with medical management. has very poor follow up\par - patient reportedly never took Plaquenil 200 mg daily and MTX (6 pills daily with FA) because she did not have any prescription \par - patient is a potential candidate for rituximab therapy, would need prior authorization, check hepatitis panel and quantiferon TB\par -rtc in 3 month or prn\par

## 2022-10-07 NOTE — HISTORY OF PRESENT ILLNESS
[FreeTextEntry1] : Patient is a 51 F, [place] speaking, w/ PMHx of seropositive (+RF, +CCP) erosive RA on Plaquenil , HTN, DMII, HLD, Tennis Elbow, and anemia presents for rheum follow up. Patient reports a lot of pain in her right shoulder, left wrist, left hand MCP joints, right sole of her foot and right knee. patient endorses morning stiffness for 30 minutes, pain will gets better during the day, then before she goes bed pain would get worse. patient reports physical exercise makes the pain better, sitting around makes the pain worse. denies acute weight loss, fevers, fatigue, sob, ulcers, or recent illness. patient reports she currently takes no medications.\par \par Her BP was noted to be elevated today\par \par [place]  [place]it [id number] utilized

## 2022-10-07 NOTE — END OF VISIT
[] : Resident [FreeTextEntry3] : 50 y/o woman with h/o seropositive RA +RF +CCP and non-adherence to prior medications, as well as severe uncontrolled diabetes and significant hyperlipidemia, presents for follow-up. Pt was previously seen in rheum clinic but has not followed up consistently; last appt was in November 2021. Pt was prescribed methotrexate multiple times in the past, but she says she never took either methotrexate or any other medications for RA because there were no prescriptions for her in the pharmacy? Pt reports pain in her MCPs, PIPs as well as her DIPs and R shoulder pain, which is worst in the morning x 30 mins with stiffness, and improves with activity. Pt's exam today is notable for prominent Heberden's nodes and CMC squaring, as well as TTP in the MCPs, PIPs, pain with ROM in wrists and elbows, limited abduction in the shoulders to approx 90 degrees b/l, and bony prominence of the b/l knees with pain on ROM. Pt had x-rays of her hands and feet in 2018 which demonstrated erosive changes in the DIPs ?erosive OA? and OA in the feet. At this time, pt continues to experience symptoms which are concerning for active RA, although some of her symptoms may also be related to OA, particularly her DIP symptoms and possibly her knees. She is reluctant to take a daily or weekly medication for her symptoms, and would prefer to try an infrequent infusion as possible.\par - f/u quantiferon (previously positive) and hepatitis B titers\par - If the above labwork is negative and/or pt is treated for latent TB, would consider rituximab 1000 mg q 14 days x 2 doses every 6 months\par - f/u in 1 month [Time Spent: ___ minutes] : I have spent [unfilled] minutes of time on the encounter.

## 2022-10-14 ENCOUNTER — OUTPATIENT (OUTPATIENT)
Dept: OUTPATIENT SERVICES | Facility: HOSPITAL | Age: 52
LOS: 1 days | Discharge: HOME | End: 2022-10-14

## 2022-10-14 ENCOUNTER — APPOINTMENT (OUTPATIENT)
Dept: NUTRITION | Facility: CLINIC | Age: 52
End: 2022-10-14

## 2022-10-14 DIAGNOSIS — Z98.891 HISTORY OF UTERINE SCAR FROM PREVIOUS SURGERY: Chronic | ICD-10-CM

## 2022-10-19 ENCOUNTER — NON-APPOINTMENT (OUTPATIENT)
Age: 52
End: 2022-10-19

## 2022-10-20 ENCOUNTER — NON-APPOINTMENT (OUTPATIENT)
Age: 52
End: 2022-10-20

## 2022-10-26 ENCOUNTER — APPOINTMENT (OUTPATIENT)
Dept: NUTRITION | Facility: CLINIC | Age: 52
End: 2022-10-26

## 2022-10-26 ENCOUNTER — OUTPATIENT (OUTPATIENT)
Dept: OUTPATIENT SERVICES | Facility: HOSPITAL | Age: 52
LOS: 1 days | Discharge: HOME | End: 2022-10-26

## 2022-10-26 DIAGNOSIS — Z98.891 HISTORY OF UTERINE SCAR FROM PREVIOUS SURGERY: Chronic | ICD-10-CM

## 2022-10-28 ENCOUNTER — NON-APPOINTMENT (OUTPATIENT)
Age: 52
End: 2022-10-28

## 2022-11-01 DIAGNOSIS — E11.9 TYPE 2 DIABETES MELLITUS WITHOUT COMPLICATIONS: ICD-10-CM

## 2022-11-11 ENCOUNTER — NON-APPOINTMENT (OUTPATIENT)
Age: 52
End: 2022-11-11

## 2022-11-14 ENCOUNTER — APPOINTMENT (OUTPATIENT)
Dept: NUTRITION | Facility: CLINIC | Age: 52
End: 2022-11-14

## 2022-11-14 ENCOUNTER — APPOINTMENT (OUTPATIENT)
Dept: PODIATRY | Facility: CLINIC | Age: 52
End: 2022-11-14

## 2022-11-14 ENCOUNTER — OUTPATIENT (OUTPATIENT)
Dept: OUTPATIENT SERVICES | Facility: HOSPITAL | Age: 52
LOS: 1 days | Discharge: HOME | End: 2022-11-14

## 2022-11-14 DIAGNOSIS — Z98.891 HISTORY OF UTERINE SCAR FROM PREVIOUS SURGERY: Chronic | ICD-10-CM

## 2022-11-14 DIAGNOSIS — L30.9 DERMATITIS, UNSPECIFIED: ICD-10-CM

## 2022-11-14 DIAGNOSIS — I73.9 PERIPHERAL VASCULAR DISEASE, UNSPECIFIED: ICD-10-CM

## 2022-11-14 PROCEDURE — 99213 OFFICE O/P EST LOW 20 MIN: CPT

## 2022-11-14 RX ORDER — CRISABOROLE 20 MG/G
2 OINTMENT TOPICAL
Qty: 1 | Refills: 3 | Status: ACTIVE | COMMUNITY
Start: 2022-11-14 | End: 1900-01-01

## 2022-11-20 ENCOUNTER — EMERGENCY (EMERGENCY)
Facility: HOSPITAL | Age: 52
LOS: 0 days | Discharge: HOME | End: 2022-11-21
Attending: STUDENT IN AN ORGANIZED HEALTH CARE EDUCATION/TRAINING PROGRAM | Admitting: STUDENT IN AN ORGANIZED HEALTH CARE EDUCATION/TRAINING PROGRAM

## 2022-11-20 VITALS
DIASTOLIC BLOOD PRESSURE: 91 MMHG | HEART RATE: 71 BPM | RESPIRATION RATE: 18 BRPM | WEIGHT: 76.94 LBS | OXYGEN SATURATION: 99 % | SYSTOLIC BLOOD PRESSURE: 173 MMHG | TEMPERATURE: 96 F

## 2022-11-20 DIAGNOSIS — M79.89 OTHER SPECIFIED SOFT TISSUE DISORDERS: ICD-10-CM

## 2022-11-20 DIAGNOSIS — Z98.891 HISTORY OF UTERINE SCAR FROM PREVIOUS SURGERY: Chronic | ICD-10-CM

## 2022-11-20 DIAGNOSIS — Z79.84 LONG TERM (CURRENT) USE OF ORAL HYPOGLYCEMIC DRUGS: ICD-10-CM

## 2022-11-20 DIAGNOSIS — M79.671 PAIN IN RIGHT FOOT: ICD-10-CM

## 2022-11-20 DIAGNOSIS — E11.9 TYPE 2 DIABETES MELLITUS WITHOUT COMPLICATIONS: ICD-10-CM

## 2022-11-20 DIAGNOSIS — B35.3 TINEA PEDIS: ICD-10-CM

## 2022-11-20 PROCEDURE — 99283 EMERGENCY DEPT VISIT LOW MDM: CPT

## 2022-11-21 RX ORDER — FLUCONAZOLE 150 MG/1
1 TABLET ORAL
Qty: 6 | Refills: 0
Start: 2022-11-21

## 2022-11-21 RX ORDER — TERBINAFINE HCL 250 MG
1 TABLET ORAL
Qty: 28 | Refills: 0
Start: 2022-11-21 | End: 2022-12-04

## 2022-11-21 NOTE — ED PROVIDER NOTE - PHYSICAL EXAMINATION
CONSTITUTIONAL: Well-developed; well-nourished; in no acute distress.   SKIN: warm, dry  HEAD: Normocephalic; atraumatic.  EYES: PERRL, EOMI, no conjunctival erythema  ENT: No nasal discharge; airway clear.  NECK: Supple; non tender.  CARD: S1, S2 normal; no murmurs, gallops, or rubs. Regular rate and rhythm.   RESP: No wheezes, rales or rhonchi.  ABD: soft ntnd  EXT: Normal ROM.  No clubbing, cyanosis or edema. Peeling skin in right foot, most likely fungal   LYMPH: No acute cervical adenopathy.  NEURO: Alert, oriented, grossly unremarkable  PSYCH: Cooperative, appropriate.

## 2022-11-21 NOTE — ED PROVIDER NOTE - CARE PROVIDER_API CALL
Ramon Boyd (DPM)  Podiatric Medicine and Surgery  [address], [address], [address]  [place] [address]  Phone: [phone]  Fax: [phone]  Follow Up Time:

## 2022-11-21 NOTE — ED ADULT NURSE NOTE - EXTENSIONS OF SELF_ADULT
Omar is here with her dtr for [facility] and is requesting an increase in her headache medication.  See order for imitrex 100 mg, will try that. Also had an emergency root canal last week and I am renewing her Norco as well, only had 10 pills 1 month ago.   See orders.   Rosario Casillas MD   
None

## 2022-11-21 NOTE — ED PROVIDER NOTE - NS ED ROS FT
Constitutional:  See HPI.   Eyes:  No visual changes, eye pain or discharge.  ENMT:  No hearing changes, pain, discharge or infections. No neck pain or stiffness.  Cardiac:  No chest pain, SOB or edema. No chest pain with exertion.  Respiratory:  No cough or respiratory distress. No hemoptysis.  GI:  No nausea, vomiting, diarrhea, abdominal pain.  :  No dysuria, frequency, hematuria  MS:  No joint pain or back pain.  Neuro:  No LOC. No headache or weakness.    Skin:  No skin rash. + peeling skin  Except as in HPI, all other review of systems is negative

## 2022-11-21 NOTE — ED PROVIDER NOTE - OBJECTIVE STATEMENT
Patient with hx of DM p/w peeling skin to bilateral feet. Patient states she has had right foot swelling, pain, skin peeling x 1 week, was started on topical antifungal and topical steroid cream by Dr. Boyd her podiatrist. no fevers. no constitutional signs. no trauma. no oral mucosal involvement.

## 2022-11-21 NOTE — ED PROVIDER NOTE - NSFOLLOWUPINSTRUCTIONS_ED_ALL_ED_FT
Skin Yeast Infection    WHAT YOU NEED TO KNOW:    Yeast is normally present on the skin. Infection happens when you have too much yeast, or when it gets into a cut on your skin. Certain types of mold and fungus can cause a yeast infection. A skin yeast infection can appear anywhere on your skin or nail beds. Skin yeast infections are usually found on warm, moist parts of the body. Examples include between skin folds or under the breasts.    DISCHARGE INSTRUCTIONS:    Return to the emergency department if:   •You have signs of infection, such as pus, warmth or red streaks coming from the wound, or a fever.          Call your doctor if:   •Your symptoms worsen or do not get better within 7 to 10 days.      •You have new or returning signs of a skin yeast infection after treatment.      •You have questions or concerns about your condition or care.      Medicines:   •Antifungal medicine may be given as a cream, ointment, or pill.      •Take your medicine as directed. Contact your healthcare provider if you think your medicine is not helping or if you have side effects. Tell your provider if you are allergic to any medicine. Keep a list of the medicines, vitamins, and herbs you take. Include the amounts, and when and why you take them. Bring the list or the pill bottles to follow-up visits. Carry your medicine list with you in case of an emergency.      Care for the skin near the infection: You may only have discolored patches of skin, or areas that are dry and flaking. Care for these skin problems as directed by your healthcare provider. If you have painful skin or an open sore, you will need to protect the skin and prevent damage. You will also need to keep the skin dry as much as possible. Ask your healthcare provider how to care for your skin while the infection clears. The following are general guidelines for caring for painful or open skin:  •Keep the skin clean. Ask your healthcare provider if you should wash with mild soap and water. Do not use soap that contains alcohol. Alcohol can dry and irritate the skin and make symptoms worse. Your baby's healthcare provider may tell you to use diaper cream or ointment when you change his or her diaper. This will protect the skin and prevent moisture from collecting.      •Keep the skin dry. Pat the area dry with a towel. Do not rub, because this may irritate the skin. If you have a skin yeast infection between skin folds, lift the top part gently and hold it while you dry between your skin folds. Always dry your feet completely after you swim or bathe, including between your toes. Dry your skin if you are sweating from exercise or exposure to heat. Use a clean towel each time to prevent spreading or continuing the infection.      •Keep the skin protected. Ask your healthcare provider if you should cover the area with a bandage or leave it open. Check your skin each day to make sure you do not have new or worsening problems. You may need to have someone check the skin if you cannot see the area easily.      Prevent another skin yeast infection:   •Do not share clothing or towels      •Wear shower shoes if you need to use a public shower      •Dry your feet completely after you bathe, and apply antifungal powder or cream as directed      •Put on socks before you get dressed so you do not spread fungus from your feet      •Wear light clothing that allows air to get to your skin      •Manage your weight to prevent skin folds where yeast can collect      •Manage diabetes      •Use antibiotics correctly to prevent antibiotic resistance      •Change your baby's diaper often, and keep the area clean and dry as much as possible      •Use a diaper cream or ointment that contains zinc oxide or dimethicone on your baby's diaper area as directed      Follow up with your doctor as directed: Write down your questions so you remember to ask them during your visits.       © Copyright [employer] 2022

## 2022-11-21 NOTE — ED ADULT NURSE NOTE - TEMPLATE LIST FOR HEAD TO TOE ASSESSMENT
VITAL SIGNS    Telemetry:  SR 60-80   Vital Signs Last 24 Hrs  T(C): 36.6 (12-06-21 @ 10:00), Max: 36.7 (12-05-21 @ 13:30)  T(F): 97.9 (12-06-21 @ 10:00), Max: 98.1 (12-05-21 @ 19:47)  HR: 73 (12-06-21 @ 10:00) (73 - 108)  BP: 92/62 (12-06-21 @ 10:00) (83/59 - 92/67)  RR: 17 (12-06-21 @ 10:00) (17 - 18)  SpO2: 99% (12-06-21 @ 10:00) (96% - 99%)            12-05 @ 07:01  -  12-06 @ 07:00  --------------------------------------------------------  IN: 760 mL / OUT: 1170 mL / NET: -410 mL    Daily   Admit Wt: Drug Dosing Weight  Height (cm): 165.1 (02 Dec 2021 13:55)  Weight (kg): 53.6 (02 Dec 2021 13:55)  BMI (kg/m2): 19.7 (02 Dec 2021 13:55)  BSA (m2): 1.58 (02 Dec 2021 13:55)    MEDICATIONS  aspirin  chewable 81 milliGRAM(s) Oral daily  ciprofloxacin     Tablet 500 milliGRAM(s) Oral every 12 hours  hydrALAZINE 25 milliGRAM(s) Oral every 8 hours  influenza  Vaccine (HIGH DOSE) 0.7 milliLiter(s) IntraMuscular once  lactobacillus acidophilus 1 Tablet(s) Oral daily  magnesium oxide 400 milliGRAM(s) Oral every 12 hours  methimazole 10 milliGRAM(s) Oral daily  metoclopramide   Syrup 5 milliGRAM(s) Oral three times a day before meals  metoprolol succinate ER 50 milliGRAM(s) Oral daily  mirtazapine 7.5 milliGRAM(s) Oral at bedtime  multivitamin 1 Tablet(s) Oral daily  pantoprazole    Tablet 40 milliGRAM(s) Oral two times a day  sertraline 100 milliGRAM(s) Oral daily  spironolactone 25 milliGRAM(s) Oral daily  vancomycin    Solution 125 milliGRAM(s) Oral every 12 hours      >>> <<<  PHYSICAL EXAM  Subjective: NAD  Neurology: alert and oriented x 3, nonfocal, no gross deficits  CV :s1s2  Sternal Wound :  CDI , Stable  Lungs:CTA b/l  Abdomen: soft, NT,ND, ( +)BM driveline site c/d/i  :  voiding  Extremities: -c/c/e       LABS  12-06    132<L>  |  97  |  15  ----------------------------<  93  3.8   |  21<L>  |  0.63    Ca    9.8      06 Dec 2021 05:45                                   10.9   7.74  )-----------( 232      ( 06 Dec 2021 05:45 )             34.1                 PAST MEDICAL & SURGICAL HISTORY:  CHF (congestive heart failure)    CAD (coronary artery disease)    Depression    Pleural effusion    History of 2019 novel coronavirus disease (COVID-19)  april 2020    Hemorrhoids    Bleeding hemorrhoids    Peripheral arterial disease    Claudication    BPH with urinary obstruction    ACC/AHA stage D systolic heart failure    Anticoagulation goal of INR 2.0 to 2.5    Falls    Clavicle fracture    CKD (chronic kidney disease), stage III    Iron deficiency anemia    H/O epistaxis    Vertigo    GI bleed    S/P TVR (tricuspid valve repair)    S/P ventricular assist device    S/P endoscopy    H/O tracheostomy         
General

## 2022-11-21 NOTE — ED PROVIDER NOTE - PATIENT PORTAL LINK FT
You can access the FollowMyHealth Patient Portal offered by [facility] by registering at the following website: [email or web address]. By joining [employer]’s FollowMyHealth portal, you will also be able to view your health information using other applications (apps) compatible with our system.

## 2022-11-22 ENCOUNTER — EMERGENCY (EMERGENCY)
Facility: HOSPITAL | Age: 52
LOS: 0 days | Discharge: HOME | End: 2022-11-23
Attending: EMERGENCY MEDICINE | Admitting: STUDENT IN AN ORGANIZED HEALTH CARE EDUCATION/TRAINING PROGRAM

## 2022-11-22 ENCOUNTER — NON-APPOINTMENT (OUTPATIENT)
Age: 52
End: 2022-11-22

## 2022-11-22 VITALS
DIASTOLIC BLOOD PRESSURE: 96 MMHG | OXYGEN SATURATION: 99 % | RESPIRATION RATE: 20 BRPM | SYSTOLIC BLOOD PRESSURE: 160 MMHG | TEMPERATURE: 98 F | HEART RATE: 62 BPM

## 2022-11-22 DIAGNOSIS — R21 RASH AND OTHER NONSPECIFIC SKIN ERUPTION: ICD-10-CM

## 2022-11-22 DIAGNOSIS — Z98.891 HISTORY OF UTERINE SCAR FROM PREVIOUS SURGERY: Chronic | ICD-10-CM

## 2022-11-22 DIAGNOSIS — E11.65 TYPE 2 DIABETES MELLITUS WITH HYPERGLYCEMIA: ICD-10-CM

## 2022-11-22 DIAGNOSIS — E83.42 HYPOMAGNESEMIA: ICD-10-CM

## 2022-11-22 DIAGNOSIS — E78.00 PURE HYPERCHOLESTEROLEMIA, UNSPECIFIED: ICD-10-CM

## 2022-11-22 DIAGNOSIS — B35.1 TINEA UNGUIUM: ICD-10-CM

## 2022-11-22 DIAGNOSIS — Z79.84 LONG TERM (CURRENT) USE OF ORAL HYPOGLYCEMIC DRUGS: ICD-10-CM

## 2022-11-22 DIAGNOSIS — I10 ESSENTIAL (PRIMARY) HYPERTENSION: ICD-10-CM

## 2022-11-22 DIAGNOSIS — B35.3 TINEA PEDIS: ICD-10-CM

## 2022-11-22 DIAGNOSIS — E11.42 TYPE 2 DIABETES MELLITUS WITH DIABETIC POLYNEUROPATHY: ICD-10-CM

## 2022-11-22 LAB
ALBUMIN SERPL ELPH-MCNC: 3.7 G/DL — SIGNIFICANT CHANGE UP (ref 3.5–5.2)
ALP SERPL-CCNC: 98 U/L — SIGNIFICANT CHANGE UP (ref 30–115)
ALT FLD-CCNC: 70 U/L — HIGH (ref 0–41)
ANION GAP SERPL CALC-SCNC: 7 MMOL/L — SIGNIFICANT CHANGE UP (ref 7–14)
AST SERPL-CCNC: 53 U/L — HIGH (ref 0–41)
B-OH-BUTYR SERPL-SCNC: <0.2 MMOL/L — SIGNIFICANT CHANGE UP
BASE EXCESS BLDV CALC-SCNC: 3 MMOL/L — SIGNIFICANT CHANGE UP (ref -2–3)
BILIRUB SERPL-MCNC: <0.2 MG/DL — SIGNIFICANT CHANGE UP (ref 0.2–1.2)
BUN SERPL-MCNC: 29 MG/DL — HIGH (ref 10–20)
CA-I SERPL-SCNC: 1.25 MMOL/L — SIGNIFICANT CHANGE UP (ref 1.15–1.33)
CALCIUM SERPL-MCNC: 9.5 MG/DL — SIGNIFICANT CHANGE UP (ref 8.4–10.5)
CHLORIDE SERPL-SCNC: 103 MMOL/L — SIGNIFICANT CHANGE UP (ref 98–110)
CO2 SERPL-SCNC: 32 MMOL/L — SIGNIFICANT CHANGE UP (ref 17–32)
CREAT SERPL-MCNC: 0.6 MG/DL — LOW (ref 0.7–1.5)
EGFR: 107 ML/MIN/1.73M2 — SIGNIFICANT CHANGE UP
GAS PNL BLDV: 133 MMOL/L — LOW (ref 136–145)
GAS PNL BLDV: SIGNIFICANT CHANGE UP
GLUCOSE SERPL-MCNC: 309 MG/DL — HIGH (ref 70–99)
HCO3 BLDV-SCNC: 33 MMOL/L — HIGH (ref 22–29)
HCT VFR BLDA CALC: 64 % — CRITICAL HIGH (ref 39–51)
HGB BLD CALC-MCNC: 21.2 G/DL — CRITICAL HIGH (ref 12.6–17.4)
LACTATE BLDV-MCNC: 0.9 MMOL/L — SIGNIFICANT CHANGE UP (ref 0.5–2)
MAGNESIUM SERPL-MCNC: 1.7 MG/DL — LOW (ref 1.8–2.4)
PCO2 BLDV: 69 MMHG — HIGH (ref 39–42)
PH BLDV: 7.29 — LOW (ref 7.32–7.43)
PO2 BLDV: 29 MMHG — SIGNIFICANT CHANGE UP
POTASSIUM BLDV-SCNC: 3.9 MMOL/L — SIGNIFICANT CHANGE UP (ref 3.5–5.1)
POTASSIUM SERPL-MCNC: 4.1 MMOL/L — SIGNIFICANT CHANGE UP (ref 3.5–5)
POTASSIUM SERPL-SCNC: 4.1 MMOL/L — SIGNIFICANT CHANGE UP (ref 3.5–5)
PROT SERPL-MCNC: 6.1 G/DL — SIGNIFICANT CHANGE UP (ref 6–8)
SAO2 % BLDV: 45.4 % — SIGNIFICANT CHANGE UP
SODIUM SERPL-SCNC: 142 MMOL/L — SIGNIFICANT CHANGE UP (ref 135–146)

## 2022-11-22 PROCEDURE — 99284 EMERGENCY DEPT VISIT MOD MDM: CPT

## 2022-11-22 PROCEDURE — 93925 LOWER EXTREMITY STUDY: CPT | Mod: 26

## 2022-11-22 PROCEDURE — 73610 X-RAY EXAM OF ANKLE: CPT | Mod: 26,RT

## 2022-11-22 PROCEDURE — 73630 X-RAY EXAM OF FOOT: CPT | Mod: 26,RT

## 2022-11-22 RX ORDER — MAGNESIUM SULFATE 500 MG/ML
2 VIAL (ML) INJECTION ONCE
Refills: 0 | Status: COMPLETED | OUTPATIENT
Start: 2022-11-22 | End: 2022-11-22

## 2022-11-22 RX ADMIN — Medication 25 GRAM(S): at 23:41

## 2022-11-22 NOTE — ED PROVIDER NOTE - ATTENDING APP SHARED VISIT CONTRIBUTION OF CARE
Patient with hx of DM p/w peeling skin to bilateral feet. Patient states she has had right foot swelling, pain, skin peeling x 1 week, was started on topical antifungal and topical steroid cream by Dr. Boyd her podiatrist. was evaluated yesterday in ED, discharged on oral fluconazole. no fevers. no constitutional signs. no trauma. no oral mucosal involvement.     vss, nontoxic, well appearing, pink conj, anicteric, MMM, neck supple, CTAB, RRR, equal radial pulses bilat, abd soft/nt/nd, no cva tend. no calves tend, mild edema to the right foot, peeling skin to soles, no warmth, no cellulitis. DP pulses 2+ bilaterally.

## 2022-11-22 NOTE — ED PROVIDER NOTE - NSPTACCESSSVCSAPPTDETAILS_ED_ALL_ED_FT
Podiatry: tx-resistant onychomycosis & bl tinea pedis, h/o DM, needs urgent f/u, follows with Dr. Boyd  Vascular: pt also needs Vascular f/u to obtain results of arterial duplex performed while in ED (no reading available yet upon pt discharge from ED)

## 2022-11-22 NOTE — ED PROVIDER NOTE - NSFOLLOWUPINSTRUCTIONS_ED_ALL_ED_FT
Please follow up with your primary care doctor and dermatologist in 1-3 days   Please be aware of any new or worsening signs or symptoms that should prompt your return to the ER.      Rash    A rash is a change in the color of the skin. A rash can also change the way your skin feels. There are many different conditions and factors that can cause a rash, most of which are not dangerous. Make sure to follow up with your primary care physician or a dermatologist as instructed by your health care provider.    SEEK IMMEDIATE MEDICAL CARE IF YOU HAVE THE FOLLOWING SYMPTOMS: fever, blisters, a rash inside your mouth, or altered mental status.          NAIL FUNGUS - General Information     Nail Fungus    WHAT YOU NEED TO KNOW:    What is nail fungus? Nail fungus, or onychomycosis, is a fungal infection in your toenail or fingernail. Nail fungus is more common in toenails than fingernails. The cause of the infection may not be known.    What increases my risk for nail fungus?     Athlete's foot      Age 60 years or older      Conditions such as diabetes, circulation problems, or a weak immune system      Wearing heavy work boots that make your feet warm and sweaty      Walking barefoot in locker rooms or public showers      Working in a job where your hands are often wet (dishwashers, house )      An unhealthy nail or underlying nail deformity    What are the signs and symptoms of nail fungus?     Nails that curl up or down or are misshapen      Discolored (often white, yellow, or brown) nails      Fragile or cracked nails      Thick nails or nails with rough, jagged edges      Nail that is  from the nail bed      Tenderness or pain in the affected nail    How is nail fungus diagnosed? Your healthcare provider can usually diagnose nail fungus by examining your nails. A small nail clipping may be examined or sent to a lab to test for infection.    How is nail fungus treated? Antifungal medicine is a pill that treats a fungal infection. You may need to take this medicine for up to 12 weeks. Topical treatments include creams and polishes that you apply to the top of your nail. You may need to use topical treatments for up to 1 year before you see positive results. Ask your healthcare provider for more information about antifungal medicine.    How can I manage my symptoms?     Use antifungal sprays or powders. You can buy these at your local drugstore.       Keep your nails short and file down any thick areas. Use separate nail trimmers and files for infected nails and healthy nails. If you go to a salon to get your nails done, bring your own nail files and trimmers.    How can I help prevent nail fungus?     Dry your feet with a towel or hair dryer after you bathe.      Do not wear tight-fitting shoes or shoes that pinch your toes. Avoid shoes made from rubber or plastic.      Wear socks that absorb moisture. This includes socks make of wool, nylon, or polypropylene. Do not wear cotton socks. Change your socks if they are damp from sweat or your feet get wet. Put on dry, clean socks every day.       Do not go barefoot in locker rooms or public showers.      Do not use nail polish or artificial nails such as acrylic or gel nails.       Wear gloves that are waterproof if you work with water.     When should I contact my healthcare provider? You have questions or concerns about your condition or care.    CARE AGREEMENT:    You have the right to help plan your care. Learn about your health condition and how it may be treated. Discuss treatment options with your healthcare providers to decide what care you want to receive. You always have the right to refuse treatment.        © Copyright [employer] 2020       back to top                      © Copyright [employer] 2020 Please follow up with your primary care doctor and dermatologist in 1-3 days   Please be aware of any new or worsening signs or symptoms that should prompt your return to the ER.    Our Emergency Department Referral Coordinators will be reaching out ot you in the next 24-48 hours from 9:00am to 5:00pm (Monday to Friday) with a follow up appointment. Please expect a phone call from the hospital in that time frame. If you do not receive a call or if you have any questions or concerns, you can reach them at [phone] or [phone].    Rash    A rash is a change in the color of the skin. A rash can also change the way your skin feels. There are many different conditions and factors that can cause a rash, most of which are not dangerous. Make sure to follow up with your primary care physician or a dermatologist as instructed by your health care provider.    SEEK IMMEDIATE MEDICAL CARE IF YOU HAVE THE FOLLOWING SYMPTOMS: fever, blisters, a rash inside your mouth, or altered mental status.          NAIL FUNGUS - General Information     Nail Fungus    WHAT YOU NEED TO KNOW:    What is nail fungus? Nail fungus, or onychomycosis, is a fungal infection in your toenail or fingernail. Nail fungus is more common in toenails than fingernails. The cause of the infection may not be known.    What increases my risk for nail fungus?     Athlete's foot      Age 60 years or older      Conditions such as diabetes, circulation problems, or a weak immune system      Wearing heavy work boots that make your feet warm and sweaty      Walking barefoot in locker rooms or public showers      Working in a job where your hands are often wet (dishwashers, house )      An unhealthy nail or underlying nail deformity    What are the signs and symptoms of nail fungus?     Nails that curl up or down or are misshapen      Discolored (often white, yellow, or brown) nails      Fragile or cracked nails      Thick nails or nails with rough, jagged edges      Nail that is  from the nail bed      Tenderness or pain in the affected nail    How is nail fungus diagnosed? Your healthcare provider can usually diagnose nail fungus by examining your nails. A small nail clipping may be examined or sent to a lab to test for infection.    How is nail fungus treated? Antifungal medicine is a pill that treats a fungal infection. You may need to take this medicine for up to 12 weeks. Topical treatments include creams and polishes that you apply to the top of your nail. You may need to use topical treatments for up to 1 year before you see positive results. Ask your healthcare provider for more information about antifungal medicine.    How can I manage my symptoms?     Use antifungal sprays or powders. You can buy these at your local drugstore.       Keep your nails short and file down any thick areas. Use separate nail trimmers and files for infected nails and healthy nails. If you go to a salon to get your nails done, bring your own nail files and trimmers.    How can I help prevent nail fungus?     Dry your feet with a towel or hair dryer after you bathe.      Do not wear tight-fitting shoes or shoes that pinch your toes. Avoid shoes made from rubber or plastic.      Wear socks that absorb moisture. This includes socks make of wool, nylon, or polypropylene. Do not wear cotton socks. Change your socks if they are damp from sweat or your feet get wet. Put on dry, clean socks every day.       Do not go barefoot in locker rooms or public showers.      Do not use nail polish or artificial nails such as acrylic or gel nails.       Wear gloves that are waterproof if you work with water.     When should I contact my healthcare provider? You have questions or concerns about your condition or care.    CARE AGREEMENT:    You have the right to help plan your care. Learn about your health condition and how it may be treated. Discuss treatment options with your healthcare providers to decide what care you want to receive. You always have the right to refuse treatment.        © Copyright [employer] 2020       back to top                      © Copyright [employer] 2020

## 2022-11-22 NOTE — ED PROVIDER NOTE - NS ED ROS FT
Review of Systems:  	•	CONSTITUTIONAL: no fever, no diaphoresis, no chills  	•	SKIN: +rash to b/l feet  	•	RESPIRATORY: no shortness of breath   	•	CARDIAC: no chest pain   	•	GI: no abd pain, no nausea, no vomiting   	•	MUSCULOSKELETAL: no joint paint, no swelling, no redness  	•	NEUROLOGIC: no weakness, no headache, no paresthesias   	•	PSYCH: no anxiety, non suicidal, non homicidal, no hallucination, no depression

## 2022-11-22 NOTE — ED PROVIDER NOTE - PROGRESS NOTE DETAILS
DC: Patient endorsed to Dr. Mantilla pending labs, reassessment, disposition. podiatry aware, ginna to follow up with podiatry and primary care doctor. patient with scheduled appt with podiatry and dermatology for current issue, educated on s/s to be aware of that should prompt return to the ER - patient verbalizes understanding and will fu with pmd.    spoke with ronan regarding arterial duplex, patient with b/l dp/pt pulses, to follow up outpatient.

## 2022-11-22 NOTE — ED PROVIDER NOTE - CLINICAL SUMMARY MEDICAL DECISION MAKING FREE TEXT BOX
Authored by Dr. Zayda Mantilla: s/o to me by Dr. Summers - 51F pmh dm, hl p/w bl foot pain, R>L x 3 wks despite anti-fungal & steroid cream as prescribed by Pods Dr. Boyd when sx began, seen in ED yest for same & course of weekly oral fluconazole x 6 wks added to regimen, s/o to me pending labs as resulted (hyperglycema / no dka), XR imaging which did not show any fxs, FBs or SQ gas, arterial duplex which was completed but will not be read until AM, and Podiatry consult who advised ok for pt f/u as outpt - pt reassessed by me, all results d/w pt & her daughter @ bedside & copies given, advised to c/w creams & oral fluconazole as prescribed, strict return precautions discussed, close op f/u with Podiatry - EMR reviewed and pt has Podiatry & Derm appts scheduled for Dec 2022, advised to attempt earlier f/u, pt's info sent to referral program - pt also advised of pending reading for vascular study performed while in ED, gave pt Vascular f/u to obtain results, info also sent to referral program Authored by Dr. Zayda Mantilla: s/o to me by Dr. Summers - 51F pmh dm, hl p/w bl foot pain, R>L x 3 wks despite anti-fungal & steroid cream as prescribed by Pods Dr. Boyd when sx began, seen in ED yest for same & course of weekly oral fluconazole x 6 wks added to regimen, s/o to me pending labs as resulted (hyperglycema / no dka, hypomag repleted), XR imaging which did not show any fxs, FBs or SQ gas, arterial duplex which was completed but will not be read until AM, and Podiatry consult who advised ok for pt f/u as outpt - pt reassessed by me, all results d/w pt & her daughter @ bedside & copies given, advised to c/w creams & oral fluconazole as prescribed, strict return precautions discussed, close op f/u with Podiatry - EMR reviewed and pt has Podiatry & Derm appts scheduled for Dec 2022, advised to attempt earlier f/u, pt's info sent to referral program - pt also advised of pending reading for vascular study performed while in ED, gave pt Vascular f/u to obtain results, info also sent to referral program

## 2022-11-22 NOTE — ED PROVIDER NOTE - OBJECTIVE STATEMENT
51 year old female, past medical history dm, htn, hld, peripheral neuropathy, who presents with rash. patient with recent podiatry evaluation for sx started on topical anti-fungal and hydrocortisone with minimal improvement. patient evaluated in ed for similar sx and started on oral antifungal meds. patient with persistent sx prompting ed eval. denies f/c, chest pain, shortness of breath, abd pain, nausea/vomiting/diarrhea. patient has scheduled appt with podiatry and dermatology. no falls.

## 2022-11-22 NOTE — ED PROVIDER NOTE - PHYSICAL EXAMINATION
CONSTITUTIONAL: Well-developed; well-nourished; in no acute distress, nontoxic appearing  SKIN: RLE: +onchomyocosis to great toe with overlying hyperpigmentation and dryness/scaling to medial/lateral ankle, no weeping/drainage/erythema. LLE: +dryness to dorsum of L foot.   ENT: MMM  CARD: S1, S2 normal, no murmur  RESP: No wheezes, rales or rhonchi. Good air movement bilaterally  EXT: Normal ROM. Pulses 2+   NEURO: awake, alert, following commands, oriented, grossly unremarkable. No Focal deficits. GCS 15.   PSYCH: Cooperative, appropriate.

## 2022-11-22 NOTE — ED PROVIDER NOTE - CARE PLAN
Assessment and plan of treatment:	likely fungal infection  labs.  podiatry input.   1 Principal Discharge DX:	Fungal toenail infection  Assessment and plan of treatment:	likely fungal infection  labs.  podiatry input.   Principal Discharge DX:	Fungal toenail infection  Assessment and plan of treatment:	likely fungal infection  labs.  podiatry input.  Secondary Diagnosis:	Tinea pedis  Secondary Diagnosis:	Hypomagnesemia  Secondary Diagnosis:	Hyperglycemia

## 2022-11-22 NOTE — ED PROVIDER NOTE - PROVIDER TOKENS
PROVIDER:[TOKEN:[[id number]],FOLLOWUP:[1-3 Days]],PROVIDER:[TOKEN:[[id number]],FOLLOWUP:[1-3 Days]] PROVIDER:[TOKEN:[[id number]],FOLLOWUP:[1-3 Days]],PROVIDER:[TOKEN:[[id number]],FOLLOWUP:[1-3 Days]],PROVIDER:[TOKEN:[[id number]],FOLLOWUP:[7-10 Days]]

## 2022-11-22 NOTE — ED ADULT NURSE NOTE - BREATHING, MLM
Limit your salt intake to 1/2 tbs daily.  Limited your fluid intake to 64 oz daily.    Spontaneous, unlabored and symmetrical

## 2022-11-22 NOTE — ED PROVIDER NOTE - CARE PROVIDER_API CALL
Monico Dockery (DO)  Medicine  [address], [address] [place] [address]  Phone: [phone]  Fax: [phone]  Follow Up Time: 1-3 Days    Ramon Boyd (DPM)  Podiatric Medicine and Surgery  [address], [address], [address]  [place] [address]  Phone: [phone]  Fax: [phone]  Follow Up Time: 1-3 Days   Monico Dockery (DO)  Medicine  [address], [address]st [place] [address]  Phone: [phone]  Fax: [phone]  Follow Up Time: 1-3 Days    Ramon Boyd)  Podiatric Medicine and Surgery  [address], [address], [address]  [place] [address]  Phone: [phone]  Fax: [phone]  Follow Up Time: 1-3 Days    Chirag Raygoza)  Surgery; Vascular Surgery  [address] [place] [address]  Phone: [phone]  Fax: [phone]  Follow Up Time: 7-10 Days

## 2022-11-22 NOTE — ED PROVIDER NOTE - PATIENT PORTAL LINK FT
You can access the FollowMyHealth Patient Portal offered by [facility] by registering at the following website: [email or web address]. By joining [employer]’s FollowMyHealth portal, you will also be able to view your health information using other applications (apps) compatible with our system.

## 2022-11-22 NOTE — ED PROVIDER NOTE - CARE PROVIDERS DIRECT ADDRESSES
[email or web address],DirectAddress_Unknown,[email or web address]

## 2022-11-23 ENCOUNTER — APPOINTMENT (OUTPATIENT)
Dept: ENDOCRINOLOGY | Facility: CLINIC | Age: 52
End: 2022-11-23

## 2022-11-23 ENCOUNTER — APPOINTMENT (OUTPATIENT)
Dept: NUTRITION | Facility: CLINIC | Age: 52
End: 2022-11-23

## 2022-11-23 ENCOUNTER — OUTPATIENT (OUTPATIENT)
Dept: OUTPATIENT SERVICES | Facility: HOSPITAL | Age: 52
LOS: 1 days | Discharge: HOME | End: 2022-11-23

## 2022-11-23 VITALS
TEMPERATURE: 98 F | OXYGEN SATURATION: 99 % | RESPIRATION RATE: 18 BRPM | SYSTOLIC BLOOD PRESSURE: 142 MMHG | DIASTOLIC BLOOD PRESSURE: 88 MMHG | HEART RATE: 68 BPM

## 2022-11-23 VITALS
WEIGHT: 86.6 LBS | HEART RATE: 71 BPM | SYSTOLIC BLOOD PRESSURE: 135 MMHG | BODY MASS INDEX: 16.91 KG/M2 | DIASTOLIC BLOOD PRESSURE: 76 MMHG

## 2022-11-23 DIAGNOSIS — Z98.891 HISTORY OF UTERINE SCAR FROM PREVIOUS SURGERY: Chronic | ICD-10-CM

## 2022-11-23 PROBLEM — I73.9 PVD (PERIPHERAL VASCULAR DISEASE): Status: ACTIVE | Noted: 2020-10-21

## 2022-11-23 PROBLEM — L30.9 ECZEMA: Status: ACTIVE | Noted: 2022-11-14

## 2022-11-23 LAB
BASOPHILS # BLD AUTO: 0.02 K/UL — SIGNIFICANT CHANGE UP (ref 0–0.2)
BASOPHILS NFR BLD AUTO: 0.3 % — SIGNIFICANT CHANGE UP (ref 0–1)
EOSINOPHIL # BLD AUTO: 0.02 K/UL — SIGNIFICANT CHANGE UP (ref 0–0.7)
EOSINOPHIL NFR BLD AUTO: 0.3 % — SIGNIFICANT CHANGE UP (ref 0–8)
HCT VFR BLD CALC: 38.6 % — SIGNIFICANT CHANGE UP (ref 37–47)
HGB BLD-MCNC: 12.5 G/DL — SIGNIFICANT CHANGE UP (ref 12–16)
IMM GRANULOCYTES NFR BLD AUTO: 0.5 % — HIGH (ref 0.1–0.3)
LYMPHOCYTES # BLD AUTO: 1.86 K/UL — SIGNIFICANT CHANGE UP (ref 1.2–3.4)
LYMPHOCYTES # BLD AUTO: 28.9 % — SIGNIFICANT CHANGE UP (ref 20.5–51.1)
MCHC RBC-ENTMCNC: 28.6 PG — SIGNIFICANT CHANGE UP (ref 27–31)
MCHC RBC-ENTMCNC: 32.4 G/DL — SIGNIFICANT CHANGE UP (ref 32–37)
MCV RBC AUTO: 88.3 FL — SIGNIFICANT CHANGE UP (ref 81–99)
MONOCYTES # BLD AUTO: 0.51 K/UL — SIGNIFICANT CHANGE UP (ref 0.1–0.6)
MONOCYTES NFR BLD AUTO: 7.9 % — SIGNIFICANT CHANGE UP (ref 1.7–9.3)
NEUTROPHILS # BLD AUTO: 4 K/UL — SIGNIFICANT CHANGE UP (ref 1.4–6.5)
NEUTROPHILS NFR BLD AUTO: 62.1 % — SIGNIFICANT CHANGE UP (ref 42.2–75.2)
NRBC # BLD: 0 /100 WBCS — SIGNIFICANT CHANGE UP (ref 0–0)
PLATELET # BLD AUTO: 205 K/UL — SIGNIFICANT CHANGE UP (ref 130–400)
RBC # BLD: 4.37 M/UL — SIGNIFICANT CHANGE UP (ref 4.2–5.4)
RBC # FLD: 15.1 % — HIGH (ref 11.5–14.5)
WBC # BLD: 6.44 K/UL — SIGNIFICANT CHANGE UP (ref 4.8–10.8)
WBC # FLD AUTO: 6.44 K/UL — SIGNIFICANT CHANGE UP (ref 4.8–10.8)

## 2022-11-23 RX ORDER — SODIUM CHLORIDE 9 MG/ML
2000 INJECTION, SOLUTION INTRAVENOUS ONCE
Refills: 0 | Status: COMPLETED | OUTPATIENT
Start: 2022-11-23 | End: 2022-11-23

## 2022-11-23 RX ADMIN — SODIUM CHLORIDE 2000 MILLILITER(S): 9 INJECTION, SOLUTION INTRAVENOUS at 00:33

## 2022-11-23 NOTE — PHYSICAL EXAM
[General Appearance - Alert] : alert [General Appearance - In No Acute Distress] : in no acute distress [General Appearance - Well Nourished] : well nourished [General Appearance - Well Developed] : well developed [Ankle Swelling (On Exam)] : not present [Ankle Swelling Bilaterally] : bilaterally  [Varicose Veins Of Lower Extremities] : bilaterally [] : on both lower extremities [2+] : left foot dorsalis pedis 2+ [Normal Foot/Ankle] : Both lower extremities were exposed and visualized. Standing exam demonstrates normal foot posture and alignment. Hindfoot exam shows no hindfoot valgus or varus [de-identified] : No pain on BL feet [Skin Turgor] : normal skin turgor [Foot Ulcer] : no foot ulcer [Skin Induration] : no skin induration [FreeTextEntry1] : Mild xerosis on BL feet; \par Erythema with scaling to Right medial ankle and posterior heel\par Scattered papule-like lesions to dorsal toes with hyperpigmentation to periphery; [Sensation] : the sensory exam was normal to light touch and pinprick [No Focal Deficits] : no focal deficits [Deep Tendon Reflexes (DTR)] : deep tendon reflexes were 2+ and symmetric [Oriented To Time, Place, And Person] : oriented to person, place, and time [Impaired Insight] : insight and judgment were intact [Affect] : the affect was normal

## 2022-11-25 DIAGNOSIS — L30.9 DERMATITIS, UNSPECIFIED: ICD-10-CM

## 2022-11-25 DIAGNOSIS — I73.9 PERIPHERAL VASCULAR DISEASE, UNSPECIFIED: ICD-10-CM

## 2022-11-25 DIAGNOSIS — E11.42 TYPE 2 DIABETES MELLITUS WITH DIABETIC POLYNEUROPATHY: ICD-10-CM

## 2022-11-28 ENCOUNTER — OUTPATIENT (OUTPATIENT)
Dept: OUTPATIENT SERVICES | Facility: HOSPITAL | Age: 52
LOS: 1 days | Discharge: HOME | End: 2022-11-28

## 2022-11-28 ENCOUNTER — APPOINTMENT (OUTPATIENT)
Dept: PODIATRY | Facility: CLINIC | Age: 52
End: 2022-11-28

## 2022-11-28 DIAGNOSIS — Z98.891 HISTORY OF UTERINE SCAR FROM PREVIOUS SURGERY: Chronic | ICD-10-CM

## 2022-11-28 DIAGNOSIS — M79.671 PAIN IN RIGHT FOOT: ICD-10-CM

## 2022-11-28 DIAGNOSIS — B35.1 TINEA UNGUIUM: ICD-10-CM

## 2022-11-28 DIAGNOSIS — B35.3 TINEA PEDIS: ICD-10-CM

## 2022-11-28 DIAGNOSIS — L03.90 CELLULITIS, UNSPECIFIED: ICD-10-CM

## 2022-11-28 PROCEDURE — 99214 OFFICE O/P EST MOD 30 MIN: CPT

## 2022-11-30 ENCOUNTER — APPOINTMENT (OUTPATIENT)
Dept: NUTRITION | Facility: CLINIC | Age: 52
End: 2022-11-30

## 2022-11-30 ENCOUNTER — OUTPATIENT (OUTPATIENT)
Dept: OUTPATIENT SERVICES | Facility: HOSPITAL | Age: 52
LOS: 1 days | Discharge: HOME | End: 2022-11-30

## 2022-11-30 DIAGNOSIS — Z98.891 HISTORY OF UTERINE SCAR FROM PREVIOUS SURGERY: Chronic | ICD-10-CM

## 2022-11-30 DIAGNOSIS — E11.42 TYPE 2 DIABETES MELLITUS WITH DIABETIC POLYNEUROPATHY: ICD-10-CM

## 2022-12-13 ENCOUNTER — OUTPATIENT (OUTPATIENT)
Dept: OUTPATIENT SERVICES | Facility: HOSPITAL | Age: 52
LOS: 1 days | Discharge: HOME | End: 2022-12-13

## 2022-12-13 ENCOUNTER — NON-APPOINTMENT (OUTPATIENT)
Age: 52
End: 2022-12-13

## 2022-12-13 ENCOUNTER — APPOINTMENT (OUTPATIENT)
Dept: INTERNAL MEDICINE | Facility: CLINIC | Age: 52
End: 2022-12-13

## 2022-12-13 ENCOUNTER — APPOINTMENT (OUTPATIENT)
Dept: DERMATOLOGY | Facility: CLINIC | Age: 52
End: 2022-12-13

## 2022-12-13 VITALS
TEMPERATURE: 97 F | BODY MASS INDEX: 17.51 KG/M2 | DIASTOLIC BLOOD PRESSURE: 80 MMHG | SYSTOLIC BLOOD PRESSURE: 158 MMHG | OXYGEN SATURATION: 98 % | HEART RATE: 91 BPM | WEIGHT: 89.19 LBS | HEIGHT: 60 IN

## 2022-12-13 DIAGNOSIS — R21 RASH AND OTHER NONSPECIFIC SKIN ERUPTION: ICD-10-CM

## 2022-12-13 DIAGNOSIS — M65.4 RADIAL STYLOID TENOSYNOVITIS [DE QUERVAIN]: ICD-10-CM

## 2022-12-13 PROCEDURE — 99203 OFFICE O/P NEW LOW 30 MIN: CPT | Mod: GC

## 2022-12-13 PROCEDURE — 99214 OFFICE O/P EST MOD 30 MIN: CPT | Mod: GC

## 2022-12-13 NOTE — PHYSICAL EXAM
[Alert] : alert [Oriented x 3] : ~L oriented x 3 [FreeTextEntry3] : Annular rash on medial and lateral aspects of R ankel - scaly in nature\par Approx 2-3 cm in diameter \par fungal infection present in b/l great toes

## 2022-12-13 NOTE — HISTORY OF PRESENT ILLNESS
[FreeTextEntry1] : rash in R [de-identified] : Patient is a 51 F, Nauruan speaking, w/ PMHx of seropositive (+RF, +CCP) erosive RA NOT on Plaquenil , HTN, DMII, HLD, Tennis Elbow, and anemia who presents for rash on Right foot.\par Pt complaining of new annular rash on lateral and medial aspects of Right ankle x 3 months. No injury to foot, no new clothing, or new detergents. Occasionally itchy. Pt evaluated by podiatry in October initially and was given clotromazole, fluconazole and diclofenac sent to [facility] however pt could only get diclofenac but was never able to get fluconazole and clotromazole. Unclear on today's visit if pt actually used any of the above meds. Now pt has been prescribed silver sulfadiazine by podaitry and has been using it with some improvement.\par Pt was able to show pictures on her phone where she had open wounds at one point which have then healed to some extent with the help of the silver sulfadizine.

## 2022-12-13 NOTE — ASSESSMENT
[FreeTextEntry1] : Patient is a 51 F, [place]er speaking, w/ PMHx of seropositive (+RF, +CCP) erosive RA on Plaquenil , HTN, DMII, HLD, Tennis Elbow, and anemia who for rash on Right foot.\par Pt complaining of new annular rash in on lateral and medial aspect of Right ankle x 3 months. Sometimes itchy but not always. Pt has seen podiatrist who prescribed silver sulfadiazine which pt has been using for a week with some improvement. \par \par #Annular Eczma\par #Swathi Pedis\par #Onchomycosis\par -fungal culture sample sent\par -f/u with podiatry next week\par -continue with silver sulfadiazine \par -RTC as needed \par \par

## 2022-12-13 NOTE — END OF VISIT
[] : Resident [FreeTextEntry3] : Toenail dystrophy and scaly soles, most likely tinea.\par Sores on right ankle seen in photo, healed with SSD.\par Scraping for fungal culture sent right foot.\par F/u in podiatry, for topical and possible oral antifungal treatment, pending the results.

## 2022-12-14 ENCOUNTER — NON-APPOINTMENT (OUTPATIENT)
Age: 52
End: 2022-12-14

## 2022-12-14 LAB
BASOPHILS # BLD AUTO: 0.05 K/UL
BASOPHILS NFR BLD AUTO: 0.7 %
EOSINOPHIL # BLD AUTO: 0.05 K/UL
EOSINOPHIL NFR BLD AUTO: 0.7 %
HCT VFR BLD CALC: 39.8 %
HGB BLD-MCNC: 12.9 G/DL
IMM GRANULOCYTES NFR BLD AUTO: 0.4 %
LYMPHOCYTES # BLD AUTO: 2.17 K/UL
LYMPHOCYTES NFR BLD AUTO: 32.1 %
MAN DIFF?: NORMAL
MCHC RBC-ENTMCNC: 28.5 PG
MCHC RBC-ENTMCNC: 32.4 G/DL
MCV RBC AUTO: 87.9 FL
MONOCYTES # BLD AUTO: 0.47 K/UL
MONOCYTES NFR BLD AUTO: 7 %
NEUTROPHILS # BLD AUTO: 3.99 K/UL
NEUTROPHILS NFR BLD AUTO: 59.1 %
PLATELET # BLD AUTO: 274 K/UL
RBC # BLD: 4.53 M/UL
RBC # FLD: 14.7 %
WBC # FLD AUTO: 6.76 K/UL

## 2022-12-14 NOTE — INTERPRETER SERVICES
[[employer] ] : provided by [employer]   [Interpreters_IDNumber] : [id number] [Interpreters_FullName] : Billie

## 2022-12-14 NOTE — PHYSICAL EXAM
[Normal] : no respiratory distress, lungs were clear to auscultation bilaterally and no accessory muscle use [Pedal Pulses Present] : the pedal pulses are present [Soft] : abdomen soft [Normal Bowel Sounds] : normal bowel sounds [No CVA Tenderness] : no CVA  tenderness [Normal Affect] : the affect was normal [de-identified] : temporal wasting [de-identified] : tachycardia 3/6 systolic murmur [de-identified] : right heel and ankle erythema and scaling. lesions on right foot, and medial malleolus  [de-identified] : RUQ and epigastric tenderness to palpation

## 2022-12-14 NOTE — ADDENDUM
[FreeTextEntry1] : obtained further GI record/ recommendation\par h pylori s/p tx and proof of eradication\par colonoscopy biopsies all benign\par would be due for a repeat colonoscopy. will recommend repeat colonoscopy next visit, assuming improving diabetic control

## 2022-12-14 NOTE — REVIEW OF SYSTEMS
[Palpitations] : palpitations [Abdominal Pain] : abdominal pain [Constipation] : constipation [Insomnia] : insomnia [Anxiety] : anxiety [Fever] : no fever [Chills] : no chills [Fatigue] : no fatigue [Chest Pain] : no chest pain [Lower Ext Edema] : no lower extremity edema [Shortness Of Breath] : no shortness of breath [Wheezing] : no wheezing [Cough] : no cough [Nausea] : no nausea [Diarrhea] : diarrhea [Vomiting] : no vomiting [Heartburn] : no heartburn [Melena] : no melena [FreeTextEntry5] : intermittent palpitations 1-2x/week

## 2022-12-14 NOTE — HISTORY OF PRESENT ILLNESS
[FreeTextEntry1] : routine follow up [de-identified] : 52 y/o female with PMHx HTN, HLD, DM1 (uncontrolled - last A1C >15), Rheumatoid arthritis presents today for routine follow up.\par \par Pt reports monitoring BS at home - reports fasting BS ranging from 150-200. Pt takes 5U insulin lispro prior to eating and 20U degludec qd. Did not repeat blood work - states she does not have paperwork for labs. Pt seen by Diabetes Educator on 11/30/22. Followed by endo (Dr. Kauffman) - last seen 11/23/22. \par \par HLD - pt reports taking atorvastatin 80 mg qd, prescribed ~3 months ago. Last lipid panel from 08/2022 , total cholesterol 402, . \par \par HTN - elevated today in office 158/80. Only taking Atenolol 50 mg qd. \par \par Pt seen by [facility] on 10/07/22 - rec to start rituximab. Pt has had poor follow up and reports not taking medications. \par \par Pt c/o anxiety before sleeping. Pt says "my feet are anxious" She states that her feet feel cold and she has had R LE swelling, which began ~ 4 months ago. Admits to heart racing, palpitations, anxiety. Denies sweating, LE paraethesia, LE numbness, LE swelling, excess weight loss. \par \par Intermittent abdominal pain, GERD - She states exacerbated by spicy foods and citrus. Admits to metallic taste, belching. Denies nausea, vomiting, diarrhea. Colonoscopy done in 2017. Not seen recently by GI. \par \par \par

## 2022-12-14 NOTE — ASSESSMENT
[FreeTextEntry1] : 52 y/o female with PMHx HTN, HLD, DM1 (uncontrolled - last A1C >15), Rheumatoid arthritis presents today for routine follow up\par \par #HTN, uncontrolled\par - BP in office 158/80 \par - c/w atenolol 50 mg qd\par - start lisinopril 10 mg qd\par - return for BP check in 2 weeks with pharmacist\par HTN;\par DASH diet discussed and recommended\par Exercise and weight loss counseled \par Frequency and target at home BP readings discussed\par Decrease caffeine intake\par Treatment options and possible side effects discussed\par Patient counseled on symptoms of hypo/hypertension\par Counseled: Yearly Ophthalmology exams\par \par #HLD, uncontrolled \par - , ,  (from 08/2022)\par - c/w atorvastatin 80mg qd\par - rpt lipid panel today in office\par \par #type 1 diabetes mellitus, uncontrolled\par - pt currently using 5U lispro TID and 20U degludec qd\par - c/w lispro and degludec \par - check a1c today now that on meds\par - f/u with Dr. Kauffman\par - seen by podiatry in 11/2022, continue close follow up for right foot/ankle lesions\par - ophthal appt scheduled for 01/2023\par - continue to log BS at home \par - rpt A1c today in office\par Diabetes;\par Low sugar, low carbohydrate diet \par Exercise Counseled \par Patient given opportunity to discuss frequency and target blood sugar levels\par Patient educated on symptoms of hypo/hyperglycemic events \par Counseled on: Yearly Ophthalmology and Podiatry Exam\par \par \par #Rheumatoid arthritis, seropositive, erosive\par - seen by rheum in 10/2022, continue to f/u \par - Per rheum, pt is potential candidate for rituximab therapy, would need prior authorization\par not currently on any treatment, hx prior nonadherence with MTX\par \par #HCM\par - Mammogram 11/2021, BI-RADS 3 \par Mammogram Cancer Screening;\par Patient counseled on the importance of a yearly mammogram screening and directed to have test performed or follow-up with OB/GYN. Failure to perform test can result in breast cancer and death\par \par - Due for rpt mammo, referral given today\par - Pap test completed in 2021 - wnl\par - Colonoscopy 2017 - Abnormal projection of ileal mucosa in the caecum, for which biopsy were negative. \par - EGD 2017 - Normal esophageal mucosa, Gastritis with Biopsies from the antrum and body showing H.pylori (s/p treatment, eradication confirmed by stool antigen test), mild duodenal inflammation.\par - f/u rpt labs \par - RTC 3 months\par

## 2022-12-16 ENCOUNTER — APPOINTMENT (OUTPATIENT)
Dept: PODIATRY | Facility: CLINIC | Age: 52
End: 2022-12-16

## 2022-12-16 PROBLEM — M79.671 ACUTE FOOT PAIN, RIGHT: Status: ACTIVE | Noted: 2018-11-20

## 2022-12-16 NOTE — PHYSICAL EXAM
[General Appearance - Alert] : alert [General Appearance - In No Acute Distress] : in no acute distress [General Appearance - Well Nourished] : well nourished [General Appearance - Well Developed] : well developed [Ankle Swelling (On Exam)] : not present [Ankle Swelling Bilaterally] : bilaterally  [Varicose Veins Of Lower Extremities] : bilaterally [] : on both lower extremities [2+] : left foot dorsalis pedis 2+ [Normal Foot/Ankle] : Both lower extremities were exposed and visualized. Standing exam demonstrates normal foot posture and alignment. Hindfoot exam shows no hindfoot valgus or varus [de-identified] : No pain on BL feet [Skin Turgor] : normal skin turgor [Foot Ulcer] : no foot ulcer [Skin Induration] : no skin induration [FreeTextEntry1] : Mild xerosis on BL feet; \par Erythema with scaling to Right medial ankle and posterior heel\par Scattered papule-like lesions to dorsal toes with hyperpigmentation to periphery; [Sensation] : the sensory exam was normal to light touch and pinprick [No Focal Deficits] : no focal deficits [Deep Tendon Reflexes (DTR)] : deep tendon reflexes were 2+ and symmetric [Oriented To Time, Place, And Person] : oriented to person, place, and time [Impaired Insight] : insight and judgment were intact [Affect] : the affect was normal

## 2022-12-19 ENCOUNTER — OUTPATIENT (OUTPATIENT)
Dept: OUTPATIENT SERVICES | Facility: HOSPITAL | Age: 52
LOS: 1 days | Discharge: HOME | End: 2022-12-19

## 2022-12-19 ENCOUNTER — APPOINTMENT (OUTPATIENT)
Dept: PODIATRY | Facility: CLINIC | Age: 52
End: 2022-12-19

## 2022-12-19 ENCOUNTER — APPOINTMENT (OUTPATIENT)
Dept: NUTRITION | Facility: CLINIC | Age: 52
End: 2022-12-19

## 2022-12-19 DIAGNOSIS — Z98.891 HISTORY OF UTERINE SCAR FROM PREVIOUS SURGERY: Chronic | ICD-10-CM

## 2022-12-19 DIAGNOSIS — B35.1 TINEA UNGUIUM: ICD-10-CM

## 2022-12-19 DIAGNOSIS — M79.672 PAIN IN LEFT FOOT: ICD-10-CM

## 2022-12-19 DIAGNOSIS — L97.309 NON-PRESSURE CHRONIC ULCER OF UNSPECIFIED ANKLE WITH UNSPECIFIED SEVERITY: ICD-10-CM

## 2022-12-19 DIAGNOSIS — M79.671 PAIN IN RIGHT FOOT: ICD-10-CM

## 2022-12-19 LAB
ALBUMIN SERPL ELPH-MCNC: 4 G/DL
ALP BLD-CCNC: 103 U/L
ALT SERPL-CCNC: 37 U/L
ANION GAP SERPL CALC-SCNC: 10 MMOL/L
AST SERPL-CCNC: 24 U/L
BILIRUB SERPL-MCNC: <0.2 MG/DL
BUN SERPL-MCNC: 35 MG/DL
CALCIUM SERPL-MCNC: 10.4 MG/DL
CHLORIDE SERPL-SCNC: 102 MMOL/L
CHOLEST SERPL-MCNC: 324 MG/DL
CO2 SERPL-SCNC: 29 MMOL/L
CREAT SERPL-MCNC: 0.8 MG/DL
EGFR: 88 ML/MIN/1.73M2
ESTIMATED AVERAGE GLUCOSE: 361 MG/DL
GLUCOSE SERPL-MCNC: 211 MG/DL
HBA1C MFR BLD HPLC: 14.2 %
HDLC SERPL-MCNC: 128 MG/DL
LDLC SERPL CALC-MCNC: 185 MG/DL
NONHDLC SERPL-MCNC: 196 MG/DL
POTASSIUM SERPL-SCNC: 4.9 MMOL/L
PROT SERPL-MCNC: 7 G/DL
SODIUM SERPL-SCNC: 141 MMOL/L
TRIGL SERPL-MCNC: 84 MG/DL
TSH SERPL-ACNC: 1.16 UIU/ML

## 2022-12-19 PROCEDURE — 99213 OFFICE O/P EST LOW 20 MIN: CPT

## 2022-12-20 DIAGNOSIS — M06.9 RHEUMATOID ARTHRITIS, UNSPECIFIED: ICD-10-CM

## 2022-12-20 DIAGNOSIS — I10 ESSENTIAL (PRIMARY) HYPERTENSION: ICD-10-CM

## 2022-12-20 DIAGNOSIS — M79.671 PAIN IN RIGHT FOOT: ICD-10-CM

## 2022-12-20 DIAGNOSIS — R92.8 OTHER ABNORMAL AND INCONCLUSIVE FINDINGS ON DIAGNOSTIC IMAGING OF BREAST: ICD-10-CM

## 2022-12-20 DIAGNOSIS — E78.5 HYPERLIPIDEMIA, UNSPECIFIED: ICD-10-CM

## 2022-12-20 DIAGNOSIS — E10.65 TYPE 1 DIABETES MELLITUS WITH HYPERGLYCEMIA: ICD-10-CM

## 2022-12-20 NOTE — PHYSICAL EXAM
[General Appearance - Alert] : alert [General Appearance - In No Acute Distress] : in no acute distress [General Appearance - Well Nourished] : well nourished [General Appearance - Well Developed] : well developed [Ankle Swelling Bilaterally] : bilaterally  [2+] : left foot dorsalis pedis 2+ [Normal Foot/Ankle] : Both lower extremities were exposed and visualized. Standing exam demonstrates normal foot posture and alignment. Hindfoot exam shows no hindfoot valgus or varus [Skin Turgor] : normal skin turgor [Sensation] : the sensory exam was normal to light touch and pinprick [No Focal Deficits] : no focal deficits [Deep Tendon Reflexes (DTR)] : deep tendon reflexes were 2+ and symmetric [Oriented To Time, Place, And Person] : oriented to person, place, and time [Impaired Insight] : insight and judgment were intact [Affect] : the affect was normal [Ankle Swelling (On Exam)] : not present [Varicose Veins Of Lower Extremities] : not present [] : not present [de-identified] : No pain on BL feet [Foot Ulcer] : no foot ulcer [Skin Induration] : no skin induration [FreeTextEntry1] : Mild xerosis on BL feet; \par Erythema with scaling to Right medial ankle and posterior heel\par Scattered papule-like lesions to dorsal toes with hyperpigmentation to periphery;

## 2022-12-20 NOTE — PHYSICAL EXAM
[General Appearance - Alert] : alert [General Appearance - In No Acute Distress] : in no acute distress [General Appearance - Well Nourished] : well nourished [General Appearance - Well Developed] : well developed [Ankle Swelling Bilaterally] : bilaterally  [2+] : left foot dorsalis pedis 2+ [Normal Foot/Ankle] : Both lower extremities were exposed and visualized. Standing exam demonstrates normal foot posture and alignment. Hindfoot exam shows no hindfoot valgus or varus [Skin Turgor] : normal skin turgor [Sensation] : the sensory exam was normal to light touch and pinprick [No Focal Deficits] : no focal deficits [Deep Tendon Reflexes (DTR)] : deep tendon reflexes were 2+ and symmetric [Oriented To Time, Place, And Person] : oriented to person, place, and time [Impaired Insight] : insight and judgment were intact [Affect] : the affect was normal [Ankle Swelling (On Exam)] : not present [Varicose Veins Of Lower Extremities] : not present [] : not present [de-identified] : No pain on BL feet [Foot Ulcer] : no foot ulcer [Skin Induration] : no skin induration [FreeTextEntry1] : Mild xerosis on BL feet; \par Erythema with scaling to Right medial ankle and posterior heel\par Scattered papule-like lesions to dorsal toes with hyperpigmentation to periphery;

## 2022-12-20 NOTE — PHYSICAL EXAM
[General Appearance - Alert] : alert [General Appearance - In No Acute Distress] : in no acute distress [General Appearance - Well Nourished] : well nourished [General Appearance - Well Developed] : well developed [Ankle Swelling Bilaterally] : bilaterally  [2+] : left foot dorsalis pedis 2+ [Normal Foot/Ankle] : Both lower extremities were exposed and visualized. Standing exam demonstrates normal foot posture and alignment. Hindfoot exam shows no hindfoot valgus or varus [Skin Turgor] : normal skin turgor [Sensation] : the sensory exam was normal to light touch and pinprick [No Focal Deficits] : no focal deficits [Deep Tendon Reflexes (DTR)] : deep tendon reflexes were 2+ and symmetric [Oriented To Time, Place, And Person] : oriented to person, place, and time [Impaired Insight] : insight and judgment were intact [Affect] : the affect was normal [Ankle Swelling (On Exam)] : not present [Varicose Veins Of Lower Extremities] : not present [] : not present [de-identified] : No pain on BL feet [Foot Ulcer] : no foot ulcer [Skin Induration] : no skin induration [FreeTextEntry1] : Mild xerosis on BL feet; \par Erythema with scaling to Right medial ankle and posterior heel\par Scattered papule-like lesions to dorsal toes with hyperpigmentation to periphery;

## 2022-12-20 NOTE — ASSESSMENT
[Verbal] : verbal [Good - alert, interested, motivated] : Good - alert, interested, motivated [Demonstrates independently] : demonstrates independently [FreeTextEntry1] : Assessment: \par -Xerosis\par -Tinea pedis\par -Onychomycosis\par \par Plan:\par -Pt seen and eval\par -rx: terbinafine x2 week dosage.  \par -F/u in 3 weeks, will discuss about dermatology eval and foot status\par

## 2022-12-22 DIAGNOSIS — E11.42 TYPE 2 DIABETES MELLITUS WITH DIABETIC POLYNEUROPATHY: ICD-10-CM

## 2022-12-22 DIAGNOSIS — M79.672 PAIN IN LEFT FOOT: ICD-10-CM

## 2022-12-22 DIAGNOSIS — B35.1 TINEA UNGUIUM: ICD-10-CM

## 2022-12-22 DIAGNOSIS — M79.671 PAIN IN RIGHT FOOT: ICD-10-CM

## 2022-12-25 ENCOUNTER — EMERGENCY (EMERGENCY)
Facility: HOSPITAL | Age: 52
LOS: 0 days | Discharge: HOME | End: 2022-12-25
Attending: STUDENT IN AN ORGANIZED HEALTH CARE EDUCATION/TRAINING PROGRAM | Admitting: STUDENT IN AN ORGANIZED HEALTH CARE EDUCATION/TRAINING PROGRAM

## 2022-12-25 VITALS
SYSTOLIC BLOOD PRESSURE: 126 MMHG | OXYGEN SATURATION: 98 % | RESPIRATION RATE: 18 BRPM | DIASTOLIC BLOOD PRESSURE: 68 MMHG | HEART RATE: 84 BPM | WEIGHT: 85.1 LBS | TEMPERATURE: 96 F

## 2022-12-25 DIAGNOSIS — M25.552 PAIN IN LEFT HIP: ICD-10-CM

## 2022-12-25 DIAGNOSIS — Z79.84 LONG TERM (CURRENT) USE OF ORAL HYPOGLYCEMIC DRUGS: ICD-10-CM

## 2022-12-25 DIAGNOSIS — M61.452: ICD-10-CM

## 2022-12-25 DIAGNOSIS — I10 ESSENTIAL (PRIMARY) HYPERTENSION: ICD-10-CM

## 2022-12-25 DIAGNOSIS — E11.9 TYPE 2 DIABETES MELLITUS WITHOUT COMPLICATIONS: ICD-10-CM

## 2022-12-25 DIAGNOSIS — M54.50 LOW BACK PAIN, UNSPECIFIED: ICD-10-CM

## 2022-12-25 DIAGNOSIS — Z98.891 HISTORY OF UTERINE SCAR FROM PREVIOUS SURGERY: Chronic | ICD-10-CM

## 2022-12-25 PROCEDURE — 99284 EMERGENCY DEPT VISIT MOD MDM: CPT

## 2022-12-25 PROCEDURE — 73502 X-RAY EXAM HIP UNI 2-3 VIEWS: CPT | Mod: 26,LT

## 2022-12-25 RX ORDER — LIDOCAINE 4 G/100G
1 CREAM TOPICAL
Qty: 7 | Refills: 0
Start: 2022-12-25 | End: 2022-12-31

## 2022-12-25 RX ORDER — METHOCARBAMOL 500 MG/1
1500 TABLET, FILM COATED ORAL ONCE
Refills: 0 | Status: COMPLETED | OUTPATIENT
Start: 2022-12-25 | End: 2022-12-25

## 2022-12-25 RX ORDER — METHOCARBAMOL 500 MG/1
2 TABLET, FILM COATED ORAL
Qty: 10 | Refills: 0
Start: 2022-12-25 | End: 2022-12-29

## 2022-12-25 RX ORDER — IBUPROFEN 200 MG
1 TABLET ORAL
Qty: 30 | Refills: 0
Start: 2022-12-25 | End: 2023-01-03

## 2022-12-25 RX ORDER — LIDOCAINE 4 G/100G
1 CREAM TOPICAL ONCE
Refills: 0 | Status: COMPLETED | OUTPATIENT
Start: 2022-12-25 | End: 2022-12-25

## 2022-12-25 RX ORDER — IBUPROFEN 200 MG
600 TABLET ORAL ONCE
Refills: 0 | Status: COMPLETED | OUTPATIENT
Start: 2022-12-25 | End: 2022-12-25

## 2022-12-25 RX ORDER — ACETAMINOPHEN 500 MG
650 TABLET ORAL ONCE
Refills: 0 | Status: COMPLETED | OUTPATIENT
Start: 2022-12-25 | End: 2022-12-25

## 2022-12-25 RX ADMIN — Medication 650 MILLIGRAM(S): at 16:44

## 2022-12-25 RX ADMIN — LIDOCAINE 1 PATCH: 4 CREAM TOPICAL at 17:56

## 2022-12-25 RX ADMIN — Medication 600 MILLIGRAM(S): at 16:46

## 2022-12-25 RX ADMIN — METHOCARBAMOL 1500 MILLIGRAM(S): 500 TABLET, FILM COATED ORAL at 16:44

## 2022-12-25 NOTE — ED PROVIDER NOTE - CARE PLAN
1 Principal Discharge DX:	Left hip pain  Secondary Diagnosis:	Other calcification of muscle, pelvic region and thigh

## 2022-12-25 NOTE — ED PROVIDER NOTE - NSFOLLOWUPINSTRUCTIONS_ED_ALL_ED_FT
Realice un seguimiento con PCP y fisioterapia en 1 a 3 días para un mayor control del dolor de cadera. Por favor, tome los medicamentos según lo prescrito. Precauciones de devolución explicadas en agarwal totalidad al paciente/kahlil.    Dolor crónico    El dolor crónico es georgina condición compleja y el Departamento de Emergencia no es el lugar ideal para manejar esta condición. Los analgésicos recetados deben ser recetados por agarwal proveedor de atención primaria o un médico de control del dolor. Evite actividades o desencadenantes que exacerben agarwal dolor.    BUSQUE ATENCIÓN MÉDICA INMEDIATA SI TIENE ALGUNO DE LOS SIGUIENTES SÍNTOMAS: dolor intenso en el pecho, desmayo, vómitos con markos, heces oscuras o con markos, o dolor diferente al dolor crónico.

## 2022-12-25 NOTE — ED PROVIDER NOTE - CLINICAL SUMMARY MEDICAL DECISION MAKING FREE TEXT BOX
.    53 y/o F no sig pmh p/w .    53 y/o F pmh htn, dm, p/w L lower back pain w/ rad to anterior/ medial thigh x2d. Worse w/ movement, better is some positions. No trauma, neuro deficit, bowel or bladder issues. No abd pain.  ROS PE above; no spinal ttp; No neuro deficit; Pt is ambulatory w/ antalgic gait.    xray shows no frx. + calcified perineal mass seen, on reassessment, pt has no complaint in that area, mass felt w/ palpation, is non tender. Sx improved.    IMP: back pain, consider radicular pain.  Pt stable for dc w/ outpt rehab f/up, rx for nsaid and lidocaine patch, and care as discussed.  Pt understands plan and signs and symptoms for ED return. DC home.     .

## 2022-12-25 NOTE — ED PROVIDER NOTE - PROGRESS NOTE
Quality 130: Documentation Of Current Medications In The Medical Record: Current Medications Documented Detail Level: Detailed Improved.

## 2022-12-25 NOTE — ED PROVIDER NOTE - PHYSICAL EXAMINATION
Gen: Alert, NAD, well appearing  Head: NC, AT, EOMI  Pulm: Bilateral BS, normal resp effort, no wheeze/stridor/retractions  CV: RRR, no M/R/G, +dist pulses  Mskel: no edema/erythema/cyanosis, tender at left lower back, positive leg raise ipsilateral, gait limited due to pain  Skin: no rash, warm/dry  Neuro: AAOx3, no sensory/motor deficits, CN 2-12 grossly intact

## 2022-12-25 NOTE — ED ADULT NURSE NOTE - IS THE PATIENT ABLE TO BE SCREENED?
Rt wrist pain/ swelling since last night, denies injury , pt states she is 3-4 months pregnant , sent by Ob/Gyn Yes

## 2022-12-25 NOTE — ED PROVIDER NOTE - OBJECTIVE STATEMENT
52-year-old female past medical history of diabetes and hypertension coming in with complaints of left back pain.  Patient reports that she began to have left sided lower back pain 2 days ago, acute in onset, no trauma or impetus known.  Pain radiates down her inner thigh, sharp in nature, alleviated when she does not walk, exacerbated when she attempts to move.  Has no history of ever getting MRI, discopathy, and has never seen neurosurgery. Denies any fever, chills, nausea, vomiting, CP, SOB, changes in urination, or changes in bowel movements.

## 2022-12-25 NOTE — ED PROVIDER NOTE - NSFOLLOWUPCLINICS_GEN_ALL_ED_FT
[facility]  Physical Therapy  Multiple Location  [place]   Phone: [phone]  Fax:   Follow Up Time: 1-3 Days

## 2022-12-25 NOTE — ED PROVIDER NOTE - PROGRESS NOTE DETAILS
SG: pt feeling better. XR noted incidental calcified mass in peroneal region. Pt denied any symptoms, agreed to pelvic exam.  External pelvic exam performed, chaperoned by PCA Veronica, palpable mass noted in vagina, nontender.

## 2022-12-28 ENCOUNTER — OUTPATIENT (OUTPATIENT)
Dept: OUTPATIENT SERVICES | Facility: HOSPITAL | Age: 52
LOS: 1 days | Discharge: HOME | End: 2022-12-28

## 2022-12-28 ENCOUNTER — APPOINTMENT (OUTPATIENT)
Dept: INTERNAL MEDICINE | Facility: CLINIC | Age: 52
End: 2022-12-28
Payer: MEDICAID

## 2022-12-28 VITALS
SYSTOLIC BLOOD PRESSURE: 147 MMHG | TEMPERATURE: 97.8 F | OXYGEN SATURATION: 98 % | WEIGHT: 87 LBS | DIASTOLIC BLOOD PRESSURE: 87 MMHG | HEIGHT: 60 IN | HEART RATE: 82 BPM | BODY MASS INDEX: 17.08 KG/M2

## 2022-12-28 DIAGNOSIS — E11.42 TYPE 2 DIABETES MELLITUS WITH DIABETIC POLYNEUROPATHY: ICD-10-CM

## 2022-12-28 DIAGNOSIS — Z98.891 HISTORY OF UTERINE SCAR FROM PREVIOUS SURGERY: Chronic | ICD-10-CM

## 2022-12-28 DIAGNOSIS — K58.9 IRRITABLE BOWEL SYNDROME W/OUT DIARRHEA: ICD-10-CM

## 2022-12-28 PROCEDURE — 99214 OFFICE O/P EST MOD 30 MIN: CPT | Mod: GC

## 2022-12-28 NOTE — HISTORY OF PRESENT ILLNESS
[FreeTextEntry1] : Here for hospital follow up  [de-identified] : 52-year-old female past medical history of diabetes and hypertension coming in for hospital follow up for left hip pain.  Patient report that she began to have left sided lower back pain days ago, acute in onset, no trauma or impetus known.  Pain radiates down her inner thigh, sharp in nature, alleviated when she does not walk, exacerbated when she attempts to move.  \par Currently still having pain , and rates in 6/10

## 2022-12-28 NOTE — ASSESSMENT
[FreeTextEntry1] : #Left Hip Pain\par #Left back pain\par - Xray in ED showed mild degenerative changes both hips and visualized lower lumbar spine\par - ortho referral \par - PT referral \par -c/w cyclobenzaprine , NSAIDs and lidocaine patch for now

## 2022-12-28 NOTE — PHYSICAL EXAM
[No Acute Distress] : no acute distress [No JVD] : no jugular venous distention [Supple] : supple [No Respiratory Distress] : no respiratory distress  [No Accessory Muscle Use] : no accessory muscle use [No Edema] : there was no peripheral edema [de-identified] : Decrease ROM in left leg

## 2023-01-04 ENCOUNTER — OUTPATIENT (OUTPATIENT)
Dept: OUTPATIENT SERVICES | Facility: HOSPITAL | Age: 53
LOS: 1 days | Discharge: HOME | End: 2023-01-04

## 2023-01-04 ENCOUNTER — APPOINTMENT (OUTPATIENT)
Dept: OPHTHALMOLOGY | Facility: CLINIC | Age: 53
End: 2023-01-04
Payer: MEDICAID

## 2023-01-04 DIAGNOSIS — H04.129 DRY EYE SYNDROME OF UNSPECIFIED LACRIMAL GLAND: ICD-10-CM

## 2023-01-04 DIAGNOSIS — E11.3299 TYPE 2 DIABETES MELLITUS WITH MILD NONPROLIFERATIVE DIABETIC RETINOPATHY WITHOUT MACULAR EDEMA, UNSPECIFIED EYE: ICD-10-CM

## 2023-01-04 DIAGNOSIS — H25.9 UNSPECIFIED AGE-RELATED CATARACT: ICD-10-CM

## 2023-01-04 PROCEDURE — 92012 INTRM OPH EXAM EST PATIENT: CPT

## 2023-01-09 PROBLEM — E11.42 CONTROLLED DIABETES MELLITUS WITH DIABETIC POLYNEUROPATHY: Status: ACTIVE | Noted: 2018-11-20

## 2023-01-09 PROBLEM — K58.9 IBS (IRRITABLE BOWEL SYNDROME): Status: ACTIVE | Noted: 2018-11-02

## 2023-01-10 ENCOUNTER — APPOINTMENT (OUTPATIENT)
Dept: NUTRITION | Facility: CLINIC | Age: 53
End: 2023-01-10

## 2023-01-10 DIAGNOSIS — K58.9 IRRITABLE BOWEL SYNDROME WITHOUT DIARRHEA: ICD-10-CM

## 2023-01-10 DIAGNOSIS — E11.42 TYPE 2 DIABETES MELLITUS WITH DIABETIC POLYNEUROPATHY: ICD-10-CM

## 2023-01-10 DIAGNOSIS — E78.00 PURE HYPERCHOLESTEROLEMIA, UNSPECIFIED: ICD-10-CM

## 2023-01-10 DIAGNOSIS — M25.552 PAIN IN LEFT HIP: ICD-10-CM

## 2023-01-10 DIAGNOSIS — I10 ESSENTIAL (PRIMARY) HYPERTENSION: ICD-10-CM

## 2023-01-11 ENCOUNTER — APPOINTMENT (OUTPATIENT)
Dept: ORTHOPEDIC SURGERY | Facility: CLINIC | Age: 53
End: 2023-01-11

## 2023-01-13 ENCOUNTER — APPOINTMENT (OUTPATIENT)
Dept: PODIATRY | Facility: CLINIC | Age: 53
End: 2023-01-13
Payer: MEDICAID

## 2023-01-13 ENCOUNTER — OUTPATIENT (OUTPATIENT)
Dept: OUTPATIENT SERVICES | Facility: HOSPITAL | Age: 53
LOS: 1 days | Discharge: HOME | End: 2023-01-13

## 2023-01-13 DIAGNOSIS — E11.21 TYPE 2 DIABETES MELLITUS WITH DIABETIC NEPHROPATHY: ICD-10-CM

## 2023-01-13 DIAGNOSIS — B35.3 TINEA PEDIS: ICD-10-CM

## 2023-01-13 DIAGNOSIS — R23.0 CYANOSIS: ICD-10-CM

## 2023-01-13 DIAGNOSIS — Z98.891 HISTORY OF UTERINE SCAR FROM PREVIOUS SURGERY: Chronic | ICD-10-CM

## 2023-01-13 DIAGNOSIS — L03.90 CELLULITIS, UNSPECIFIED: ICD-10-CM

## 2023-01-13 DIAGNOSIS — E11.65 TYPE 2 DIABETES MELLITUS WITH DIABETIC NEPHROPATHY: ICD-10-CM

## 2023-01-13 PROCEDURE — 99214 OFFICE O/P EST MOD 30 MIN: CPT

## 2023-01-17 LAB — FUNGUS TISS CULT: NORMAL

## 2023-01-22 PROBLEM — E11.21 UNCONTROLLED TYPE 2 DIABETES MELLITUS WITH MACROALBUMINURIC DIABETIC NEPHROPATHY: Status: ACTIVE | Noted: 2019-09-06

## 2023-01-22 PROBLEM — L03.90 WOUND CELLULITIS: Status: ACTIVE | Noted: 2022-11-28

## 2023-01-22 PROBLEM — B35.3 TINEA PEDIS OF BOTH FEET: Status: ACTIVE | Noted: 2022-05-06

## 2023-01-22 PROBLEM — B35.3 TINEA PEDIS OF BOTH FEET: Status: ACTIVE | Noted: 2022-06-10

## 2023-01-22 NOTE — PHYSICAL EXAM
[General Appearance - Alert] : alert [General Appearance - In No Acute Distress] : in no acute distress [General Appearance - Well Nourished] : well nourished [General Appearance - Well Developed] : well developed [Ankle Swelling (On Exam)] : not present [Ankle Swelling Bilaterally] : bilaterally  [Varicose Veins Of Lower Extremities] : bilaterally [] : on both lower extremities [2+] : left foot dorsalis pedis 2+ [Normal Foot/Ankle] : Both lower extremities were exposed and visualized. Standing exam demonstrates normal foot posture and alignment. Hindfoot exam shows no hindfoot valgus or varus [de-identified] : No pain on BL feet [Skin Turgor] : normal skin turgor [Foot Ulcer] : no foot ulcer [Skin Induration] : no skin induration [FreeTextEntry1] : Mild xerosis on BL feet; \par Erythema with scaling to Right medial ankle and posterior heel\par Scattered papule-like lesions to dorsal toes with hyperpigmentation to periphery; [Sensation] : the sensory exam was normal to light touch and pinprick [No Focal Deficits] : no focal deficits [Deep Tendon Reflexes (DTR)] : deep tendon reflexes were 2+ and symmetric [Oriented To Time, Place, And Person] : oriented to person, place, and time [Impaired Insight] : insight and judgment were intact [Affect] : the affect was normal

## 2023-01-22 NOTE — ASSESSMENT
[FreeTextEntry1] : Assessment: \par -Xerosis\par -Tinea pedis\par -Onychomycosis\par \par Plan:\par -Pt seen and eval\par -rx: terbinafine x2 week dosage.  \par -F/u in 3 weeks, will discuss about dermatology eval and foot status\par  [Verbal] : verbal [Good - alert, interested, motivated] : Good - alert, interested, motivated [Demonstrates independently] : demonstrates independently

## 2023-01-25 ENCOUNTER — APPOINTMENT (OUTPATIENT)
Dept: ORTHOPEDIC SURGERY | Facility: CLINIC | Age: 53
End: 2023-01-25
Payer: MEDICAID

## 2023-01-25 ENCOUNTER — OUTPATIENT (OUTPATIENT)
Dept: OUTPATIENT SERVICES | Facility: HOSPITAL | Age: 53
LOS: 1 days | Discharge: HOME | End: 2023-01-25

## 2023-01-25 VITALS
DIASTOLIC BLOOD PRESSURE: 83 MMHG | SYSTOLIC BLOOD PRESSURE: 138 MMHG | BODY MASS INDEX: 17.08 KG/M2 | HEART RATE: 87 BPM | TEMPERATURE: 98.1 F | WEIGHT: 87 LBS | HEIGHT: 60 IN

## 2023-01-25 DIAGNOSIS — M25.552 PAIN IN LEFT HIP: ICD-10-CM

## 2023-01-25 DIAGNOSIS — Z98.891 HISTORY OF UTERINE SCAR FROM PREVIOUS SURGERY: Chronic | ICD-10-CM

## 2023-01-25 PROCEDURE — 99213 OFFICE O/P EST LOW 20 MIN: CPT

## 2023-01-27 DIAGNOSIS — L03.90 CELLULITIS, UNSPECIFIED: ICD-10-CM

## 2023-01-27 DIAGNOSIS — I83.893 VARICOSE VEINS OF BILATERAL LOWER EXTREMITIES WITH OTHER COMPLICATIONS: ICD-10-CM

## 2023-01-27 DIAGNOSIS — R23.0 CYANOSIS: ICD-10-CM

## 2023-01-27 DIAGNOSIS — E10.65 TYPE 1 DIABETES MELLITUS WITH HYPERGLYCEMIA: ICD-10-CM

## 2023-01-27 DIAGNOSIS — B35.3 TINEA PEDIS: ICD-10-CM

## 2023-02-10 ENCOUNTER — APPOINTMENT (OUTPATIENT)
Dept: RHEUMATOLOGY | Facility: CLINIC | Age: 53
End: 2023-02-10

## 2023-02-10 ENCOUNTER — APPOINTMENT (OUTPATIENT)
Age: 53
End: 2023-02-10

## 2023-02-10 ENCOUNTER — APPOINTMENT (OUTPATIENT)
Dept: NUTRITION | Facility: CLINIC | Age: 53
End: 2023-02-10

## 2023-02-13 ENCOUNTER — APPOINTMENT (OUTPATIENT)
Dept: PODIATRY | Facility: CLINIC | Age: 53
End: 2023-02-13

## 2023-02-13 ENCOUNTER — APPOINTMENT (OUTPATIENT)
Age: 53
End: 2023-02-13

## 2023-03-20 NOTE — HISTORY OF PRESENT ILLNESS
[FreeTextEntry1] : 50 y/o female presenting for follow-up visit regarding her T1DM.\par Started on Tresiba at her last visit. She states she takes Tresiba 20U QD and Lispro 5U TID without issues.\par She is checking her fignersticks every day, stating that it is normally between 200s-270s but that it was in 130s this morning.\par She also has lesions over her right foot and ankle, for which she is following with Dr. Boyd from podiatry.

## 2023-03-20 NOTE — REVIEW OF SYSTEMS
[Difficulty Walking] : difficulty walking [Fatigue] : no fatigue [Recent Weight Gain (___ Lbs)] : no recent weight gain [Recent Weight Loss (___ Lbs)] : no recent weight loss [Fever] : no fever [Chills] : no chills [Visual Field Defect] : no visual field defect [Shortness Of Breath] : no shortness of breath [Cough] : no cough [SOB on Exertion] : no shortness of breath on exertion [Nausea] : no nausea [Constipation] : no constipation [Abdominal Pain] : no abdominal pain [Vomiting] : no vomiting [Diarrhea] : no diarrhea [Gas/Bloating] : no gas/bloating [Polyuria] : no polyuria [Dizziness] : no dizziness [Cold Intolerance] : no cold intolerance [Heat Intolerance] : no heat intolerance

## 2023-03-20 NOTE — ASSESSMENT
[Diabetes Foot Care] : diabetes foot care [Long Term Vascular Complications] : long term vascular complications of diabetes [Carbohydrate Consistent Diet] : carbohydrate consistent diet [Importance of Diet and Exercise] : importance of diet and exercise to improve glycemic control, achieve weight loss and improve cardiovascular health [Retinopathy Screening] : Patient was referred to ophthalmology for retinopathy screening [FreeTextEntry1] : ASSESSMENT: 50 y/o female presenting for follow-up visit regarding her T1DM. Currently on Tresiba 20U QD and Lispro 5U TID without issues. Fingersticks reportedly run between 200s-270s but 130s this morning. She also has lesions over her right foot and ankle, for which she is following with Dr. Boyd from podiatry.\par \par Plans:\par # Poorly controlled T1DM with diabetic polyneuropathy\par - Continue Tresiba 20U QD and Lispro 5U TID.\par - Patient instructed to continue checking fingersticks regularly and follow with diabetes educator Sarah.\par - Repeat A1c/serum glucose.\par - Repeat albumin/creatinine ratio.\par - Repeat CMP.\par \par # Lesions on right foot and medial malleolus\par - Instructed to continue following closely with Dr. Boyd from Podiatry.\par \par # HCM\par - RTC in 3-4 months.\par  type 1 diabets, E10.65. she uses blood glucose measurements to adjust insulin dosage according to sliding scale, and carbohydrate counting.. she is on 4 insulin injections daily.\par

## 2023-03-20 NOTE — PHYSICAL EXAM
[Alert] : alert [No Respiratory Distress] : no respiratory distress [Normal Rate and Effort] : normal respiratory rate and effort [Clear to Auscultation] : lungs were clear to auscultation bilaterally [Normal Rate] : heart rate was normal [Regular Rhythm] : with a regular rhythm [Not Tender] : non-tender [Not Distended] : not distended [de-identified] : Several lesions present on right foot and over right medial malleolus.

## 2023-03-23 ENCOUNTER — APPOINTMENT (OUTPATIENT)
Dept: ENDOCRINOLOGY | Facility: CLINIC | Age: 53
End: 2023-03-23

## 2023-03-28 ENCOUNTER — APPOINTMENT (OUTPATIENT)
Dept: INTERNAL MEDICINE | Facility: CLINIC | Age: 53
End: 2023-03-28

## 2023-03-30 ENCOUNTER — APPOINTMENT (OUTPATIENT)
Dept: VASCULAR SURGERY | Facility: CLINIC | Age: 53
End: 2023-03-30
Payer: MEDICAID

## 2023-03-30 VITALS
HEIGHT: 60 IN | WEIGHT: 90 LBS | SYSTOLIC BLOOD PRESSURE: 129 MMHG | BODY MASS INDEX: 17.67 KG/M2 | DIASTOLIC BLOOD PRESSURE: 83 MMHG

## 2023-03-30 DIAGNOSIS — I83.893 VARICOSE VEINS OF BILATERAL LOWER EXTREMITIES WITH OTHER COMPLICATIONS: ICD-10-CM

## 2023-03-30 DIAGNOSIS — R20.9 UNSPECIFIED DISTURBANCES OF SKIN SENSATION: ICD-10-CM

## 2023-03-30 PROCEDURE — 93970 EXTREMITY STUDY: CPT

## 2023-03-30 PROCEDURE — 99213 OFFICE O/P EST LOW 20 MIN: CPT

## 2023-03-30 NOTE — DATA REVIEWED
[FreeTextEntry1] : I performed a venous duplex which was medically necessary to evaluate for venous insufficiency. It showed no evidence of DVT or GSV reflux in the LE bilaterally.\par

## 2023-03-30 NOTE — ASSESSMENT
[FreeTextEntry1] : 53 y/o female with coldness to feet and leg swelling.\par \par No evidence of DVT or GSV reflux in the LE bilaterally, normal arterial duplex in November 2022, palpable pulses in the lower extremities bialterally.\par \par Her symptoms are of diabetic neuropathy.\par \par No vascular surgical intervention needed.\par \par Follow up with Podiatry.

## 2023-04-12 ENCOUNTER — APPOINTMENT (OUTPATIENT)
Dept: ORTHOPEDIC SURGERY | Facility: CLINIC | Age: 53
End: 2023-04-12

## 2023-04-19 ENCOUNTER — APPOINTMENT (OUTPATIENT)
Dept: ORTHOPEDIC SURGERY | Facility: CLINIC | Age: 53
End: 2023-04-19

## 2023-04-26 ENCOUNTER — APPOINTMENT (OUTPATIENT)
Dept: ORTHOPEDIC SURGERY | Facility: CLINIC | Age: 53
End: 2023-04-26

## 2023-04-27 NOTE — ED ADULT NURSE NOTE - NSICDXPASTSURGICALHX_GEN_ALL_CORE_FT
PAST SURGICAL HISTORY:  H/O  section      Localized Dermabrasion Text: The patient was draped in routine manner.  Localized dermabrasion using 3 x 17 mm wire brush was performed in routine manner to papillary dermis. This spot dermabrasion is being performed to complete skin cancer reconstruction. It also will eliminate the other sun damaged precancerous cells that are known to be part of the regional effect of a lifetime's worth of sun exposure. This localized dermabrasion is therapeutic and should not be considered cosmetic in any regard. Localized Dermabrasion With Wire Brush Text: The patient was draped in routine manner.  Localized dermabrasion using 3 x 17 mm wire brush was performed in routine manner to papillary dermis. This spot dermabrasion is being performed to complete skin cancer reconstruction. It also will eliminate the other sun damaged precancerous cells that are known to be part of the regional effect of a lifetime's worth of sun exposure. This localized dermabrasion is therapeutic and should not be considered cosmetic in any regard.

## 2023-04-28 ENCOUNTER — INPATIENT (INPATIENT)
Facility: HOSPITAL | Age: 53
LOS: 0 days | Discharge: HOME | DRG: 191 | End: 2023-04-29
Attending: INTERNAL MEDICINE | Admitting: INTERNAL MEDICINE
Payer: MEDICAID

## 2023-04-28 VITALS — SYSTOLIC BLOOD PRESSURE: 194 MMHG | OXYGEN SATURATION: 100 % | DIASTOLIC BLOOD PRESSURE: 96 MMHG | HEART RATE: 96 BPM

## 2023-04-28 DIAGNOSIS — E78.5 HYPERLIPIDEMIA, UNSPECIFIED: ICD-10-CM

## 2023-04-28 DIAGNOSIS — I25.118 ATHEROSCLEROTIC HEART DISEASE OF NATIVE CORONARY ARTERY WITH OTHER FORMS OF ANGINA PECTORIS: ICD-10-CM

## 2023-04-28 DIAGNOSIS — Z98.891 HISTORY OF UTERINE SCAR FROM PREVIOUS SURGERY: Chronic | ICD-10-CM

## 2023-04-28 DIAGNOSIS — R11.2 NAUSEA WITH VOMITING, UNSPECIFIED: ICD-10-CM

## 2023-04-28 DIAGNOSIS — Z79.4 LONG TERM (CURRENT) USE OF INSULIN: ICD-10-CM

## 2023-04-28 DIAGNOSIS — Z86.16 PERSONAL HISTORY OF COVID-19: ICD-10-CM

## 2023-04-28 DIAGNOSIS — I10 ESSENTIAL (PRIMARY) HYPERTENSION: ICD-10-CM

## 2023-04-28 DIAGNOSIS — R06.02 SHORTNESS OF BREATH: ICD-10-CM

## 2023-04-28 DIAGNOSIS — E11.40 TYPE 2 DIABETES MELLITUS WITH DIABETIC NEUROPATHY, UNSPECIFIED: ICD-10-CM

## 2023-04-28 LAB
ALBUMIN SERPL ELPH-MCNC: 4 G/DL — SIGNIFICANT CHANGE UP (ref 3.5–5.2)
ALP SERPL-CCNC: 98 U/L — SIGNIFICANT CHANGE UP (ref 30–115)
ALT FLD-CCNC: 16 U/L — SIGNIFICANT CHANGE UP (ref 0–41)
ANION GAP SERPL CALC-SCNC: 12 MMOL/L — SIGNIFICANT CHANGE UP (ref 7–14)
ANION GAP SERPL CALC-SCNC: 16 MMOL/L — HIGH (ref 7–14)
AST SERPL-CCNC: 21 U/L — SIGNIFICANT CHANGE UP (ref 0–41)
BASOPHILS # BLD AUTO: 0.04 K/UL — SIGNIFICANT CHANGE UP (ref 0–0.2)
BASOPHILS NFR BLD AUTO: 0.3 % — SIGNIFICANT CHANGE UP (ref 0–1)
BILIRUB SERPL-MCNC: 0.2 MG/DL — SIGNIFICANT CHANGE UP (ref 0.2–1.2)
BUN SERPL-MCNC: 19 MG/DL — SIGNIFICANT CHANGE UP (ref 10–20)
BUN SERPL-MCNC: 22 MG/DL — HIGH (ref 10–20)
CALCIUM SERPL-MCNC: 10 MG/DL — SIGNIFICANT CHANGE UP (ref 8.4–10.5)
CALCIUM SERPL-MCNC: 10.1 MG/DL — SIGNIFICANT CHANGE UP (ref 8.4–10.5)
CHLORIDE SERPL-SCNC: 101 MMOL/L — SIGNIFICANT CHANGE UP (ref 98–110)
CHLORIDE SERPL-SCNC: 102 MMOL/L — SIGNIFICANT CHANGE UP (ref 98–110)
CO2 SERPL-SCNC: 26 MMOL/L — SIGNIFICANT CHANGE UP (ref 17–32)
CO2 SERPL-SCNC: 28 MMOL/L — SIGNIFICANT CHANGE UP (ref 17–32)
CREAT SERPL-MCNC: 0.6 MG/DL — LOW (ref 0.7–1.5)
CREAT SERPL-MCNC: 0.7 MG/DL — SIGNIFICANT CHANGE UP (ref 0.7–1.5)
EGFR: 103 ML/MIN/1.73M2 — SIGNIFICANT CHANGE UP
EGFR: 107 ML/MIN/1.73M2 — SIGNIFICANT CHANGE UP
EOSINOPHIL # BLD AUTO: 0 K/UL — SIGNIFICANT CHANGE UP (ref 0–0.7)
EOSINOPHIL NFR BLD AUTO: 0 % — SIGNIFICANT CHANGE UP (ref 0–8)
GLUCOSE BLDC GLUCOMTR-MCNC: 171 MG/DL — HIGH (ref 70–99)
GLUCOSE SERPL-MCNC: 131 MG/DL — HIGH (ref 70–99)
GLUCOSE SERPL-MCNC: 173 MG/DL — HIGH (ref 70–99)
HCT VFR BLD CALC: 39.2 % — SIGNIFICANT CHANGE UP (ref 37–47)
HCT VFR BLD CALC: 40.4 % — SIGNIFICANT CHANGE UP (ref 37–47)
HGB BLD-MCNC: 13 G/DL — SIGNIFICANT CHANGE UP (ref 12–16)
HGB BLD-MCNC: 13.5 G/DL — SIGNIFICANT CHANGE UP (ref 12–16)
IMM GRANULOCYTES NFR BLD AUTO: 0.3 % — SIGNIFICANT CHANGE UP (ref 0.1–0.3)
LYMPHOCYTES # BLD AUTO: 1.17 K/UL — LOW (ref 1.2–3.4)
LYMPHOCYTES # BLD AUTO: 8.4 % — LOW (ref 20.5–51.1)
MCHC RBC-ENTMCNC: 29.5 PG — SIGNIFICANT CHANGE UP (ref 27–31)
MCHC RBC-ENTMCNC: 29.5 PG — SIGNIFICANT CHANGE UP (ref 27–31)
MCHC RBC-ENTMCNC: 33.2 G/DL — SIGNIFICANT CHANGE UP (ref 32–37)
MCHC RBC-ENTMCNC: 33.4 G/DL — SIGNIFICANT CHANGE UP (ref 32–37)
MCV RBC AUTO: 88.4 FL — SIGNIFICANT CHANGE UP (ref 81–99)
MCV RBC AUTO: 89.1 FL — SIGNIFICANT CHANGE UP (ref 81–99)
MONOCYTES # BLD AUTO: 0.17 K/UL — SIGNIFICANT CHANGE UP (ref 0.1–0.6)
MONOCYTES NFR BLD AUTO: 1.2 % — LOW (ref 1.7–9.3)
NEUTROPHILS # BLD AUTO: 12.52 K/UL — HIGH (ref 1.4–6.5)
NEUTROPHILS NFR BLD AUTO: 89.8 % — HIGH (ref 42.2–75.2)
NRBC # BLD: 0 /100 WBCS — SIGNIFICANT CHANGE UP (ref 0–0)
NRBC # BLD: 0 /100 WBCS — SIGNIFICANT CHANGE UP (ref 0–0)
PLATELET # BLD AUTO: 253 K/UL — SIGNIFICANT CHANGE UP (ref 130–400)
PLATELET # BLD AUTO: 263 K/UL — SIGNIFICANT CHANGE UP (ref 130–400)
PMV BLD: 10 FL — SIGNIFICANT CHANGE UP (ref 7.4–10.4)
PMV BLD: 10.1 FL — SIGNIFICANT CHANGE UP (ref 7.4–10.4)
POTASSIUM SERPL-MCNC: 3.7 MMOL/L — SIGNIFICANT CHANGE UP (ref 3.5–5)
POTASSIUM SERPL-MCNC: 3.9 MMOL/L — SIGNIFICANT CHANGE UP (ref 3.5–5)
POTASSIUM SERPL-SCNC: 3.7 MMOL/L — SIGNIFICANT CHANGE UP (ref 3.5–5)
POTASSIUM SERPL-SCNC: 3.9 MMOL/L — SIGNIFICANT CHANGE UP (ref 3.5–5)
PROT SERPL-MCNC: 7.2 G/DL — SIGNIFICANT CHANGE UP (ref 6–8)
RBC # BLD: 4.4 M/UL — SIGNIFICANT CHANGE UP (ref 4.2–5.4)
RBC # BLD: 4.57 M/UL — SIGNIFICANT CHANGE UP (ref 4.2–5.4)
RBC # FLD: 12.1 % — SIGNIFICANT CHANGE UP (ref 11.5–14.5)
RBC # FLD: 12.1 % — SIGNIFICANT CHANGE UP (ref 11.5–14.5)
SODIUM SERPL-SCNC: 142 MMOL/L — SIGNIFICANT CHANGE UP (ref 135–146)
SODIUM SERPL-SCNC: 143 MMOL/L — SIGNIFICANT CHANGE UP (ref 135–146)
WBC # BLD: 10.26 K/UL — SIGNIFICANT CHANGE UP (ref 4.8–10.8)
WBC # BLD: 13.94 K/UL — HIGH (ref 4.8–10.8)
WBC # FLD AUTO: 10.26 K/UL — SIGNIFICANT CHANGE UP (ref 4.8–10.8)
WBC # FLD AUTO: 13.94 K/UL — HIGH (ref 4.8–10.8)

## 2023-04-28 PROCEDURE — 83036 HEMOGLOBIN GLYCOSYLATED A1C: CPT

## 2023-04-28 PROCEDURE — 85025 COMPLETE CBC W/AUTO DIFF WBC: CPT

## 2023-04-28 PROCEDURE — 80053 COMPREHEN METABOLIC PANEL: CPT

## 2023-04-28 PROCEDURE — 82962 GLUCOSE BLOOD TEST: CPT

## 2023-04-28 PROCEDURE — 93458 L HRT ARTERY/VENTRICLE ANGIO: CPT

## 2023-04-28 PROCEDURE — 36415 COLL VENOUS BLD VENIPUNCTURE: CPT

## 2023-04-28 PROCEDURE — 85027 COMPLETE CBC AUTOMATED: CPT

## 2023-04-28 PROCEDURE — 93005 ELECTROCARDIOGRAM TRACING: CPT

## 2023-04-28 PROCEDURE — 80048 BASIC METABOLIC PNL TOTAL CA: CPT

## 2023-04-28 RX ORDER — INSULIN LISPRO 100/ML
VIAL (ML) SUBCUTANEOUS
Refills: 0 | Status: DISCONTINUED | OUTPATIENT
Start: 2023-04-28 | End: 2023-04-29

## 2023-04-28 RX ORDER — GABAPENTIN 400 MG/1
1 CAPSULE ORAL
Qty: 0 | Refills: 0 | DISCHARGE

## 2023-04-28 RX ORDER — PIOGLITAZONE HYDROCHLORIDE 15 MG/1
1 TABLET ORAL
Qty: 0 | Refills: 0 | DISCHARGE

## 2023-04-28 RX ORDER — DEXTROSE 50 % IN WATER 50 %
25 SYRINGE (ML) INTRAVENOUS ONCE
Refills: 0 | Status: DISCONTINUED | OUTPATIENT
Start: 2023-04-28 | End: 2023-04-29

## 2023-04-28 RX ORDER — ASPIRIN/CALCIUM CARB/MAGNESIUM 324 MG
1 TABLET ORAL
Qty: 30 | Refills: 0
Start: 2023-04-28 | End: 2023-05-27

## 2023-04-28 RX ORDER — ONDANSETRON 8 MG/1
8 TABLET, FILM COATED ORAL
Refills: 0 | Status: DISCONTINUED | OUTPATIENT
Start: 2023-04-28 | End: 2023-04-28

## 2023-04-28 RX ORDER — OMEPRAZOLE 10 MG/1
1 CAPSULE, DELAYED RELEASE ORAL
Qty: 0 | Refills: 0 | DISCHARGE

## 2023-04-28 RX ORDER — SODIUM CHLORIDE 9 MG/ML
1000 INJECTION, SOLUTION INTRAVENOUS
Refills: 0 | Status: DISCONTINUED | OUTPATIENT
Start: 2023-04-28 | End: 2023-04-29

## 2023-04-28 RX ORDER — METFORMIN HYDROCHLORIDE 850 MG/1
1 TABLET ORAL
Qty: 0 | Refills: 0 | DISCHARGE

## 2023-04-28 RX ORDER — ASPIRIN/CALCIUM CARB/MAGNESIUM 324 MG
81 TABLET ORAL DAILY
Refills: 0 | Status: DISCONTINUED | OUTPATIENT
Start: 2023-04-29 | End: 2023-04-29

## 2023-04-28 RX ORDER — RAMIPRIL 5 MG
1 CAPSULE ORAL
Qty: 0 | Refills: 0 | DISCHARGE

## 2023-04-28 RX ORDER — DEXTROSE 50 % IN WATER 50 %
12.5 SYRINGE (ML) INTRAVENOUS ONCE
Refills: 0 | Status: DISCONTINUED | OUTPATIENT
Start: 2023-04-28 | End: 2023-04-29

## 2023-04-28 RX ORDER — ATORVASTATIN CALCIUM 80 MG/1
1 TABLET, FILM COATED ORAL
Qty: 0 | Refills: 0 | DISCHARGE

## 2023-04-28 RX ORDER — DEXTROSE 50 % IN WATER 50 %
15 SYRINGE (ML) INTRAVENOUS ONCE
Refills: 0 | Status: DISCONTINUED | OUTPATIENT
Start: 2023-04-28 | End: 2023-04-29

## 2023-04-28 RX ORDER — GLUCAGON INJECTION, SOLUTION 0.5 MG/.1ML
1 INJECTION, SOLUTION SUBCUTANEOUS ONCE
Refills: 0 | Status: DISCONTINUED | OUTPATIENT
Start: 2023-04-28 | End: 2023-04-29

## 2023-04-28 RX ORDER — FERROUS SULFATE 325(65) MG
1 TABLET ORAL
Qty: 0 | Refills: 0 | DISCHARGE

## 2023-04-28 RX ORDER — GABAPENTIN 400 MG/1
100 CAPSULE ORAL THREE TIMES A DAY
Refills: 0 | Status: DISCONTINUED | OUTPATIENT
Start: 2023-04-28 | End: 2023-04-29

## 2023-04-28 RX ADMIN — GABAPENTIN 100 MILLIGRAM(S): 400 CAPSULE ORAL at 21:41

## 2023-04-28 NOTE — PATIENT PROFILE ADULT - FALL HARM RISK - HARM RISK INTERVENTIONS
My signature below certifies that the above stated patient is homebound and upon completion of the Face-To-Face encounter, has the need for intermittent skilled nursing, physical therapy and/or speech or occupational therapy services in their home for their current diagnosis as outlined in their initial plan of care. These services will continue to be monitored by myself or another physician. Communicate Risk of Fall with Harm to all staff/Reinforce activity limits and safety measures with patient and family/Tailored Fall Risk Interventions/Visual Cue: Yellow wristband and red socks/Bed in lowest position, wheels locked, appropriate side rails in place/Call bell, personal items and telephone in reach/Instruct patient to call for assistance before getting out of bed or chair/Non-slip footwear when patient is out of bed/[place] to call system/Physically safe environment - no spills, clutter or unnecessary equipment/Purposeful Proactive Rounding/Room/bathroom lighting operational, light cord in reach

## 2023-04-28 NOTE — CHART NOTE - NSCHARTNOTEFT_GEN_A_CORE
PRE-OP DIAGNOSIS:    Positive stress test        PROCEDURE:     [x] Coronary Angiogram   [x] LHC   [x] LVG   [ ] RHC   [ ] Intervention (see below)         PHYSICIAN:  Dr. Moody    ASSISTANT:   Dr Amaro       Consent:    [x] Patient   [ ] Family Member   [ ]  Used        Anesthesia:   [ ] General   [x] Sedation   [x] Local        Access & Closure:     [x] Fr Radial Artery       IV Contrast:    30     mL        Intervention:       Implants:        FINDINGS:     Coronary Angiography  The coronary circulation is co-dominant.     LM  Left main artery: The segment is large. Angiography shows minor irregularities.     LAD  Left anterior descending artery: The segment is large. Angiography shows mild atherosclerosis. First diagonal: The segment is medium sized. Angiography shows minor irregularities.     CX  Circumflex: The segment is large. Angiography shows minor irregularities.   First obtuse marginal: The segment is medium sized. Angiography shows minor irregularities.     RCA  Right coronary artery: The segment is large, co-dominant. Angiography shows minor irregularities. Right posterior descending artery: The segment is small. Angiography shows minor irregularities.          LVEDP:    18      mmHg     EF:   60    %        ESTIMATED BLOOD LOSS: < 10 mL        CONDITION:   [x] Good   [] Fair   [] Critical        SPECIMEN REMOVED: N/A       POST-OP DIAGNOSIS:      [x] Mild Coronary Artery Disease (< 50% stenosis)              PLAN OF CARE:     [] D/C Home Today   [] Medications:  start asa 81. cont statin.   [] IV Fluids:  per protocol PRE-OP DIAGNOSIS:    Positive stress test        PROCEDURE:     [x] Coronary Angiogram   [x] LHC   [x] LVG   [ ] RHC   [ ] Intervention (see below)         PHYSICIAN:  Dr. Moody    ASSISTANT:   Dr Amaro       Consent:    [x] Patient   [ ] Family Member   [ ]  Used        Anesthesia:   [ ] General   [x] Sedation   [x] Local        Access & Closure:     [x] Fr Radial Artery       IV Contrast:    30     mL        Intervention:       Implants:        FINDINGS:     Coronary Angiography  The coronary circulation is co-dominant.     LM  Left main artery: The segment is large. Angiography shows minor irregularities.     LAD  Left anterior descending artery: The segment is large. Angiography shows mild atherosclerosis. First diagonal: The segment is medium sized. Angiography shows minor irregularities.     CX  Circumflex: The segment is large. Angiography shows minor irregularities.   First obtuse marginal: The segment is medium sized. Angiography shows minor irregularities.     RCA  Right coronary artery: The segment is large, co-dominant. Angiography shows minor irregularities. Right posterior descending artery: The segment is small. Angiography shows minor irregularities.          LVEDP:    18      mmHg     EF:   60    %        ESTIMATED BLOOD LOSS: < 10 mL        CONDITION:   [x] Good   [] Fair   [] Critical        SPECIMEN REMOVED: N/A       POST-OP DIAGNOSIS:      [x] Mild Coronary Artery Disease (< 50% stenosis)              PLAN OF CARE:     [] admit overnight for continued observation dt nausea/vomiting.   [] Medications:  start asa 81. cont statin.   [] IV Fluids:  per protocol

## 2023-04-28 NOTE — H&P CARDIOLOGY - HISTORY OF PRESENT ILLNESS
Patient is a 52y Female PMH: HTN, HLD, DM, Covid                                     PSH:     Pt reports SOB when walking uphill , also reports episode of chest heaviness and fatigue over past year, pt had abnormal stress test on 23 at Dr. Moody's office, [facility] recommended    Vital Signs Last 24 Hrs  T(C): --  T(F): --  HR: --96  BP: --194/96  BP(mean): --135  RR: --  SpO2: --100% RA        Pre cath note:  indication:  [ ] STEMI                [ ] NSTEMI                 [ ] Acute coronary syndrome                   [ ]Unstable Angina   [ ] high risk  [ ] intermediate risk  [ ] low risk                   [ x] Stable Angina     non-invasive testing:        EST                  Date:    23                 result: [ ] high risk  [ x] intermediate risk  [ ] low risk    Anti- Anginal medications:                    [ ] not used                       [x ] used                   [ ] not used but strong indication not to use    Ejection Fraction                   [ ] <29            [ ] 30-39%   [ ] 40-49%     [ ]>50%    CHF                   [ ] active (within last 14 days on meds   [ ] Chronic (on meds but no exacerbation)    COPD                   [ ] mild (on chronic bronchodilators)  [ ] moderate (on chronic steroid therapy)      [ ] severe (indication for home O2 or PACO2 >50)    Other risk factors:                     [ ] Previous MI                     [ ] CVA/ stroke                    [ ] carotid stent/ CEA                    [ ] PVD/PAD- (arterial aneurysm, non-palpable pulses, tortuous vessel with inability to insert catheter, infra-renal dissection, renal or subclavian artery stenosis)                    [x ] diabetic                    [ ] previous CABG                    [ ] Renal Failure     Bleeding Risk: 2%                          13.0   10.26 )-----------( 263      ( 2023 10:00 )             39.2       RIGHT RADIAL ARTERY EVALUATION:  MONICO TEST: [] Negative          [x] Positive    REVIEW OF SYSTEMS:  CONSTITUTIONAL: No fever, weight loss, or fatigue  CARDIOLOGY: PAtient denies chest pain, shortness of breath or syncopal episodes.   RESPIRATORY: denies shortness of breath, wheezing  NEUROLOGICAL: NO weakness, no focal deficits to report.  ENDOCRINOLOGICAL: no recent change in diabetic medications.   GI: no BRBPR, no N,V,diarrhea.     PHYSICAL EXAM:  · CONSTITUTIONAL:	Well-developed, well nourished    ·RESPIRATORY:   airway patent; breath sounds equal; good air movement; respirations non-labored; clear to auscultation bilaterally; no chest wall tenderness; no intercostal retractions; no rales,rhonchi or wheeze  · CARDIOVASCULAR	regular rate and rhythm  no rub  no murmur  normal PMI  · EXTREMITIES: No cyanosis, clubbing or edema  · VASCULAR: 	Equal and normal pulses (carotid, femoral, dorsalis pedis)  	     cc IV bolus x 1   EF: n/a  EKG: SR on 23

## 2023-04-29 ENCOUNTER — TRANSCRIPTION ENCOUNTER (OUTPATIENT)
Age: 53
End: 2023-04-29

## 2023-04-29 VITALS
RESPIRATION RATE: 18 BRPM | TEMPERATURE: 99 F | HEART RATE: 78 BPM | SYSTOLIC BLOOD PRESSURE: 130 MMHG | DIASTOLIC BLOOD PRESSURE: 67 MMHG

## 2023-04-29 LAB
A1C WITH ESTIMATED AVERAGE GLUCOSE RESULT: 10.5 % — HIGH (ref 4–5.6)
ANION GAP SERPL CALC-SCNC: 10 MMOL/L — SIGNIFICANT CHANGE UP (ref 7–14)
BUN SERPL-MCNC: 21 MG/DL — HIGH (ref 10–20)
CALCIUM SERPL-MCNC: 9.2 MG/DL — SIGNIFICANT CHANGE UP (ref 8.4–10.5)
CHLORIDE SERPL-SCNC: 105 MMOL/L — SIGNIFICANT CHANGE UP (ref 98–110)
CO2 SERPL-SCNC: 27 MMOL/L — SIGNIFICANT CHANGE UP (ref 17–32)
CREAT SERPL-MCNC: 0.7 MG/DL — SIGNIFICANT CHANGE UP (ref 0.7–1.5)
EGFR: 103 ML/MIN/1.73M2 — SIGNIFICANT CHANGE UP
ESTIMATED AVERAGE GLUCOSE: 255 MG/DL — HIGH (ref 68–114)
GLUCOSE BLDC GLUCOMTR-MCNC: 138 MG/DL — HIGH (ref 70–99)
GLUCOSE SERPL-MCNC: 96 MG/DL — SIGNIFICANT CHANGE UP (ref 70–99)
HCT VFR BLD CALC: 33.9 % — LOW (ref 37–47)
HGB BLD-MCNC: 11.5 G/DL — LOW (ref 12–16)
MCHC RBC-ENTMCNC: 29.9 PG — SIGNIFICANT CHANGE UP (ref 27–31)
MCHC RBC-ENTMCNC: 33.9 G/DL — SIGNIFICANT CHANGE UP (ref 32–37)
MCV RBC AUTO: 88.3 FL — SIGNIFICANT CHANGE UP (ref 81–99)
NRBC # BLD: 0 /100 WBCS — SIGNIFICANT CHANGE UP (ref 0–0)
PLATELET # BLD AUTO: 228 K/UL — SIGNIFICANT CHANGE UP (ref 130–400)
PMV BLD: 10.1 FL — SIGNIFICANT CHANGE UP (ref 7.4–10.4)
POTASSIUM SERPL-MCNC: 3.7 MMOL/L — SIGNIFICANT CHANGE UP (ref 3.5–5)
POTASSIUM SERPL-SCNC: 3.7 MMOL/L — SIGNIFICANT CHANGE UP (ref 3.5–5)
RBC # BLD: 3.84 M/UL — LOW (ref 4.2–5.4)
RBC # FLD: 12.3 % — SIGNIFICANT CHANGE UP (ref 11.5–14.5)
SODIUM SERPL-SCNC: 142 MMOL/L — SIGNIFICANT CHANGE UP (ref 135–146)
WBC # BLD: 8.89 K/UL — SIGNIFICANT CHANGE UP (ref 4.8–10.8)
WBC # FLD AUTO: 8.89 K/UL — SIGNIFICANT CHANGE UP (ref 4.8–10.8)

## 2023-04-29 PROCEDURE — 93010 ELECTROCARDIOGRAM REPORT: CPT

## 2023-04-29 RX ADMIN — GABAPENTIN 100 MILLIGRAM(S): 400 CAPSULE ORAL at 05:34

## 2023-04-29 RX ADMIN — SODIUM CHLORIDE 100 MILLILITER(S): 9 INJECTION, SOLUTION INTRAVENOUS at 05:34

## 2023-04-29 NOTE — DISCHARGE NOTE PROVIDER - NSDCMRMEDTOKEN_GEN_ALL_CORE_FT
Admelog 100 units/mL injectable solution: 8 unit(s) injectable  Ecotrin Adult Low Strength 81 mg oral delayed release tablet: 1 tab(s) orally once a day  gabapentin 100 mg oral capsule: 1 cap(s) orally 3 times a day  Tresiba FlexTouch 100 units/mL subcutaneous solution: 25 unit(s) subcutaneous

## 2023-04-29 NOTE — DISCHARGE NOTE PROVIDER - NSDCFUSCHEDAPPT_GEN_ALL_CORE_FT
GURVINDER [place]  [facility] PreAdmits  Scheduled Appointment: 06/02/2023    [facility] Physician [address]  Scheduled Appointment: 06/02/2023

## 2023-04-29 NOTE — PROGRESS NOTE ADULT - SUBJECTIVE AND OBJECTIVE BOX
Cardiology Follow up    IZA TIAN   52y Female  PAST MEDICAL & SURGICAL HISTORY:  Diabetes      Neuropathy      Hypertension      Hypercholesteremia      H/O  section           HPI:  Patient is a 52y Female PMH: HTN, HLD, DM, Covid                                     PSH:     Pt reports SOB when walking uphill , also reports episode of chest heaviness and fatigue over past year, pt had abnormal stress test on 23 at Dr. Moody's office, [facility] recommended    Vital Signs Last 24 Hrs  T(C): --  T(F): --  HR: --96  BP: --194/96  BP(mean): --135  RR: --  SpO2: --100% RA        Pre cath note:  indication:  [ ] STEMI                [ ] NSTEMI                 [ ] Acute coronary syndrome                   [ ]Unstable Angina   [ ] high risk  [ ] intermediate risk  [ ] low risk                   [ x] Stable Angina     non-invasive testing:        EST                  Date:    23                 result: [ ] high risk  [ x] intermediate risk  [ ] low risk    Anti- Anginal medications:                    [ ] not used                       [x ] used                   [ ] not used but strong indication not to use    Ejection Fraction                   [ ] <29            [ ] 30-39%   [ ] 40-49%     [ ]>50%    CHF                   [ ] active (within last 14 days on meds   [ ] Chronic (on meds but no exacerbation)    COPD                   [ ] mild (on chronic bronchodilators)  [ ] moderate (on chronic steroid therapy)      [ ] severe (indication for home O2 or PACO2 >50)    Other risk factors:                     [ ] Previous MI                     [ ] CVA/ stroke                    [ ] carotid stent/ CEA                    [ ] PVD/PAD- (arterial aneurysm, non-palpable pulses, tortuous vessel with inability to insert catheter, infra-renal dissection, renal or subclavian artery stenosis)                    [x ] diabetic                    [ ] previous CABG                    [ ] Renal Failure     Bleeding Risk: 2%                          13.0   10.26 )-----------( 263      ( 2023 10:00 )             39.2       RIGHT RADIAL ARTERY EVALUATION:  MONICO TEST: [] Negative          [x] Positive    REVIEW OF SYSTEMS:  CONSTITUTIONAL: No fever, weight loss, or fatigue  CARDIOLOGY: PAtient denies chest pain, shortness of breath or syncopal episodes.   RESPIRATORY: denies shortness of breath, wheezing  NEUROLOGICAL: NO weakness, no focal deficits to report.  ENDOCRINOLOGICAL: no recent change in diabetic medications.   GI: no BRBPR, no N,V,diarrhea.     PHYSICAL EXAM:  · CONSTITUTIONAL:	Well-developed, well nourished    ·RESPIRATORY:   airway patent; breath sounds equal; good air movement; respirations non-labored; clear to auscultation bilaterally; no chest wall tenderness; no intercostal retractions; no rales,rhonchi or wheeze  · CARDIOVASCULAR	regular rate and rhythm  no rub  no murmur  normal PMI  · EXTREMITIES: No cyanosis, clubbing or edema  · VASCULAR: 	Equal and normal pulses (carotid, femoral, dorsalis pedis)  	     cc IV bolus x 1   EF: n/a  EKG: SR on 23 (2023 10:25)    Allergies    No Known Allergies    Intolerances      Patient seen and examined at bedside. No acute events overnight.  Patient without complaints. Pt ambulated without issues/symptoms  Denies CP, SOB, palpitations, or dizziness  No events on telemetry overnight    Vital Signs Last 24 Hrs  T(C): 36.7 (2023 04:18), Max: 36.7 (2023 04:18)  T(F): 98.1 (2023 04:18), Max: 98.1 (2023 04:18)  HR: 87 (2023 04:18) (87 - 96)  BP: 105/59 (2023 04:18) (105/59 - 194/96)  BP(mean): 128 (2023 10:25) (128 - 128)  RR: 18 (2023 04:18) (18 - 18)  SpO2: 100% (2023 10:25) (100% - 100%)    Parameters below as of 2023 10:25  Patient On (Oxygen Delivery Method): room air        MEDICATIONS  (STANDING):  aspirin enteric coated 81 milliGRAM(s) Oral daily  dextrose 5% + sodium chloride 0.9%. 1000 milliLiter(s) (100 mL/Hr) IV Continuous <Continuous>  dextrose 5%. 1000 milliLiter(s) (50 mL/Hr) IV Continuous <Continuous>  dextrose 5%. 1000 milliLiter(s) (100 mL/Hr) IV Continuous <Continuous>  dextrose 50% Injectable 12.5 Gram(s) IV Push once  dextrose 50% Injectable 25 Gram(s) IV Push once  dextrose 50% Injectable 25 Gram(s) IV Push once  gabapentin 100 milliGRAM(s) Oral three times a day  glucagon  Injectable 1 milliGRAM(s) IntraMuscular once  insulin lispro (ADMELOG) corrective regimen sliding scale   SubCutaneous three times a day before meals    MEDICATIONS  (PRN):  dextrose Oral Gel 15 Gram(s) Oral once PRN Blood Glucose LESS THAN 70 milliGRAM(s)/deciliter      REVIEW OF SYSTEMS:          All negative except as mentioned in HPI    PHYSICAL EXAM:           CONSTITUTIONAL: Well-developed; well-nourished; in no acute distress  	SKIN: warm, dry  	HEAD: Normocephalic; atraumatic  	EYES: PERRL.  	ENT: No nasal discharge, airway clear, mucous membranes moist  	NECK: Supple; non tender.  	CARD: +S1, +S2, no murmurs, gallops, or rubs. Regular rate and rhythm    	RESP: No wheezes, rales or rhonchi. CTA B/L  	ABD: soft ntnd, + BS x 4 quadrants  	EXT: moves all extremities,  no clubbing, cyanosis or edema  	NEURO: Alert and oriented x3, no focal deficits          PSYCH: Cooperative, appropriate          VASCULAR:  + Rad / + PTs / + DPs          EXTREMITY:             Right Radial: Dressing D/C/I, access site soft, no hematoma, no pain, + pulses, no sign of infection, no numbness            LABS:                        11.5   8.89  )-----------( 228      ( 2023 06:57 )             33.9     -    143  |  101  |  19  ----------------------------<  173<H>  3.9   |  26  |  0.6<L>    Ca    10.1      2023 18:05    TPro  7.2  /  Alb  4.0  /  TBili  0.2  /  DBili  x   /  AST  21  /  ALT  16  /  AlkPhos  98          LIVER FUNCTIONS - ( 2023 18:05 )  Alb: 4.0 g/dL / Pro: 7.2 g/dL / ALK PHOS: 98 U/L / ALT: 16 U/L / AST: 21 U/L / GGT: x                 A/P:  I discussed the case with Cardiologist    and recommend the following:    S/P [facility] :      Intervention:   NONE    FINDINGS:     Coronary Angiography  The coronary circulation is co-dominant.     LM  Left main artery: The segment is large. Angiography shows minor irregularities.     LAD  Left anterior descending artery: The segment is large. Angiography shows mild atherosclerosis. First diagonal: The segment is medium sized. Angiography shows minor irregularities.     CX  Circumflex: The segment is large. Angiography shows minor irregularities.   First obtuse marginal: The segment is medium sized. Angiography shows minor irregularities.     RCA  Right coronary artery: The segment is large, co-dominant. Angiography shows minor irregularities. Right posterior descending artery: The segment is small. Angiography shows minor irregularities.       	     Continue ASA 81mg po daily,                    STATIN to be added as outpatient                   Post cath instructions, access site care and activity restrictions reviewed with patient                     Discussed with patient to return to hospital if experience chest pain, shortness breath, dizziness and site bleeding                   Aggressive risk factor modification, diet counseling, smoking cessation discussed with patient                   Can be discharged from cardiology perspective                    Follow up with Cardiology Dr. Moody in two weeks.  Instructed to call and make an appointment

## 2023-04-29 NOTE — DISCHARGE NOTE NURSING/CASE MANAGEMENT/SOCIAL WORK - NSDCPEFALRISK_GEN_ALL_CORE
For information on Fall & Injury Prevention, visit: [email or web address] OR  [email or web address] OR  https://www.cdc.gov/steadi/patient.html

## 2023-04-29 NOTE — DISCHARGE NOTE PROVIDER - NSDCFUADDINST_GEN_ALL_CORE_FT
Discharge Instructions as follows:  - Instructed to call 911 if chest pain, shortness of breath or bleeding from access site.  - No heavy lifting > 10lbs x 1 week.  - No driving x 24 hours.  - No baths, swimming pools x 1 week, may shower  - Low sodium low fat low cholesterol diet  - Follow-up with Cardiologist in 1-2 weeks after discharge

## 2023-04-29 NOTE — DISCHARGE NOTE NURSING/CASE MANAGEMENT/SOCIAL WORK - PATIENT PORTAL LINK FT
You can access the FollowMyHealth Patient Portal offered by [facility] by registering at the following website: [email or web address]. By joining [employer]’s FollowMyHealth portal, you will also be able to view your health information using other applications (apps) compatible with our system.

## 2023-04-29 NOTE — DISCHARGE NOTE PROVIDER - CARE PROVIDER_API CALL
Duran Moody)  Cardiovascular Disease; Interventional Cardiology  [address] [place] [address]  Phone: [phone]  Fax: [phone]  Follow Up Time: 2 weeks

## 2023-04-29 NOTE — DISCHARGE NOTE PROVIDER - HOSPITAL COURSE
Patient is a 52y Female PMHx: HTN, HLD, DM, Covid 12/21 who presented to cardiologists office with a c/o SOB when walking uphill. Patient also reported episode of chest heaviness and fatigue over the past year. Stress test from Dr. Caal office on 4/24/23 was abnormal and LHC recommended.                                  On 4/28/23 patient underwent LHC which revealed: Mild coronary artery disease (<50% stenosis)  LM  Left main artery: The segment is large. Angiography shows minor irregularities.     LAD  Left anterior descending artery: The segment is large. Angiography shows mild atherosclerosis. First diagonal: The segment is medium sized. Angiography shows minor irregularities.     CX  Circumflex: The segment is large. Angiography shows minor irregularities.   First obtuse marginal: The segment is medium sized. Angiography shows minor irregularities.     RCA  Right coronary artery: The segment is large, co-dominant. Angiography shows minor irregularities. Right posterior descending artery: The segment is small. Angiography shows minor irregularities.     While in recovery patient developed intractable nausea and vomiting despite doses of Zofran and Relgan. Patient was admitted for overnight observation and IV hydration. On POD 1 patient is HD stable with no complaints. Patient remains in SR with no arrhythmias noted on tele. EKG performed showed no acute ST changes. Examination of right radial artery showed a C/DI site with no hematoma, erythema or bruit. Distal pulses are 2+ bilaterally. Renal function remains stable post cath. Patient will be discharged home with ASA,  and to continue STATIN. Patient is being DC home in stable condition.

## 2023-04-29 NOTE — DISCHARGE NOTE PROVIDER - NSDCCPCAREPLAN_GEN_ALL_CORE_FT
PRINCIPAL DISCHARGE DIAGNOSIS  Diagnosis: Mild coronary artery disease  Assessment and Plan of Treatment:

## 2023-04-29 NOTE — DISCHARGE NOTE PROVIDER - NSDCCPTREATMENT_GEN_ALL_CORE_FT
PRINCIPAL PROCEDURE  Procedure: Left heart catheterization  Findings and Treatment: s/p LHC with no stent placement, patient with mild coronary artery disease

## 2023-05-11 ENCOUNTER — EMERGENCY (EMERGENCY)
Facility: HOSPITAL | Age: 53
LOS: 0 days | Discharge: HOME | End: 2023-05-12
Attending: EMERGENCY MEDICINE
Payer: COMMERCIAL

## 2023-05-11 VITALS
DIASTOLIC BLOOD PRESSURE: 86 MMHG | RESPIRATION RATE: 18 BRPM | TEMPERATURE: 98 F | HEIGHT: 61 IN | OXYGEN SATURATION: 99 % | HEART RATE: 93 BPM | WEIGHT: 91.05 LBS | SYSTOLIC BLOOD PRESSURE: 134 MMHG

## 2023-05-11 VITALS
DIASTOLIC BLOOD PRESSURE: 65 MMHG | OXYGEN SATURATION: 99 % | HEART RATE: 81 BPM | RESPIRATION RATE: 18 BRPM | SYSTOLIC BLOOD PRESSURE: 121 MMHG | TEMPERATURE: 97 F

## 2023-05-11 DIAGNOSIS — E11.40 TYPE 2 DIABETES MELLITUS WITH DIABETIC NEUROPATHY, UNSPECIFIED: ICD-10-CM

## 2023-05-11 DIAGNOSIS — K59.00 CONSTIPATION, UNSPECIFIED: ICD-10-CM

## 2023-05-11 DIAGNOSIS — R05.9 COUGH, UNSPECIFIED: ICD-10-CM

## 2023-05-11 DIAGNOSIS — I10 ESSENTIAL (PRIMARY) HYPERTENSION: ICD-10-CM

## 2023-05-11 DIAGNOSIS — E78.00 PURE HYPERCHOLESTEROLEMIA, UNSPECIFIED: ICD-10-CM

## 2023-05-11 DIAGNOSIS — Z98.891 HISTORY OF UTERINE SCAR FROM PREVIOUS SURGERY: Chronic | ICD-10-CM

## 2023-05-11 DIAGNOSIS — R07.89 OTHER CHEST PAIN: ICD-10-CM

## 2023-05-11 LAB
ALBUMIN SERPL ELPH-MCNC: 3.6 G/DL — SIGNIFICANT CHANGE UP (ref 3.5–5.2)
ALP SERPL-CCNC: 85 U/L — SIGNIFICANT CHANGE UP (ref 30–115)
ALT FLD-CCNC: 13 U/L — SIGNIFICANT CHANGE UP (ref 0–41)
ANION GAP SERPL CALC-SCNC: 10 MMOL/L — SIGNIFICANT CHANGE UP (ref 7–14)
AST SERPL-CCNC: 16 U/L — SIGNIFICANT CHANGE UP (ref 0–41)
BASOPHILS # BLD AUTO: 0.03 K/UL — SIGNIFICANT CHANGE UP (ref 0–0.2)
BASOPHILS NFR BLD AUTO: 0.4 % — SIGNIFICANT CHANGE UP (ref 0–1)
BILIRUB SERPL-MCNC: <0.2 MG/DL — SIGNIFICANT CHANGE UP (ref 0.2–1.2)
BUN SERPL-MCNC: 38 MG/DL — HIGH (ref 10–20)
CALCIUM SERPL-MCNC: 9.6 MG/DL — SIGNIFICANT CHANGE UP (ref 8.4–10.5)
CHLORIDE SERPL-SCNC: 99 MMOL/L — SIGNIFICANT CHANGE UP (ref 98–110)
CO2 SERPL-SCNC: 28 MMOL/L — SIGNIFICANT CHANGE UP (ref 17–32)
CREAT SERPL-MCNC: 0.8 MG/DL — SIGNIFICANT CHANGE UP (ref 0.7–1.5)
EGFR: 88 ML/MIN/1.73M2 — SIGNIFICANT CHANGE UP
EOSINOPHIL # BLD AUTO: 0.12 K/UL — SIGNIFICANT CHANGE UP (ref 0–0.7)
EOSINOPHIL NFR BLD AUTO: 1.5 % — SIGNIFICANT CHANGE UP (ref 0–8)
GAS PNL BLDV: SIGNIFICANT CHANGE UP
GLUCOSE SERPL-MCNC: 260 MG/DL — HIGH (ref 70–99)
HCT VFR BLD CALC: 33.7 % — LOW (ref 37–47)
HGB BLD-MCNC: 11.5 G/DL — LOW (ref 12–16)
IMM GRANULOCYTES NFR BLD AUTO: 0.4 % — HIGH (ref 0.1–0.3)
LYMPHOCYTES # BLD AUTO: 2.51 K/UL — SIGNIFICANT CHANGE UP (ref 1.2–3.4)
LYMPHOCYTES # BLD AUTO: 31 % — SIGNIFICANT CHANGE UP (ref 20.5–51.1)
MCHC RBC-ENTMCNC: 30.3 PG — SIGNIFICANT CHANGE UP (ref 27–31)
MCHC RBC-ENTMCNC: 34.1 G/DL — SIGNIFICANT CHANGE UP (ref 32–37)
MCV RBC AUTO: 88.7 FL — SIGNIFICANT CHANGE UP (ref 81–99)
MONOCYTES # BLD AUTO: 0.68 K/UL — HIGH (ref 0.1–0.6)
MONOCYTES NFR BLD AUTO: 8.4 % — SIGNIFICANT CHANGE UP (ref 1.7–9.3)
NEUTROPHILS # BLD AUTO: 4.73 K/UL — SIGNIFICANT CHANGE UP (ref 1.4–6.5)
NEUTROPHILS NFR BLD AUTO: 58.3 % — SIGNIFICANT CHANGE UP (ref 42.2–75.2)
NRBC # BLD: 0 /100 WBCS — SIGNIFICANT CHANGE UP (ref 0–0)
PLATELET # BLD AUTO: 310 K/UL — SIGNIFICANT CHANGE UP (ref 130–400)
PMV BLD: 9.9 FL — SIGNIFICANT CHANGE UP (ref 7.4–10.4)
POTASSIUM SERPL-MCNC: 4.6 MMOL/L — SIGNIFICANT CHANGE UP (ref 3.5–5)
POTASSIUM SERPL-SCNC: 4.6 MMOL/L — SIGNIFICANT CHANGE UP (ref 3.5–5)
PROT SERPL-MCNC: 6.5 G/DL — SIGNIFICANT CHANGE UP (ref 6–8)
RBC # BLD: 3.8 M/UL — LOW (ref 4.2–5.4)
RBC # FLD: 12.3 % — SIGNIFICANT CHANGE UP (ref 11.5–14.5)
SODIUM SERPL-SCNC: 137 MMOL/L — SIGNIFICANT CHANGE UP (ref 135–146)
WBC # BLD: 8.1 K/UL — SIGNIFICANT CHANGE UP (ref 4.8–10.8)
WBC # FLD AUTO: 8.1 K/UL — SIGNIFICANT CHANGE UP (ref 4.8–10.8)

## 2023-05-11 PROCEDURE — 93005 ELECTROCARDIOGRAM TRACING: CPT

## 2023-05-11 PROCEDURE — 36415 COLL VENOUS BLD VENIPUNCTURE: CPT

## 2023-05-11 PROCEDURE — 82330 ASSAY OF CALCIUM: CPT

## 2023-05-11 PROCEDURE — 71046 X-RAY EXAM CHEST 2 VIEWS: CPT | Mod: 26

## 2023-05-11 PROCEDURE — 99285 EMERGENCY DEPT VISIT HI MDM: CPT

## 2023-05-11 PROCEDURE — 85018 HEMOGLOBIN: CPT

## 2023-05-11 PROCEDURE — 82803 BLOOD GASES ANY COMBINATION: CPT

## 2023-05-11 PROCEDURE — 83605 ASSAY OF LACTIC ACID: CPT

## 2023-05-11 PROCEDURE — 80053 COMPREHEN METABOLIC PANEL: CPT

## 2023-05-11 PROCEDURE — 82962 GLUCOSE BLOOD TEST: CPT

## 2023-05-11 PROCEDURE — 71046 X-RAY EXAM CHEST 2 VIEWS: CPT

## 2023-05-11 PROCEDURE — 84484 ASSAY OF TROPONIN QUANT: CPT

## 2023-05-11 PROCEDURE — 84132 ASSAY OF SERUM POTASSIUM: CPT

## 2023-05-11 PROCEDURE — 84295 ASSAY OF SERUM SODIUM: CPT

## 2023-05-11 PROCEDURE — 85014 HEMATOCRIT: CPT

## 2023-05-11 PROCEDURE — 99285 EMERGENCY DEPT VISIT HI MDM: CPT | Mod: 25

## 2023-05-11 PROCEDURE — 85025 COMPLETE CBC W/AUTO DIFF WBC: CPT

## 2023-05-11 PROCEDURE — 93010 ELECTROCARDIOGRAM REPORT: CPT

## 2023-05-11 NOTE — ED PROVIDER NOTE - PATIENT PORTAL LINK FT
You can access the FollowMyHealth Patient Portal offered by [facility] by registering at the following website: [email or web address]. By joining [employer]’s FollowMyHealth portal, you will also be able to view your health information using other applications (apps) compatible with our system.

## 2023-05-11 NOTE — ED PROVIDER NOTE - NSFOLLOWUPINSTRUCTIONS_ED_ALL_ED_FT
Diabetic Neuropathy  Diabetic neuropathy refers to nerve damage that is caused by diabetes (diabetes mellitus). Over time, people with diabetes can develop nerve damage throughout the body. There are several types of diabetic neuropathy:  Peripheral neuropathy. This is the most common type of diabetic neuropathy. It causes damage to nerves that carry signals between the spinal cord and other parts of the body (peripheral nerves). This usually affects nerves in the feet and legs first, and may eventually affect the hands and arms. The damage affects the ability to sense touch or temperature.  Autonomic neuropathy. This type causes damage to nerves that control involuntary functions (autonomic nerves). These nerves carry signals that control:  Heartbeat.  Body temperature.  Blood pressure.  Urination.  Digestion.  Sweating.  Sexual function.  Response to changing blood sugar (glucose) levels.  Focal neuropathy. This type of nerve damage affects one area of the body, such as an arm, a leg, or the face. The injury may involve one nerve or a small group of nerves. Focal neuropathy can be painful and unpredictable, and occurs most often in older adults with diabetes. This often develops suddenly, but usually improves over time and does not cause long-term problems.  Proximal neuropathy. This type of nerve damage affects the nerves of the thighs, hips, buttocks, or legs. It causes severe pain, weakness, and muscle death (atrophy), usually in the thigh muscles. It is more common among older men and people who have type 2 diabetes. The length of recovery time may vary.  What are the causes?  Peripheral, autonomic, and focal neuropathies are caused by diabetes that is not well controlled with treatment. The cause of proximal neuropathy is not known, but it may be caused by inflammation related to uncontrolled blood glucose levels.    What are the signs or symptoms?  Peripheral neuropathy     Peripheral neuropathy develops slowly over time. When the nerves of the feet and legs no longer work, you may experience:  Burning, stabbing, or aching pain in the legs or feet.  Pain or cramping in the legs or feet.  Loss of feeling (numbness) and inability to feel pressure or pain in the feet. This can lead to:  Thick calluses or sores on areas of constant pressure.  Ulcers.  Reduced ability to feel temperature changes.  Foot deformities.  Muscle weakness.  Loss of balance or coordination.  Autonomic neuropathy    The symptoms of autonomic neuropathy vary depending on which nerves are affected. Symptoms may include:  Problems with digestion, such as:  Nausea or vomiting.  Poor appetite.  Bloating.  Diarrhea or constipation.  Trouble swallowing.  Losing weight without trying to.  Problems with the heart, blood and lungs, such as:  Dizziness, especially when standing up.  Fainting.  Shortness of breath.  Irregular heartbeat.  Bladder problems, such as:  Trouble starting or stopping urination.  Leaking urine.  Trouble emptying the bladder.  Urinary tract infections (UTIs).  Problems with other body functions, such as:  Sweat. You may sweat too much or too little.  Temperature. You might get hot easily. Or, you might feel cold more than usual.  Sexual function. Men may not be able to get or maintain an erection. Women may have vaginal dryness and difficulty with arousal.  Focal neuropathy    Symptoms affect only one area of the body. Common symptoms include:  Numbness.  Tingling.  Burning pain.  Prickling feeling.  Very sensitive skin.  Weakness.  Inability to move (paralysis).  Muscle twitching.  Muscles getting smaller (wasting).  Poor coordination.  Double or blurred vision.  Proximal neuropathy    Sudden, severe pain in the hip, thigh, or buttocks. Pain may spread from the back into the legs (sciatica).  Pain and numbness in the arms and legs.  Tingling.  Loss of bladder control or bowel control.  Weakness and wasting of thigh muscles.  Difficulty getting up from a seated position.  Abdominal swelling.  Unexplained weight loss.  How is this diagnosed?  Diagnosis usually involves reviewing your medical history and any symptoms you have. Diagnosis varies depending on the type of neuropathy your health care provider suspects.    Peripheral neuropathy     Your health care provider will check areas that are affected by your nervous system (neurologic exam), such as your reflexes, how you move, and what you can feel. You may have other tests, such as:  Blood tests.  Removal and examination of fluid that surrounds the spinal cord (lumbar puncture).  CT scan.  MRI.  A test to check the nerves that control muscles (electromyogram, EMG).  Tests of how quickly messages pass through your nerves (nerve conduction velocity tests).  Removal of a small piece of nerve to be examined under a microscope (biopsy).  Autonomic neuropathy    You may have tests, such as:  Tests to measure your blood pressure and heart rate. This may include monitoring you while you are safely secured to an exam table that moves you from a lying position to an upright position (table tilt test).  Breathing tests to check your lungs.  Tests to check how food moves through the digestive system (gastric emptying tests).  Blood, sweat, or urine tests.  Ultrasound of your bladder.  Spinal fluid tests.  Focal neuropathy    This condition may be diagnosed with:  A neurologic exam.  CT scan.  MRI.  EMG.  Nerve conduction velocity tests.  Proximal neuropathy    There is no test to diagnose this type of neuropathy. You may have tests to rule out other possible causes of this type of neuropathy. Tests may include:  X-rays of your spine and lumbar region.  Lumbar puncture.  MRI.  How is this treated?  The goal of treatment is to keep nerve damage from getting worse. The most important part of treatment is keeping your blood glucose level and your A1C level within your target range by following your diabetes management plan. Over time, maintaining lower blood glucose levels helps lessen symptoms. In some cases, you may need prescription pain medicine.    Follow these instructions at home:  Image   Lifestyle     Image   Do not use any products that contain nicotine or tobacco, such as cigarettes and e-cigarettes. If you need help quitting, ask your health care provider.  Be physically active every day. Include strength training and balance exercises.  Follow a healthy meal plan.  Work with your health care provider to manage your blood pressure.  General instructions     Follow your diabetes management plan as directed.  Check your blood glucose levels as directed by your health care provider.  Keep your blood glucose in your target range as directed by your health care provider.  Have your A1C level checked at least two times a year, or as often as told by your health care provider.  Take over the counter and prescription medicines only as told by your health care provider. This includes insulin and diabetes medicine.  Do not drive or use heavy machinery while taking prescription pain medicines.  Check your skin and feet every day for cuts, bruises, redness, blisters, or sores.  Keep all follow up visits as told by your health care provider. This is important.  Contact a health care provider if:  You have burning, stabbing, or aching pain in your legs or feet.  You are unable to feel pressure or pain in your feet.  You develop problems with digestion, such as:  Nausea.  Vomiting.  Bloating.  Constipation.  Diarrhea.  Abdominal pain.  You have difficulty with urination, such as inability:  To control when you urinate (incontinence).  To completely empty the bladder (retention).  You have palpitations.  You feel dizzy, weak, or faint when you stand up.  Get help right away if:  You cannot urinate.  You have sudden weakness or loss of coordination.  You have trouble speaking.  You have pain or pressure in your chest.  You have an irregular heart beat.  You have sudden inability to move a part of your body.  Summary  Diabetic neuropathy refers to nerve damage that is caused by diabetes. It can affect nerves throughout the entire body, causing numbness and pain in the arms, legs, digestive tract, heart, and other body systems.  Keep your blood glucose level and your blood pressure in your target range, as directed by your health care provider. This can help prevent neuropathy from getting worse.  Check your skin and feet every day for cuts, bruises, redness, blisters, or sores.  Do not use any products that contain nicotine or tobacco, such as cigarettes and e-cigarettes. If you need         Abdominal Pain, Adult  Abdominal pain can be caused by many things. Often, abdominal pain is not serious and it gets better with no treatment or by being treated at home. However, sometimes abdominal pain is serious. Your health care provider will do a medical history and a physical exam to try to determine the cause of your abdominal pain.    Follow these instructions at home:  Take over-the-counter and prescription medicines only as told by your health care provider. Do not take a laxative unless told by your health care provider.  ImageDrink enough fluid to keep your urine clear or pale yellow.  Watch your condition for any changes.  Keep all follow-up visits as told by your health care provider. This is important.  Contact a health care provider if:  Your abdominal pain changes or gets worse.  You are not hungry or you lose weight without trying.  You are constipated or have diarrhea for more than 2–3 days.  You have pain when you urinate or have a bowel movement.  Your abdominal pain wakes you up at night.  Your pain gets worse with meals, after eating, or with certain foods.  You are throwing up and cannot keep anything down.  You have a fever.  Get help right away if:  Your pain does not go away as soon as your health care provider told you to expect.  You cannot stop throwing up.  Your pain is only in areas of the abdomen, such as the right side or the left lower portion of the abdomen.  You have bloody or black stools, or stools that look like tar.  You have severe pain, cramping, or bloating in your abdomen.  You have signs of dehydration, such as:    Dark urine, very little urine, or no urine.  Cracked lips.  Dry mouth.  Sunken eyes.  Sleepiness.  Weakness.    This information is not intended to replace advice given to you by your health care provider. Make sure you discuss any questions you have with your health care provider

## 2023-05-11 NOTE — ED PROVIDER NOTE - CARE PROVIDER_API CALL
Monico Dockery (DO)  Medicine  [address] [place] [address], [address] [place] [address]  Phone: [phone]  Fax: [phone]  Established Patient  Follow Up Time: 7-10 Days

## 2023-05-11 NOTE — ED ADULT NURSE NOTE - NSFALLUNIVINTERV_ED_ALL_ED
Bed/Stretcher in lowest position, wheels locked, appropriate side rails in place/Call bell, personal items and telephone in reach/Instruct patient to call for assistance before getting out of bed/chair/stretcher/Non-slip footwear applied when patient is off stretcher/[place] to call system/Physically safe environment - no spills, clutter or unnecessary equipment/Purposeful proactive rounding/Room/bathroom lighting operational, light cord in reach

## 2023-05-11 NOTE — ED PROVIDER NOTE - PHYSICAL EXAMINATION
Constitutional: Well developed, well nourished. NAD  Head: Normocephalic, atraumatic.  Eyes: PERRL, EOMI.  ENT: No nasal discharge. Mucous membranes dry.  Neck: Supple. Painless ROM.  Cardiovascular: Regular rate and rhythm.   Pulmonary:  Lungs clear to auscultation bilaterally.   Abdominal: Soft. Nondistended. No rebound, guarding, rigidity.  Rectal: Rn Emeterio present; no fecal impaction;   Extremities. Pelvis stable. No lower extremity edema, symmetric calves.  Skin: No rashes, cyanosis.  Neuro: AAOx3. No focal neurological deficits.  Psych: Normal mood. Normal affect.

## 2023-05-11 NOTE — ED ADULT NURSE NOTE - OBJECTIVE STATEMENT
patient complaints of bilateral leg pain and constipation x 2 weeks. Patient ambulates with a steady gait.

## 2023-05-11 NOTE — ED PROVIDER NOTE - NS ED ROS FT
Constitutional: No fever, chills.  Eyes: No visual changes.  ENT: No hearing changes.  Neck: No neck pain or stiffness.  Cardiovascular: No chest pain, palpitations, edema.  Pulmonary: see hpi.  Abdominal:  constipation  : No dysuria, frequency.  Neuro: No headache, syncope, dizziness.  MS: bilat lower ext burning pains;   Psych: No suicidal ideations.

## 2023-05-11 NOTE — ED PROVIDER NOTE - OBJECTIVE STATEMENT
52 yold female to Ed Pmhx Htn, Hld, Dm pt presents to ED with multiple complaints - c/o burning pain and hypersensitivity to bilat lower ext x 2-3 weeks, chronic constipation x 2 weeks -  attempted prune juice without improvement; pt also c/o "lung pain" worse with inspiration with occassional cough; pt denies fever, chills, n/v, chest pain, abdominal pain, urinary sx; pt seen by cardiology Dr. Schaefer - had coronay cath showing mild disease; pt had above sx even prior to cath;

## 2023-05-12 LAB
BASE EXCESS BLDV CALC-SCNC: 4.7 MMOL/L — HIGH (ref -2–3)
CA-I SERPL-SCNC: 1.33 MMOL/L — SIGNIFICANT CHANGE UP (ref 1.15–1.33)
GAS PNL BLDV: 134 MMOL/L — LOW (ref 136–145)
GAS PNL BLDV: SIGNIFICANT CHANGE UP
HCO3 BLDV-SCNC: 31 MMOL/L — HIGH (ref 22–29)
HCT VFR BLDA CALC: 36 % — LOW (ref 39–51)
HGB BLD CALC-MCNC: 12.1 G/DL — LOW (ref 12.6–17.4)
LACTATE BLDV-MCNC: 1 MMOL/L — SIGNIFICANT CHANGE UP (ref 0.5–2)
PCO2 BLDV: 54 MMHG — HIGH (ref 39–42)
PH BLDV: 7.37 — SIGNIFICANT CHANGE UP (ref 7.32–7.43)
PO2 BLDV: 44 MMHG — SIGNIFICANT CHANGE UP
POTASSIUM BLDV-SCNC: 4.7 MMOL/L — SIGNIFICANT CHANGE UP (ref 3.5–5.1)
SAO2 % BLDV: 75.9 % — SIGNIFICANT CHANGE UP
TROPONIN T SERPL-MCNC: <0.01 NG/ML — SIGNIFICANT CHANGE UP

## 2023-05-12 RX ORDER — POLYETHYLENE GLYCOL 3350 17 G/17G
17 POWDER, FOR SOLUTION ORAL
Qty: 1 | Refills: 0
Start: 2023-05-12 | End: 2023-05-16

## 2023-06-02 ENCOUNTER — OUTPATIENT (OUTPATIENT)
Dept: OUTPATIENT SERVICES | Facility: HOSPITAL | Age: 53
LOS: 1 days | End: 2023-06-02
Payer: MEDICAID

## 2023-06-02 ENCOUNTER — NON-APPOINTMENT (OUTPATIENT)
Age: 53
End: 2023-06-02

## 2023-06-02 ENCOUNTER — APPOINTMENT (OUTPATIENT)
Dept: RHEUMATOLOGY | Facility: CLINIC | Age: 53
End: 2023-06-02
Payer: MEDICAID

## 2023-06-02 VITALS
DIASTOLIC BLOOD PRESSURE: 79 MMHG | BODY MASS INDEX: 17.35 KG/M2 | WEIGHT: 88.38 LBS | HEART RATE: 91 BPM | OXYGEN SATURATION: 98 % | TEMPERATURE: 97 F | HEIGHT: 60 IN | SYSTOLIC BLOOD PRESSURE: 120 MMHG

## 2023-06-02 DIAGNOSIS — Z98.891 HISTORY OF UTERINE SCAR FROM PREVIOUS SURGERY: Chronic | ICD-10-CM

## 2023-06-02 DIAGNOSIS — Z00.00 ENCOUNTER FOR GENERAL ADULT MEDICAL EXAMINATION WITHOUT ABNORMAL FINDINGS: ICD-10-CM

## 2023-06-02 LAB
ALBUMIN SERPL ELPH-MCNC: 4 G/DL
ALP BLD-CCNC: 109 U/L
ALT SERPL-CCNC: 19 U/L
ANION GAP SERPL CALC-SCNC: 13 MMOL/L
AST SERPL-CCNC: 20 U/L
BILIRUB SERPL-MCNC: 0.2 MG/DL
BUN SERPL-MCNC: 32 MG/DL
CALCIUM SERPL-MCNC: 10.5 MG/DL
CHLORIDE SERPL-SCNC: 98 MMOL/L
CO2 SERPL-SCNC: 26 MMOL/L
CREAT SERPL-MCNC: 0.9 MG/DL
EGFR: 76 ML/MIN/1.73M2
GLUCOSE SERPL-MCNC: 203 MG/DL
POTASSIUM SERPL-SCNC: 4.4 MMOL/L
PROT SERPL-MCNC: 7.3 G/DL
SODIUM SERPL-SCNC: 137 MMOL/L

## 2023-06-02 PROCEDURE — 87340 HEPATITIS B SURFACE AG IA: CPT

## 2023-06-02 PROCEDURE — 85652 RBC SED RATE AUTOMATED: CPT

## 2023-06-02 PROCEDURE — 99214 OFFICE O/P EST MOD 30 MIN: CPT | Mod: GC

## 2023-06-02 PROCEDURE — 99214 OFFICE O/P EST MOD 30 MIN: CPT

## 2023-06-02 PROCEDURE — 86803 HEPATITIS C AB TEST: CPT

## 2023-06-02 PROCEDURE — 80053 COMPREHEN METABOLIC PANEL: CPT

## 2023-06-02 PROCEDURE — 86140 C-REACTIVE PROTEIN: CPT

## 2023-06-02 PROCEDURE — 86704 HEP B CORE ANTIBODY TOTAL: CPT

## 2023-06-02 PROCEDURE — 85027 COMPLETE CBC AUTOMATED: CPT

## 2023-06-02 PROCEDURE — 86480 TB TEST CELL IMMUN MEASURE: CPT

## 2023-06-02 PROCEDURE — 86706 HEP B SURFACE ANTIBODY: CPT

## 2023-06-02 RX ORDER — MELOXICAM 15 MG/1
15 TABLET ORAL
Qty: 30 | Refills: 1 | Status: ACTIVE | COMMUNITY
Start: 2023-06-02 | End: 1900-01-01

## 2023-06-02 RX ORDER — DICLOFENAC SODIUM 1% 10 MG/G
1 GEL TOPICAL
Qty: 1 | Refills: 5 | Status: ACTIVE | COMMUNITY
Start: 2023-06-02 | End: 1900-01-01

## 2023-06-02 NOTE — REVIEW OF SYSTEMS
[Arthralgias] : arthralgias [Joint Pain] : joint pain [Joint Stiffness] : joint stiffness [Negative] : Genitourinary [Fever] : no fever [Chills] : no chills [Eye Pain] : no eye pain [Red Eyes] : eyes not red [Nosebleeds] : no nosebleeds [Chest Pain] : no chest pain [Palpitations] : no palpitations [Shortness Of Breath] : no shortness of breath [Wheezing] : no wheezing [Cough] : no cough [SOB on Exertion] : no shortness of breath during exertion [de-identified] : Rash on b/l LEs

## 2023-06-02 NOTE — END OF VISIT
[] : Resident [Time Spent: ___ minutes] : I have spent [unfilled] minutes of time on the encounter. [FreeTextEntry3] : 51 y/o woman presents for f/u of seropositive RA, diagnosed in 2019. Pt also has a h/o non-adherence to medical treatment. She was last seen in clinic 10/2022, when the plan was to start rituximab, and for her to f/u hepatitis titers and quantiferon prior to initiation. However, pt did not have the labs done and did not follow up. She comes back today with continued severe pain in her hands, wrists, elbows, shoulders and knees, with burning pain in her feet and legs. Pt is reluctant to take a daily or weekly medication for her symptoms. \par - f/u hepatitis B titers and quantiferon (previously positive)\par - If the above workup is negative and/or pt is treated for latent TB, would consider rituximab 1000 mg q 14 days x 2 doses every 6 months\par - Prescribed meloxicam and Voltaren gel for pain for now\par \par f/u in 1 month

## 2023-06-02 NOTE — HISTORY OF PRESENT ILLNESS
[FreeTextEntry1] : Patient is a 52 F, [place] speaking, w/ PMHx of seropositive (+RF, +CCP in 2019) erosive RA on Plaquenil , HTN, poorly controlled DMII, HLD, Tennis Elbow, and anemia presents for rheum follow up.\par \par Patient reports a lot of pain in her right shoulder, b/l elbows, wrists, MCP joints. Patient endorses morning stiffness for 30 minutes, pain gets better during the day, then before she goes bed, pain worsens. Patient reports physical exercise makes the pain a little better, sitting around makes the pain worse. Denies acute weight loss, fevers, fatigue, sob, ulcers, or recent illness. patient reports she currently takes no medications. Burning sensations on b/l feet. Skin dryness. \par \par Patient reports that only medication she takes is insulin injection for diabetes. Denies taking any prescriptions oral medications. Will occasionally take Tylenol for pain. Reports that she doesn't take any meds due to them not being at the pharmacy when she goes to pick them up.\par \par  Ralph [id number]

## 2023-06-02 NOTE — PHYSICAL EXAM
[General Appearance - Alert] : alert [General Appearance - In No Acute Distress] : in no acute distress [Auscultation Breath Sounds / Voice Sounds] : lungs were clear to auscultation bilaterally [Heart Rate And Rhythm] : heart rate was normal and rhythm regular [Heart Sounds] : normal S1 and S2 [Heart Sounds Gallop] : no gallops [Murmurs] : no murmurs [Bowel Sounds] : normal bowel sounds [Heart Sounds Pericardial Friction Rub] : no pericardial rub [Abdomen Soft] : soft [Abdomen Tenderness] : non-tender [] : no hepato-splenomegaly [Abdomen Mass (___ Cm)] : no abdominal mass palpated [PERRL With Normal Accommodation] : pupils were equal in size, round, and reactive to light [Sclera] : the sclera and conjunctiva were normal [Extraocular Movements] : extraocular movements were intact [Outer Ear] : the ears and nose were normal in appearance [Oriented To Time, Place, And Person] : oriented to person, place, and time [Affect] : the affect was normal [FreeTextEntry1] : hyperpigmented patches on medial aspect of ankles

## 2023-06-02 NOTE — ASSESSMENT
[FreeTextEntry1] : Patient is a 52 F, [place] speaking, w/ PMHx of seropositive (+RF, +CCP) erosive RA on Plaquenil , HTN, DMII, HLD, Tennis Elbow, and anemia presents for rheum follow up.\par \par #Rheumatoid arthritis , seropositive (RF, CCP), erosive\par -patient is not compliant with medical management. Takes none of her oral medications.\par - patient reportedly never took Plaquenil 200 mg daily and MTX (6 pills daily with FA) because she did not have any prescription \par - patient is a potential candidate for rituximab therapy, would need prior authorization, \par - check hepatitis panel and quantiferon TB, ESR, CRP, CBC, and CMP\par - Meloxicam and voltarin PRN for pain\par - rtc in 2 weeks\par

## 2023-06-05 DIAGNOSIS — M06.9 RHEUMATOID ARTHRITIS, UNSPECIFIED: ICD-10-CM

## 2023-06-05 LAB
CRP SERPL-MCNC: 9.1 MG/L
ERYTHROCYTE [SEDIMENTATION RATE] IN BLOOD BY WESTERGREN METHOD: 125 MM/HR
HBV CORE IGG+IGM SER QL: NONREACTIVE
HBV SURFACE AB SER QL: NONREACTIVE
HBV SURFACE AG SER QL: NONREACTIVE
HCV AB SER QL: NONREACTIVE
HCV S/CO RATIO: 0.19 S/CO

## 2023-06-07 LAB
M TB IFN-G BLD-IMP: POSITIVE
QUANTIFERON TB PLUS MITOGEN MINUS NIL: 8.29 IU/ML
QUANTIFERON TB PLUS NIL: 1.34 IU/ML
QUANTIFERON TB PLUS TB1 MINUS NIL: 4.02 IU/ML
QUANTIFERON TB PLUS TB2 MINUS NIL: 4.22 IU/ML

## 2023-06-22 ENCOUNTER — NON-APPOINTMENT (OUTPATIENT)
Age: 53
End: 2023-06-22

## 2023-06-23 ENCOUNTER — OUTPATIENT (OUTPATIENT)
Dept: OUTPATIENT SERVICES | Facility: HOSPITAL | Age: 53
LOS: 1 days | End: 2023-06-23
Payer: MEDICAID

## 2023-06-23 ENCOUNTER — APPOINTMENT (OUTPATIENT)
Dept: NUTRITION | Facility: CLINIC | Age: 53
End: 2023-06-23

## 2023-06-23 DIAGNOSIS — Z98.891 HISTORY OF UTERINE SCAR FROM PREVIOUS SURGERY: Chronic | ICD-10-CM

## 2023-06-23 DIAGNOSIS — E11.42 TYPE 2 DIABETES MELLITUS WITH DIABETIC POLYNEUROPATHY: ICD-10-CM

## 2023-06-23 PROCEDURE — G0108: CPT

## 2023-06-24 RX ORDER — FLASH GLUCOSE SENSOR
KIT MISCELLANEOUS
Qty: 2 | Refills: 11 | Status: ACTIVE | COMMUNITY
Start: 2023-03-20 | End: 1900-01-01

## 2023-06-24 RX ORDER — FLASH GLUCOSE SCANNING READER
EACH MISCELLANEOUS
Qty: 1 | Refills: 0 | Status: ACTIVE | COMMUNITY
Start: 2023-03-20 | End: 1900-01-01

## 2023-06-27 DIAGNOSIS — E11.9 TYPE 2 DIABETES MELLITUS WITHOUT COMPLICATIONS: ICD-10-CM

## 2023-06-30 ENCOUNTER — APPOINTMENT (OUTPATIENT)
Dept: RHEUMATOLOGY | Facility: CLINIC | Age: 53
End: 2023-06-30
Payer: MEDICAID

## 2023-06-30 ENCOUNTER — OUTPATIENT (OUTPATIENT)
Dept: OUTPATIENT SERVICES | Facility: HOSPITAL | Age: 53
LOS: 1 days | End: 2023-06-30
Payer: MEDICAID

## 2023-06-30 ENCOUNTER — RESULT REVIEW (OUTPATIENT)
Age: 53
End: 2023-06-30

## 2023-06-30 VITALS
WEIGHT: 88 LBS | HEART RATE: 83 BPM | DIASTOLIC BLOOD PRESSURE: 77 MMHG | BODY MASS INDEX: 17.28 KG/M2 | SYSTOLIC BLOOD PRESSURE: 113 MMHG | OXYGEN SATURATION: 96 % | TEMPERATURE: 98.9 F | HEIGHT: 60 IN

## 2023-06-30 DIAGNOSIS — R76.12 NONSPECIFIC REACTION TO CELL MEDIATED IMMUNITY MEASUREMENT OF GAMMA INTERFERON ANTIGEN RESPONSE W/OUT ACTIVE TUBERCULOSIS: ICD-10-CM

## 2023-06-30 DIAGNOSIS — R21 RASH AND OTHER NONSPECIFIC SKIN ERUPTION: ICD-10-CM

## 2023-06-30 DIAGNOSIS — Z98.891 HISTORY OF UTERINE SCAR FROM PREVIOUS SURGERY: Chronic | ICD-10-CM

## 2023-06-30 DIAGNOSIS — R76.12 NONSPECIFIC REACTION TO CELL MEDIATED IMMUNITY MEASUREMENT OF GAMMA INTERFERON ANTIGEN RESPONSE WITHOUT ACTIVE TUBERCULOSIS: ICD-10-CM

## 2023-06-30 DIAGNOSIS — Z00.00 ENCOUNTER FOR GENERAL ADULT MEDICAL EXAMINATION WITHOUT ABNORMAL FINDINGS: ICD-10-CM

## 2023-06-30 PROCEDURE — 71046 X-RAY EXAM CHEST 2 VIEWS: CPT | Mod: 26

## 2023-06-30 PROCEDURE — 71046 X-RAY EXAM CHEST 2 VIEWS: CPT

## 2023-06-30 PROCEDURE — 99214 OFFICE O/P EST MOD 30 MIN: CPT | Mod: GC

## 2023-06-30 PROCEDURE — 99214 OFFICE O/P EST MOD 30 MIN: CPT

## 2023-06-30 RX ORDER — PEN NEEDLE, DIABETIC 29 G X1/2"
31G X 5 MM NEEDLE, DISPOSABLE MISCELLANEOUS
Qty: 100 | Refills: 2 | Status: ACTIVE | COMMUNITY
Start: 2022-08-24 | End: 1900-01-01

## 2023-06-30 NOTE — ASSESSMENT
[FreeTextEntry1] : Seropositive erosive RA: High +RF and CCP, with h/o non-adherence to HCQ or methotrexate therapy. Pt would prefer to use an intermittent effusion, but has been found to have +Quantiferon, which was also noted on testing in 2019 but it is unclear whether she received any treatment for latent TB at that time. Also now with b/l LE rash x 2 months ?non-blanching, unclear if it is related to her RA. The rash is not typical for a petechial rash. \par - Chest x-ray today 6/30/2023 with no e/o consolidations or opacifications\par - Referred to ID for treatment of latent TB\par - Start prednisone 15 mg q day for now\par - Also prescribed triamcinolone cream for pt's rash\par - Once pt has been started on latent TB treatment, would consider referring for IV rituximab infusions\par \par f/u in 3 weeks

## 2023-06-30 NOTE — HISTORY OF PRESENT ILLNESS
[FreeTextEntry1] : 51 y/o woman presents for f/u of seropositive erosive RA, diagnosed in 2019. Pt has a h/o non-adherence to medical treatment. She was previously taking hydroxychloroquine, methotrexate with improvement of her symptoms, but has not been taking them or following up consistently. She also previously expressed a desire to not take medications on a regular basis. Rituximab infusions were considered for her, but pt was found to be quantiferon positive on 6/2023 labs. Reportedly she was also quantiferon positive in 2019 on rheum testing, and was referred to ID but it is unclear what the outcome of this referral was. Today, pt says that she continues to experience significant pain in her shoulders, hands, wrist, elbows and knees, also with continued burning pain in her legs, especially around the knees. She has noticed an erythematous rash on her b/l LEs for approx the past 2 months. \par \par Physical exam: GEN: AAO woman sitting on exam table in NAD\par SKIN: +?Non-blanching scant erythematous pinpoint faintly erythematous macules noted on b/l shins\par PULM: Clear to auscultation b/l\par CV: Regular rate and rhythm, no murmurs\par MSK:\par Shoulders: Limited abduction to approx 90 degrees on L, 60 degrees on R\par Elbows: Full ROM b/l, no effusions\par Wrists: Pain with ROM with limited ROM to approx 60 degrees b/l\par Hands: + TTP in PIPs b/l, no effusions\par Hips: Full ROM b/l\par Knees: no effusions, full ROM b/l\par Ankles: no effusions, full ROM b/l\par Feet: + TTP in MTPs b/l\par EXT: No LE edema b/l

## 2023-07-01 DIAGNOSIS — R76.12 NONSPECIFIC REACTION TO CELL MEDIATED IMMUNITY MEASUREMENT OF GAMMA INTERFERON ANTIGEN RESPONSE WITHOUT ACTIVE TUBERCULOSIS: ICD-10-CM

## 2023-07-05 DIAGNOSIS — M06.9 RHEUMATOID ARTHRITIS, UNSPECIFIED: ICD-10-CM

## 2023-07-05 DIAGNOSIS — R76.12 NONSPECIFIC REACTION TO CELL MEDIATED IMMUNITY MEASUREMENT OF GAMMA INTERFERON ANTIGEN RESPONSE WITHOUT ACTIVE TUBERCULOSIS: ICD-10-CM

## 2023-07-05 DIAGNOSIS — R21 RASH AND OTHER NONSPECIFIC SKIN ERUPTION: ICD-10-CM

## 2023-07-21 ENCOUNTER — INPATIENT (INPATIENT)
Facility: HOSPITAL | Age: 53
LOS: 0 days | Discharge: HOME | DRG: 420 | End: 2023-07-22
Attending: HOSPITALIST | Admitting: STUDENT IN AN ORGANIZED HEALTH CARE EDUCATION/TRAINING PROGRAM
Payer: MEDICAID

## 2023-07-21 VITALS
HEART RATE: 84 BPM | RESPIRATION RATE: 18 BRPM | SYSTOLIC BLOOD PRESSURE: 134 MMHG | DIASTOLIC BLOOD PRESSURE: 73 MMHG | OXYGEN SATURATION: 97 % | TEMPERATURE: 98 F

## 2023-07-21 DIAGNOSIS — Z98.891 HISTORY OF UTERINE SCAR FROM PREVIOUS SURGERY: Chronic | ICD-10-CM

## 2023-07-21 DIAGNOSIS — R73.9 HYPERGLYCEMIA, UNSPECIFIED: ICD-10-CM

## 2023-07-21 DIAGNOSIS — T38.3X1A POISONING BY INSULIN AND ORAL HYPOGLYCEMIC [ANTIDIABETIC] DRUGS, ACCIDENTAL (UNINTENTIONAL), INITIAL ENCOUNTER: ICD-10-CM

## 2023-07-21 LAB
ALBUMIN SERPL ELPH-MCNC: 3.9 G/DL — SIGNIFICANT CHANGE UP (ref 3.5–5.2)
ALP SERPL-CCNC: 85 U/L — SIGNIFICANT CHANGE UP (ref 30–115)
ALT FLD-CCNC: 22 U/L — SIGNIFICANT CHANGE UP (ref 0–41)
ANION GAP SERPL CALC-SCNC: 11 MMOL/L — SIGNIFICANT CHANGE UP (ref 7–14)
APPEARANCE UR: ABNORMAL
AST SERPL-CCNC: 27 U/L — SIGNIFICANT CHANGE UP (ref 0–41)
BACTERIA # UR AUTO: NEGATIVE — SIGNIFICANT CHANGE UP
BASOPHILS # BLD AUTO: 0.05 K/UL — SIGNIFICANT CHANGE UP (ref 0–0.2)
BASOPHILS NFR BLD AUTO: 0.5 % — SIGNIFICANT CHANGE UP (ref 0–1)
BILIRUB SERPL-MCNC: <0.2 MG/DL — SIGNIFICANT CHANGE UP (ref 0.2–1.2)
BILIRUB UR-MCNC: NEGATIVE — SIGNIFICANT CHANGE UP
BUN SERPL-MCNC: 32 MG/DL — HIGH (ref 10–20)
CALCIUM SERPL-MCNC: 10.2 MG/DL — SIGNIFICANT CHANGE UP (ref 8.4–10.5)
CHLORIDE SERPL-SCNC: 100 MMOL/L — SIGNIFICANT CHANGE UP (ref 98–110)
CO2 SERPL-SCNC: 30 MMOL/L — SIGNIFICANT CHANGE UP (ref 17–32)
COLOR SPEC: YELLOW — SIGNIFICANT CHANGE UP
CREAT SERPL-MCNC: 0.7 MG/DL — SIGNIFICANT CHANGE UP (ref 0.7–1.5)
DIFF PNL FLD: ABNORMAL
EGFR: 103 ML/MIN/1.73M2 — SIGNIFICANT CHANGE UP
EOSINOPHIL # BLD AUTO: 0.07 K/UL — SIGNIFICANT CHANGE UP (ref 0–0.7)
EOSINOPHIL NFR BLD AUTO: 0.8 % — SIGNIFICANT CHANGE UP (ref 0–8)
EPI CELLS # UR: 3 /HPF — SIGNIFICANT CHANGE UP (ref 0–5)
GLUCOSE SERPL-MCNC: 57 MG/DL — LOW (ref 70–99)
GLUCOSE UR QL: ABNORMAL
HCG SERPL QL: NEGATIVE — SIGNIFICANT CHANGE UP
HCT VFR BLD CALC: 33.9 % — LOW (ref 37–47)
HGB BLD-MCNC: 11.4 G/DL — LOW (ref 12–16)
HYALINE CASTS # UR AUTO: 0 /LPF — SIGNIFICANT CHANGE UP (ref 0–7)
IMM GRANULOCYTES NFR BLD AUTO: 0.2 % — SIGNIFICANT CHANGE UP (ref 0.1–0.3)
KETONES UR-MCNC: NEGATIVE — SIGNIFICANT CHANGE UP
LEUKOCYTE ESTERASE UR-ACNC: ABNORMAL
LYMPHOCYTES # BLD AUTO: 2.56 K/UL — SIGNIFICANT CHANGE UP (ref 1.2–3.4)
LYMPHOCYTES # BLD AUTO: 27.8 % — SIGNIFICANT CHANGE UP (ref 20.5–51.1)
MCHC RBC-ENTMCNC: 29.6 PG — SIGNIFICANT CHANGE UP (ref 27–31)
MCHC RBC-ENTMCNC: 33.6 G/DL — SIGNIFICANT CHANGE UP (ref 32–37)
MCV RBC AUTO: 88.1 FL — SIGNIFICANT CHANGE UP (ref 81–99)
MONOCYTES # BLD AUTO: 0.63 K/UL — HIGH (ref 0.1–0.6)
MONOCYTES NFR BLD AUTO: 6.8 % — SIGNIFICANT CHANGE UP (ref 1.7–9.3)
NEUTROPHILS # BLD AUTO: 5.88 K/UL — SIGNIFICANT CHANGE UP (ref 1.4–6.5)
NEUTROPHILS NFR BLD AUTO: 63.9 % — SIGNIFICANT CHANGE UP (ref 42.2–75.2)
NITRITE UR-MCNC: NEGATIVE — SIGNIFICANT CHANGE UP
NRBC # BLD: 0 /100 WBCS — SIGNIFICANT CHANGE UP (ref 0–0)
PH UR: 6.5 — SIGNIFICANT CHANGE UP (ref 5–8)
PLATELET # BLD AUTO: 333 K/UL — SIGNIFICANT CHANGE UP (ref 130–400)
PMV BLD: 9.8 FL — SIGNIFICANT CHANGE UP (ref 7.4–10.4)
POTASSIUM SERPL-MCNC: 4.5 MMOL/L — SIGNIFICANT CHANGE UP (ref 3.5–5)
POTASSIUM SERPL-SCNC: 4.5 MMOL/L — SIGNIFICANT CHANGE UP (ref 3.5–5)
PROT SERPL-MCNC: 6.9 G/DL — SIGNIFICANT CHANGE UP (ref 6–8)
PROT UR-MCNC: ABNORMAL
RBC # BLD: 3.85 M/UL — LOW (ref 4.2–5.4)
RBC # FLD: 12.9 % — SIGNIFICANT CHANGE UP (ref 11.5–14.5)
RBC CASTS # UR COMP ASSIST: 1 /HPF — SIGNIFICANT CHANGE UP (ref 0–4)
SODIUM SERPL-SCNC: 141 MMOL/L — SIGNIFICANT CHANGE UP (ref 135–146)
SP GR SPEC: 1.01 — SIGNIFICANT CHANGE UP (ref 1.01–1.03)
TROPONIN T SERPL-MCNC: <0.01 NG/ML — SIGNIFICANT CHANGE UP
UROBILINOGEN FLD QL: SIGNIFICANT CHANGE UP
WBC # BLD: 9.21 K/UL — SIGNIFICANT CHANGE UP (ref 4.8–10.8)
WBC # FLD AUTO: 9.21 K/UL — SIGNIFICANT CHANGE UP (ref 4.8–10.8)
WBC UR QL: 183 /HPF — HIGH (ref 0–5)

## 2023-07-21 PROCEDURE — 80053 COMPREHEN METABOLIC PANEL: CPT

## 2023-07-21 PROCEDURE — 97161 PT EVAL LOW COMPLEX 20 MIN: CPT | Mod: GP

## 2023-07-21 PROCEDURE — 83525 ASSAY OF INSULIN: CPT

## 2023-07-21 PROCEDURE — 36415 COLL VENOUS BLD VENIPUNCTURE: CPT

## 2023-07-21 PROCEDURE — 85025 COMPLETE CBC W/AUTO DIFF WBC: CPT

## 2023-07-21 PROCEDURE — 84681 ASSAY OF C-PEPTIDE: CPT

## 2023-07-21 PROCEDURE — ZZZZZ: CPT

## 2023-07-21 PROCEDURE — 82962 GLUCOSE BLOOD TEST: CPT

## 2023-07-21 PROCEDURE — 83036 HEMOGLOBIN GLYCOSYLATED A1C: CPT

## 2023-07-21 PROCEDURE — 99285 EMERGENCY DEPT VISIT HI MDM: CPT

## 2023-07-21 PROCEDURE — 84443 ASSAY THYROID STIM HORMONE: CPT

## 2023-07-21 PROCEDURE — 81001 URINALYSIS AUTO W/SCOPE: CPT

## 2023-07-21 PROCEDURE — 83735 ASSAY OF MAGNESIUM: CPT

## 2023-07-21 RX ORDER — SODIUM CHLORIDE 9 MG/ML
1000 INJECTION INTRAMUSCULAR; INTRAVENOUS; SUBCUTANEOUS ONCE
Refills: 0 | Status: COMPLETED | OUTPATIENT
Start: 2023-07-21 | End: 2023-07-21

## 2023-07-21 RX ORDER — CEFTRIAXONE 500 MG/1
1000 INJECTION, POWDER, FOR SOLUTION INTRAMUSCULAR; INTRAVENOUS EVERY 24 HOURS
Refills: 0 | Status: DISCONTINUED | OUTPATIENT
Start: 2023-07-21 | End: 2023-07-22

## 2023-07-21 RX ORDER — ACETAMINOPHEN 500 MG
650 TABLET ORAL EVERY 6 HOURS
Refills: 0 | Status: DISCONTINUED | OUTPATIENT
Start: 2023-07-21 | End: 2023-07-22

## 2023-07-21 RX ORDER — ONDANSETRON 8 MG/1
4 TABLET, FILM COATED ORAL EVERY 8 HOURS
Refills: 0 | Status: DISCONTINUED | OUTPATIENT
Start: 2023-07-21 | End: 2023-07-22

## 2023-07-21 RX ORDER — LANOLIN ALCOHOL/MO/W.PET/CERES
3 CREAM (GRAM) TOPICAL AT BEDTIME
Refills: 0 | Status: DISCONTINUED | OUTPATIENT
Start: 2023-07-21 | End: 2023-07-22

## 2023-07-21 RX ADMIN — SODIUM CHLORIDE 1000 MILLILITER(S): 9 INJECTION INTRAMUSCULAR; INTRAVENOUS; SUBCUTANEOUS at 17:57

## 2023-07-21 NOTE — ED PROVIDER NOTE - OBJECTIVE STATEMENT
52-year-old female history of hypertension, hyperlipidemia, insulin-dependent diabetes presenting to ER for evaluation of dizziness/low blood sugar.  Patient states for the past 2 weeks her fingerstick has been consistently low 50 to 60s.  Patient is on Tresiba 15 units at night and Aprida 6 units in the a.m.  Patient states prior to this her fingerstick was normally in the 200s in the morning.  States yesterday had episode of dizziness room spinning sensation checked her fingerstick was found to be low started eating/drinking and gradually felt better.  Patient states had similar episode today as well.  Denies any weight loss, decreased p.o. intake, medication changes, recent illness, fever, chills, cough, URI symptoms, chest pain, shortness of breath, syncope, urinary symptoms.

## 2023-07-21 NOTE — ED PROVIDER NOTE - ATTENDING APP SHARED VISIT CONTRIBUTION OF CARE
I personally evaluated the patient. I reviewed the Resident´s or Physician Assistant´s note (as assigned above), and agree with the findings and plan except as documented in my note.    52-year-old female presents to the emergency department for dizziness.  Is a known diabetic using oral hypoglycemics and insulin, has been taking insulin without eating as well symptoms are reliably provoked by administration of hypoglycemic agents and solved by administration of food.  The review of systems is otherwise unremarkable.    GENERAL: female in no distress.   HEENT: EOMI  CHEST: normal work of breathing noted. CTA bilateral.   CV: pulses intact S1S2 regular  ABD: soft, non rigid, non distended  EXTR: FROM   NEURO: AAO 3 no focal deficits  SKIN: normal no pallor      Impression: Uncontrolled diabetes    Plan: IV, labs, imaging, supportive care & reevaluation

## 2023-07-21 NOTE — ED ADULT NURSE NOTE - NSFALLUNIVINTERV_ED_ALL_ED
Bed/Stretcher in lowest position, wheels locked, appropriate side rails in place/Call bell, personal items and telephone in reach/Instruct patient to call for assistance before getting out of bed/chair/stretcher/Non-slip footwear applied when patient is off stretcher/[place] to call system/Physically safe environment - no spills, clutter or unnecessary equipment/Purposeful proactive rounding/Room/bathroom lighting operational, light cord in reach

## 2023-07-21 NOTE — CONSULT NOTE ADULT - ASSESSMENT
52-year-old female, insulin-dependent diabetic, presents for episodes of hypoglycemia in the past 2 to 3 weeks.    Recommendations:  - ED work-up is unremarkable for common causes of hypoglycemia and insulin-dependent diabetics, worsening renal function, infectious etiology, creased p.o. intake.   - Patient denies overdose on insulin.  - Octreotide not recommended at this time  - Recommend every 4 fingersticks, feeding patient full meals with complex carbohydrates, proteins and fats  - Recommend endocrine evaluation for possible dose adjustment.     - Discussed with attending  Thank you for this consult    Toxicology consults: [phone]  Please use the Adult Antidote orderset (search "antidote") for your tox patients. Items are pre-populated with doses and contain additional instruction on for pharmacy and nursing.                               52-year-old female, insulin-dependent diabetic, presents for episodes of hypoglycemia in the past 2 to 3 weeks.    Recommendations:  - ED work-up is unremarkable for common causes of hypoglycemia in insulin-dependent diabetics, including worsening renal function, infectious etiology, creased p.o. intake.   - Patient denies overdose on insulin.  - Octreotide not recommended at this time  - Recommend every 4 fingersticks, feeding patient full meals with complex carbohydrates, proteins and fats while inpatient.   - Recommend endocrine evaluation.     - Discussed with attending  Thank you for this consult    Toxicology consults: [phone]  Please use the Adult Antidote orderset (search "antidote") for your tox patients. Items are pre-populated with doses and contain additional instruction on for pharmacy and nursing.                               52-year-old female, insulin-dependent diabetic, presents for episodes of hypoglycemia in the past 2 to 3 weeks.    Recommendations:  - ED work-up is unremarkable for common causes of hypoglycemia in insulin-dependent diabetics, including worsening renal function, infectious etiology, creased p.o. intake.   - Patient denies overdose on insulin.  - Octreotide not recommended at this time  - Recommend every 4 fingersticks, feeding patient full meals with complex carbohydrates, proteins and fats while inpatient.   - Recommend endocrine evaluation.     - Discussed with attending  Thank you for this consult    Toxicology consults: [phone]  Please use the Adult Antidote orderset (search "antidote") for your tox patients. Items are pre-populated with doses and contain additional instruction on for pharmacy and nursing.    I personally discussed with ED team. I reviewed the med tox fellow’s note (as assigned above), and agree with the findings and plan except as documented in my note.    Hypoglycemia secondary to insulin.  Feed patient.  Bolus dosing of dextrose as needed.  Discussion with PCP/endo regarding dosing.    -- Please call with any further questions    Wali    [phone] (pager)

## 2023-07-21 NOTE — ED ADULT NURSE REASSESSMENT NOTE - NS ED NURSE REASSESS COMMENT FT1
Pt A&Ox4. Pt presented to ED c/o dizziness since 7/20. Pt denies having any symptoms at this time. Denies any chest pain or palpitations. Pt daughter at bedside. LAC 18G in place. Awaiting urine specimen. Bed alarm in place.

## 2023-07-21 NOTE — CONSULT NOTE ADULT - SUBJECTIVE AND OBJECTIVE BOX
MEDICAL TOXICOLOGY CONSULT    HPI:    52-year-old female history of hypertension, hyperlipidemia, insulin-dependent diabetes, on 15 units of defludec insulin at night, and 6 units of Admelog with meals, presents with multiple episodes of hypoglycemia in the past 2 to 3 weeks.  Patient describes episodes of patient had an episode of dizziness and low blood sugar fang with fingersticks in the 50 to 60s, that improves with p.o intake.  Patient denies any weight loss, decreased p.o., changes in her diet, changes in her medication, infectious symptoms, decreased urine output, urinary symptoms.  Patient endorses been compliant with her insulin    On exam, well-appearing female, alert and oriented to person place time and situation.  No focal neurological deficits.    Vitals: 84, 134/73, 18, 98.3, 97%      PAST MEDICAL & SURGICAL HISTORY:  Diabetes      Neuropathy      Hypertension      Hypercholesteremia      H/O  section          MEDICATION HISTORY:      FAMILY HISTORY:  No pertinent family history in first degree relatives        PHYSICAL EXAM  Vital Signs Last 24 Hrs  T(C): 36.8 (2023 14:55), Max: 36.8 (2023 14:55)  T(F): 98.3 (2023 14:55), Max: 98.3 (2023 14:55)  HR: 84 (2023 14:55) (84 - 84)  BP: 134/73 (2023 14:55) (134/73 - 134/73)  BP(mean): --  RR: 18 (2023 14:55) (18 - 18)  SpO2: 97% (2023 14:55) (97% - 97%)        SIGNIFICANT LABORATORY STUDIES:                        11.4   9.21  )-----------( 333      ( 2023 16:59 )             33.9       07-21    141  |  100  |  32<H>  ----------------------------<  57<L>  4.5   |  30  |  0.7    Ca    10.2      2023 16:59    TPro  6.9  /  Alb  3.9  /  TBili  <0.2  /  DBili  x   /  AST  27  /  ALT  22  /  AlkPhos  85  07-21          Urinalysis Basic - ( 2023 16:59 )    Color: x / Appearance: x / SG: x / pH: x  Gluc: 57 mg/dL / Ketone: x  / Bili: x / Urobili: x   Blood: x / Protein: x / Nitrite: x   Leuk Esterase: x / RBC: x / WBC x   Sq Epi: x / Non Sq Epi: x / Bacteria: x        Anion Gap: 11  @ 16:59  CK: --  @ 16:59  Troponin:  --   @ 16:59  Pro-BNP:  --   @ 16:59  VBG:  --   @ 16:59  Carboxyhemoglobin %:  --   @ 16:59  Methemoglobin %:  --   @ 16:59  Osmolality Serum:  --   @ 16:59  Aspirin Level: --   @ 16:59  Acetaminophen Level:  --   @ 16:59  Ethanol Level:  --   @ 16:59  Digoxin Level:  --   @ 16:59  Phenytoin Level:  --   @ 16:59  Carbamazepine level:  --   @ 16:59  Lamotrigine level:  --   @ 16:59      RADIOLOGIC STUDIES

## 2023-07-21 NOTE — ED ADULT NURSE REASSESSMENT NOTE - NSFALLUNIVINTERV_ED_ALL_ED
Bed/Stretcher in lowest position, wheels locked, appropriate side rails in place/Call bell, personal items and telephone in reach/Instruct patient to call for assistance before getting out of bed/chair/stretcher/Non-slip footwear applied when patient is off stretcher/[place] to call system/Physically safe environment - no spills, clutter or unnecessary equipment/Purposeful proactive rounding/Room/bathroom lighting operational, light cord in reach

## 2023-07-21 NOTE — ED PROVIDER NOTE - CLINICAL SUMMARY MEDICAL DECISION MAKING FREE TEXT BOX
52-year-old female presents to the emergency department for poorly controlled fingersticks and missed management of antidiabetic agents, resulting in episodes of symptomatic hypoglycemia.  In the emergency department had screening exam, labs, imaging, and case discussed with poison control, will admit for continued management of recurrent hypoglycemia.  At the time of disposition, results of work-up discussed with patient [and family] with questions answered, expressed understanding of the medical decision making.

## 2023-07-22 ENCOUNTER — TRANSCRIPTION ENCOUNTER (OUTPATIENT)
Age: 53
End: 2023-07-22

## 2023-07-22 ENCOUNTER — APPOINTMENT (OUTPATIENT)
Dept: INTERNAL MEDICINE | Facility: CLINIC | Age: 53
End: 2023-07-22

## 2023-07-22 VITALS
RESPIRATION RATE: 18 BRPM | SYSTOLIC BLOOD PRESSURE: 135 MMHG | TEMPERATURE: 98 F | HEART RATE: 92 BPM | DIASTOLIC BLOOD PRESSURE: 70 MMHG | OXYGEN SATURATION: 99 %

## 2023-07-22 LAB
A1C WITH ESTIMATED AVERAGE GLUCOSE RESULT: 9.5 % — HIGH (ref 4–5.6)
ALBUMIN SERPL ELPH-MCNC: 3.8 G/DL — SIGNIFICANT CHANGE UP (ref 3.5–5.2)
ALP SERPL-CCNC: 92 U/L — SIGNIFICANT CHANGE UP (ref 30–115)
ALT FLD-CCNC: 21 U/L — SIGNIFICANT CHANGE UP (ref 0–41)
ANION GAP SERPL CALC-SCNC: 9 MMOL/L — SIGNIFICANT CHANGE UP (ref 7–14)
AST SERPL-CCNC: 21 U/L — SIGNIFICANT CHANGE UP (ref 0–41)
BASOPHILS # BLD AUTO: 0.04 K/UL — SIGNIFICANT CHANGE UP (ref 0–0.2)
BASOPHILS NFR BLD AUTO: 0.6 % — SIGNIFICANT CHANGE UP (ref 0–1)
BILIRUB SERPL-MCNC: 0.2 MG/DL — SIGNIFICANT CHANGE UP (ref 0.2–1.2)
BUN SERPL-MCNC: 22 MG/DL — HIGH (ref 10–20)
CALCIUM SERPL-MCNC: 9.7 MG/DL — SIGNIFICANT CHANGE UP (ref 8.4–10.5)
CHLORIDE SERPL-SCNC: 101 MMOL/L — SIGNIFICANT CHANGE UP (ref 98–110)
CO2 SERPL-SCNC: 32 MMOL/L — SIGNIFICANT CHANGE UP (ref 17–32)
CREAT SERPL-MCNC: 0.7 MG/DL — SIGNIFICANT CHANGE UP (ref 0.7–1.5)
EGFR: 103 ML/MIN/1.73M2 — SIGNIFICANT CHANGE UP
EOSINOPHIL # BLD AUTO: 0.06 K/UL — SIGNIFICANT CHANGE UP (ref 0–0.7)
EOSINOPHIL NFR BLD AUTO: 0.8 % — SIGNIFICANT CHANGE UP (ref 0–8)
ESTIMATED AVERAGE GLUCOSE: 226 MG/DL — HIGH (ref 68–114)
GLUCOSE BLDC GLUCOMTR-MCNC: 126 MG/DL — HIGH (ref 70–99)
GLUCOSE BLDC GLUCOMTR-MCNC: 174 MG/DL — HIGH (ref 70–99)
GLUCOSE BLDC GLUCOMTR-MCNC: 230 MG/DL — HIGH (ref 70–99)
GLUCOSE SERPL-MCNC: 128 MG/DL — HIGH (ref 70–99)
HCT VFR BLD CALC: 35.3 % — LOW (ref 37–47)
HGB BLD-MCNC: 11.7 G/DL — LOW (ref 12–16)
IMM GRANULOCYTES NFR BLD AUTO: 0.3 % — SIGNIFICANT CHANGE UP (ref 0.1–0.3)
INSULIN SERPL-MCNC: 1.8 UU/ML — LOW (ref 2.6–24.9)
LYMPHOCYTES # BLD AUTO: 2.06 K/UL — SIGNIFICANT CHANGE UP (ref 1.2–3.4)
LYMPHOCYTES # BLD AUTO: 29 % — SIGNIFICANT CHANGE UP (ref 20.5–51.1)
MAGNESIUM SERPL-MCNC: 1.9 MG/DL — SIGNIFICANT CHANGE UP (ref 1.8–2.4)
MCHC RBC-ENTMCNC: 28.8 PG — SIGNIFICANT CHANGE UP (ref 27–31)
MCHC RBC-ENTMCNC: 33.1 G/DL — SIGNIFICANT CHANGE UP (ref 32–37)
MCV RBC AUTO: 86.9 FL — SIGNIFICANT CHANGE UP (ref 81–99)
MONOCYTES # BLD AUTO: 0.58 K/UL — SIGNIFICANT CHANGE UP (ref 0.1–0.6)
MONOCYTES NFR BLD AUTO: 8.2 % — SIGNIFICANT CHANGE UP (ref 1.7–9.3)
NEUTROPHILS # BLD AUTO: 4.34 K/UL — SIGNIFICANT CHANGE UP (ref 1.4–6.5)
NEUTROPHILS NFR BLD AUTO: 61.1 % — SIGNIFICANT CHANGE UP (ref 42.2–75.2)
NRBC # BLD: 0 /100 WBCS — SIGNIFICANT CHANGE UP (ref 0–0)
PLATELET # BLD AUTO: 336 K/UL — SIGNIFICANT CHANGE UP (ref 130–400)
PMV BLD: 9.7 FL — SIGNIFICANT CHANGE UP (ref 7.4–10.4)
POTASSIUM SERPL-MCNC: 4.3 MMOL/L — SIGNIFICANT CHANGE UP (ref 3.5–5)
POTASSIUM SERPL-SCNC: 4.3 MMOL/L — SIGNIFICANT CHANGE UP (ref 3.5–5)
PROT SERPL-MCNC: 6.7 G/DL — SIGNIFICANT CHANGE UP (ref 6–8)
RBC # BLD: 4.06 M/UL — LOW (ref 4.2–5.4)
RBC # FLD: 12.5 % — SIGNIFICANT CHANGE UP (ref 11.5–14.5)
SODIUM SERPL-SCNC: 142 MMOL/L — SIGNIFICANT CHANGE UP (ref 135–146)
T4 FREE+ TSH PNL SERPL: 1.64 UIU/ML — SIGNIFICANT CHANGE UP (ref 0.27–4.2)
WBC # BLD: 7.1 K/UL — SIGNIFICANT CHANGE UP (ref 4.8–10.8)
WBC # FLD AUTO: 7.1 K/UL — SIGNIFICANT CHANGE UP (ref 4.8–10.8)

## 2023-07-22 PROCEDURE — 99223 1ST HOSP IP/OBS HIGH 75: CPT

## 2023-07-22 RX ORDER — INSULIN LISPRO 100/ML
8 VIAL (ML) SUBCUTANEOUS
Refills: 0 | DISCHARGE

## 2023-07-22 RX ORDER — SODIUM CHLORIDE 9 MG/ML
1000 INJECTION, SOLUTION INTRAVENOUS
Refills: 0 | Status: DISCONTINUED | OUTPATIENT
Start: 2023-07-22 | End: 2023-07-22

## 2023-07-22 RX ORDER — INSULIN LISPRO 100/ML
VIAL (ML) SUBCUTANEOUS
Refills: 0 | Status: DISCONTINUED | OUTPATIENT
Start: 2023-07-22 | End: 2023-07-22

## 2023-07-22 RX ORDER — LISINOPRIL 2.5 MG/1
10 TABLET ORAL DAILY
Refills: 0 | Status: DISCONTINUED | OUTPATIENT
Start: 2023-07-22 | End: 2023-07-22

## 2023-07-22 RX ORDER — ATORVASTATIN CALCIUM 80 MG/1
1 TABLET, FILM COATED ORAL
Refills: 0 | DISCHARGE

## 2023-07-22 RX ORDER — CHOLECALCIFEROL (VITAMIN D3) 125 MCG
1 CAPSULE ORAL
Refills: 0 | DISCHARGE

## 2023-07-22 RX ORDER — INSULIN LISPRO 100/ML
VIAL (ML) SUBCUTANEOUS AT BEDTIME
Refills: 0 | Status: DISCONTINUED | OUTPATIENT
Start: 2023-07-22 | End: 2023-07-22

## 2023-07-22 RX ORDER — DEXTROSE 50 % IN WATER 50 %
15 SYRINGE (ML) INTRAVENOUS ONCE
Refills: 0 | Status: DISCONTINUED | OUTPATIENT
Start: 2023-07-22 | End: 2023-07-22

## 2023-07-22 RX ORDER — INSULIN DEGLUDEC 100 U/ML
25 INJECTION, SOLUTION SUBCUTANEOUS
Refills: 0 | DISCHARGE

## 2023-07-22 RX ORDER — DEXTROSE 50 % IN WATER 50 %
12.5 SYRINGE (ML) INTRAVENOUS ONCE
Refills: 0 | Status: DISCONTINUED | OUTPATIENT
Start: 2023-07-22 | End: 2023-07-22

## 2023-07-22 RX ORDER — INSULIN GLULISINE 100 [IU]/ML
4 INJECTION, SOLUTION SUBCUTANEOUS
Qty: 0 | Refills: 0 | DISCHARGE

## 2023-07-22 RX ORDER — ATORVASTATIN CALCIUM 80 MG/1
80 TABLET, FILM COATED ORAL AT BEDTIME
Refills: 0 | Status: DISCONTINUED | OUTPATIENT
Start: 2023-07-22 | End: 2023-07-22

## 2023-07-22 RX ORDER — INSULIN LISPRO 100/ML
3 VIAL (ML) SUBCUTANEOUS
Refills: 0 | Status: DISCONTINUED | OUTPATIENT
Start: 2023-07-22 | End: 2023-07-22

## 2023-07-22 RX ORDER — GLUCAGON INJECTION, SOLUTION 0.5 MG/.1ML
1 INJECTION, SOLUTION SUBCUTANEOUS ONCE
Refills: 0 | Status: DISCONTINUED | OUTPATIENT
Start: 2023-07-22 | End: 2023-07-22

## 2023-07-22 RX ORDER — LISINOPRIL 2.5 MG/1
1 TABLET ORAL
Refills: 0 | DISCHARGE

## 2023-07-22 RX ORDER — ENOXAPARIN SODIUM 100 MG/ML
40 INJECTION SUBCUTANEOUS EVERY 24 HOURS
Refills: 0 | Status: DISCONTINUED | OUTPATIENT
Start: 2023-07-22 | End: 2023-07-22

## 2023-07-22 RX ORDER — INSULIN DEGLUDEC 100 U/ML
10 INJECTION, SOLUTION SUBCUTANEOUS
Qty: 0 | Refills: 0 | DISCHARGE

## 2023-07-22 RX ORDER — SIMVASTATIN 20 MG/1
1 TABLET, FILM COATED ORAL
Refills: 0 | DISCHARGE

## 2023-07-22 RX ORDER — DEXTROSE 50 % IN WATER 50 %
25 SYRINGE (ML) INTRAVENOUS ONCE
Refills: 0 | Status: DISCONTINUED | OUTPATIENT
Start: 2023-07-22 | End: 2023-07-22

## 2023-07-22 RX ORDER — INSULIN GLARGINE 100 [IU]/ML
10 INJECTION, SOLUTION SUBCUTANEOUS EVERY MORNING
Refills: 0 | Status: DISCONTINUED | OUTPATIENT
Start: 2023-07-22 | End: 2023-07-22

## 2023-07-22 RX ADMIN — ENOXAPARIN SODIUM 40 MILLIGRAM(S): 100 INJECTION SUBCUTANEOUS at 12:21

## 2023-07-22 RX ADMIN — Medication 2: at 12:21

## 2023-07-22 NOTE — PATIENT PROFILE ADULT - LANGUAGE ASSISTANCE NEEDED
interpetation available as needed/No-Patient/Caregiver offered and refused free interpretation services.

## 2023-07-22 NOTE — DISCHARGE NOTE PROVIDER - CARE PROVIDER_API CALL
Vira Perez  Endocrinology/Metab/Diabetes  [address] [place] [address], [address]  [place] [address]  Phone: [phone]  Fax: [phone]  Follow Up Time: 1-3 days

## 2023-07-22 NOTE — H&P ADULT - CONVERSATION DETAILS
Explained to pt that unless otherwise stated, in a hospital everyone is treated as full code, meaning if needed in an emergency, compressions are done when a pt's heart does not beat on its own and intubation and ventilation are done when a pt cannot breathe on their own. Asked whether this is something that pt would want if ever required as a life sustaining measure.   Pt has not spent significant amount of time considering goals of care in the past. Initially stated she would not want intubation and I explained that it would only be done as a life saving measure in emergent situations where the pt is unable to breath, and that sometimes the cause for need for intubation is reversible and depending on situation may be as short as a day or two. Pt still expressed significant hesitancy and reason provided for wanting DNI is that she is simply "scared of intubation," and asked if she would decline it even if it meant death and pt then said she wasn't sure. Told pt that if she has not made her mind up at this time, can leave her as full code for now. Pt agreeable to this.     HCP not discussed at this time as she became anxious after above conversation. She had asked me if I was asking because I thought she would need it, and I had to explain to her multiple times that I ask this as a routine question and that these conversations are actually best had when pts are well and mentating at baseline, and that their close family should be aware of their thoughts on such measures, and that she does not clinically appear to need these measures imminently.     Emergency contact listed as Magdalena Duron [phone]  Next of Kin is spouse Jose Luis Duron [phone]  Also lives at home w son

## 2023-07-22 NOTE — DISCHARGE NOTE PROVIDER - NSDCMRMEDTOKEN_GEN_ALL_CORE_FT
Apidra 100 units/mL injectable solution: 6 unit(s) injectable 3 times a day (before meals)  atorvastatin 80 mg oral tablet: 1 tab(s) orally once a day (at bedtime)  Ecotrin Adult Low Strength 81 mg oral delayed release tablet: 1 tab(s) orally once a day  gabapentin 100 mg oral capsule: 1 cap(s) orally 3 times a day  lisinopril 10 mg oral tablet: 1 tab(s) orally once a day  MiraLax oral powder for reconstitution: 17 gram(s) orally once a day  Tresiba FlexTouch 100 units/mL subcutaneous solution: 16 unit(s) subcutaneous once a day (at bedtime)   Apidra 100 units/mL injectable solution: 4 unit(s) injectable 3 times a day (before meals)   50 mcg (2000 intl units) oral capsule: 1 orally once a day  gabapentin 100 mg oral capsule: 1 cap(s) orally 3 times a day  lisinopril 10 mg oral tablet: 1 tab(s) orally once a day  simvastatin 40 mg oral tablet: 1 orally once a day  Tresiba FlexTouch 100 units/mL subcutaneous solution: 10 unit(s) subcutaneous once a day (at bedtime)

## 2023-07-22 NOTE — DISCHARGE NOTE PROVIDER - NSDCCPCAREPLAN_GEN_ALL_CORE_FT
PRINCIPAL DISCHARGE DIAGNOSIS  Diagnosis: Hypoglycemia  Assessment and Plan of Treatment: You were admitted for low blood sugar. Your insulin doses were reduced. Glucose levels became normal and your dizziness resolved. An endocrinologist was consulted and he recommended ... at home. Please continue regular check of fingerstick glucose testing BEFORE breakfast, lunch and dinner and write the results in a notebook to show your endocrinologist. Please if you had low blood sugar, stop your insuline and come back to the ER.      SECONDARY DISCHARGE DIAGNOSES  Diagnosis: Dizziness  Assessment and Plan of Treatment:      PRINCIPAL DISCHARGE DIAGNOSIS  Diagnosis: Hypoglycemia  Assessment and Plan of Treatment: You were admitted for low blood sugar. Your insulin doses were reduced. Glucose levels became normal and your dizziness resolved. An endocrinologist was consulted and he recommended Tresiba 10 units once daily at bedtime and apidra 4 units three times per day pre meals  at home. Please continue regular check of fingerstick glucose testing BEFORE breakfast, lunch and dinner and write the results in a notebook to show your endocrinologist. Please if you had low blood sugar, stop your insuline and come back to the ER.  Also please follow up with your cardiologist, to make sure you are taking your heart medication appropriately.      SECONDARY DISCHARGE DIAGNOSES  Diagnosis: Dizziness  Assessment and Plan of Treatment: You were admitted for dizziness due to low blood sugar. Your symptoms resolved once your blood sugar became normal.

## 2023-07-22 NOTE — PATIENT PROFILE ADULT - FALL HARM RISK - HARM RISK INTERVENTIONS

## 2023-07-22 NOTE — DISCHARGE NOTE PROVIDER - HOSPITAL COURSE
51 y/o woman w PMHx of HTN, HLD, mild CAD on cath 4/2023, DM2 dx in 2018, has been on insulin since 2022, h/o RA, presented to ED for reports of 3 weeks of low blood glucose readings and 4 days of dizziness on standing, worse x1day and found in ED BMP to have glucose 57, (+)UA and given ctx, admitted to medicine for management of hypoglycemia and dizziness.     States that previously was taking Tresiba 20u qhs and Apidra 8u TID AC and her sugars were in the 200s range and felt dizzy so called her doctor and was told to lower her insulin to Tresiba 16u and Apidra 6u TID AC around 2 weeks ago and her glucose was subsequently lowered, and has been persistently below 100 but felt OK so continued at that dose. However at a different point also told me her sugar levels have been 100-150s range recently? On further history, states for past 4 days has felt 10-15min episodes of dizziness when she wakes up and immediately gets out of bed, w corresponding glucose in 60s range, but through the day does not otherwise have any symptoms. On day of presentation had a particularly bad episode of dizziness which was worse than usual and happened twice so presented to ED.   Asked whether she had been taking Prednisone recently, as NICOLAS shows she was prescribed Prednisone 15mg QD on 6/30/23, but pt denies this, states she was not prescribed any steroids.   Denies any other symptoms such as fevers, chills, CP, SOB, vision changes, falls, syncope, n/v/d, constipation, abd pain.     Triage vitals were: 137/73, 84bpm, RR18 97% on RA, 98.3F   Labs notable for: WBC 9.21, gluc 57. UA(+) WBC And large LE  Treatments: CTX stat.  Antibiotics stopped because patient asymptomatic.   Insulin stopped and FGT normalized in hospital  Endocrino consulted and suggested that    Patient discharged home   53 y/o woman w PMHx of HTN, HLD, mild CAD on cath 4/2023, DM2 dx in 2018, has been on insulin since 2022, h/o RA, presented to ED for reports of 3 weeks of low blood glucose readings and 4 days of dizziness on standing, worse x1day and found in ED BMP to have glucose 57, (+)UA and given ctx, admitted to medicine for management of hypoglycemia and dizziness.     States that previously was taking Tresiba 20u qhs and Apidra 8u TID AC and her sugars were in the 200s range and felt dizzy so called her doctor and was told to lower her insulin to Tresiba 16u and Apidra 6u TID AC around 2 weeks ago and her glucose was subsequently lowered, and has been persistently below 100 but felt OK so continued at that dose. However at a different point also told me her sugar levels have been 100-150s range recently? On further history, states for past 4 days has felt 10-15min episodes of dizziness when she wakes up and immediately gets out of bed, w corresponding glucose in 60s range, but through the day does not otherwise have any symptoms. On day of presentation had a particularly bad episode of dizziness which was worse than usual and happened twice so presented to ED.   Asked whether she had been taking Prednisone recently, as NICOLAS shows she was prescribed Prednisone 15mg QD on 6/30/23, but pt denies this, states she was not prescribed any steroids.   Denies any other symptoms such as fevers, chills, CP, SOB, vision changes, falls, syncope, n/v/d, constipation, abd pain.     Triage vitals were: 137/73, 84bpm, RR18 97% on RA, 98.3F   Labs notable for: WBC 9.21, gluc 57. UA(+) WBC And large LE  Treatments: CTX stat.  Antibiotics stopped because patient asymptomatic.   Insulin stopped and FGT normalized in hospital  Endocrino consulted and suggested to discharge the patient on tresiba 10u sQ once daily and apidra 4 units sQ tid    Patient discharged home

## 2023-07-22 NOTE — H&P ADULT - NSHPPHYSICALEXAM_GEN_ALL_CORE
GENERAL: NAD, lying in bed comfortably  HEAD:  Atraumatic, Normocephalic  EYES: EOMI, sclera clear  ENT: Moist mucous membranes  NECK: Supple, trachea midline  CHEST/LUNG: Clear to auscultation anteriorly bilaterally; No rales, rhonchi, wheezing, or rubs. Unlabored respirations  HEART: Regular rate and rhythm; (+)3/6 murmur RUSB   ABDOMEN: (+)BS; Soft, nontender, nondistended. No suprapubic tenderness.   EXTREMITIES:  2+ Radial Pulses, brisk capillary refill. No clubbing, cyanosis, or edema  NERVOUS SYSTEM:  A&Ox3, no noted facial droop. Moving all extremities w/o difficulty.   SKIN: No rashes or lesions to b/l forearm, face.

## 2023-07-22 NOTE — DISCHARGE NOTE NURSING/CASE MANAGEMENT/SOCIAL WORK - PATIENT PORTAL LINK FT
You can access the FollowMyHealth Patient Portal offered by [facility] by registering at the following website: [email or web address]. By joining [facility]’s FollowMyHealth portal, you will also be able to view your health information using other applications (apps) compatible with our system.

## 2023-07-22 NOTE — PHYSICAL THERAPY INITIAL EVALUATION ADULT - GENERAL OBSERVATIONS, REHAB EVAL
11:17-11:37. Pt encountered semifowler in bed in NAD, son present at b/s, pt without complaints, agreeable to PT. Pt understand minimal English and son present speaks English therefore interperter in [place] was not used.

## 2023-07-22 NOTE — H&P ADULT - HISTORY OF PRESENT ILLNESS
[place] speaking 53 y/o woman w PMHx of HTN, HLD, mild CAD on cath 4/2023, DM2 dx in 2018, has been on insulin since 2022, h/o RA, presented to ED for reports of 3 weeks of low blood glucose readings and 4 days of dizziness on standing, worse x1day and found in ED BMP to have glucose 57, (+)UA and given ctx, admitted to medicine for management of hypoglycemia and dizziness.   Emergency contact listed as Magdalena Duron [phone]  Next of Kin is spouse Jose Luis Duron [phone]  Also lives at home w son     States that previously was taking Tresiba 20u qhs and Apidra 8u TID AC and her sugars were in the 200s range and felt dizzy so called her doctor and was told to lower her insulin to Tresiba 16u and Apidra 6u TID AC around 2 weeks ago and her glucose was subsequently improved, but has been persistently below 100 but felt OK so continued at that dose. However at a different point also told me her sugar levels have been 100-150s range recently? On further history, states for past 4 days has felt 10-15min episodes of dizziness when she wakes up and immediately gets out of bed, w corresponding glucose in 60s range, but through the day does not otherwise have any symptoms. On day of presentation had a particularly bad episode of dizziness which was worse than usual and happened twice so presented to ED.   Asked whether she had been taking Prednisone recently, as HIE shows she was prescribed Prednisone 15mg QD on 6/30/23, but pt denies this, states she was not prescribed any steroids.   Denies any other symptoms such as fevers, chills, CP, SOB, vision changes, falls, syncope, n/v/d, constipation, abd pain.     Triage vitals were: 137/73, 84bpm, RR18 97% on RA, 98.3F   Labs notable for: WBC 9.21, gluc 57. UA(+) WBC And large LE  Treatments: CTX  [place] speaking 53 y/o woman w PMHx of HTN, HLD, mild CAD on cath 4/2023, DM2 dx in 2018, has been on insulin since 2022, h/o RA, presented to ED for reports of 3 weeks of low blood glucose readings and 4 days of dizziness on standing, worse x1day and found in ED BMP to have glucose 57, (+)UA and given ctx, admitted to medicine for management of hypoglycemia and dizziness.   Emergency contact listed as Magdalena Duron [phone]  Next of Kin is spouse Jose Luis Duron [phone]  Also lives at home w son     States that previously was taking Tresiba 20u qhs and Apidra 8u TID AC and her sugars were in the 200s range and felt dizzy so called her doctor and was told to lower her insulin to Tresiba 16u and Apidra 6u TID AC around 2 weeks ago and her glucose was subsequently lowered, and has been persistently below 100 but felt OK so continued at that dose. However at a different point also told me her sugar levels have been 100-150s range recently? On further history, states for past 4 days has felt 10-15min episodes of dizziness when she wakes up and immediately gets out of bed, w corresponding glucose in 60s range, but through the day does not otherwise have any symptoms. On day of presentation had a particularly bad episode of dizziness which was worse than usual and happened twice so presented to ED.   Asked whether she had been taking Prednisone recently, as HIE shows she was prescribed Prednisone 15mg QD on 6/30/23, but pt denies this, states she was not prescribed any steroids.   Denies any other symptoms such as fevers, chills, CP, SOB, vision changes, falls, syncope, n/v/d, constipation, abd pain.     Triage vitals were: 137/73, 84bpm, RR18 97% on RA, 98.3F   Labs notable for: WBC 9.21, gluc 57. UA(+) WBC And large LE  Treatments: CTX

## 2023-07-22 NOTE — PROGRESS NOTE ADULT - SUBJECTIVE AND OBJECTIVE BOX
IZA TIAN  SIUH-N 3A (Back) 019 B (SI-N 3A (Back))      Patient was evaluated and examined  by bedside, c/o mild joints pain, tolerating diet well, bsfs stable        REVIEW OF SYSTEMS: please see pertinent positives mentioned above, all other 12 ROS negative      T(C): , Max: 36.8 (07-21-23 @ 14:55)  HR: 80 (07-22-23 @ 05:13)  BP: 158/78 (07-22-23 @ 05:13)  RR: 18 (07-22-23 @ 05:13)  SpO2: 98% (07-22-23 @ 05:13)  CAPILLARY BLOOD GLUCOSE      POCT Blood Glucose.: 126 mg/dL (22 Jul 2023 07:40)  POCT Blood Glucose.: 174 mg/dL (22 Jul 2023 01:26)  POCT Blood Glucose.: 316 mg/dL (21 Jul 2023 19:42)      PHYSICAL EXAM:  General: NAD, AAOX3, patient is sitting comfortably in bed, cachectic   HEENT: AT, NC, Supple, NO JVD, NO CB  Lungs: CTA B/L, no wheezing, no rhonchi  CVS: normal S1, S2, RRR, NO M/G/R  Abdomen: soft, bowel sounds present, non-tender, non-distended  Extremities: no edema, no clubbing, no cyanosis, positive peripheral pulses b/l  Neuro: no acute focal neurological deficits  Skin: no rash, no ecchymosis      LAB  CBC  Date: 07-22-23 @ 08:25  Mean cell Hnasdrxatl69.8  Mean cell Hemoglobin Conc33.1  Mean cell Volum 86.9  Platelet count-Automate 336  RBC Count 4.06  Red Cell Distrib Width12.5  WBC Count7.10  % Albumin, Urine--  Hematocrit 35.3  Hemoglobin 11.7  CBC  Date: 07-21-23 @ 16:59  Mean cell Vexpkcjukp93.6  Mean cell Hemoglobin Conc33.6  Mean cell Volum 88.1  Platelet count-Automate 333  RBC Count 3.85  Red Cell Distrib Width12.9  WBC Count9.21  % Albumin, Urine--  Hematocrit 33.9  Hemoglobin 11.4    BMP  07-22-23 @ 08:25  Blood Gas Arterial-Calcium,Ionized--  Blood Urea Nitrogen, Serum 22 mg/dL<H> [10 - 20]  Carbon Dioxide, Serum32 mmol/L [17 - 32]  Chloride, [id number] mmol/L [98 - 110]  Creatinie, Serum0.7 mg/dL [0.7 - 1.5]  Glucose, Khkvj409 mg/dL<H> [70 - 99]  Potassium, Serum4.3 mmol/L [3.5 - 5.0]  Sodium, Serum 142 mmol/L [135 - 146]  BMP  07-21-23 @ 16:59  Blood Gas Arterial-Calcium,Ionized--  Blood Urea Nitrogen, Serum 32 mg/dL<H> [10 - 20]  Carbon Dioxide, Serum30 mmol/L [17 - 32]  Chloride, Qkdwn012 mmol/L [98 - 110]  Creatinie, Serum0.7 mg/dL [0.7 - 1.5]  Glucose, Serum57 mg/dL<L> [70 - 99]  Potassium, Serum4.5 mmol/L [3.5 - 5.0]  Sodium, Serum 141 mmol/L [135 - 146]        Medications:  acetaminophen     Tablet .. 650 milliGRAM(s) Oral every 6 hours PRN  aluminum hydroxide/magnesium hydroxide/simethicone Suspension 30 milliLiter(s) Oral every 4 hours PRN  atorvastatin 80 milliGRAM(s) Oral at bedtime  dextrose 5%. 1000 milliLiter(s) IV Continuous <Continuous>  dextrose 5%. 1000 milliLiter(s) IV Continuous <Continuous>  dextrose 50% Injectable 25 Gram(s) IV Push once  dextrose 50% Injectable 12.5 Gram(s) IV Push once  dextrose 50% Injectable 25 Gram(s) IV Push once  dextrose Oral Gel 15 Gram(s) Oral once PRN  enoxaparin Injectable 40 milliGRAM(s) SubCutaneous every 24 hours  glucagon  Injectable 1 milliGRAM(s) IntraMuscular once  insulin lispro (ADMELOG) corrective regimen sliding scale   SubCutaneous three times a day before meals  insulin lispro (ADMELOG) corrective regimen sliding scale   SubCutaneous at bedtime  lisinopril 10 milliGRAM(s) Oral daily  melatonin 3 milliGRAM(s) Oral at bedtime PRN  ondansetron Injectable 4 milliGRAM(s) IV Push every 8 hours PRN        Assessment and Plan:  53 y/o Female with hx of HTN, HLD, RA on prednisone tx, at home ,  IDDM presents to ED for dizziness and hypoglycemia.     Hypoglycemia 2/2 insulin  - FS improved --> CAPILLARY BLOOD GLUCOSE  POCT Blood Glucose.: 126 mg/dL (22 Jul 2023 07:40)  POCT Blood Glucose.: 174 mg/dL (22 Jul 2023 01:26)  POCT Blood Glucose.: 316 mg/dL (21 Jul 2023 19:42)    - check HbA1c  - Hold home insulin for now. continue with sliding scale co. prn.    - Endocrine consult for insulin dose adjustment.     h/o RA- recently prescribed Prednisone 15 mg po once daily ( 6/30/23 ) , planned to start Retuximab post ID Clearance patient has positive QuantiFeron test     HTN/HLD - c/w home med    DVT ppx: Lovenox SC  GI ppx: PPI   Diet: DASH diet   Activity: as tolerated.     #Progress Note Handoff: continue to monitor bsfs , if remains stable d/c home with outpatient Endocrinology and Rheumatology specialists f/up   Family discussion: medical plan of tx. d/w pt. by bedside Disposition: Home__possibly today    Total time spent to complete patient's bedside assessment, review medical chart, discuss medical plan of care with covering medical team was more than 35 minutes with >50% of time spent face to face with patient, discussion with patient/family and/or coordination of care

## 2023-07-22 NOTE — DISCHARGE NOTE PROVIDER - NSDCFUSCHEDAPPT_GEN_ALL_CORE_FT
Kamron Maoy  [facility] PreAdmits  Scheduled Appointment: 09/01/2023    [facility]  RHEUM  [address]  Scheduled Appointment: 09/01/2023    [facility] PreAdmits  Scheduled Appointment: 09/13/2023    [facility]  NUTRITION  [address]  Scheduled Appointment: 09/13/2023    Vira Perez  [facility] PreAdmits  Scheduled Appointment: 10/04/2023    [facility]  ENDOCRIN [address]  Scheduled Appointment: 10/04/2023

## 2023-07-22 NOTE — DISCHARGE NOTE NURSING/CASE MANAGEMENT/SOCIAL WORK - NSDCPEFALRISK_GEN_ALL_CORE
For information on Fall & Injury Prevention, visit: [email or web address] OR  [email or web address] OR  https://www.cdc.gov/steadi/patient.html

## 2023-07-22 NOTE — PATIENT PROFILE ADULT - NS PRO AD NO ADVANCE DIRECTIVE
No [Fully active, able to carry on all pre-disease performance without restriction] : Status 0 - Fully active, able to carry on all pre-disease performance without restriction [Normal] : affect appropriate

## 2023-07-22 NOTE — PHYSICAL THERAPY INITIAL EVALUATION ADULT - CRITERIA FOR SKILLED THERAPEUTIC INTERVENTIONS
pt d/c from PT, does not need acute skilled PT, can amb on unit with unit staff./anticipated discharge recommendation

## 2023-07-22 NOTE — H&P ADULT - ASSESSMENT
Macanese speaking 51 y/o woman w PMHx of HTN, HLD, mild CAD on cath 4/2023, DM2 dx in 2018, has been on insulin since 2022, h/o RA, presented to ED for reports of 3 weeks of low blood glucose readings and 4 days of dizziness on standing, worse x1day and found in ED BMP to have glucose 57, (+)UA and given ctx, admitted to medicine for management of hypoglycemia and dizziness.   Emergency contact listed as Magdalena Duron [phone]  Next of Kin is spouse Jose Luis Duron [phone]    Triage vitals were: 137/73, 84bpm, RR18 97% on RA, 98.3F   Labs notable for: WBC 9.21, gluc 57. UA(+) WBC And large LE  Treatments: CTX    Citizen of [place] speaking 51 y/o woman w PMHx of HTN, HLD, mild CAD on cath 4/2023, DM2 dx in 2018, has been on insulin since 2022, h/o RA, presented to ED for reports of 3 weeks of low blood glucose readings and 4 days of dizziness on standing, worse x1day and found in ED BMP to have glucose 57, (+)UA and given ctx, admitted to medicine for management of hypoglycemia and dizziness.   Emergency contact listed as Magdalena Duron [phone]  Next of Kin is spouse Jose Luis Duron [phone]    ***med rec incomplete - Will have her son bring them in.   Uses [facility] on [address] but unclear if pt is taking all meds prescribed there as she denied being on Prednisone even though she was prescribed it as documented in HIE.   Pt states she takes 4 meds: one for BP, one for cholesterol, vitamin D, and "something for hand pains." HIE meds reviewed and most likely correspond w Lisinopril 10mg QD, ?Gabapentin, and Atorvastatin most recent dose 80mg qhs.     #Dizziness   #Hypoglycemia  #DM2 dx in 2018, has been on insulin since 2022  DDx Hypoglycemia seems very likely but must also consider other causes such as orthostatic hypotension and AS given murmur heard on exam and description of positional dizziness. Less likely infectious etiology as pt w/o localizing symptoms or leukocytosis or fever.   Had difficulty obtaining timeline from patient and gave varying answers when asked same questions at various times. Overall: previously on Tresiba 20u qhs and Apidra 8u TID AC and her sugars were in the 200s range and felt dizzy so called her doctor and was told to lower her insulin to Tresiba 16u and Apidra 6u TID AC around 2 weeks ago and her glucose was subsequently lowered to consistently under 100   - Current home dose Tresiba 16u and Apidra 6u TID AC   - Lantus 10 and Lispro 3/3/3 for now. As Tresiba is very long acting w half life ~24h, may have some residual effect as well.   - At home checks fs only before meals and sometimes after meals at irregular intervals. Advised to check in am prior to eating and TID AC.   - States she has a log of glucose checks but left it at home  - TTE to eval for valvular disease   - Endo consulted by Dr Harry   - Pt reports she has not been on Prednisone/Steroids but if she had previously been on steroids and tapered off w/o appropriate adjustment in insulin could explain her current hypoglycemia   - f/u insulin, cpeptide, cortisol level   - f/u A1c - Previously has had A1c in 10-15 range.     #(+)UA   No bacteria, , large LE   Asymptomatic  Received ctx once in ED  Monitor off abx     #HTN - Lisinopril 10mg QD but needs med rec   #HLD - Atorvastatin 80mg qhs but needs med rec   #mild CAD on cath 4/2023 - follows Dr Moody   #h/o RA - reports not on any meds for maintenance or treatment                                                                              ----------------------------------------------------  # DVT prophylaxis: Lovenox 40mg QD   # GI prophylaxis: N/A  # Diet: DASH TLC +ensure   # Activity: Ambulate as Tolerated   # Code status: FULL CODE   # Disposition: Admitted                                                                            --------------------------------------------------------    # Handoff:   - f/u glucose levels  - adjust insulin as needed  - f/u endo consult  - f/u insulin, cpeptide, cortisol levels

## 2023-07-22 NOTE — H&P ADULT - ATTENDING COMMENTS
Patient seen and examined at bedside independently of the residents. I read the resident's note and agree with the plan with the additions and corrections as noted below. My note supersedes the resident's note.     REVIEW OF SYSTEMS:  CONSTITUTIONAL: No weakness, fevers or chills  EYES/ENT: No visual changes;  No vertigo or throat pain   NECK: No pain or stiffness  RESPIRATORY: No cough, wheezing, hemoptysis; No shortness of breath  CARDIOVASCULAR: No chest pain or palpitations  GASTROINTESTINAL: No abdominal or epigastric pain. No nausea, vomiting, or hematemesis; No diarrhea or constipation. No melena or hematochezia.  GENITOURINARY: No dysuria, frequency or hematuria  NEUROLOGICAL: No numbness or weakness  MSK: No pain. No weakness.   SKIN: No itching, rashes.     PMH: HTN, HLD, IDDM     FHx: Reviewed. No fhx of asthma/copd, No fhx of liver and pulmonary disease. No fhx of hematological disorder.     Physical Exam:  GEN: No acute distress. Awake, Alert and oriented x 3.   Head: Atraumatic, Normocephalic.   Eye: PEERLA. No sclera icterus. EOMI.   ENT: Normal oropharynx, no thyromegaly, no mass, no lymphadenopathy.   LUNGS: Clear to auscultation bilaterally. No wheeze/rales/crackles.   HEART: Normal. S1/S2 present. RRR. No murmur/gallops.   ABD: Soft, non-tender, non-distended. Bowel sounds present.   EXT: No pitting edema. No erythema. No tenderness.  Integumentary: No rash, No sore, No petechia.   NEURO: CN III-XII intact. Strength: 5/5 b/l ULE. Sensory intact b/l ULE.     Vital Signs Last 24 Hrs  T(C): 36.2 (2023 01:43), Max: 36.8 (2023 14:55)  T(F): 97.1 (2023 01:43), Max: 98.3 (2023 14:55)  HR: 79 (2023 01:43) (79 - 91)  BP: 160/77 (2023 01:43) (112/56 - 160/77)  BP(mean): 80 (2023 21:04) (80 - 80)  RR: 18 (2023 01:43) (18 - 19)  SpO2: 96% (2023 01:43) (96% - 97%)    Parameters below as of 2023 01:43  Patient On (Oxygen Delivery Method): room air      Please see the above notes for Labs and radiology.     Assessment and Plan:     53 yo F with hx of HTN, HLD, IDDM presents to ED for dizziness and hypoglycemia.     Hypoglycemia 2/2 insulin  - FS improved --> currently 316  - check HbA1c  - monitor FS q4h   - Hold home insulin for now. May start on insulin if FS persistently > 180.   - Endocrine consult for insulin dose adjustment.     Asymptomatic bacteriuria   - NO fever/WBC.   - patient is asymptomatic.   - s/p ceftriaxone in ED.   - hold off abx for now.     HTN/HLD - c/w home med    DVT ppx: Lovenox SC  GI ppx: PPI   Diet: DASH diet   Activity: as tolerated.     Date seen by the attendin2023  Total time spent: 75 minutes. Patient seen and examined at bedside independently of the residents. I read the resident's note and agree with the plan with the additions and corrections as noted below. My note supersedes the resident's note.     REVIEW OF SYSTEMS:  CONSTITUTIONAL: No weakness, fevers or chills  EYES/ENT: No visual changes;  No vertigo or throat pain   NECK: No pain or stiffness  RESPIRATORY: No cough, wheezing, hemoptysis; No shortness of breath  CARDIOVASCULAR: No chest pain or palpitations  GASTROINTESTINAL: No abdominal or epigastric pain. No nausea, vomiting, or hematemesis; No diarrhea or constipation. No melena or hematochezia.  GENITOURINARY: No dysuria, frequency or hematuria  NEUROLOGICAL: No numbness or weakness  MSK: No pain. No weakness.   SKIN: No itching, rashes.     PMH: HTN, HLD, IDDM     FHx: Reviewed. No fhx of asthma/copd, No fhx of liver and pulmonary disease. No fhx of hematological disorder.     Physical Exam:  GEN: No acute distress. Awake, Alert and oriented x 3.   Head: Atraumatic, Normocephalic.   Eye: PEERLA. No sclera icterus. EOMI.   ENT: Normal oropharynx, no thyromegaly, no mass, no lymphadenopathy.   LUNGS: Clear to auscultation bilaterally. No wheeze/rales/crackles.   HEART: Normal. S1/S2 present. RRR. No murmur/gallops.   ABD: Soft, non-tender, non-distended. Bowel sounds present.   EXT: No pitting edema. No erythema. No tenderness.  Integumentary: No rash, No sore, No petechia.   NEURO: CN III-XII intact. Strength: 5/5 b/l ULE. Sensory intact b/l ULE.     Vital Signs Last 24 Hrs  T(C): 36.2 (2023 01:43), Max: 36.8 (2023 14:55)  T(F): 97.1 (2023 01:43), Max: 98.3 (2023 14:55)  HR: 79 (2023 01:43) (79 - 91)  BP: 160/77 (2023 01:43) (112/56 - 160/77)  BP(mean): 80 (2023 21:04) (80 - 80)  RR: 18 (2023 01:43) (18 - 19)  SpO2: 96% (2023 01:43) (96% - 97%)    Parameters below as of 2023 01:43  Patient On (Oxygen Delivery Method): room air      Please see the above notes for Labs and radiology.     Assessment and Plan:     51 yo F with hx of HTN, HLD, IDDM presents to ED for dizziness and hypoglycemia.     Hypoglycemia 2/2 insulin  - FS improved --> currently 316  - check HbA1c  - monitor FS q4h   - Hold home insulin for now. May start on insulin if FS persistently > 180.   - Endocrine consult for insulin dose adjustment.     Asymptomatic bacteriuria   - NO fever/WBC.   - patient is asymptomatic.   - hold off abx for now.     HTN/HLD - c/w home med    DVT ppx: Lovenox SC  GI ppx: PPI   Diet: DASH diet   Activity: as tolerated.     Date seen by the attendin2023  Total time spent: 75 minutes.

## 2023-07-23 LAB — C PEPTIDE SERPL-MCNC: 0.7 NG/ML — LOW (ref 1.1–4.4)

## 2023-07-27 DIAGNOSIS — I10 ESSENTIAL (PRIMARY) HYPERTENSION: ICD-10-CM

## 2023-07-27 DIAGNOSIS — E11.649 TYPE 2 DIABETES MELLITUS WITH HYPOGLYCEMIA WITHOUT COMA: ICD-10-CM

## 2023-07-27 DIAGNOSIS — I25.10 ATHEROSCLEROTIC HEART DISEASE OF NATIVE CORONARY ARTERY WITHOUT ANGINA PECTORIS: ICD-10-CM

## 2023-07-27 DIAGNOSIS — Z79.4 LONG TERM (CURRENT) USE OF INSULIN: ICD-10-CM

## 2023-07-27 DIAGNOSIS — E16.2 HYPOGLYCEMIA, UNSPECIFIED: ICD-10-CM

## 2023-07-27 DIAGNOSIS — E78.5 HYPERLIPIDEMIA, UNSPECIFIED: ICD-10-CM

## 2023-07-27 DIAGNOSIS — M06.9 RHEUMATOID ARTHRITIS, UNSPECIFIED: ICD-10-CM

## 2023-07-27 DIAGNOSIS — E11.40 TYPE 2 DIABETES MELLITUS WITH DIABETIC NEUROPATHY, UNSPECIFIED: ICD-10-CM

## 2023-07-28 NOTE — ED ADULT TRIAGE NOTE - WEIGHT METHOD
Video Visit - Elson Bosworth 43 y.o. female MRN: [id number]    REQUIRED DOCUMENTATION:         1. This service was provided via [employer]. 2. Provider located at [facility]  [address] [facility] [address] [phone]. 3. [facility] provider: MAYRA Bullock. 4. Identify all parties in room with patient during [facility] visit:  Patient   5. After connecting through televideo, patient was identified by name and date of birth. Patient was then informed that this was a Telemedicine visit and that the exam was being conducted confidentially over secure lines. My office door was closed. No one else was in the room. Patient acknowledged consent and understanding of privacy and security of the Telemedicine visit. I informed the patient that I have reviewed their record in Epic and presented the opportunity for them to ask any questions regarding the visit today. The patient agreed to participate. This is a 43year old female here today for video visit. She states yesterday she broke out into hives. The took Pepcid and benadryl which did help. She states rash improved and then returned. She took benadryl again but they continue to return and makes her drowsy. She states the hives are coming back on her arms. No new foods, soaps,  No new exposures. She thinks may be related to stress. She denies any sob or chest pain. No tongue or lip swelling. Review of Systems   Constitutional: Negative. Respiratory: Negative. Cardiovascular: Negative. Skin: Positive for rash. Neurological: Negative. Psychiatric/Behavioral: Negative. There were no vitals filed for this visit. Physical Exam  Constitutional:       General: She is not in acute distress. Appearance: Normal appearance. She is not toxic-appearing. HENT:      Head: Normocephalic and atraumatic. Pulmonary:      Effort: Pulmonary effort is normal. No respiratory distress. Comments: No wheezing.  Able to speak in full sentences. Skin:     Comments: Several scattered hive like lesions on arms. Neurological:      Mental Status: She is alert and oriented to person, place, and time. Psychiatric:         Mood and Affect: Mood normal.         Behavior: Behavior normal.         Thought Content: Thought content normal.         Judgment: Judgment normal.       Diagnoses and all orders for this visit:    Hives  -     methylprednisolone (MEDROL) 4 mg tablet; Medrol dose pack Take as directed. Patient Instructions     Will start medrol dose pack. Continue benadryl. Follow up with PCP if no improvement. Go to Er with any worsening symptoms, chest pain, sob, tongue or lip swelling or difficulty breathing. Urticaria   WHAT YOU NEED TO KNOW:   Urticaria is also called hives. Hives can change size and shape, and appear anywhere on your skin. They can be mild or severe and last from a few minutes to a few days. Hives may be a sign of a severe allergic reaction called anaphylaxis that needs immediate treatment. Urticaria that lasts longer than 6 weeks may be a chronic condition that needs long-term treatment. DISCHARGE INSTRUCTIONS:   Call your local emergency number (911 in the [address]) for signs or symptoms of anaphylaxis,  such as trouble breathing, swelling in your mouth or throat, or wheezing. You may also have itching, a rash, or feel like you are going to faint. Return to the emergency department if:   • Your heart is beating faster than it normally does. • You have cramping or severe pain in your abdomen. Call your doctor if:   • You have a fever. • Your skin still itches 24 hours after you take your medicine. • You still have hives after 7 days. • Your joints are painful and swollen. • You have questions or concerns about your condition or care. Medicines:   You may need any of the following:  • Epinephrine  is used to treat severe allergic reactions such as anaphylaxis. • Antihistamines  decrease mild symptoms such as itching or a rash. • Steroids  decrease redness, pain, and swelling. • Take your medicine as directed. Contact your healthcare provider if you think your medicine is not helping or if you have side effects. Tell your provider if you are allergic to any medicine. Keep a list of the medicines, vitamins, and herbs you take. Include the amounts, and when and why you take them. Bring the list or the pill bottles to follow-up visits. Carry your medicine list with you in case of an emergency. Steps to take for signs or symptoms of anaphylaxis:   • Immediately  give 1 shot of epinephrine only into the outer thigh muscle. • Leave the shot in place  as directed. Your provider may recommend you leave it in place for up to 10 seconds before you remove it. This helps make sure all of the epinephrine is delivered. • Call 911 and go to the emergency department,  even if the shot improved symptoms. Do not drive yourself. Bring the used epinephrine shot with you. Safety precautions to take if you are at risk for anaphylaxis:   • Keep 2 shots of epinephrine with you at all times. You may need a second shot, because epinephrine only works for about 20 minutes and symptoms may return. Your provider can show you and family members how to give the shot. Check the expiration date every month and replace it before it expires. • Create an action plan. Your provider can help you create a written plan that explains the allergy and an emergency plan to treat a reaction. The plan explains when to give a second epinephrine shot if symptoms return or do not improve after the first. Give copies of the action plan and emergency instructions to family members, work and school staff, and  providers. Show them how to give a shot of epinephrine. • Be careful when you exercise.   If you have had exercise-induced anaphylaxis, do not exercise right after you eat. Stop exercising right away if you start to develop any signs or symptoms of anaphylaxis. You may first feel tired, warm, or have itchy skin. Hives, swelling, and severe breathing problems may develop if you continue to exercise. • Carry medical alert identification. Wear medical alert jewelry or carry a card that explains the allergy. Ask your provider where to get these items. • Keep a record of triggers and symptoms. Record everything you eat, drink, or apply to your skin for 3 weeks. Include stressful events and what you were doing right before your hives started. Bring the record with you to follow-up visits with your provider. Manage urticaria:   • Cool your skin. This may help decrease itching. Apply a cool pack to your hives. Dip a hand towel in cool water, wring it out, and place it on your hives. You may also soak your skin in a cool oatmeal bath. • Do not rub your hives. This can irritate your skin and cause more hives. • Wear loose clothing. Tight clothes may irritate your skin and cause more hives. • Manage stress. Stress may trigger hives, or make them worse. Learn new ways to relax, such as deep breathing. Follow up with your healthcare provider as directed:  Write down your questions so you remember to ask them during your visits. © Copyright Karen Hand 2022 Information is for End User's use only and may not be sold, redistributed or otherwise used for commercial purposes. The above information is an  only. It is not intended as medical advice for individual conditions or treatments. Talk to your doctor, nurse or pharmacist before following any medical regimen to see if it is safe and effective for you. Follow up with PCP if not improved, if symptoms are worse, go to the ER. stated

## 2023-08-30 NOTE — ED ADULT NURSE NOTE - CAS EDN DISCHARGE ASSESSMENT
Ice pack given:    ___x__yes _____no    Discharge instructions signed by patient:    _____yes __x___no    Discharge instructions given to patient:    ___x__yes _____no    Discharged via:    __x___ambulatory    _____wheelchair    _____stretcher    Stable on discharge:    __x___yes ____no  Patient would like results over the phone. Ok to leave a message. No Alert and oriented to person, place and time

## 2023-09-01 ENCOUNTER — APPOINTMENT (OUTPATIENT)
Dept: RHEUMATOLOGY | Facility: CLINIC | Age: 53
End: 2023-09-01

## 2023-09-01 ENCOUNTER — OUTPATIENT (OUTPATIENT)
Dept: OUTPATIENT SERVICES | Facility: HOSPITAL | Age: 53
LOS: 1 days | End: 2023-09-01
Payer: COMMERCIAL

## 2023-09-01 DIAGNOSIS — R05.9 COUGH, UNSPECIFIED: ICD-10-CM

## 2023-09-01 DIAGNOSIS — Z98.891 HISTORY OF UTERINE SCAR FROM PREVIOUS SURGERY: Chronic | ICD-10-CM

## 2023-09-01 PROCEDURE — 71046 X-RAY EXAM CHEST 2 VIEWS: CPT

## 2023-09-01 PROCEDURE — 71046 X-RAY EXAM CHEST 2 VIEWS: CPT | Mod: 26

## 2023-09-02 DIAGNOSIS — R05.9 COUGH, UNSPECIFIED: ICD-10-CM

## 2023-09-07 NOTE — ED ADULT NURSE NOTE - NSFALLRISKASMTTYPE_ED_ALL_ED
Initial (On Arrival) 35 Fluconazole Counseling:  Patient counseled regarding adverse effects of fluconazole including but not limited to headache, diarrhea, nausea, upset stomach, liver function test abnormalities, taste disturbance, and stomach pain.  There is a rare possibility of liver failure that can occur when taking fluconazole.  The patient understands that monitoring of LFTs and kidney function test may be required, especially at baseline. The patient verbalized understanding of the proper use and possible adverse effects of fluconazole.  All of the patient's questions and concerns were addressed.

## 2023-09-15 ENCOUNTER — APPOINTMENT (OUTPATIENT)
Dept: NUTRITION | Facility: CLINIC | Age: 53
End: 2023-09-15

## 2023-09-15 ENCOUNTER — OUTPATIENT (OUTPATIENT)
Dept: OUTPATIENT SERVICES | Facility: HOSPITAL | Age: 53
LOS: 1 days | End: 2023-09-15
Payer: MEDICAID

## 2023-09-15 DIAGNOSIS — E11.9 TYPE 2 DIABETES MELLITUS WITHOUT COMPLICATIONS: ICD-10-CM

## 2023-09-15 DIAGNOSIS — Z98.891 HISTORY OF UTERINE SCAR FROM PREVIOUS SURGERY: Chronic | ICD-10-CM

## 2023-09-15 PROCEDURE — G0108: CPT

## 2023-09-19 DIAGNOSIS — E11.42 TYPE 2 DIABETES MELLITUS WITH DIABETIC POLYNEUROPATHY: ICD-10-CM

## 2023-09-19 DIAGNOSIS — E11.40 TYPE 2 DIABETES MELLITUS WITH DIABETIC NEUROPATHY, UNSPECIFIED: ICD-10-CM

## 2023-10-04 ENCOUNTER — APPOINTMENT (OUTPATIENT)
Dept: ENDOCRINOLOGY | Facility: CLINIC | Age: 53
End: 2023-10-04
Payer: COMMERCIAL

## 2023-10-04 ENCOUNTER — OUTPATIENT (OUTPATIENT)
Dept: OUTPATIENT SERVICES | Facility: HOSPITAL | Age: 53
LOS: 1 days | End: 2023-10-04
Payer: COMMERCIAL

## 2023-10-04 VITALS
SYSTOLIC BLOOD PRESSURE: 117 MMHG | BODY MASS INDEX: 16.8 KG/M2 | DIASTOLIC BLOOD PRESSURE: 75 MMHG | HEART RATE: 91 BPM | WEIGHT: 86 LBS

## 2023-10-04 DIAGNOSIS — Z98.891 HISTORY OF UTERINE SCAR FROM PREVIOUS SURGERY: Chronic | ICD-10-CM

## 2023-10-04 DIAGNOSIS — Z00.00 ENCOUNTER FOR GENERAL ADULT MEDICAL EXAMINATION WITHOUT ABNORMAL FINDINGS: ICD-10-CM

## 2023-10-04 DIAGNOSIS — I10 ESSENTIAL (PRIMARY) HYPERTENSION: ICD-10-CM

## 2023-10-04 DIAGNOSIS — E78.5 HYPERLIPIDEMIA, UNSPECIFIED: ICD-10-CM

## 2023-10-04 PROCEDURE — 99204 OFFICE O/P NEW MOD 45 MIN: CPT

## 2023-10-05 PROBLEM — I10 HYPERTENSION: Status: ACTIVE | Noted: 2017-08-24

## 2023-10-05 PROBLEM — E78.5 HYPERLIPIDEMIA: Status: ACTIVE | Noted: 2022-12-13

## 2023-10-06 DIAGNOSIS — I10 ESSENTIAL (PRIMARY) HYPERTENSION: ICD-10-CM

## 2023-10-06 DIAGNOSIS — E10.65 TYPE 1 DIABETES MELLITUS WITH HYPERGLYCEMIA: ICD-10-CM

## 2023-10-06 DIAGNOSIS — E78.5 HYPERLIPIDEMIA, UNSPECIFIED: ICD-10-CM

## 2023-10-11 ENCOUNTER — APPOINTMENT (OUTPATIENT)
Dept: ENDOCRINOLOGY | Facility: CLINIC | Age: 53
End: 2023-10-11

## 2023-10-25 ENCOUNTER — APPOINTMENT (OUTPATIENT)
Dept: INTERNAL MEDICINE | Facility: CLINIC | Age: 53
End: 2023-10-25
Payer: COMMERCIAL

## 2023-10-25 ENCOUNTER — OUTPATIENT (OUTPATIENT)
Dept: OUTPATIENT SERVICES | Facility: HOSPITAL | Age: 53
LOS: 1 days | End: 2023-10-25
Payer: MEDICAID

## 2023-10-25 VITALS
HEIGHT: 60 IN | WEIGHT: 87 LBS | BODY MASS INDEX: 17.08 KG/M2 | SYSTOLIC BLOOD PRESSURE: 131 MMHG | DIASTOLIC BLOOD PRESSURE: 84 MMHG | OXYGEN SATURATION: 98 % | TEMPERATURE: 97.6 F | HEART RATE: 87 BPM

## 2023-10-25 DIAGNOSIS — E10.43 TYPE 1 DIABETES MELLITUS WITH DIABETIC AUTONOMIC (POLY)NEUROPATHY: ICD-10-CM

## 2023-10-25 DIAGNOSIS — Z00.00 ENCOUNTER FOR GENERAL ADULT MEDICAL EXAMINATION WITHOUT ABNORMAL FINDINGS: ICD-10-CM

## 2023-10-25 DIAGNOSIS — L85.3 XEROSIS CUTIS: ICD-10-CM

## 2023-10-25 DIAGNOSIS — K59.09 OTHER CONSTIPATION: ICD-10-CM

## 2023-10-25 DIAGNOSIS — K59.00 CONSTIPATION, UNSPECIFIED: ICD-10-CM

## 2023-10-25 DIAGNOSIS — R92.8 OTHER ABNORMAL AND INCONCLUSIVE FINDINGS ON DIAGNOSTIC IMAGING OF BREAST: ICD-10-CM

## 2023-10-25 DIAGNOSIS — Z98.891 HISTORY OF UTERINE SCAR FROM PREVIOUS SURGERY: Chronic | ICD-10-CM

## 2023-10-25 DIAGNOSIS — E10.9 TYPE 1 DIABETES MELLITUS W/OUT COMPLICATIONS: ICD-10-CM

## 2023-10-25 PROCEDURE — 99214 OFFICE O/P EST MOD 30 MIN: CPT

## 2023-10-25 RX ORDER — FLUCONAZOLE 200 MG/1
200 TABLET ORAL DAILY
Qty: 30 | Refills: 0 | Status: DISCONTINUED | COMMUNITY
Start: 2022-05-06 | End: 2023-10-25

## 2023-10-25 RX ORDER — FLUCONAZOLE 200 MG/1
200 TABLET ORAL DAILY
Qty: 14 | Refills: 0 | Status: DISCONTINUED | COMMUNITY
Start: 2022-06-10 | End: 2023-10-25

## 2023-10-25 RX ORDER — METHYLPREDNISOLONE 4 MG/1
4 TABLET ORAL
Qty: 1 | Refills: 0 | Status: DISCONTINUED | COMMUNITY
Start: 2020-10-21 | End: 2023-10-25

## 2023-10-25 RX ORDER — PREDNISONE 5 MG/1
5 TABLET ORAL DAILY
Qty: 90 | Refills: 1 | Status: DISCONTINUED | COMMUNITY
Start: 2023-06-30 | End: 2023-10-25

## 2023-10-25 RX ORDER — SENNOSIDES 8.6 MG TABLETS 8.6 MG/1
8.6 TABLET ORAL
Qty: 120 | Refills: 1 | Status: ACTIVE | COMMUNITY
Start: 2023-10-25 | End: 1900-01-01

## 2023-10-25 RX ORDER — AMMONIUM LACTATE 12 %
12 CREAM (GRAM) TOPICAL TWICE DAILY
Qty: 6 | Refills: 0 | Status: ACTIVE | COMMUNITY
Start: 2023-10-25 | End: 1900-01-01

## 2023-10-25 RX ORDER — METHOTREXATE 2.5 MG/1
2.5 TABLET ORAL
Qty: 25 | Refills: 2 | Status: DISCONTINUED | COMMUNITY
Start: 2021-11-12 | End: 2023-10-25

## 2023-11-07 DIAGNOSIS — R92.8 OTHER ABNORMAL AND INCONCLUSIVE FINDINGS ON DIAGNOSTIC IMAGING OF BREAST: ICD-10-CM

## 2023-11-07 DIAGNOSIS — K59.09 OTHER CONSTIPATION: ICD-10-CM

## 2023-11-07 DIAGNOSIS — K59.00 CONSTIPATION, UNSPECIFIED: ICD-10-CM

## 2023-11-07 DIAGNOSIS — E10.9 TYPE 1 DIABETES MELLITUS WITHOUT COMPLICATIONS: ICD-10-CM

## 2023-11-07 DIAGNOSIS — M06.9 RHEUMATOID ARTHRITIS, UNSPECIFIED: ICD-10-CM

## 2023-11-07 DIAGNOSIS — L85.3 XEROSIS CUTIS: ICD-10-CM

## 2023-11-07 DIAGNOSIS — E10.43 TYPE 1 DIABETES MELLITUS WITH DIABETIC AUTONOMIC (POLY)NEUROPATHY: ICD-10-CM

## 2023-11-07 DIAGNOSIS — Z22.7 LATENT TUBERCULOSIS: ICD-10-CM

## 2023-11-16 ENCOUNTER — NON-APPOINTMENT (OUTPATIENT)
Age: 53
End: 2023-11-16

## 2023-12-04 NOTE — ED ADULT NURSE NOTE - PRIMARY CARE PROVIDER
PREOP INSTRUCTIONS:  No food,milk or milk products for 8 hours before surgery.  Clear liquids like water,gatorade,apple juice are allowed up until 2 hours before surgery.  Instructed to follow the surgeon's instructions if they differ from these.  Shower instructions as well as directions to the Surgery Center were given.  Encouraged to wear loose fitting,comfortable clothing.  Medication instructions for pm prior to and am of procedure reviewed.  Instructed to avoid taking vitamins,supplements,aspirin and ibuprofen the morning of surgery.    Patient denies any side effects or issues with anesthesia or sedation.     Patient does not know arrival time.Explained that this information comes from the surgeon's office and if they haven't heard from them by 2 or 3 pm to call the office.Patient stated an understanding.   
pmd

## 2023-12-08 ENCOUNTER — APPOINTMENT (OUTPATIENT)
Dept: NUTRITION | Facility: CLINIC | Age: 53
End: 2023-12-08

## 2024-01-03 ENCOUNTER — APPOINTMENT (OUTPATIENT)
Dept: INTERNAL MEDICINE | Facility: CLINIC | Age: 54
End: 2024-01-03
Payer: COMMERCIAL

## 2024-01-03 ENCOUNTER — OUTPATIENT (OUTPATIENT)
Dept: OUTPATIENT SERVICES | Facility: HOSPITAL | Age: 54
LOS: 1 days | End: 2024-01-03
Payer: COMMERCIAL

## 2024-01-03 VITALS
BODY MASS INDEX: 16.92 KG/M2 | SYSTOLIC BLOOD PRESSURE: 136 MMHG | OXYGEN SATURATION: 98 % | WEIGHT: 86.19 LBS | HEART RATE: 89 BPM | HEIGHT: 60 IN | TEMPERATURE: 97.3 F | DIASTOLIC BLOOD PRESSURE: 81 MMHG

## 2024-01-03 DIAGNOSIS — R05.9 COUGH, UNSPECIFIED: ICD-10-CM

## 2024-01-03 DIAGNOSIS — Z23 ENCOUNTER FOR IMMUNIZATION: ICD-10-CM

## 2024-01-03 DIAGNOSIS — E78.00 PURE HYPERCHOLESTEROLEMIA, UNSPECIFIED: ICD-10-CM

## 2024-01-03 DIAGNOSIS — E10.65 TYPE 1 DIABETES MELLITUS WITH HYPERGLYCEMIA: ICD-10-CM

## 2024-01-03 DIAGNOSIS — E11.00 TYPE 2 DIABETES MELLITUS WITH HYPEROSMOLARITY W/OUT NONKETOTIC HYPERGLYCEMIC-HYPEROSMOLAR COMA (NKHHC): ICD-10-CM

## 2024-01-03 DIAGNOSIS — Z00.00 ENCOUNTER FOR GENERAL ADULT MEDICAL EXAMINATION W/OUT ABNORMAL FINDINGS: ICD-10-CM

## 2024-01-03 DIAGNOSIS — Z00.00 ENCOUNTER FOR GENERAL ADULT MEDICAL EXAMINATION WITHOUT ABNORMAL FINDINGS: ICD-10-CM

## 2024-01-03 DIAGNOSIS — Z22.7 LATENT TUBERCULOSIS: ICD-10-CM

## 2024-01-03 DIAGNOSIS — Z98.891 HISTORY OF UTERINE SCAR FROM PREVIOUS SURGERY: Chronic | ICD-10-CM

## 2024-01-03 DIAGNOSIS — I10 ESSENTIAL (PRIMARY) HYPERTENSION: ICD-10-CM

## 2024-01-03 DIAGNOSIS — M06.9 RHEUMATOID ARTHRITIS, UNSPECIFIED: ICD-10-CM

## 2024-01-03 PROCEDURE — G2211 COMPLEX E/M VISIT ADD ON: CPT

## 2024-01-03 PROCEDURE — 90686 IIV4 VACC NO PRSV 0.5 ML IM: CPT

## 2024-01-03 PROCEDURE — 90471 IMMUNIZATION ADMIN: CPT | Mod: 25

## 2024-01-03 PROCEDURE — T1013: CPT | Mod: 25

## 2024-01-03 PROCEDURE — 99214 OFFICE O/P EST MOD 30 MIN: CPT

## 2024-01-03 RX ORDER — CLOTRIMAZOLE 10 MG/G
1 CREAM TOPICAL 3 TIMES DAILY
Qty: 1 | Refills: 3 | Status: COMPLETED | COMMUNITY
Start: 2022-06-10 | End: 2024-01-03

## 2024-01-03 RX ORDER — NAPROXEN 250 MG/1
250 TABLET ORAL
Qty: 60 | Refills: 0 | Status: COMPLETED | COMMUNITY
Start: 2022-12-28 | End: 2024-01-03

## 2024-01-03 RX ORDER — ATORVASTATIN CALCIUM 80 MG/1
80 TABLET, FILM COATED ORAL
Qty: 30 | Refills: 11 | Status: COMPLETED | COMMUNITY
Start: 2021-07-29 | End: 2024-01-03

## 2024-01-03 RX ORDER — CEFADROXIL 500 MG/1
500 CAPSULE ORAL TWICE DAILY
Qty: 14 | Refills: 0 | Status: COMPLETED | COMMUNITY
Start: 2022-11-28 | End: 2024-01-03

## 2024-01-03 RX ORDER — GABAPENTIN 100 MG/1
100 CAPSULE ORAL 3 TIMES DAILY
Qty: 360 | Refills: 2 | Status: COMPLETED | COMMUNITY
Start: 2023-10-25 | End: 2024-01-03

## 2024-01-03 RX ORDER — ATENOLOL 50 MG/1
50 TABLET ORAL DAILY
Qty: 30 | Refills: 6 | Status: COMPLETED | COMMUNITY
Start: 2020-12-09 | End: 2024-01-03

## 2024-01-03 RX ORDER — LISINOPRIL 40 MG/1
40 TABLET ORAL DAILY
Qty: 90 | Refills: 3 | Status: COMPLETED | COMMUNITY
Start: 2018-10-25 | End: 2024-01-03

## 2024-01-03 RX ORDER — FLUCONAZOLE 200 MG/1
200 TABLET ORAL
Qty: 6 | Refills: 0 | Status: COMPLETED | COMMUNITY
Start: 2022-12-19 | End: 2024-01-03

## 2024-01-03 RX ORDER — CLOTRIMAZOLE 10 MG/G
1 CREAM TOPICAL 3 TIMES DAILY
Qty: 1 | Refills: 0 | Status: COMPLETED | COMMUNITY
Start: 2022-05-06 | End: 2024-01-03

## 2024-01-03 RX ORDER — ATENOLOL 50 MG/1
50 TABLET ORAL
Qty: 30 | Refills: 11 | Status: COMPLETED | COMMUNITY
Start: 2022-12-13 | End: 2024-01-03

## 2024-01-03 NOTE — REVIEW OF SYSTEMS
[Cough] : cough [Constipation] : constipation [Joint Pain] : joint pain [Back Pain] : back pain [Anxiety] : anxiety [Fever] : no fever [Chills] : no chills [Night Sweats] : no night sweats [Vision Problems] : no vision problems [Hearing Loss] : no hearing loss [Chest Pain] : no chest pain [Lower Ext Edema] : no lower extremity edema [Orthopnea] : no orthopnea [Shortness Of Breath] : no shortness of breath [Dyspnea on Exertion] : no dyspnea on exertion [Nausea] : no nausea [Vomiting] : no vomiting [Melena] : no melena [Dysuria] : no dysuria [Hematuria] : no hematuria [Frequency] : no frequency [Headache] : no headache [Depression] : no depression [de-identified] : Reports skin rash on bilateral legs

## 2024-01-03 NOTE — INTERPRETER SERVICES
[Other: ______] : provided by ERICA [Time Spent: ____ minutes] : Total time spent using  services: [unfilled] minutes. The patient's primary language is not English thus required  services. [Interpreters_IDNumber] : [id number] [Interpreters_FullName] : Aziza [TWNoteComboBox1] : Azerbaijani

## 2024-01-03 NOTE — HISTORY OF PRESENT ILLNESS
[Spouse] : spouse [FreeTextEntry1] : f/u, multiple complaints [de-identified] : Patient is a 51 yo F w/ PMH of RA not on tx, latent TB currently treatment held, and DM 1 currently not on insulin who presents for follow up.   She endorses occasional cough with chest pain but denies hemoptysis, dyspnea, or sore throat. She also endorses joint pain in her BL shoulders, elbows. wrists, finger joints, lower back, and R hip. She denies any other symptoms.

## 2024-01-03 NOTE — PLAN
[FreeTextEntry1] : # Under controlled insulin dependent diabetes with neuropathy problem with medication affordability - following endo - was on insulin basal + bolus, but currently off it because of insurance issues - f/u with pharmacy referral for the insulin  Diabetes; Low sugar, low carbohydrate diet  Exercise Counseled  Patient given opportunity to discuss frequency and target blood sugar levels Patient educated on symptoms of hypo/hyperglycemic events  Counseled on: Yearly Ophthalmology and Podiatry Exam a1c in 3 mo pharmacy f/u regarding medication affordability issue   Latent TB - following up with ID (next visit following week) - Was started on PO INH 200mg q24hrs (5mg/kg, 88lb = 40kg), PO vitamin B6 50mg q24hrs, and PO rifampin (10mg/kg) 450mg q24hrs for 8 weeks, was instructed to hold last friday's dose in light of possible LFT concerns - complaining of cough and chest pain this visit - no fever, f/u with CXR- PA and Lateral ID aware of today's visit  ra not currently on tx awaiting tx of latent tb prior to infusion tx  neuralgia - off gabapentin - endorsing pain control off the medications for a month  Hyperlipidemia; Low fat, low cholesterol diet. Discussed importance of eating a heart healthy diet Counseled on aerobic exercise and weight loss Fasting lipid panel every 3 months Treatment options and possible side effects discussed  Smoking cessation discussed, if applicable Patient counseled on effects of ETOH on lipid levels statin c/w  HTN; controlled - c/w lisinopril DASH diet discussed and recommended Exercise and weight loss counseled  Frequency and target at home BP readings discussed Decrease caffeine intake Treatment options and possible side effects discussed Patient counseled on symptoms of hypo/hypertension Counseled: Yearly Ophthalmology exams controlled, renew acei  Mammogram Cancer Screening; Patient counseled on the importance of a yearly mammogram screening and directed to have test performed or follow-up with OB/GYN. Failure to perform test can result in breast cancer and death Colon Cancer Screening; Patient counseled on the importance of colonoscopy screening and directed to follow-up with GI doctor. Failure to perform test can result in Colon Cancer and Death.  flu vaccine given today Vaccine; All vaccine side effects discussed with pt prior to injection. Tolerated well by patient. Patient instructed to return to office if any side effects; including, but not limited to, rash, fever or vomiting occur.  f/u in 8 weeks

## 2024-01-03 NOTE — END OF VISIT
[] : Resident [FreeTextEntry3] : I saw the patient, examined the patient independently, reviewed medical record, and provided the medical services. Agree with resident note as personally edited above. repeat labs. pharmacy f/u regarding insulin affordability issue. w/u URI- has RUL scant end expiratory wheeze on exam. d/w ID. f/u CXR. they will resume latent tb tx if LFTs okay. not yet on dmard for ra

## 2024-01-03 NOTE — PHYSICAL EXAM
[Normal Sclera/Conjunctiva] : normal sclera/conjunctiva [PERRL] : pupils equal round and reactive to light [Normal Oropharynx] : the oropharynx was normal [Thyroid Normal, No Nodules] : the thyroid was normal and there were no nodules present [No Accessory Muscle Use] : no accessory muscle use [Clear to Auscultation] : lungs were clear to auscultation bilaterally [Regular Rhythm] : with a regular rhythm [Normal S1, S2] : normal S1 and S2 [Pedal Pulses Present] : the pedal pulses are present [No Edema] : there was no peripheral edema [Non Tender] : non-tender [Non-distended] : non-distended [No Focal Deficits] : no focal deficits [Normal Insight/Judgement] : insight and judgment were intact [de-identified] : thin [de-identified] : Wheeze + on right upper zone [de-identified] : b/l knee tenderness no overt joint swelling noted [de-identified] : dry skin b/l LE's

## 2024-01-05 ENCOUNTER — NON-APPOINTMENT (OUTPATIENT)
Age: 54
End: 2024-01-05

## 2024-01-05 RX ORDER — PIOGLITAZONE HYDROCHLORIDE 45 MG/1
45 TABLET ORAL DAILY
Qty: 30 | Refills: 11 | Status: DISCONTINUED | COMMUNITY
Start: 2019-11-13 | End: 2024-01-05

## 2024-01-05 RX ORDER — INSULIN LISPRO 100 [IU]/ML
100 INJECTION, SOLUTION INTRAVENOUS; SUBCUTANEOUS 3 TIMES DAILY
Qty: 1 | Refills: 3 | Status: ACTIVE | COMMUNITY
Start: 2023-06-29 | End: 1900-01-01

## 2024-01-05 RX ORDER — METFORMIN HYDROCHLORIDE 1000 MG/1
1000 TABLET, COATED ORAL
Qty: 60 | Refills: 11 | Status: DISCONTINUED | COMMUNITY
Start: 2017-06-19 | End: 2024-01-05

## 2024-01-05 RX ORDER — NITROGLYCERIN 20 MG/G
2 OINTMENT TOPICAL
Qty: 1 | Refills: 0 | Status: DISCONTINUED | COMMUNITY
Start: 2023-01-13 | End: 2024-01-05

## 2024-01-05 RX ORDER — CICLOPIROX OLAMINE 7.7 MG/G
0.77 CREAM TOPICAL TWICE DAILY
Qty: 1 | Refills: 3 | Status: DISCONTINUED | COMMUNITY
Start: 2022-11-23 | End: 2024-01-05

## 2024-01-05 RX ORDER — LISINOPRIL 10 MG/1
10 TABLET ORAL DAILY
Qty: 30 | Refills: 3 | Status: ACTIVE | COMMUNITY
Start: 2022-12-13 | End: 1900-01-01

## 2024-01-05 RX ORDER — CLOTRIMAZOLE 10 MG/ML
1 SOLUTION TOPICAL TWICE DAILY
Qty: 1 | Refills: 3 | Status: DISCONTINUED | COMMUNITY
Start: 2020-09-15 | End: 2024-01-05

## 2024-01-05 RX ORDER — INSULIN GLULISINE 100 [IU]/ML
100 INJECTION, SOLUTION SUBCUTANEOUS
Qty: 1 | Refills: 5 | Status: DISCONTINUED | COMMUNITY
Start: 2023-06-24 | End: 2024-01-05

## 2024-01-05 RX ORDER — SIMVASTATIN 40 MG/1
40 TABLET, FILM COATED ORAL
Qty: 30 | Refills: 3 | Status: ACTIVE | COMMUNITY
Start: 2023-10-25 | End: 1900-01-01

## 2024-01-05 RX ORDER — UREA 40 G/100G
40 CREAM TOPICAL DAILY
Qty: 1 | Refills: 2 | Status: DISCONTINUED | COMMUNITY
Start: 2022-06-10 | End: 2024-01-05

## 2024-01-05 RX ORDER — CHOLECALCIFEROL (VITAMIN D3) 50 MCG
50 MCG TABLET ORAL
Qty: 30 | Refills: 3 | Status: ACTIVE | COMMUNITY
Start: 2023-10-25 | End: 1900-01-01

## 2024-01-05 RX ORDER — INSULIN DEGLUDEC 200 U/ML
200 INJECTION, SOLUTION SUBCUTANEOUS DAILY
Qty: 1 | Refills: 2 | Status: DISCONTINUED | COMMUNITY
Start: 2022-08-24 | End: 2024-01-05

## 2024-01-05 RX ORDER — INSULIN GLARGINE 100 [IU]/ML
100 INJECTION, SOLUTION SUBCUTANEOUS
Qty: 3 | Refills: 3 | Status: DISCONTINUED | COMMUNITY
Start: 2023-06-29 | End: 2024-01-05

## 2024-01-05 RX ORDER — AMMONIUM LACTATE 12 %
12 LOTION (GRAM) TOPICAL TWICE DAILY
Qty: 1 | Refills: 3 | Status: DISCONTINUED | COMMUNITY
Start: 2021-09-14 | End: 2024-01-05

## 2024-01-05 RX ORDER — DICLOFENAC SODIUM 1% 10 MG/G
1 GEL TOPICAL DAILY
Qty: 2 | Refills: 3 | Status: DISCONTINUED | COMMUNITY
Start: 2021-11-12 | End: 2024-01-05

## 2024-01-05 RX ORDER — SILVER SULFADIAZINE 10 MG/G
1 CREAM TOPICAL DAILY
Qty: 1 | Refills: 2 | Status: DISCONTINUED | COMMUNITY
Start: 2022-11-23 | End: 2024-01-05

## 2024-01-05 RX ORDER — INSULIN DEGLUDEC 100 U/ML
100 INJECTION, SOLUTION SUBCUTANEOUS DAILY
Qty: 1 | Refills: 5 | Status: DISCONTINUED | COMMUNITY
Start: 2023-06-24 | End: 2024-01-05

## 2024-01-05 RX ORDER — INSULIN GLARGINE 100 [IU]/ML
100 INJECTION, SOLUTION SUBCUTANEOUS AT BEDTIME
Qty: 6 | Refills: 3 | Status: ACTIVE | COMMUNITY
Start: 2024-01-05 | End: 1900-01-01

## 2024-01-05 RX ORDER — INSULIN DEGLUDEC INJECTION 100 U/ML
100 INJECTION, SOLUTION SUBCUTANEOUS DAILY
Qty: 1 | Refills: 0 | Status: DISCONTINUED | COMMUNITY
Start: 2023-10-25 | End: 2024-01-05

## 2024-01-05 RX ORDER — BETAMETHASONE VALERATE 1 MG/G
0.1 CREAM TOPICAL DAILY
Qty: 1 | Refills: 5 | Status: DISCONTINUED | COMMUNITY
Start: 2022-03-15 | End: 2024-01-05

## 2024-01-05 RX ORDER — DICLOFENAC SODIUM 1% 10 MG/G
1 GEL TOPICAL
Qty: 1 | Refills: 5 | Status: DISCONTINUED | COMMUNITY
Start: 2021-02-23 | End: 2024-01-05

## 2024-01-05 RX ORDER — INSULIN LISPRO 100 U/ML
100 INJECTION, SOLUTION SUBCUTANEOUS
Qty: 1 | Refills: 0 | Status: DISCONTINUED | COMMUNITY
Start: 2022-08-24 | End: 2024-01-05

## 2024-01-05 RX ORDER — DICLOFENAC SODIUM 1% 10 MG/G
1 GEL TOPICAL
Qty: 1 | Refills: 3 | Status: DISCONTINUED | COMMUNITY
Start: 2022-12-19 | End: 2024-01-05

## 2024-01-05 RX ORDER — TRIAMCINOLONE ACETONIDE 1 MG/G
0.1 CREAM TOPICAL TWICE DAILY
Qty: 1 | Refills: 1 | Status: DISCONTINUED | COMMUNITY
Start: 2023-06-30 | End: 2024-01-05

## 2024-01-05 RX ORDER — METHYLPREDNISOLONE 4 MG/1
4 TABLET ORAL
Qty: 1 | Refills: 0 | Status: DISCONTINUED | COMMUNITY
Start: 2022-03-15 | End: 2024-01-05

## 2024-01-05 RX ORDER — AMMONIUM LACTATE 12 %
12 CREAM (GRAM) TOPICAL
Qty: 1 | Refills: 5 | Status: DISCONTINUED | COMMUNITY
Start: 2022-03-15 | End: 2024-01-05

## 2024-01-16 DIAGNOSIS — E11.00 TYPE 2 DIABETES MELLITUS WITH HYPEROSMOLARITY WITHOUT NONKETOTIC HYPERGLYCEMIC-HYPEROSMOLAR COMA (NKHHC): ICD-10-CM

## 2024-01-16 DIAGNOSIS — E78.00 PURE HYPERCHOLESTEROLEMIA, UNSPECIFIED: ICD-10-CM

## 2024-01-16 DIAGNOSIS — M06.9 RHEUMATOID ARTHRITIS, UNSPECIFIED: ICD-10-CM

## 2024-01-16 DIAGNOSIS — R05.9 COUGH, UNSPECIFIED: ICD-10-CM

## 2024-01-16 DIAGNOSIS — I10 ESSENTIAL (PRIMARY) HYPERTENSION: ICD-10-CM

## 2024-01-16 DIAGNOSIS — Z22.7 LATENT TUBERCULOSIS: ICD-10-CM

## 2024-01-16 DIAGNOSIS — Z23 ENCOUNTER FOR IMMUNIZATION: ICD-10-CM

## 2024-01-17 ENCOUNTER — APPOINTMENT (OUTPATIENT)
Dept: ENDOCRINOLOGY | Facility: CLINIC | Age: 54
End: 2024-01-17

## 2024-01-19 RX ORDER — BENZOCAINE 200 MG/G
20 LIQUID DENTAL; ORAL; PERIODONTAL
Qty: 1 | Refills: 0 | Status: DISCONTINUED | COMMUNITY
Start: 2024-01-03 | End: 2024-01-19

## 2024-02-12 ENCOUNTER — APPOINTMENT (OUTPATIENT)
Dept: RHEUMATOLOGY | Facility: CLINIC | Age: 54
End: 2024-02-12

## 2024-04-13 ENCOUNTER — OUTPATIENT (OUTPATIENT)
Dept: OUTPATIENT SERVICES | Facility: HOSPITAL | Age: 54
LOS: 1 days | End: 2024-04-13
Payer: MEDICAID

## 2024-04-13 DIAGNOSIS — Z98.891 HISTORY OF UTERINE SCAR FROM PREVIOUS SURGERY: Chronic | ICD-10-CM

## 2024-04-13 DIAGNOSIS — Z00.00 ENCOUNTER FOR GENERAL ADULT MEDICAL EXAMINATION WITHOUT ABNORMAL FINDINGS: ICD-10-CM

## 2024-04-13 PROCEDURE — 80053 COMPREHEN METABOLIC PANEL: CPT

## 2024-04-13 PROCEDURE — 83036 HEMOGLOBIN GLYCOSYLATED A1C: CPT

## 2024-04-13 PROCEDURE — 80061 LIPID PANEL: CPT

## 2024-04-13 PROCEDURE — 85027 COMPLETE CBC AUTOMATED: CPT

## 2024-04-13 PROCEDURE — 84443 ASSAY THYROID STIM HORMONE: CPT

## 2024-04-14 DIAGNOSIS — Z00.00 ENCOUNTER FOR GENERAL ADULT MEDICAL EXAMINATION WITHOUT ABNORMAL FINDINGS: ICD-10-CM

## 2024-04-15 LAB
ALBUMIN SERPL ELPH-MCNC: 3.6 G/DL
ALP BLD-CCNC: 112 U/L
ALT SERPL-CCNC: 14 U/L
ANION GAP SERPL CALC-SCNC: 12 MMOL/L
AST SERPL-CCNC: 18 U/L
BILIRUB SERPL-MCNC: <0.2 MG/DL
BUN SERPL-MCNC: 30 MG/DL
CALCIUM SERPL-MCNC: 9.9 MG/DL
CHLORIDE SERPL-SCNC: 104 MMOL/L
CHOLEST SERPL-MCNC: 359 MG/DL
CO2 SERPL-SCNC: 26 MMOL/L
CREAT SERPL-MCNC: 0.8 MG/DL
EGFR: 87 ML/MIN/1.73M2
ESTIMATED AVERAGE GLUCOSE: 232 MG/DL
GLUCOSE SERPL-MCNC: 169 MG/DL
HBA1C MFR BLD HPLC: 9.7 %
HCT VFR BLD CALC: 35.4 %
HDLC SERPL-MCNC: 90 MG/DL
HGB BLD-MCNC: 11.5 G/DL
LDLC SERPL CALC-MCNC: 233 MG/DL
MCHC RBC-ENTMCNC: 29 PG
MCHC RBC-ENTMCNC: 32.5 G/DL
MCV RBC AUTO: 89.4 FL
NONHDLC SERPL-MCNC: 269 MG/DL
PLATELET # BLD AUTO: 286 K/UL
PMV BLD AUTO: 0 /100 WBCS
PMV BLD: 10.6 FL
POTASSIUM SERPL-SCNC: 4.4 MMOL/L
PROT SERPL-MCNC: 7 G/DL
RBC # BLD: 3.96 M/UL
RBC # FLD: 13.2 %
SODIUM SERPL-SCNC: 142 MMOL/L
TRIGL SERPL-MCNC: 178 MG/DL
TSH SERPL-ACNC: 1.02 UIU/ML
WBC # FLD AUTO: 7.83 K/UL

## 2024-04-15 NOTE — PATIENT PROFILE ADULT - DO YOU FEEL UNSAFE AT HOME, WORK, OR SCHOOL?
PA for tresiba flex Denied    Reason         Message sent to office clinical pool Yes    Denial letter scanned into Media Yes    Appeal started No ( Provider will need to decide if appeal is warranted and send clinical documentation to PA team for initiation.)   no

## 2024-04-24 NOTE — ED PROVIDER NOTE - GASTROINTESTINAL [-], MLM
Problem: Adult Inpatient Plan of Care  Goal: Plan of Care Review  4/23/2024 2227 by Vitor Deluna RN  Outcome: Progressing  4/23/2024 2159 by Vitor Deluna RN  Outcome: Progressing  Goal: Patient-Specific Goal (Individualized)  4/23/2024 2227 by Vitor Deluna RN  Outcome: Progressing  4/23/2024 2159 by Vitor Deluna RN  Outcome: Progressing  Goal: Absence of Hospital-Acquired Illness or Injury  4/23/2024 2227 by Vitor Deluna RN  Outcome: Progressing  4/23/2024 2159 by Vitor Deluna RN  Outcome: Progressing  Goal: Optimal Comfort and Wellbeing  4/23/2024 2227 by Vitor Deluna RN  Outcome: Progressing  4/23/2024 2159 by Vitor Deluna RN  Outcome: Progressing  Goal: Readiness for Transition of Care  4/23/2024 2227 by Vitor Deluna RN  Outcome: Progressing  4/23/2024 2159 by Vitor Deluna RN  Outcome: Progressing     Problem: Infection  Goal: Absence of Infection Signs and Symptoms  4/23/2024 2227 by Vitor Deluna RN  Outcome: Progressing  4/23/2024 2159 by Vitor Deluna RN  Outcome: Progressing     Problem: Fluid Imbalance (Pneumonia)  Goal: Fluid Balance  4/23/2024 2227 by Vitor Deluna RN  Outcome: Progressing  4/23/2024 2159 by Vitor Deluna RN  Outcome: Progressing     Problem: Infection (Pneumonia)  Goal: Resolution of Infection Signs and Symptoms  4/23/2024 2227 by Vitor Deluna RN  Outcome: Progressing  4/23/2024 2159 by Vitor Deluna RN  Outcome: Progressing     Problem: Respiratory Compromise (Pneumonia)  Goal: Effective Oxygenation and Ventilation  4/23/2024 2227 by Vitor Deluna RN  Outcome: Progressing  4/23/2024 2159 by Vitor Deluna RN  Outcome: Progressing     Problem: Fall Injury Risk  Goal: Absence of Fall and Fall-Related Injury  4/23/2024 2227 by Vitor Deluna RN  Outcome: Progressing  4/23/2024 2159 by Vitor Deluna RN  Outcome: Progressing     Problem: Skin Injury Risk Increased  Goal: Skin Health  and Integrity  4/23/2024 2227 by Vitor Deluna, RN  Outcome: Progressing  4/23/2024 2159 by Vitor Deluna, RN  Outcome: Progressing      no diarrhea

## 2024-04-30 ENCOUNTER — EMERGENCY (EMERGENCY)
Facility: HOSPITAL | Age: 54
LOS: 0 days | Discharge: HOME | End: 2024-05-01
Attending: EMERGENCY MEDICINE
Payer: COMMERCIAL

## 2024-04-30 VITALS
TEMPERATURE: 97 F | WEIGHT: 104.06 LBS | OXYGEN SATURATION: 99 % | HEART RATE: 91 BPM | RESPIRATION RATE: 18 BRPM | SYSTOLIC BLOOD PRESSURE: 141 MMHG | DIASTOLIC BLOOD PRESSURE: 75 MMHG

## 2024-04-30 DIAGNOSIS — Z98.891 HISTORY OF UTERINE SCAR FROM PREVIOUS SURGERY: Chronic | ICD-10-CM

## 2024-04-30 LAB
ALBUMIN SERPL ELPH-MCNC: 4 G/DL — SIGNIFICANT CHANGE UP (ref 3.5–5.2)
ALP SERPL-CCNC: 121 U/L — HIGH (ref 30–115)
ALT FLD-CCNC: 16 U/L — SIGNIFICANT CHANGE UP (ref 0–41)
ANION GAP SERPL CALC-SCNC: 10 MMOL/L — SIGNIFICANT CHANGE UP (ref 7–14)
APPEARANCE UR: CLEAR — SIGNIFICANT CHANGE UP
AST SERPL-CCNC: 19 U/L — SIGNIFICANT CHANGE UP (ref 0–41)
BACTERIA # UR AUTO: ABNORMAL /HPF
BASOPHILS # BLD AUTO: 0.04 K/UL — SIGNIFICANT CHANGE UP (ref 0–0.2)
BASOPHILS NFR BLD AUTO: 0.3 % — SIGNIFICANT CHANGE UP (ref 0–1)
BILIRUB SERPL-MCNC: <0.2 MG/DL — SIGNIFICANT CHANGE UP (ref 0.2–1.2)
BILIRUB UR-MCNC: NEGATIVE — SIGNIFICANT CHANGE UP
BUN SERPL-MCNC: 47 MG/DL — HIGH (ref 10–20)
CALCIUM SERPL-MCNC: 10 MG/DL — SIGNIFICANT CHANGE UP (ref 8.4–10.5)
CAST: 10 /LPF — HIGH (ref 0–4)
CHLORIDE SERPL-SCNC: 103 MMOL/L — SIGNIFICANT CHANGE UP (ref 98–110)
CO2 SERPL-SCNC: 25 MMOL/L — SIGNIFICANT CHANGE UP (ref 17–32)
COLOR SPEC: YELLOW — SIGNIFICANT CHANGE UP
CREAT SERPL-MCNC: 1.3 MG/DL — SIGNIFICANT CHANGE UP (ref 0.7–1.5)
DIFF PNL FLD: ABNORMAL
EGFR: 49 ML/MIN/1.73M2 — LOW
EOSINOPHIL # BLD AUTO: 0.15 K/UL — SIGNIFICANT CHANGE UP (ref 0–0.7)
EOSINOPHIL NFR BLD AUTO: 1.2 % — SIGNIFICANT CHANGE UP (ref 0–8)
FINE GRAN CASTS #/AREA URNS AUTO: PRESENT
GLUCOSE SERPL-MCNC: 235 MG/DL — HIGH (ref 70–99)
GLUCOSE UR QL: 250 MG/DL
HCG SERPL QL: NEGATIVE — SIGNIFICANT CHANGE UP
HCT VFR BLD CALC: 37.3 % — SIGNIFICANT CHANGE UP (ref 37–47)
HGB BLD-MCNC: 12.3 G/DL — SIGNIFICANT CHANGE UP (ref 12–16)
IMM GRANULOCYTES NFR BLD AUTO: 0.2 % — SIGNIFICANT CHANGE UP (ref 0.1–0.3)
KETONES UR-MCNC: NEGATIVE MG/DL — SIGNIFICANT CHANGE UP
LEUKOCYTE ESTERASE UR-ACNC: ABNORMAL
LIDOCAIN IGE QN: 71 U/L — HIGH (ref 7–60)
LYMPHOCYTES # BLD AUTO: 19.4 % — LOW (ref 20.5–51.1)
LYMPHOCYTES # BLD AUTO: 2.34 K/UL — SIGNIFICANT CHANGE UP (ref 1.2–3.4)
MCHC RBC-ENTMCNC: 29.4 PG — SIGNIFICANT CHANGE UP (ref 27–31)
MCHC RBC-ENTMCNC: 33 G/DL — SIGNIFICANT CHANGE UP (ref 32–37)
MCV RBC AUTO: 89 FL — SIGNIFICANT CHANGE UP (ref 81–99)
MONOCYTES # BLD AUTO: 0.75 K/UL — HIGH (ref 0.1–0.6)
MONOCYTES NFR BLD AUTO: 6.2 % — SIGNIFICANT CHANGE UP (ref 1.7–9.3)
NEUTROPHILS # BLD AUTO: 8.73 K/UL — HIGH (ref 1.4–6.5)
NEUTROPHILS NFR BLD AUTO: 72.7 % — SIGNIFICANT CHANGE UP (ref 42.2–75.2)
NITRITE UR-MCNC: NEGATIVE — SIGNIFICANT CHANGE UP
NRBC # BLD: 0 /100 WBCS — SIGNIFICANT CHANGE UP (ref 0–0)
PH UR: 5.5 — SIGNIFICANT CHANGE UP (ref 5–8)
PLATELET # BLD AUTO: 277 K/UL — SIGNIFICANT CHANGE UP (ref 130–400)
PMV BLD: 10 FL — SIGNIFICANT CHANGE UP (ref 7.4–10.4)
POTASSIUM SERPL-MCNC: 4.7 MMOL/L — SIGNIFICANT CHANGE UP (ref 3.5–5)
POTASSIUM SERPL-SCNC: 4.7 MMOL/L — SIGNIFICANT CHANGE UP (ref 3.5–5)
PROT SERPL-MCNC: 7.5 G/DL — SIGNIFICANT CHANGE UP (ref 6–8)
PROT UR-MCNC: 300 MG/DL
RBC # BLD: 4.19 M/UL — LOW (ref 4.2–5.4)
RBC # FLD: 13.2 % — SIGNIFICANT CHANGE UP (ref 11.5–14.5)
RBC CASTS # UR COMP ASSIST: 3 /HPF — SIGNIFICANT CHANGE UP (ref 0–4)
SODIUM SERPL-SCNC: 138 MMOL/L — SIGNIFICANT CHANGE UP (ref 135–146)
SP GR SPEC: 1.02 — SIGNIFICANT CHANGE UP (ref 1–1.03)
SQUAMOUS # UR AUTO: 2 /HPF — SIGNIFICANT CHANGE UP (ref 0–5)
UROBILINOGEN FLD QL: 0.2 MG/DL — SIGNIFICANT CHANGE UP (ref 0.2–1)
WBC # BLD: 12.04 K/UL — HIGH (ref 4.8–10.8)
WBC # FLD AUTO: 12.04 K/UL — HIGH (ref 4.8–10.8)
WBC CASTS UR QL COMP ASSIST: PRESENT
WBC UR QL: 21 /HPF — HIGH (ref 0–5)

## 2024-04-30 PROCEDURE — 84703 CHORIONIC GONADOTROPIN ASSAY: CPT

## 2024-04-30 PROCEDURE — 99285 EMERGENCY DEPT VISIT HI MDM: CPT

## 2024-04-30 PROCEDURE — 74177 CT ABD & PELVIS W/CONTRAST: CPT | Mod: MC

## 2024-04-30 PROCEDURE — 74177 CT ABD & PELVIS W/CONTRAST: CPT | Mod: 26,MC

## 2024-04-30 PROCEDURE — 83690 ASSAY OF LIPASE: CPT

## 2024-04-30 PROCEDURE — 99284 EMERGENCY DEPT VISIT MOD MDM: CPT | Mod: 25

## 2024-04-30 PROCEDURE — 96374 THER/PROPH/DIAG INJ IV PUSH: CPT | Mod: XU

## 2024-04-30 PROCEDURE — 96375 TX/PRO/DX INJ NEW DRUG ADDON: CPT

## 2024-04-30 PROCEDURE — 85025 COMPLETE CBC W/AUTO DIFF WBC: CPT

## 2024-04-30 PROCEDURE — 81001 URINALYSIS AUTO W/SCOPE: CPT

## 2024-04-30 PROCEDURE — 87086 URINE CULTURE/COLONY COUNT: CPT

## 2024-04-30 PROCEDURE — 36415 COLL VENOUS BLD VENIPUNCTURE: CPT

## 2024-04-30 PROCEDURE — 80053 COMPREHEN METABOLIC PANEL: CPT

## 2024-04-30 RX ORDER — SODIUM CHLORIDE 9 MG/ML
1000 INJECTION INTRAMUSCULAR; INTRAVENOUS; SUBCUTANEOUS ONCE
Refills: 0 | Status: COMPLETED | OUTPATIENT
Start: 2024-04-30 | End: 2024-04-30

## 2024-04-30 RX ORDER — KETOROLAC TROMETHAMINE 30 MG/ML
15 SYRINGE (ML) INJECTION ONCE
Refills: 0 | Status: DISCONTINUED | OUTPATIENT
Start: 2024-04-30 | End: 2024-04-30

## 2024-04-30 RX ORDER — ONDANSETRON 8 MG/1
4 TABLET, FILM COATED ORAL ONCE
Refills: 0 | Status: COMPLETED | OUTPATIENT
Start: 2024-04-30 | End: 2024-04-30

## 2024-04-30 RX ADMIN — Medication 15 MILLIGRAM(S): at 21:15

## 2024-04-30 RX ADMIN — SODIUM CHLORIDE 1000 MILLILITER(S): 9 INJECTION INTRAMUSCULAR; INTRAVENOUS; SUBCUTANEOUS at 21:15

## 2024-04-30 RX ADMIN — ONDANSETRON 4 MILLIGRAM(S): 8 TABLET, FILM COATED ORAL at 21:15

## 2024-04-30 NOTE — ED PROVIDER NOTE - CLINICAL SUMMARY MEDICAL DECISION MAKING FREE TEXT BOX
54 yo woman w/ HTN, HLD, DM here w/ 2 days of n/v/d and abdo pain  decr PO  no fevers  no urinary symptoms  no cp, sob, cullen, palpitations  no prior abdo surgeries except for     wd/wn  nad  rrr s1s2 wnl  cta b/l  + RLQ ttp w/o rebound/guarding  aao x 3  gait stable    54 yo woman w/ RLQ abdo pain  labs, CT a/p and reassess

## 2024-04-30 NOTE — ED ADULT TRIAGE NOTE - LOCATION:
Pt states she is about to start her 3rd or 4th chemo session tomorrow.  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  Reports some burning and irritation.  Took Diflucan recently but would like to be examined.  Requesting Dr. Arango look into her chart before seeing her as she has quite a history.  Also mentioned she found a lump in her armpit 2 weeks after seeing Dr. Arango.  Had it biopsied and was dx'd with a rare issue.    Scheduled pt with Dr. Arango tomorrow.   Left arm;

## 2024-04-30 NOTE — ED PROVIDER NOTE - CARE PROVIDERS DIRECT ADDRESSES
[email or web address]

## 2024-04-30 NOTE — ED PROVIDER NOTE - NSFOLLOWUPINSTRUCTIONS_ED_ALL_ED_FT
Follow up with your primary care provider for outpatient CT scan of chest to evaluate the chronic consolidation of your right lung.       Gastroenteritis    WHAT YOU NEED TO KNOW:  Gastroenteritis, or stomach flu, is an infection of the stomach and intestines. Digestive Tract     DISCHARGE INSTRUCTIONS:  Call 911 for any of the following:   You have trouble breathing or a very fast pulse.    Return to the emergency department if:   You see blood in your diarrhea.  You cannot stop vomiting.  You have not urinated for 12 hours.   You feel like you are going to faint.    Contact your healthcare provider if:   You have a fever.  You continue to vomit or have diarrhea, even after treatment.  You see worms in your diarrhea.  Your mouth or eyes are dry. You are not urinating as much or as often.  You have questions or concerns about your condition or care.    Medicines:     Medicines may be given to stop vomiting or diarrhea, decrease abdominal cramps, or treat an infection.    Take your medicine as directed. Contact your healthcare provider if you think your medicine is not helping or if you have side effects. Tell him or her if you are allergic to any medicine. Keep a list of the medicines, vitamins, and herbs you take. Include the amounts, and when and why you take them. Bring the list or the pill bottles to follow-up visits. Carry your medicine list with you in case of an emergency.    Manage your symptoms:   Drink liquids as directed. Ask your healthcare provider how much liquid to drink each day, and which liquids are best for you. You may also need to drink an oral rehydration solution (ORS). An ORS has the right amounts of sugar, salt, and minerals in water to replace body fluids.    Eat bland foods. When you feel hungry, begin eating soft, bland foods. Examples are bananas, clear soup, potatoes, and applesauce. Do not have dairy products, alcohol, sugary drinks, or drinks with caffeine until you feel better.    Rest as much as possible. Slowly start to do more each day when you begin to feel better.    Prevent the spread of gastroenteritis: Gastroenteritis can spread easily. Keep yourself, your family, and your surroundings clean to help prevent the spread of gastroenteritis:     Wash your hands often. Use soap and water. Wash your hands after you use the bathroom, change a child's diapers, or sneeze. Wash your hands before you prepare or eat food. Handwashing     Clean surfaces and do laundry often. Wash your clothes and towels separately from the rest of the laundry. Clean surfaces in your home with antibacterial  or bleach.    Clean food thoroughly and cook safely. Wash raw vegetables before you cook. Cook meat, fish, and eggs fully. Do not use the same dishes for raw meat as you do for other foods. Refrigerate any leftover food immediately.    Be aware when you camp or travel. Drink only clean water. Do not drink from rivers or lakes unless you purify or boil the water first. When you travel, drink bottled water and do not add ice. Do not eat fruit that has not been peeled. Do not eat raw fish or meat that is not fully cooked.     Follow up with your healthcare provider as directed: Write down your questions so you remember to ask them during your visits.     Abdominal Pain    Many things can cause abdominal pain. Usually, abdominal pain is not caused by a disease and will improve without treatment. Your health care provider will do a physical exam and possibly order blood tests and imaging to help determine the seriousness of your pain. However, in many cases, no cause may be found and you may need further testing as an outpatient. Monitor your abdominal pain for any changes.     SEEK IMMEDIATE MEDICAL CARE IF YOU HAVE THE FOLLOWING SYMPTOMS: worsening abdominal pain, vomiting, diarrhea, inability to have bowel movements or pass gas, black or bloody stool, fever accompanying chest pain or back pain, or dizziness/lightheadedness.

## 2024-04-30 NOTE — ED PROVIDER NOTE - CARE PROVIDER_API CALL
Jus Cobian E  Pulmonary Disease  [address] [place] [id number]  Phone: [phone]  Fax: [phone]  Follow Up Time:

## 2024-04-30 NOTE — ED PROVIDER NOTE - PHYSICAL EXAMINATION
CONSTITUTIONAL: Well-appearing; in no apparent distress.   EYES: PERRL; EOM intact.   CARDIOVASCULAR: Normal S1, S2; no murmurs, rubs, or gallops.   RESPIRATORY: Normal chest excursion with respiration; breath sounds clear and equal bilaterally; no wheezes, rhonchi, or rales.  GI/: + RLQ tenderness.  No rebound or guarding.  Normal bowel sounds; non-distended no palpable organomegaly.   MS: No calf swelling and tenderness.  SKIN: Normal for age and race; warm; dry; good turgor; no apparent lesions or exudate.   NEURO/PSYCH: A & O x 4; grossly unremarkable.

## 2024-04-30 NOTE — ED PROVIDER NOTE - PROGRESS NOTE DETAILS
feeling well. tolerating PO. CT lung finding chronic and disclosed to patient to f/u with PMD for outpatient CT chest. BRAT diet.

## 2024-04-30 NOTE — ED PROVIDER NOTE - NSFOLLOWUPCLINICS_GEN_ALL_ED_FT
[facility]  Medicine  [address], [place]   Phone: [phone]  Fax:

## 2024-04-30 NOTE — ED PROVIDER NOTE - PATIENT PORTAL LINK FT
You can access the FollowMyHealth Patient Portal offered by [facility] by registering at the following website: [email or web address]. By joining [employer]’s FollowMyHealth portal, you will also be able to view your health information using other applications (apps) compatible with our system.

## 2024-05-01 RX ORDER — ONDANSETRON 8 MG/1
1 TABLET, FILM COATED ORAL
Qty: 1 | Refills: 0
Start: 2024-05-01

## 2024-05-02 LAB
CULTURE RESULTS: SIGNIFICANT CHANGE UP
SPECIMEN SOURCE: SIGNIFICANT CHANGE UP

## 2024-05-04 DIAGNOSIS — R11.2 NAUSEA WITH VOMITING, UNSPECIFIED: ICD-10-CM

## 2024-05-04 DIAGNOSIS — E11.9 TYPE 2 DIABETES MELLITUS WITHOUT COMPLICATIONS: ICD-10-CM

## 2024-05-04 DIAGNOSIS — R19.7 DIARRHEA, UNSPECIFIED: ICD-10-CM

## 2024-05-04 DIAGNOSIS — R10.31 RIGHT LOWER QUADRANT PAIN: ICD-10-CM

## 2024-05-04 DIAGNOSIS — E78.00 PURE HYPERCHOLESTEROLEMIA, UNSPECIFIED: ICD-10-CM

## 2024-05-04 DIAGNOSIS — I10 ESSENTIAL (PRIMARY) HYPERTENSION: ICD-10-CM

## 2024-05-06 ENCOUNTER — APPOINTMENT (OUTPATIENT)
Dept: INTERNAL MEDICINE | Facility: CLINIC | Age: 54
End: 2024-05-06

## 2024-05-31 ENCOUNTER — EMERGENCY (EMERGENCY)
Facility: HOSPITAL | Age: 54
LOS: 0 days | Discharge: HOME | End: 2024-06-01
Attending: EMERGENCY MEDICINE
Payer: COMMERCIAL

## 2024-05-31 VITALS
HEART RATE: 89 BPM | WEIGHT: 89.95 LBS | OXYGEN SATURATION: 97 % | TEMPERATURE: 98 F | SYSTOLIC BLOOD PRESSURE: 154 MMHG | RESPIRATION RATE: 18 BRPM | HEIGHT: 57 IN | DIASTOLIC BLOOD PRESSURE: 85 MMHG

## 2024-05-31 DIAGNOSIS — G89.29 OTHER CHRONIC PAIN: ICD-10-CM

## 2024-05-31 DIAGNOSIS — E11.9 TYPE 2 DIABETES MELLITUS WITHOUT COMPLICATIONS: ICD-10-CM

## 2024-05-31 DIAGNOSIS — E78.5 HYPERLIPIDEMIA, UNSPECIFIED: ICD-10-CM

## 2024-05-31 DIAGNOSIS — M25.511 PAIN IN RIGHT SHOULDER: ICD-10-CM

## 2024-05-31 DIAGNOSIS — M54.2 CERVICALGIA: ICD-10-CM

## 2024-05-31 DIAGNOSIS — M25.512 PAIN IN LEFT SHOULDER: ICD-10-CM

## 2024-05-31 DIAGNOSIS — I10 ESSENTIAL (PRIMARY) HYPERTENSION: ICD-10-CM

## 2024-05-31 DIAGNOSIS — M79.602 PAIN IN LEFT ARM: ICD-10-CM

## 2024-05-31 DIAGNOSIS — Z98.891 HISTORY OF UTERINE SCAR FROM PREVIOUS SURGERY: Chronic | ICD-10-CM

## 2024-05-31 PROCEDURE — 84484 ASSAY OF TROPONIN QUANT: CPT

## 2024-05-31 PROCEDURE — 99285 EMERGENCY DEPT VISIT HI MDM: CPT

## 2024-05-31 PROCEDURE — 80053 COMPREHEN METABOLIC PANEL: CPT

## 2024-05-31 PROCEDURE — 73030 X-RAY EXAM OF SHOULDER: CPT | Mod: LT

## 2024-05-31 PROCEDURE — 71046 X-RAY EXAM CHEST 2 VIEWS: CPT

## 2024-05-31 PROCEDURE — 96375 TX/PRO/DX INJ NEW DRUG ADDON: CPT

## 2024-05-31 PROCEDURE — 99285 EMERGENCY DEPT VISIT HI MDM: CPT | Mod: 25

## 2024-05-31 PROCEDURE — 36415 COLL VENOUS BLD VENIPUNCTURE: CPT

## 2024-05-31 PROCEDURE — 85025 COMPLETE CBC W/AUTO DIFF WBC: CPT

## 2024-05-31 PROCEDURE — 96374 THER/PROPH/DIAG INJ IV PUSH: CPT

## 2024-05-31 PROCEDURE — 83735 ASSAY OF MAGNESIUM: CPT

## 2024-05-31 PROCEDURE — 93005 ELECTROCARDIOGRAM TRACING: CPT

## 2024-05-31 RX ORDER — KETOROLAC TROMETHAMINE 30 MG/ML
30 SYRINGE (ML) INJECTION ONCE
Refills: 0 | Status: DISCONTINUED | OUTPATIENT
Start: 2024-05-31 | End: 2024-05-31

## 2024-05-31 RX ORDER — DEXAMETHASONE 0.5 MG/5ML
10 ELIXIR ORAL ONCE
Refills: 0 | Status: COMPLETED | OUTPATIENT
Start: 2024-05-31 | End: 2024-05-31

## 2024-05-31 NOTE — ED ADULT TRIAGE NOTE - CHIEF COMPLAINT QUOTE
pt presents with 3 days of neck pain and bilateral shoulder pain. tender on palpation in triage. denies cp/sob/n/v.

## 2024-06-01 LAB
ALBUMIN SERPL ELPH-MCNC: 3.8 G/DL — SIGNIFICANT CHANGE UP (ref 3.5–5.2)
ALP SERPL-CCNC: 132 U/L — HIGH (ref 30–115)
ALT FLD-CCNC: 18 U/L — SIGNIFICANT CHANGE UP (ref 0–41)
ANION GAP SERPL CALC-SCNC: 12 MMOL/L — SIGNIFICANT CHANGE UP (ref 7–14)
AST SERPL-CCNC: 21 U/L — SIGNIFICANT CHANGE UP (ref 0–41)
BASOPHILS # BLD AUTO: 0.04 K/UL — SIGNIFICANT CHANGE UP (ref 0–0.2)
BASOPHILS NFR BLD AUTO: 0.5 % — SIGNIFICANT CHANGE UP (ref 0–1)
BILIRUB SERPL-MCNC: <0.2 MG/DL — SIGNIFICANT CHANGE UP (ref 0.2–1.2)
BUN SERPL-MCNC: 41 MG/DL — HIGH (ref 10–20)
CALCIUM SERPL-MCNC: 9.8 MG/DL — SIGNIFICANT CHANGE UP (ref 8.4–10.5)
CHLORIDE SERPL-SCNC: 100 MMOL/L — SIGNIFICANT CHANGE UP (ref 98–110)
CO2 SERPL-SCNC: 25 MMOL/L — SIGNIFICANT CHANGE UP (ref 17–32)
CREAT SERPL-MCNC: 1.2 MG/DL — SIGNIFICANT CHANGE UP (ref 0.7–1.5)
EGFR: 53 ML/MIN/1.73M2 — LOW
EOSINOPHIL # BLD AUTO: 0.1 K/UL — SIGNIFICANT CHANGE UP (ref 0–0.7)
EOSINOPHIL NFR BLD AUTO: 1.2 % — SIGNIFICANT CHANGE UP (ref 0–8)
GLUCOSE SERPL-MCNC: 261 MG/DL — HIGH (ref 70–99)
HCT VFR BLD CALC: 34.2 % — LOW (ref 37–47)
HGB BLD-MCNC: 11.5 G/DL — LOW (ref 12–16)
IMM GRANULOCYTES NFR BLD AUTO: 0.2 % — SIGNIFICANT CHANGE UP (ref 0.1–0.3)
LYMPHOCYTES # BLD AUTO: 1.94 K/UL — SIGNIFICANT CHANGE UP (ref 1.2–3.4)
LYMPHOCYTES # BLD AUTO: 23.6 % — SIGNIFICANT CHANGE UP (ref 20.5–51.1)
MAGNESIUM SERPL-MCNC: 2 MG/DL — SIGNIFICANT CHANGE UP (ref 1.8–2.4)
MCHC RBC-ENTMCNC: 29.4 PG — SIGNIFICANT CHANGE UP (ref 27–31)
MCHC RBC-ENTMCNC: 33.6 G/DL — SIGNIFICANT CHANGE UP (ref 32–37)
MCV RBC AUTO: 87.5 FL — SIGNIFICANT CHANGE UP (ref 81–99)
MONOCYTES # BLD AUTO: 0.51 K/UL — SIGNIFICANT CHANGE UP (ref 0.1–0.6)
MONOCYTES NFR BLD AUTO: 6.2 % — SIGNIFICANT CHANGE UP (ref 1.7–9.3)
NEUTROPHILS # BLD AUTO: 5.6 K/UL — SIGNIFICANT CHANGE UP (ref 1.4–6.5)
NEUTROPHILS NFR BLD AUTO: 68.3 % — SIGNIFICANT CHANGE UP (ref 42.2–75.2)
NRBC # BLD: 0 /100 WBCS — SIGNIFICANT CHANGE UP (ref 0–0)
PLATELET # BLD AUTO: 297 K/UL — SIGNIFICANT CHANGE UP (ref 130–400)
PMV BLD: 10.4 FL — SIGNIFICANT CHANGE UP (ref 7.4–10.4)
POTASSIUM SERPL-MCNC: 4.3 MMOL/L — SIGNIFICANT CHANGE UP (ref 3.5–5)
POTASSIUM SERPL-SCNC: 4.3 MMOL/L — SIGNIFICANT CHANGE UP (ref 3.5–5)
PROT SERPL-MCNC: 7.1 G/DL — SIGNIFICANT CHANGE UP (ref 6–8)
RBC # BLD: 3.91 M/UL — LOW (ref 4.2–5.4)
RBC # FLD: 12.9 % — SIGNIFICANT CHANGE UP (ref 11.5–14.5)
SODIUM SERPL-SCNC: 137 MMOL/L — SIGNIFICANT CHANGE UP (ref 135–146)
TROPONIN T, HIGH SENSITIVITY RESULT: 12 NG/L — SIGNIFICANT CHANGE UP (ref 6–13)
TROPONIN T, HIGH SENSITIVITY RESULT: 18 NG/L — HIGH (ref 6–13)
WBC # BLD: 8.21 K/UL — SIGNIFICANT CHANGE UP (ref 4.8–10.8)
WBC # FLD AUTO: 8.21 K/UL — SIGNIFICANT CHANGE UP (ref 4.8–10.8)

## 2024-06-01 PROCEDURE — 93010 ELECTROCARDIOGRAM REPORT: CPT

## 2024-06-01 PROCEDURE — 71046 X-RAY EXAM CHEST 2 VIEWS: CPT | Mod: 26

## 2024-06-01 PROCEDURE — 73030 X-RAY EXAM OF SHOULDER: CPT | Mod: 26,LT

## 2024-06-01 RX ORDER — IBUPROFEN 200 MG
1 TABLET ORAL
Qty: 20 | Refills: 0
Start: 2024-06-01 | End: 2024-06-05

## 2024-06-01 RX ADMIN — Medication 102 MILLIGRAM(S): at 00:18

## 2024-06-01 RX ADMIN — Medication 30 MILLIGRAM(S): at 00:18

## 2024-06-01 NOTE — ED PROVIDER NOTE - CARE PROVIDER_API CALL
Amanda Weiss  Neurosurgery  [address] [place] [address], [address]  [place] [address]  Phone: [phone]  Fax: [phone]  Follow Up Time: 7-10 Days

## 2024-06-01 NOTE — ED PROVIDER NOTE - NSFOLLOWUPINSTRUCTIONS_ED_ALL_ED_FT
Our Emergency Department Referral Coordinators will be reaching out to you in the next 24-48 hours from 9:00am to 5:00pm to schedule a follow up appointment. Please expect a phone call from the hospital in that time frame. If you do not receive a call or if you have any questions or concerns, you can reach them at   [phone].    ~~~  Acute Neck Pain    WHAT YOU NEED TO KNOW:    Acute neck pain starts suddenly, increases quickly, and goes away in a few days. The pain may come and go, or be worse with certain movements. The pain may be only in your neck, or it may move to your arms, back, or shoulders. You may also have pain that starts in another body area and moves to your neck. Vertebral Column         DISCHARGE INSTRUCTIONS:    Return to the emergency department if:     You have an injury that causes neck pain and shooting pain down your arms or legs.      Your neck pain suddenly becomes severe.      You have neck pain along with numbness, tingling, or weakness in your arms or legs.      You have a stiff neck, a headache, and a fever.    Contact your healthcare provider if:     You have new or worsening symptoms.      Your symptoms continue even after treatment.      You have questions or concerns about your condition or care.    Medicines:     NSAIDs, such as ibuprofen, help decrease swelling, pain, and fever. This medicine is available without a doctor's order. Ask your healthcare provider which medicine to take and how often to take it. Follow directions. NSAIDs can cause stomach bleeding or kidney problems if not taken correctly. If you take blood thinner medicine, always ask if NSAIDs are safe for you.      Acetaminophen helps decrease pain and fever. Ask your healthcare provider how much to take and how often to take it. Follow directions. Acetaminophen can cause liver damage if not taken correctly.      Steroid medicine may be used to reduce inflammation. This can help relieve pain caused by swelling.      Take your medicine as directed. Contact your healthcare provider if you think your medicine is not helping or if you have side effects. Tell him or her if you are allergic to any medicine. Keep a list of the medicines, vitamins, and herbs you take. Include the amounts, and when and why you take them. Bring the list or the pill bottles to follow-up visits. Carry your medicine list with you in case of an emergency.    Manage or prevent acute neck pain:     Rest your neck as directed. Do not make sudden movements, such as turning your head quickly. Your healthcare provider may recommend you wear a cervical collar for a short time. The collar will prevent you from moving your head. This will give your neck time to heal if an injury is causing your neck pain. Ask your healthcare provider when you can return to sports or other normal daily activities.      Apply heat as directed. Heat helps relieve pain and swelling. Use a heat wrap, or soak a small towel in warm water. Wring out the extra water. Apply the heat wrap or towel for 20 minutes every hour, or as directed.      Apply ice as directed. Ice helps relieve pain and swelling, and can help prevent tissue damage. Use an ice pack, or put ice in a bag. Cover the ice pack or back with a towel before you apply it to your neck. Apply the ice pack or ice for 15 minutes every hour, or as directed. Your healthcare provider can tell you how often to apply ice.      Do neck exercises as directed. Neck exercises help strengthen the muscles and increase range of motion. Your healthcare provider will tell you which exercises are right for you. He may give you instructions, or he may recommend that you work with a physical therapist. Your healthcare provider or therapist can make sure you are doing the exercises correctly.       Maintain good posture. Try to keep your head and shoulders lifted when you sit. If you work in front of a computer, make sure the monitor is at the right level. You should not need to look up down to see the screen. You should also not have to lean forward to be able to read what is on the screen. Make sure your keyboard, mouse, and other computer items are placed where you do not have to extend your shoulder to reach them. Get up often if you work in front of a computer or sit for long periods of time. Stretch or walk around to keep your neck muscles loose.    Follow up with your healthcare provider as directed: Your healthcare provider may refer you to a specialist if your pain does not get better with treatment. Write down your questions so you remember to ask them during your visits.       © Copyright [employer] 2019 All illustrations and images included in CareNotes are the copyrighted property of [employer] or [employer].

## 2024-06-01 NOTE — ED PROVIDER NOTE - PROGRESS NOTE DETAILS
ss Patient to be discharged from ED. Any available test results were discussed with patient and son at bedside. Verbal instructions given, including instructions to return to ED immediately for any new, worsening, or concerning symptoms. Patient endorsed understanding. Written discharge instructions additionally given, including follow-up plan

## 2024-06-01 NOTE — ED PROVIDER NOTE - PHYSICAL EXAMINATION
CONSTITUTIONAL: NAD  SKIN: Warm dry  HEAD: NCAT  EYES: NL inspection  ENT: MMM  NECK: Supple,   MSK: +LT trapezius ttp, no skin changes, BL DP intact, sensation and motor strength intact BL UE  CARD: RRR  RESP: CTAB  ABD: S/NT no R/G  EXT: no pedal edema  NEURO: Grossly unremarkable  PSYCH: Cooperative, appropriate.

## 2024-06-01 NOTE — ED PROVIDER NOTE - OBJECTIVE STATEMENT
53-year-old female with history of hypertension, hyperlipidemia, diabetes presents with bilateral shoulder pain and left arm pain worse with movement x multiple days.  Denies any trauma pain shortness of breath difficulty breathing.  Admits to mild numbness and tingling radiating from her left shoulder to her left  Accompanied by daughter who is translating

## 2024-06-01 NOTE — ED PROVIDER NOTE - PATIENT PORTAL LINK FT
You can access the FollowMyHealth Patient Portal offered by [facility] by registering at the following website: [email or web address]. By joining [employer]’s FollowMyHealth portal, you will also be able to view your health information using other applications (apps) compatible with our system.

## 2024-06-01 NOTE — ED PROVIDER NOTE - PRINCIPAL DIAGNOSIS
Dr. Radha Mendoza,    The patient will need a new referral for future scheduled allergy appointments with Dr Ila Ely. Pended referral please review diagnosis and sign off if you agree. Thank you.   Prosper Miramontes Neck pain

## 2024-06-01 NOTE — ED PROVIDER NOTE - ATTENDING CONTRIBUTION TO CARE
I personally evaluated the patient. I reviewed the Resident’s or Physician Assistant’s note (as assigned above), and agree with the findings and plan except as documented in my note.  Pt with several days of progressing left shoulder pain that radiates to her left chest. No dizziness, SOB, cardiac symptoms. VS reviewed, pt non-toxic appearing, NAD. Head ncat, PERRLA, EOMI, MMM, pharyngeal exam w/o erythema, edema or exudates. B/l TM wnl. neck supple, normal ROM, normal s1s2 without any murmurs, Lungs CTAB with normal work of breathing. abd +BS, s/nd/nt, extremities with ttp of the left glenohumeral joint, neurovascularly intact, neurovascularly intact, neuro exam grossly normal. No acute skin rashes. Plan is imaging, ekg, labs, pain control and reassess.

## 2024-06-21 NOTE — ED PROVIDER NOTE - OBJECTIVE STATEMENT
no 53 years old female history of diabetes, hypertension, high cholesterol presents complaint of abdominal pain with nausea, vomiting and diarrhea for 2 days.  Report pain mostly localized to right lower quadrant of the abdomen.  Report multiple episodes of NBNB vomitus and watery diarrhea the past 2-day.  Denies known sick contact or recent travel.  Family with no similar symptoms.  Otherwise denies fever, chills, recent illness, coughing, sore throat, chest pain, shortness of breath, dysuria or urinary frequency and urgency.

## 2024-07-14 ENCOUNTER — EMERGENCY (EMERGENCY)
Facility: HOSPITAL | Age: 54
LOS: 0 days | Discharge: HOME | End: 2024-07-14
Attending: EMERGENCY MEDICINE
Payer: COMMERCIAL

## 2024-07-14 VITALS
DIASTOLIC BLOOD PRESSURE: 77 MMHG | HEART RATE: 84 BPM | RESPIRATION RATE: 18 BRPM | SYSTOLIC BLOOD PRESSURE: 181 MMHG | OXYGEN SATURATION: 98 % | HEIGHT: 57 IN | TEMPERATURE: 98 F

## 2024-07-14 VITALS
RESPIRATION RATE: 18 BRPM | DIASTOLIC BLOOD PRESSURE: 79 MMHG | TEMPERATURE: 98 F | OXYGEN SATURATION: 98 % | HEART RATE: 84 BPM | SYSTOLIC BLOOD PRESSURE: 146 MMHG

## 2024-07-14 DIAGNOSIS — R11.2 NAUSEA WITH VOMITING, UNSPECIFIED: ICD-10-CM

## 2024-07-14 DIAGNOSIS — Z98.891 HISTORY OF UTERINE SCAR FROM PREVIOUS SURGERY: Chronic | ICD-10-CM

## 2024-07-14 DIAGNOSIS — E11.65 TYPE 2 DIABETES MELLITUS WITH HYPERGLYCEMIA: ICD-10-CM

## 2024-07-14 DIAGNOSIS — I10 ESSENTIAL (PRIMARY) HYPERTENSION: ICD-10-CM

## 2024-07-14 LAB
ALBUMIN SERPL ELPH-MCNC: 4 G/DL — SIGNIFICANT CHANGE UP (ref 3.5–5.2)
ALP SERPL-CCNC: 143 U/L — HIGH (ref 30–115)
ALT FLD-CCNC: 17 U/L — SIGNIFICANT CHANGE UP (ref 0–41)
ANION GAP SERPL CALC-SCNC: 14 MMOL/L — SIGNIFICANT CHANGE UP (ref 7–14)
APPEARANCE UR: CLEAR — SIGNIFICANT CHANGE UP
AST SERPL-CCNC: 20 U/L — SIGNIFICANT CHANGE UP (ref 0–41)
B-OH-BUTYR SERPL-SCNC: 0.5 MMOL/L — HIGH
BACTERIA # UR AUTO: NEGATIVE /HPF — SIGNIFICANT CHANGE UP
BASE EXCESS BLDV CALC-SCNC: 0.9 MMOL/L — SIGNIFICANT CHANGE UP (ref -2–3)
BASOPHILS # BLD AUTO: 0.04 K/UL — SIGNIFICANT CHANGE UP (ref 0–0.2)
BASOPHILS NFR BLD AUTO: 0.4 % — SIGNIFICANT CHANGE UP (ref 0–1)
BILIRUB SERPL-MCNC: 0.3 MG/DL — SIGNIFICANT CHANGE UP (ref 0.2–1.2)
BILIRUB UR-MCNC: NEGATIVE — SIGNIFICANT CHANGE UP
BUN SERPL-MCNC: 39 MG/DL — HIGH (ref 10–20)
CA-I SERPL-SCNC: 1.28 MMOL/L — SIGNIFICANT CHANGE UP (ref 1.15–1.33)
CALCIUM SERPL-MCNC: 10.4 MG/DL — SIGNIFICANT CHANGE UP (ref 8.4–10.5)
CAST: 1 /LPF — SIGNIFICANT CHANGE UP (ref 0–4)
CHLORIDE SERPL-SCNC: 99 MMOL/L — SIGNIFICANT CHANGE UP (ref 98–110)
CO2 SERPL-SCNC: 24 MMOL/L — SIGNIFICANT CHANGE UP (ref 17–32)
COLOR SPEC: YELLOW — SIGNIFICANT CHANGE UP
CREAT SERPL-MCNC: 1.2 MG/DL — SIGNIFICANT CHANGE UP (ref 0.7–1.5)
DIFF PNL FLD: ABNORMAL
EGFR: 53 ML/MIN/1.73M2 — LOW
EOSINOPHIL # BLD AUTO: 0.07 K/UL — SIGNIFICANT CHANGE UP (ref 0–0.7)
EOSINOPHIL NFR BLD AUTO: 0.6 % — SIGNIFICANT CHANGE UP (ref 0–8)
GAS PNL BLDV: 133 MMOL/L — LOW (ref 136–145)
GAS PNL BLDV: SIGNIFICANT CHANGE UP
GAS PNL BLDV: SIGNIFICANT CHANGE UP
GLUCOSE SERPL-MCNC: 275 MG/DL — HIGH (ref 70–99)
GLUCOSE UR QL: 500 MG/DL
HCO3 BLDV-SCNC: 29 MMOL/L — SIGNIFICANT CHANGE UP (ref 22–29)
HCT VFR BLD CALC: 36.9 % — LOW (ref 37–47)
HCT VFR BLDA CALC: 58 % — CRITICAL HIGH (ref 34.5–46.5)
HGB BLD CALC-MCNC: 19.3 G/DL — CRITICAL HIGH (ref 11.7–16.1)
HGB BLD-MCNC: 12.6 G/DL — SIGNIFICANT CHANGE UP (ref 12–16)
IMM GRANULOCYTES NFR BLD AUTO: 0.5 % — HIGH (ref 0.1–0.3)
KETONES UR-MCNC: NEGATIVE MG/DL — SIGNIFICANT CHANGE UP
LACTATE BLDV-MCNC: 1.5 MMOL/L — SIGNIFICANT CHANGE UP (ref 0.5–2)
LEUKOCYTE ESTERASE UR-ACNC: NEGATIVE — SIGNIFICANT CHANGE UP
LYMPHOCYTES # BLD AUTO: 1.38 K/UL — SIGNIFICANT CHANGE UP (ref 1.2–3.4)
LYMPHOCYTES # BLD AUTO: 12.6 % — LOW (ref 20.5–51.1)
MAGNESIUM SERPL-MCNC: 1.9 MG/DL — SIGNIFICANT CHANGE UP (ref 1.8–2.4)
MCHC RBC-ENTMCNC: 29.7 PG — SIGNIFICANT CHANGE UP (ref 27–31)
MCHC RBC-ENTMCNC: 34.1 G/DL — SIGNIFICANT CHANGE UP (ref 32–37)
MCV RBC AUTO: 87 FL — SIGNIFICANT CHANGE UP (ref 81–99)
MONOCYTES # BLD AUTO: 0.43 K/UL — SIGNIFICANT CHANGE UP (ref 0.1–0.6)
MONOCYTES NFR BLD AUTO: 3.9 % — SIGNIFICANT CHANGE UP (ref 1.7–9.3)
NEUTROPHILS # BLD AUTO: 8.97 K/UL — HIGH (ref 1.4–6.5)
NEUTROPHILS NFR BLD AUTO: 82 % — HIGH (ref 42.2–75.2)
NITRITE UR-MCNC: NEGATIVE — SIGNIFICANT CHANGE UP
NRBC # BLD: 0 /100 WBCS — SIGNIFICANT CHANGE UP (ref 0–0)
PCO2 BLDV: 58 MMHG — HIGH (ref 39–42)
PH BLDV: 7.31 — LOW (ref 7.32–7.43)
PH UR: 6.5 — SIGNIFICANT CHANGE UP (ref 5–8)
PLATELET # BLD AUTO: 249 K/UL — SIGNIFICANT CHANGE UP (ref 130–400)
PMV BLD: 10.6 FL — HIGH (ref 7.4–10.4)
PO2 BLDV: 49 MMHG — HIGH (ref 25–45)
POTASSIUM BLDV-SCNC: 4.4 MMOL/L — SIGNIFICANT CHANGE UP (ref 3.5–5.1)
POTASSIUM SERPL-MCNC: 4.7 MMOL/L — SIGNIFICANT CHANGE UP (ref 3.5–5)
POTASSIUM SERPL-SCNC: 4.7 MMOL/L — SIGNIFICANT CHANGE UP (ref 3.5–5)
PROT SERPL-MCNC: 7.5 G/DL — SIGNIFICANT CHANGE UP (ref 6–8)
PROT UR-MCNC: 300 MG/DL
RBC # BLD: 4.24 M/UL — SIGNIFICANT CHANGE UP (ref 4.2–5.4)
RBC # FLD: 12.5 % — SIGNIFICANT CHANGE UP (ref 11.5–14.5)
RBC CASTS # UR COMP ASSIST: 24 /HPF — HIGH (ref 0–4)
SAO2 % BLDV: 80 % — SIGNIFICANT CHANGE UP (ref 67–88)
SODIUM SERPL-SCNC: 137 MMOL/L — SIGNIFICANT CHANGE UP (ref 135–146)
SP GR SPEC: 1.01 — SIGNIFICANT CHANGE UP (ref 1–1.03)
SQUAMOUS # UR AUTO: 2 /HPF — SIGNIFICANT CHANGE UP (ref 0–5)
UROBILINOGEN FLD QL: 0.2 MG/DL — SIGNIFICANT CHANGE UP (ref 0.2–1)
WBC # BLD: 10.94 K/UL — HIGH (ref 4.8–10.8)
WBC # FLD AUTO: 10.94 K/UL — HIGH (ref 4.8–10.8)
WBC UR QL: 5 /HPF — SIGNIFICANT CHANGE UP (ref 0–5)

## 2024-07-14 PROCEDURE — 82010 KETONE BODYS QUAN: CPT

## 2024-07-14 PROCEDURE — 71045 X-RAY EXAM CHEST 1 VIEW: CPT

## 2024-07-14 PROCEDURE — 85018 HEMOGLOBIN: CPT

## 2024-07-14 PROCEDURE — 83735 ASSAY OF MAGNESIUM: CPT

## 2024-07-14 PROCEDURE — 36415 COLL VENOUS BLD VENIPUNCTURE: CPT

## 2024-07-14 PROCEDURE — 82803 BLOOD GASES ANY COMBINATION: CPT

## 2024-07-14 PROCEDURE — 93005 ELECTROCARDIOGRAM TRACING: CPT

## 2024-07-14 PROCEDURE — 96374 THER/PROPH/DIAG INJ IV PUSH: CPT

## 2024-07-14 PROCEDURE — 85014 HEMATOCRIT: CPT

## 2024-07-14 PROCEDURE — 85025 COMPLETE CBC W/AUTO DIFF WBC: CPT

## 2024-07-14 PROCEDURE — 93010 ELECTROCARDIOGRAM REPORT: CPT

## 2024-07-14 PROCEDURE — 82962 GLUCOSE BLOOD TEST: CPT

## 2024-07-14 PROCEDURE — 84132 ASSAY OF SERUM POTASSIUM: CPT

## 2024-07-14 PROCEDURE — 71045 X-RAY EXAM CHEST 1 VIEW: CPT | Mod: 26

## 2024-07-14 PROCEDURE — 80053 COMPREHEN METABOLIC PANEL: CPT

## 2024-07-14 PROCEDURE — 84295 ASSAY OF SERUM SODIUM: CPT

## 2024-07-14 PROCEDURE — 81001 URINALYSIS AUTO W/SCOPE: CPT

## 2024-07-14 PROCEDURE — 99285 EMERGENCY DEPT VISIT HI MDM: CPT

## 2024-07-14 PROCEDURE — 83605 ASSAY OF LACTIC ACID: CPT

## 2024-07-14 PROCEDURE — 82330 ASSAY OF CALCIUM: CPT

## 2024-07-14 PROCEDURE — 99285 EMERGENCY DEPT VISIT HI MDM: CPT | Mod: 25

## 2024-07-14 RX ORDER — INSULIN REGULAR, HUMAN 100/ML
4 VIAL (ML) INJECTION ONCE
Refills: 0 | Status: COMPLETED | OUTPATIENT
Start: 2024-07-14 | End: 2024-07-14

## 2024-07-14 RX ORDER — DEXTROSE MONOHYDRATE AND SODIUM CHLORIDE 5; .3 G/100ML; G/100ML
1000 INJECTION, SOLUTION INTRAVENOUS ONCE
Refills: 0 | Status: COMPLETED | OUTPATIENT
Start: 2024-07-14 | End: 2024-07-14

## 2024-07-14 RX ORDER — ONDANSETRON HYDROCHLORIDE 2 MG/ML
4 INJECTION INTRAMUSCULAR; INTRAVENOUS ONCE
Refills: 0 | Status: COMPLETED | OUTPATIENT
Start: 2024-07-14 | End: 2024-07-14

## 2024-07-14 RX ADMIN — DEXTROSE MONOHYDRATE AND SODIUM CHLORIDE 1000 MILLILITER(S): 5; .3 INJECTION, SOLUTION INTRAVENOUS at 11:55

## 2024-07-14 RX ADMIN — ONDANSETRON HYDROCHLORIDE 4 MILLIGRAM(S): 2 INJECTION INTRAMUSCULAR; INTRAVENOUS at 11:55

## 2024-07-14 RX ADMIN — Medication 4 UNIT(S): at 15:48

## 2024-07-16 ENCOUNTER — APPOINTMENT (OUTPATIENT)
Dept: PULMONOLOGY | Facility: CLINIC | Age: 54
End: 2024-07-16
Payer: COMMERCIAL

## 2024-07-16 ENCOUNTER — NON-APPOINTMENT (OUTPATIENT)
Age: 54
End: 2024-07-16

## 2024-07-16 VITALS
OXYGEN SATURATION: 94 % | SYSTOLIC BLOOD PRESSURE: 132 MMHG | BODY MASS INDEX: 17.67 KG/M2 | HEIGHT: 60 IN | DIASTOLIC BLOOD PRESSURE: 76 MMHG | HEART RATE: 83 BPM | WEIGHT: 90 LBS

## 2024-07-16 DIAGNOSIS — R06.09 OTHER FORMS OF DYSPNEA: ICD-10-CM

## 2024-07-16 DIAGNOSIS — J98.11 ATELECTASIS: ICD-10-CM

## 2024-07-16 DIAGNOSIS — Z22.7 LATENT TUBERCULOSIS: ICD-10-CM

## 2024-07-16 PROCEDURE — 99204 OFFICE O/P NEW MOD 45 MIN: CPT

## 2024-07-16 RX ORDER — BUDESONIDE AND FORMOTEROL FUMARATE DIHYDRATE 80; 4.5 UG/1; UG/1
80-4.5 AEROSOL RESPIRATORY (INHALATION) TWICE DAILY
Qty: 1 | Refills: 3 | Status: ACTIVE | COMMUNITY
Start: 2024-07-16 | End: 1900-01-01

## 2024-07-16 RX ORDER — ALBUTEROL SULFATE 90 UG/1
108 (90 BASE) INHALANT RESPIRATORY (INHALATION)
Qty: 1 | Refills: 3 | Status: ACTIVE | COMMUNITY
Start: 2024-07-16 | End: 1900-01-01

## 2024-07-23 ENCOUNTER — APPOINTMENT (OUTPATIENT)
Dept: RHEUMATOLOGY | Facility: CLINIC | Age: 54
End: 2024-07-23

## 2024-07-24 ENCOUNTER — APPOINTMENT (OUTPATIENT)
Dept: PULMONOLOGY | Facility: HOSPITAL | Age: 54
End: 2024-07-24

## 2024-07-24 PROCEDURE — 94727 GAS DIL/WSHOT DETER LNG VOL: CPT | Mod: 26

## 2024-07-24 PROCEDURE — 94060 EVALUATION OF WHEEZING: CPT | Mod: 26

## 2024-07-24 PROCEDURE — 94729 DIFFUSING CAPACITY: CPT | Mod: 26

## 2024-07-31 ENCOUNTER — APPOINTMENT (OUTPATIENT)
Dept: PULMONOLOGY | Facility: CLINIC | Age: 54
End: 2024-07-31

## 2024-08-12 ENCOUNTER — RESULT REVIEW (OUTPATIENT)
Age: 54
End: 2024-08-12

## 2024-08-28 ENCOUNTER — APPOINTMENT (OUTPATIENT)
Dept: PULMONOLOGY | Facility: CLINIC | Age: 54
End: 2024-08-28
Payer: COMMERCIAL

## 2024-08-28 VITALS
HEIGHT: 60 IN | OXYGEN SATURATION: 97 % | SYSTOLIC BLOOD PRESSURE: 148 MMHG | DIASTOLIC BLOOD PRESSURE: 88 MMHG | HEART RATE: 87 BPM | WEIGHT: 92 LBS | BODY MASS INDEX: 18.06 KG/M2

## 2024-08-28 DIAGNOSIS — R06.09 OTHER FORMS OF DYSPNEA: ICD-10-CM

## 2024-08-28 DIAGNOSIS — Z86.15 PERSONAL HISTORY OF LATENT TUBERCULOSIS INFECTION: ICD-10-CM

## 2024-08-28 DIAGNOSIS — J47.9 BRONCHIECTASIS, UNCOMPLICATED: ICD-10-CM

## 2024-08-28 PROCEDURE — 99214 OFFICE O/P EST MOD 30 MIN: CPT

## 2024-08-28 PROCEDURE — G2211 COMPLEX E/M VISIT ADD ON: CPT | Mod: NC

## 2024-08-28 NOTE — REVIEW OF SYSTEMS
[Pleuritic Pain] : pleuritic pain [SOB on Exertion] : sob on exertion [Negative] : Musculoskeletal [Cough] : no cough

## 2024-08-28 NOTE — HISTORY OF PRESENT ILLNESS
[Never] : never [TextBox_4] : 52 y/o F with pmhx of RA, latent TB s/p treatment? Dm, presenting for shoulder pain and lung mass. Patient was recently treated for latent TB and has finished her treatment several weeks ago. She is now complaining of dyspnea on exertion and shoulder pain on inspiration for the last several months. She also reports weight loss and fatigue throughout the day. Never smoker. Had a CT A&P 04/2024 that showed a rt middle lobe atelectasis, RT middle lobe opacification noted since 2020   PFTs done and are normal  No wheezing on exam today  CT with RML atelectasis and bronchiectasis; much milder in lingula  high suspicion for MAC  Short of breath only with exertion

## 2024-08-28 NOTE — REASON FOR VISIT
[Follow-Up] : a follow-up visit [Chest Pain] : chest pain [Latent TB/ +PPD/ +IGRA] : latent TB/ +PPD/ +IGRA

## 2024-08-28 NOTE — ASSESSMENT
[FreeTextEntry1] : CT reviewed  RML atelectasis and bronchiectasis; as well in the lingula Will need bronchoscopy with BAL - suspect MAC  Will advise about clearance therapy next visit once we have diagnosis    Shortness of breath PFTs normal; air trapping note Start Symbicort BID  It was sent to [facility]  1 month follow up

## 2024-08-28 NOTE — PHYSICAL EXAM
[No Acute Distress] : no acute distress [Normal Appearance] : normal appearance [Normal Rate/Rhythm] : normal rate/rhythm [Normal S1, S2] : normal s1, s2 [No Murmurs] : no murmurs [No Resp Distress] : no resp distress [Clear to Auscultation Bilaterally] : clear to auscultation bilaterally [No Abnormalities] : no abnormalities [Benign] : benign [Normal Gait] : normal gait [No Edema] : no edema

## 2024-09-02 ENCOUNTER — RESULT REVIEW (OUTPATIENT)
Age: 54
End: 2024-09-02

## 2024-09-03 ENCOUNTER — TRANSCRIPTION ENCOUNTER (OUTPATIENT)
Age: 54
End: 2024-09-03

## 2024-09-03 ENCOUNTER — INPATIENT (INPATIENT)
Facility: HOSPITAL | Age: 54
LOS: 2 days | Discharge: HOME | DRG: 179 | End: 2024-09-06
Attending: INTERNAL MEDICINE | Admitting: INTERNAL MEDICINE
Payer: COMMERCIAL

## 2024-09-03 VITALS
OXYGEN SATURATION: 95 % | TEMPERATURE: 97 F | HEIGHT: 64 IN | WEIGHT: 93.04 LBS | HEART RATE: 90 BPM | RESPIRATION RATE: 18 BRPM | DIASTOLIC BLOOD PRESSURE: 97 MMHG | SYSTOLIC BLOOD PRESSURE: 217 MMHG

## 2024-09-03 DIAGNOSIS — A31.0 PULMONARY MYCOBACTERIAL INFECTION: ICD-10-CM

## 2024-09-03 DIAGNOSIS — Z98.891 HISTORY OF UTERINE SCAR FROM PREVIOUS SURGERY: Chronic | ICD-10-CM

## 2024-09-03 LAB
ALBUMIN SERPL ELPH-MCNC: 4.1 G/DL — SIGNIFICANT CHANGE UP (ref 3.5–5.2)
ALP SERPL-CCNC: 129 U/L — HIGH (ref 30–115)
ALT FLD-CCNC: 23 U/L — SIGNIFICANT CHANGE UP (ref 0–41)
ANION GAP SERPL CALC-SCNC: 13 MMOL/L — SIGNIFICANT CHANGE UP (ref 7–14)
AST SERPL-CCNC: 23 U/L — SIGNIFICANT CHANGE UP (ref 0–41)
BASOPHILS # BLD AUTO: 0.05 K/UL — SIGNIFICANT CHANGE UP (ref 0–0.2)
BASOPHILS NFR BLD AUTO: 0.4 % — SIGNIFICANT CHANGE UP (ref 0–1)
BILIRUB SERPL-MCNC: 0.2 MG/DL — SIGNIFICANT CHANGE UP (ref 0.2–1.2)
BUN SERPL-MCNC: 28 MG/DL — HIGH (ref 10–20)
CALCIUM SERPL-MCNC: 10 MG/DL — SIGNIFICANT CHANGE UP (ref 8.4–10.5)
CHLORIDE SERPL-SCNC: 102 MMOL/L — SIGNIFICANT CHANGE UP (ref 98–110)
CO2 SERPL-SCNC: 25 MMOL/L — SIGNIFICANT CHANGE UP (ref 17–32)
CREAT SERPL-MCNC: 0.9 MG/DL — SIGNIFICANT CHANGE UP (ref 0.7–1.5)
EGFR: 76 ML/MIN/1.73M2 — SIGNIFICANT CHANGE UP
EOSINOPHIL # BLD AUTO: 0.01 K/UL — SIGNIFICANT CHANGE UP (ref 0–0.7)
EOSINOPHIL NFR BLD AUTO: 0.1 % — SIGNIFICANT CHANGE UP (ref 0–8)
GLUCOSE BLDC GLUCOMTR-MCNC: 196 MG/DL — HIGH (ref 70–99)
GLUCOSE BLDC GLUCOMTR-MCNC: 203 MG/DL — HIGH (ref 70–99)
GLUCOSE BLDC GLUCOMTR-MCNC: 214 MG/DL — HIGH (ref 70–99)
GLUCOSE SERPL-MCNC: 252 MG/DL — HIGH (ref 70–99)
GRAM STN FLD: SIGNIFICANT CHANGE UP
HCT VFR BLD CALC: 37.8 % — SIGNIFICANT CHANGE UP (ref 37–47)
HGB BLD-MCNC: 12.5 G/DL — SIGNIFICANT CHANGE UP (ref 12–16)
IMM GRANULOCYTES NFR BLD AUTO: 0.4 % — HIGH (ref 0.1–0.3)
LYMPHOCYTES # BLD AUTO: 1.23 K/UL — SIGNIFICANT CHANGE UP (ref 1.2–3.4)
LYMPHOCYTES # BLD AUTO: 8.7 % — LOW (ref 20.5–51.1)
MAGNESIUM SERPL-MCNC: 1.7 MG/DL — LOW (ref 1.8–2.4)
MCHC RBC-ENTMCNC: 29.5 PG — SIGNIFICANT CHANGE UP (ref 27–31)
MCHC RBC-ENTMCNC: 33.1 G/DL — SIGNIFICANT CHANGE UP (ref 32–37)
MCV RBC AUTO: 89.2 FL — SIGNIFICANT CHANGE UP (ref 81–99)
MONOCYTES # BLD AUTO: 0.69 K/UL — HIGH (ref 0.1–0.6)
MONOCYTES NFR BLD AUTO: 4.9 % — SIGNIFICANT CHANGE UP (ref 1.7–9.3)
NEUTROPHILS # BLD AUTO: 12.12 K/UL — HIGH (ref 1.4–6.5)
NEUTROPHILS NFR BLD AUTO: 85.5 % — HIGH (ref 42.2–75.2)
NRBC # BLD: 0 /100 WBCS — SIGNIFICANT CHANGE UP (ref 0–0)
PLATELET # BLD AUTO: 251 K/UL — SIGNIFICANT CHANGE UP (ref 130–400)
PMV BLD: 10.3 FL — SIGNIFICANT CHANGE UP (ref 7.4–10.4)
POTASSIUM SERPL-MCNC: 4.1 MMOL/L — SIGNIFICANT CHANGE UP (ref 3.5–5)
POTASSIUM SERPL-SCNC: 4.1 MMOL/L — SIGNIFICANT CHANGE UP (ref 3.5–5)
PROT SERPL-MCNC: 7.2 G/DL — SIGNIFICANT CHANGE UP (ref 6–8)
RBC # BLD: 4.24 M/UL — SIGNIFICANT CHANGE UP (ref 4.2–5.4)
RBC # FLD: 12.5 % — SIGNIFICANT CHANGE UP (ref 11.5–14.5)
SODIUM SERPL-SCNC: 140 MMOL/L — SIGNIFICANT CHANGE UP (ref 135–146)
SPECIMEN SOURCE: SIGNIFICANT CHANGE UP
TROPONIN T, HIGH SENSITIVITY RESULT: 14 NG/L — HIGH (ref 6–13)
TROPONIN T, HIGH SENSITIVITY RESULT: 18 NG/L — HIGH (ref 6–13)
WBC # BLD: 14.16 K/UL — HIGH (ref 4.8–10.8)
WBC # FLD AUTO: 14.16 K/UL — HIGH (ref 4.8–10.8)

## 2024-09-03 PROCEDURE — 83036 HEMOGLOBIN GLYCOSYLATED A1C: CPT

## 2024-09-03 PROCEDURE — 82962 GLUCOSE BLOOD TEST: CPT

## 2024-09-03 PROCEDURE — 84484 ASSAY OF TROPONIN QUANT: CPT

## 2024-09-03 PROCEDURE — 88312 SPECIAL STAINS GROUP 1: CPT | Mod: 26

## 2024-09-03 PROCEDURE — 84300 ASSAY OF URINE SODIUM: CPT

## 2024-09-03 PROCEDURE — 70450 CT HEAD/BRAIN W/O DYE: CPT | Mod: MC

## 2024-09-03 PROCEDURE — 94640 AIRWAY INHALATION TREATMENT: CPT

## 2024-09-03 PROCEDURE — 80053 COMPREHEN METABOLIC PANEL: CPT

## 2024-09-03 PROCEDURE — 99223 1ST HOSP IP/OBS HIGH 75: CPT

## 2024-09-03 PROCEDURE — 93307 TTE W/O DOPPLER COMPLETE: CPT

## 2024-09-03 PROCEDURE — 80048 BASIC METABOLIC PNL TOTAL CA: CPT

## 2024-09-03 PROCEDURE — 85025 COMPLETE CBC W/AUTO DIFF WBC: CPT

## 2024-09-03 PROCEDURE — 88305 TISSUE EXAM BY PATHOLOGIST: CPT | Mod: 26

## 2024-09-03 PROCEDURE — 71045 X-RAY EXAM CHEST 1 VIEW: CPT

## 2024-09-03 PROCEDURE — 93005 ELECTROCARDIOGRAM TRACING: CPT

## 2024-09-03 PROCEDURE — 36415 COLL VENOUS BLD VENIPUNCTURE: CPT

## 2024-09-03 PROCEDURE — 70450 CT HEAD/BRAIN W/O DYE: CPT | Mod: 26

## 2024-09-03 PROCEDURE — 84443 ASSAY THYROID STIM HORMONE: CPT

## 2024-09-03 PROCEDURE — 84156 ASSAY OF PROTEIN URINE: CPT

## 2024-09-03 PROCEDURE — 88112 CYTOPATH CELL ENHANCE TECH: CPT | Mod: 26

## 2024-09-03 PROCEDURE — 80061 LIPID PANEL: CPT

## 2024-09-03 PROCEDURE — G0378: CPT

## 2024-09-03 PROCEDURE — 83735 ASSAY OF MAGNESIUM: CPT

## 2024-09-03 PROCEDURE — 81001 URINALYSIS AUTO W/SCOPE: CPT

## 2024-09-03 PROCEDURE — 71045 X-RAY EXAM CHEST 1 VIEW: CPT | Mod: 26

## 2024-09-03 PROCEDURE — 82570 ASSAY OF URINE CREATININE: CPT

## 2024-09-03 RX ORDER — DEXTROSE 15 G/33 G
15 GEL IN PACKET (GRAM) ORAL ONCE
Refills: 0 | Status: DISCONTINUED | OUTPATIENT
Start: 2024-09-03 | End: 2024-09-06

## 2024-09-03 RX ORDER — HEPARIN SODIUM,BOVINE 1000/ML
5000 VIAL (ML) INJECTION EVERY 12 HOURS
Refills: 0 | Status: DISCONTINUED | OUTPATIENT
Start: 2024-09-03 | End: 2024-09-06

## 2024-09-03 RX ORDER — ONDANSETRON 2 MG/ML
4 INJECTION, SOLUTION INTRAMUSCULAR; INTRAVENOUS ONCE
Refills: 0 | Status: COMPLETED | OUTPATIENT
Start: 2024-09-03 | End: 2024-09-03

## 2024-09-03 RX ORDER — DEXTROSE 15 G/33 G
25 GEL IN PACKET (GRAM) ORAL ONCE
Refills: 0 | Status: DISCONTINUED | OUTPATIENT
Start: 2024-09-03 | End: 2024-09-06

## 2024-09-03 RX ORDER — INSULIN GLARGINE 100 [IU]/ML
0 INJECTION, SOLUTION SUBCUTANEOUS
Refills: 0 | DISCHARGE

## 2024-09-03 RX ORDER — NIFEDIPINE 60 MG/1
60 TABLET, FILM COATED, EXTENDED RELEASE ORAL DAILY
Refills: 0 | Status: DISCONTINUED | OUTPATIENT
Start: 2024-09-03 | End: 2024-09-04

## 2024-09-03 RX ORDER — ASPIRIN 81 MG
81 TABLET, DELAYED RELEASE (ENTERIC COATED) ORAL DAILY
Refills: 0 | Status: DISCONTINUED | OUTPATIENT
Start: 2024-09-03 | End: 2024-09-06

## 2024-09-03 RX ORDER — INSULIN GLARGINE 100 [IU]/ML
10 INJECTION, SOLUTION SUBCUTANEOUS AT BEDTIME
Refills: 0 | Status: DISCONTINUED | OUTPATIENT
Start: 2024-09-03 | End: 2024-09-04

## 2024-09-03 RX ORDER — DEXTROSE 15 G/33 G
12.5 GEL IN PACKET (GRAM) ORAL ONCE
Refills: 0 | Status: DISCONTINUED | OUTPATIENT
Start: 2024-09-03 | End: 2024-09-06

## 2024-09-03 RX ORDER — HYDRALAZINE HCL 50 MG
25 TABLET ORAL EVERY 8 HOURS
Refills: 0 | Status: DISCONTINUED | OUTPATIENT
Start: 2024-09-03 | End: 2024-09-04

## 2024-09-03 RX ORDER — GLUCAGON INJECTION, SOLUTION 1 MG/.2ML
1 INJECTION, SOLUTION SUBCUTANEOUS ONCE
Refills: 0 | Status: DISCONTINUED | OUTPATIENT
Start: 2024-09-03 | End: 2024-09-06

## 2024-09-03 RX ORDER — HYDRALAZINE HCL 50 MG
10 TABLET ORAL ONCE
Refills: 0 | Status: COMPLETED | OUTPATIENT
Start: 2024-09-03 | End: 2024-09-03

## 2024-09-03 RX ORDER — BUDESONIDE AND FORMOTEROL FUMARATE 80; 4.5 UG/1; UG/1
2 AEROSOL, METERED RESPIRATORY (INHALATION)
Refills: 0 | Status: DISCONTINUED | OUTPATIENT
Start: 2024-09-03 | End: 2024-09-06

## 2024-09-03 RX ADMIN — NIFEDIPINE 60 MILLIGRAM(S): 60 TABLET, FILM COATED, EXTENDED RELEASE ORAL at 17:03

## 2024-09-03 RX ADMIN — Medication 25 MILLIGRAM(S): at 15:06

## 2024-09-03 RX ADMIN — Medication 10 MILLIGRAM(S): at 13:40

## 2024-09-03 RX ADMIN — Medication 5000 UNIT(S): at 20:22

## 2024-09-03 RX ADMIN — Medication 2: at 16:17

## 2024-09-03 RX ADMIN — ONDANSETRON 4 MILLIGRAM(S): 2 INJECTION, SOLUTION INTRAMUSCULAR; INTRAVENOUS at 20:16

## 2024-09-03 RX ADMIN — ONDANSETRON 4 MILLIGRAM(S): 2 INJECTION, SOLUTION INTRAMUSCULAR; INTRAVENOUS at 11:19

## 2024-09-03 RX ADMIN — INSULIN GLARGINE 10 UNIT(S): 100 INJECTION, SOLUTION SUBCUTANEOUS at 21:56

## 2024-09-03 RX ADMIN — Medication 10 MILLIGRAM(S): at 13:03

## 2024-09-03 NOTE — CHART NOTE - NSCHARTNOTEFT_GEN_A_CORE
PACU ANESTHESIA ADMISSION NOTE      Procedure:   Post op diagnosis:      ____  Intubated  TV:______       Rate: ______      FiO2: ______    _x___  Patent Airway    _x___  Full return of protective reflexes    _x___  Full recovery from anesthesia / back to baseline status    Vitals:  T(C): 36.3 (09-03-24 @ 09:23), Max: 36.3 (09-03-24 @ 09:23)  HR: 75 (09-03-24 @ 10:26) (75 - 90)  BP: 124/70 (09-03-24 @ 10:26) (124/70 - 217/97)  RR: 16 (09-03-24 @ 10:26) (16 - 18)  SpO2: 99% (09-03-24 @ 10:26) (95% - 99%)    Mental Status:  _x___ Awake   _____ Alert   _____ Drowsy   _____ Sedated    Nausea/Vomiting:  _x___  NO       ______Yes,   See Post - Op Orders         Pain Scale (0-10):  __0___    Treatment: _x___ None    ____ See Post - Op/PCA Orders    Post - Operative Fluids:   __x__ Oral   ____ See Post - Op Orders    Plan: Discharge:   _x___Home       _____Floor     _____Critical Care    _____  Other:_________________    Comments:  No anesthesia issues or complications noted.  Discharge when criteria met.

## 2024-09-03 NOTE — ASU PREOP CHECKLIST - BOWEL PREP
Last office visit 12/11/2020.  Follow up scheduled for 06/11/2021.  
PT CALLED REQUESTING THAT WE SEND HIS MEDICATIONS TO [facility] ACCU CHECK STRIPS AND LANCETS. PLEASE AND THANK YOU  
n/a

## 2024-09-03 NOTE — H&P ADULT - ASSESSMENT
53 y o female with pmh of HTN, HLD, mild CAD on cath 4/2023, DM2 dx in 2018, has been on insulin since 2022, h/o RA, latent TB treated (reported receiving 8 months of therapy), who had dyspnea and found to have RML opacification, was admitted for bronch BAL but her BP was high before and after the procedure.     #Hypertension (can't tell if urgency vs emergency; waiting for the blood work and ECG) .  - was given iv labetalol 10 mg iv once and hydralazine 10 mg IV once.   - non compliant with meds    Plan:   - no need for drip for now as SBP is in the 170s now (dropped by approximately 25% from 210)  - target SBP is 160s first 24 hours   - will not start ACEi/ARB waiting for the BMP (creat and K+).   - will start for now nifedipine 60 mg XR daily labetalol 100 mg BID and hydralazine 25 mg TID; meds to be adjusted once creat and K+ are back.     - check ekg   - f up troponin and bmp   - ua: to r/o proteinuria   - strict in/out   - weight daily   - cth given the dizziness and vomiting (neuro exam is non focal)  - will not send workup for secondary hypertension for now as patient was not on any BP meds before  - admit to tele    #Hx of latent TB reportedly s/p Isoniazid for 8 months   - complaining of dyspnea on exertion and shoulder pain on inspiration for the last several months.   - reports weight loss and fatigue throughout the day.   - Never smoker.   - CT A&P 04/2024 that showed a rt middle lobe atelectasis, RT middle lobe opacification noted since 2020.   - PFTs done and was normal.   - CT repeat: with RML atelectasis and bronchiectasis; much milder in lingula   - seen by Dr Michael gray who is suspecting MAC. So BAL was performed on 9/3  - f up studies     #DM2  - hold oral meds;    - check fs  - start and adjust insulin s/s prn  - on Admelog 4 u pre-meals however she is only taking it in the morning  (hold for now)  - on Tresiba 14 u at bedtime -> c/w lantus for now (dose reduced to 10 u as she is expected to have poor PO intake from the nausea) -> adjust accordingly    #CAD  - mild CAD on cath 4/2023  - resume aspirin and statin   - f up lipid profile       #Diet: DASH; CC  #DVT pro: lovenox vs heparin based on Cr clearance  #GI pro: not indicated  #Activity: as tolerated  #Dispo: TELE  #Med rec: done  #Code status: full     F up blood work, Urine studies, ECG, TTE, CT head; starting CDVT prophylaxis (based on Cr clearance) 53 y o female with pmh of HTN, HLD, mild CAD on cath 4/2023, DM2 dx in 2018, has been on insulin since 2022, h/o RA, latent TB treated (reported receiving 8 months of therapy), who had dyspnea and found to have RML opacification, was admitted for bronch BAL but her BP was high before and after the procedure.     #Hypertension (can't tell if urgency vs emergency; waiting for the blood work and ECG) .  - was given iv labetalol 10 mg iv once and hydralazine 10 mg IV once.   - non compliant with meds    Plan:   - no need for drip for now as SBP is in the 170s now (dropped by approximately 25% from 210)  - target SBP is 160s first 24 hours   - will not start ACEi/ARB waiting for the BMP (creat and K+).   - will start for now nifedipine 60 mg XR daily labetalol 100 mg BID and hydralazine 25 mg TID; meds to be adjusted once creat and K+ are back.     - check ekg   - f up troponin and bmp   - ua: to r/o proteinuria   - strict in/out   - weight daily   - cth given the dizziness and vomiting (neuro exam is non focal)  - will not send workup for secondary hypertension for now as patient was not on any BP meds before  - admit to tele    #Hx of latent TB reportedly s/p Isoniazid for 8 months   - complaining of dyspnea on exertion and shoulder pain on inspiration for the last several months.   - reports weight loss and fatigue throughout the day.   - Never smoker.   - CT A&P 04/2024 that showed a rt middle lobe atelectasis, RT middle lobe opacification noted since 2020.   - PFTs done and was normal.   - CT repeat: with RML atelectasis and bronchiectasis; much milder in lingula   - seen by Dr Michael gray who is suspecting MAC. So BAL was performed on 9/3  - f up studies     #DM2  - hold oral meds;    - check fs  - start and adjust insulin s/s prn  - on Admelog 4 u pre-meals however she is only taking it in the morning  (hold for now)  - on Tresiba 14 u at bedtime -> c/w lantus for now (dose reduced to 10 u as she is expected to have poor PO intake from the nausea) -> adjust accordingly    #CAD  - mild CAD on cath 4/2023  - resume aspirin and statin   - f up lipid profile       #Diet: DASH; CC  #DVT pro: lovenox vs heparin based on Cr clearance  #GI pro: not indicated  #Activity: as tolerated  #Dispo: TELE  #Med rec: done  #Code status: full     Pending:   - blood work, Urine studies, ECG,   - f up TTE,   - f up CT head;   - adjust DVT prophylaxis (based on Cr clearance); will start heparin SQ for now

## 2024-09-03 NOTE — ASU PATIENT PROFILE, ADULT - FALL HARM RISK - UNIVERSAL INTERVENTIONS
Bed in lowest position, wheels locked, appropriate side rails in place/Call bell, personal items and telephone in reach/Instruct patient to call for assistance before getting out of bed or chair/Non-slip footwear when patient is out of bed/[place] to call system/Physically safe environment - no spills, clutter or unnecessary equipment/Purposeful Proactive Rounding/Room/bathroom lighting operational, light cord in reach

## 2024-09-03 NOTE — H&P PST ADULT - HISTORY OF PRESENT ILLNESS
Patient is a 53 y o female with  a Hx of latent tTB comppo Patient is a 53 y o female with  a Hx of latent  Tb treated, who had dyspnea and found to have RML opacification, here for bronch BAL.

## 2024-09-03 NOTE — CHART NOTE - NSCHARTNOTEFT_GEN_A_CORE
The patient tolerated the bronch and bAL well, recovered anesthesia however pre and post bronch blood pressures were 200s systolic   Necessitating closer monitoring inpatient  Patient not on home meds  Had some N/v and could not tolerate PO   It was decided to admit the patient to medicine  sign out given to MAR The patient tolerated the bronch and bAL well, recovered anesthesia however pre and post bronch blood pressures were 200s systolic   Necessitating closer monitoring inpatient  Patient not on home meds  Meds were ordered, however   Had some N/v and could not tolerate PO requiring IV medications   It was decided to admit the patient to medicine  sign out given to MAR

## 2024-09-03 NOTE — ASU DISCHARGE PLAN (ADULT/PEDIATRIC) - NS MD DC FALL RISK RISK
For information on Fall & Injury Prevention, visit: [email or web address] OR  [email or web address] OR  https://www.cdc.gov/steadi/patient.html

## 2024-09-03 NOTE — H&P ADULT - HISTORY OF PRESENT ILLNESS
53 y o female with  a HTN, DM, DLP, latent TB treated, who had dyspnea and found to have RML opacification, was admitted for bronch BAL but her BP was high before and after the procedure.         /97  Admitted for hypertensive urgency 53 y o female with pmh of HTN, HLD, mild CAD on cath 4/2023, DM2 dx in 2018, has been on insulin since 2022, h/o RA, latent TB treated (reported receiving 8 months of therapy), who had dyspnea and found to have RML opacification, was admitted for bronch BAL but her BP was high before and after the procedure.   Prior to procedure it was 210; procedure was performed; after the procedure it remained 217/97. Was given hydralazine IV with slight improvement; then labetalol 10 mg IV given with improvement in BP to 170s.   Patient reports being dizzy.   No chest pain, no SOB.   Reports that usually on exertion she experiences SOB   Patient reports that she checks her BP few times per week and it is usually less than 130.   Patient was discharged last year on lisinopril 10, simvastatin 40 mg and aspirin however she reports that she is not taking any oral meds (she's only on the insulin)       As for the indication for the bronchoscopy: Patient was recently treated for latent TB and has finished her treatment several weeks ago. then she was complaining of dyspnea on exertion and shoulder pain on inspiration for the last several months. She also reports weight loss and fatigue throughout the day. Never smoker. Had a CT A&P 04/2024 that showed a rt middle lobe atelectasis, RT middle lobe opacification noted since 2020. PFTs done and are normal. CT with RML atelectasis and bronchiectasis; much milder in lingula   high suspicion for MAC. so BAL was indicated      no blood work or imaging yet.

## 2024-09-03 NOTE — ASU PATIENT PROFILE, ADULT - BLOOD AVOIDANCE/RESTRICTIONS, PROFILE
Spoke to mom, discussed the Immune eval lab.  Some levels are low.   Will repeat 4 weeks after his 15 months vaccines.    none

## 2024-09-03 NOTE — H&P ADULT - ATTENDING COMMENTS
52 y/o Female with PMH of HTN, HLD, mild CAD on cath 4/2023, DM2 dx in 2018, has been on insulin since 2022,  RA, latent TB treated (reported receiving 8 months of therapy), who had dyspnea and found to have RML opacification, was admitted for bronch BAL but her BP was high before and after the procedure.        Agree  with assessment  except for changes below.   Vital Signs Last 24 Hrs  T(C): 36.1 (03 Sep 2024 10:31), Max: 36.3 (03 Sep 2024 09:23)  T(F): 97 (03 Sep 2024 10:31), Max: 97.3 (03 Sep 2024 09:23)  HR: 87 (03 Sep 2024 17:30) (75 - 95)  BP: 155/74 (03 Sep 2024 17:30) (124/70 - 217/97)  BP(mean): --  RR: 18 (03 Sep 2024 17:30) (16 - 20)  SpO2: 95% (03 Sep 2024 17:30) (94% - 99%)    Parameters below as of 03 Sep 2024 17:30  Patient On (Oxygen Delivery Method): room air      IMPRESSION   Suspected  Hypertensive  Urgency/ Emergency  Now improved   Asosciated with nausea  Vomiting   SBP is in the 170s now (dropped by approximately 25% from 210)  patient Not on Home Antihypertensive  medication   Agree  with target SBP is 160s first 24 hours   f/u ECHO, ECG, Repeat Trop, CTH   24 Tele Monitoring   Started on Nifedipine   Correct electrolytes (Target Na = 135-145 | Mg = >2.2 | K = 3.5-5)     Hx of latent TB reportedly s/p Isoniazid for 8 months   Leukocytosis  likely related to BAL procedure   - complaining of dyspnea on exertion and shoulder pain on inspiration for the last several months.   - reports weight loss and fatigue throughout the day.   - Never smoker.   - CT A&P 04/2024 that showed a rt middle lobe atelectasis, RT middle lobe opacification noted since 2020.   - PFTs done and was normal.   - CT repeat: with RML atelectasis and bronchiectasis; much milder in lingula   - seen by Dr Michael gray who is suspecting MAC. So BAL was performed on 9/3  - f up studies     Hx  DM   Hold oral meds;  check fs  Start insulin  regimen if  serum Glucose IF FS >180,   Adjust insulin  Lantus/Lispro,  for  Goal 140-180  Hold for Hypoglycemia     Hx CAD - c/w  ASA  Statin , f/u Lipids 52 y/o Female with PMH of HTN, HLD, mild CAD on cath 4/2023, DM2 dx in 2018, has been on insulin since 2022,  RA, latent TB treated (reported receiving 8 months of therapy), who had dyspnea and found to have RML opacification, was admitted for bronch BAL but her BP was high before and after the procedure.        Agree  with assessment  except for changes below.   Vital Signs Last 24 Hrs  T(C): 36.1 (03 Sep 2024 10:31), Max: 36.3 (03 Sep 2024 09:23)  T(F): 97 (03 Sep 2024 10:31), Max: 97.3 (03 Sep 2024 09:23)  HR: 87 (03 Sep 2024 17:30) (75 - 95)  BP: 155/74 (03 Sep 2024 17:30) (124/70 - 217/97)  BP(mean): --  RR: 18 (03 Sep 2024 17:30) (16 - 20)  SpO2: 95% (03 Sep 2024 17:30) (94% - 99%)    Parameters below as of 03 Sep 2024 17:30  Patient On (Oxygen Delivery Method): room air      IMPRESSION   Suspected  Hypertensive  Urgency/ Emergency   Vs  Medication Rxn   Associated with nausea  Vomiting   SBP is in the 170s now (dropped by approximately 25% from 210)  patient Not on Home Antihypertensive  medication   Agree  with target SBP is 160s first 24 hours   f/u ECHO, ECG, Repeat Trop, CTH   24 Tele Monitoring   Started on Nifedipine   PRN  Zofran   Correct electrolytes (Target Na = 135-145 | Mg = >2.2 | K = 3.5-5)     Hx of latent TB reportedly s/p Isoniazid for 8 months   Leukocytosis  likely related to BAL procedure   - complaining of dyspnea on exertion and shoulder pain on inspiration for the last several months.   - reports weight loss and fatigue throughout the day.   - Never smoker.   - CT A&P 04/2024 that showed a rt middle lobe atelectasis, RT middle lobe opacification noted since 2020.   - PFTs done and was normal.   - CT repeat: with RML atelectasis and bronchiectasis; much milder in lingula   - seen by Dr Michael gray who is suspecting MAC. So BAL was performed on 9/3  - f up studies     Hx  DM   Hold oral meds;  check fs  Start insulin  regimen if  serum Glucose IF FS >180,   Adjust insulin  Lantus/Lispro,  for  Goal 140-180  Hold for Hypoglycemia     Hx CAD - c/w  ASA  Statin , f/u Lipids    seen on 09/04 52 y/o Female with PMH of HTN, HLD, mild CAD on cath 4/2023, DM2 dx in 2018, has been on insulin since 2022,  RA, latent TB treated (reported receiving 8 months of therapy), who had dyspnea and found to have RML opacification, was admitted for bronch BAL but her BP was high before and after the procedure.        Agree  with assessment  except for changes below.   Vital Signs Last 24 Hrs  T(C): 36.1 (03 Sep 2024 10:31), Max: 36.3 (03 Sep 2024 09:23)  T(F): 97 (03 Sep 2024 10:31), Max: 97.3 (03 Sep 2024 09:23)  HR: 87 (03 Sep 2024 17:30) (75 - 95)  BP: 155/74 (03 Sep 2024 17:30) (124/70 - 217/97)  BP(mean): --  RR: 18 (03 Sep 2024 17:30) (16 - 20)  SpO2: 95% (03 Sep 2024 17:30) (94% - 99%)    Parameters below as of 03 Sep 2024 17:30  Patient On (Oxygen Delivery Method): room air    PHYSICAL EXAM  GENERAL: NAD,  HEAD:  NCAT, EOMI, MM  NECK: Supple, Nontender  NERVOUS SYSTEM:  AAOx3, NFD  CHEST/LUNG: +bs b/l, No wheezing   HEART: +s1s2 RRR  ABDOMEN: soft, NT/ND  EXTREMITIES:  pp, no edema  SKIN: age related skin changes       IMPRESSION   Suspected  Hypertensive  Urgency/ Emergency   Vs  Medication Rxn   Associated with nausea  Vomiting   SBP is in the 170s now (dropped by approximately 25% from 210)  patient Not on Home Antihypertensive  medication   Agree  with target SBP is 160s first 24 hours   f/u ECHO, ECG, Repeat Trop, CTH   24 Tele Monitoring   Started on Nifedipine   PRN  Zofran   Correct electrolytes (Target Na = 135-145 | Mg = >2.2 | K = 3.5-5)     Hx of latent TB reportedly s/p Isoniazid for 8 months   Leukocytosis  likely related to BAL procedure   - complaining of dyspnea on exertion and shoulder pain on inspiration for the last several months.   - reports weight loss and fatigue throughout the day.   - Never smoker.   - CT A&P 04/2024 that showed a rt middle lobe atelectasis, RT middle lobe opacification noted since 2020.   - PFTs done and was normal.   - CT repeat: with RML atelectasis and bronchiectasis; much milder in lingula   - seen by Dr Michael gray who is suspecting MAC. So BAL was performed on 9/3  - f up studies     Hx  DM   Hold oral meds;  check fs  Start insulin  regimen if  serum Glucose IF FS >180,   Adjust insulin  Lantus/Lispro,  for  Goal 140-180  Hold for Hypoglycemia     Hx CAD - c/w  ASA  Statin , f/u Lipids    seen on 09/04

## 2024-09-03 NOTE — H&P ADULT - NSHPPHYSICALEXAM_GEN_ALL_CORE
LOS:     VITALS:   T(C): 36.1 (09-03-24 @ 10:31), Max: 36.3 (09-03-24 @ 09:23)  HR: 86 (09-03-24 @ 13:45) (75 - 95)  BP: 175/92 (09-03-24 @ 13:45) (124/70 - 217/97)  RR: 16 (09-03-24 @ 13:45) (16 - 20)  SpO2: 98% (09-03-24 @ 13:45) (94% - 99%)    GENERAL: NAD, lying in bed comfortably  HEAD:  Atraumatic, Normocephalic  EYES: EOMI, PERRLA, conjunctiva and sclera clear  ENT: Moist mucous membranes  NECK: Supple, No JVD  CHEST/LUNG: Clear to auscultation bilaterally; No rales, rhonchi, wheezing, or rubs. Unlabored respirations  HEART: Regular rate and rhythm; No murmurs, rubs, or gallops  ABDOMEN: BSx4; Soft, nontender, nondistended  EXTREMITIES:  2+ Peripheral Pulses, brisk capillary refill. No clubbing, cyanosis, or edema  NERVOUS SYSTEM:  A&Ox3, no focal deficits   SKIN: No rashes or lesions

## 2024-09-03 NOTE — H&P PST ADULT - LANGUAGE ASSISTANCE NEEDED
Detail Level: Zone Mask Type (Optional): calming Exfoliation Type: scrub Extraction Method: extractor No-Patient/Caregiver offered and refused free interpretation services. Treatment Type (Optional): European Facial Price (Use Numbers Only, No Special Characters Or $): 150.00

## 2024-09-04 ENCOUNTER — RESULT REVIEW (OUTPATIENT)
Age: 54
End: 2024-09-04

## 2024-09-04 LAB
A1C WITH ESTIMATED AVERAGE GLUCOSE RESULT: 10.2 % — HIGH (ref 4–5.6)
ALBUMIN SERPL ELPH-MCNC: 3.5 G/DL — SIGNIFICANT CHANGE UP (ref 3.5–5.2)
ALP SERPL-CCNC: 113 U/L — SIGNIFICANT CHANGE UP (ref 30–115)
ALT FLD-CCNC: 17 U/L — SIGNIFICANT CHANGE UP (ref 0–41)
ANION GAP SERPL CALC-SCNC: 12 MMOL/L — SIGNIFICANT CHANGE UP (ref 7–14)
APPEARANCE UR: CLEAR — SIGNIFICANT CHANGE UP
AST SERPL-CCNC: 19 U/L — SIGNIFICANT CHANGE UP (ref 0–41)
BASOPHILS # BLD AUTO: 0.03 K/UL — SIGNIFICANT CHANGE UP (ref 0–0.2)
BASOPHILS NFR BLD AUTO: 0.2 % — SIGNIFICANT CHANGE UP (ref 0–1)
BILIRUB SERPL-MCNC: 0.2 MG/DL — SIGNIFICANT CHANGE UP (ref 0.2–1.2)
BILIRUB UR-MCNC: NEGATIVE — SIGNIFICANT CHANGE UP
BUN SERPL-MCNC: 35 MG/DL — HIGH (ref 10–20)
CALCIUM SERPL-MCNC: 9.2 MG/DL — SIGNIFICANT CHANGE UP (ref 8.4–10.5)
CHLORIDE SERPL-SCNC: 102 MMOL/L — SIGNIFICANT CHANGE UP (ref 98–110)
CHOLEST SERPL-MCNC: 333 MG/DL — HIGH
CO2 SERPL-SCNC: 27 MMOL/L — SIGNIFICANT CHANGE UP (ref 17–32)
COLOR SPEC: YELLOW — SIGNIFICANT CHANGE UP
CREAT ?TM UR-MCNC: 43 MG/DL — SIGNIFICANT CHANGE UP
CREAT ?TM UR-MCNC: 46 MG/DL — SIGNIFICANT CHANGE UP
CREAT SERPL-MCNC: 1.5 MG/DL — SIGNIFICANT CHANGE UP (ref 0.7–1.5)
DIFF PNL FLD: NEGATIVE — SIGNIFICANT CHANGE UP
EGFR: 41 ML/MIN/1.73M2 — LOW
EOSINOPHIL # BLD AUTO: 0 K/UL — SIGNIFICANT CHANGE UP (ref 0–0.7)
EOSINOPHIL NFR BLD AUTO: 0 % — SIGNIFICANT CHANGE UP (ref 0–8)
ESTIMATED AVERAGE GLUCOSE: 246 MG/DL — HIGH (ref 68–114)
GLUCOSE BLDC GLUCOMTR-MCNC: 154 MG/DL — HIGH (ref 70–99)
GLUCOSE SERPL-MCNC: 200 MG/DL — HIGH (ref 70–99)
GLUCOSE UR QL: 250 MG/DL
GRAM STN FLD: ABNORMAL
HCT VFR BLD CALC: 31.3 % — LOW (ref 37–47)
HDLC SERPL-MCNC: 87 MG/DL — SIGNIFICANT CHANGE UP
HGB BLD-MCNC: 10.4 G/DL — LOW (ref 12–16)
IMM GRANULOCYTES NFR BLD AUTO: 0.3 % — SIGNIFICANT CHANGE UP (ref 0.1–0.3)
KETONES UR-MCNC: NEGATIVE MG/DL — SIGNIFICANT CHANGE UP
LEUKOCYTE ESTERASE UR-ACNC: NEGATIVE — SIGNIFICANT CHANGE UP
LIPID PNL WITH DIRECT LDL SERPL: 216 MG/DL — HIGH
LYMPHOCYTES # BLD AUTO: 1.83 K/UL — SIGNIFICANT CHANGE UP (ref 1.2–3.4)
LYMPHOCYTES # BLD AUTO: 14.8 % — LOW (ref 20.5–51.1)
MAGNESIUM SERPL-MCNC: 1.9 MG/DL — SIGNIFICANT CHANGE UP (ref 1.8–2.4)
MCHC RBC-ENTMCNC: 29.2 PG — SIGNIFICANT CHANGE UP (ref 27–31)
MCHC RBC-ENTMCNC: 33.2 G/DL — SIGNIFICANT CHANGE UP (ref 32–37)
MCV RBC AUTO: 87.9 FL — SIGNIFICANT CHANGE UP (ref 81–99)
MONOCYTES # BLD AUTO: 0.54 K/UL — SIGNIFICANT CHANGE UP (ref 0.1–0.6)
MONOCYTES NFR BLD AUTO: 4.4 % — SIGNIFICANT CHANGE UP (ref 1.7–9.3)
NEUTROPHILS # BLD AUTO: 9.95 K/UL — HIGH (ref 1.4–6.5)
NEUTROPHILS NFR BLD AUTO: 80.3 % — HIGH (ref 42.2–75.2)
NIGHT BLUE STAIN TISS: SIGNIFICANT CHANGE UP
NIGHT BLUE STAIN TISS: SIGNIFICANT CHANGE UP
NITRITE UR-MCNC: NEGATIVE — SIGNIFICANT CHANGE UP
NON HDL CHOLESTEROL: 246 MG/DL — HIGH
NRBC # BLD: 0 /100 WBCS — SIGNIFICANT CHANGE UP (ref 0–0)
PH UR: 8.5 (ref 5–8)
PLATELET # BLD AUTO: 237 K/UL — SIGNIFICANT CHANGE UP (ref 130–400)
PMV BLD: 10.3 FL — SIGNIFICANT CHANGE UP (ref 7.4–10.4)
POTASSIUM SERPL-MCNC: 4.6 MMOL/L — SIGNIFICANT CHANGE UP (ref 3.5–5)
POTASSIUM SERPL-SCNC: 4.6 MMOL/L — SIGNIFICANT CHANGE UP (ref 3.5–5)
PROT ?TM UR-MCNC: 459 MG/DLG/24H — SIGNIFICANT CHANGE UP
PROT SERPL-MCNC: 6.2 G/DL — SIGNIFICANT CHANGE UP (ref 6–8)
PROT UR-MCNC: 300 MG/DL
PROT/CREAT UR-RTO: 10 RATIO — HIGH (ref 0–0.2)
RBC # BLD: 3.56 M/UL — LOW (ref 4.2–5.4)
RBC # FLD: 12.4 % — SIGNIFICANT CHANGE UP (ref 11.5–14.5)
SODIUM SERPL-SCNC: 141 MMOL/L — SIGNIFICANT CHANGE UP (ref 135–146)
SODIUM UR-SCNC: 95 MMOL/L — SIGNIFICANT CHANGE UP
SP GR SPEC: 1.01 — SIGNIFICANT CHANGE UP (ref 1–1.03)
SPECIMEN SOURCE: SIGNIFICANT CHANGE UP
TRIGL SERPL-MCNC: 151 MG/DL — HIGH
TSH SERPL-MCNC: 0.48 UIU/ML — SIGNIFICANT CHANGE UP (ref 0.27–4.2)
UROBILINOGEN FLD QL: 0.2 MG/DL — SIGNIFICANT CHANGE UP (ref 0.2–1)
WBC # BLD: 12.39 K/UL — HIGH (ref 4.8–10.8)
WBC # FLD AUTO: 12.39 K/UL — HIGH (ref 4.8–10.8)

## 2024-09-04 PROCEDURE — 93306 TTE W/DOPPLER COMPLETE: CPT | Mod: 26

## 2024-09-04 PROCEDURE — 93010 ELECTROCARDIOGRAM REPORT: CPT

## 2024-09-04 PROCEDURE — 99232 SBSQ HOSP IP/OBS MODERATE 35: CPT

## 2024-09-04 RX ORDER — INSULIN GLARGINE 100 [IU]/ML
12 INJECTION, SOLUTION SUBCUTANEOUS AT BEDTIME
Refills: 0 | Status: DISCONTINUED | OUTPATIENT
Start: 2024-09-04 | End: 2024-09-06

## 2024-09-04 RX ADMIN — Medication 5000 UNIT(S): at 07:07

## 2024-09-04 RX ADMIN — Medication 2: at 06:31

## 2024-09-04 RX ADMIN — Medication 81 MILLIGRAM(S): at 12:22

## 2024-09-04 RX ADMIN — Medication 100 MILLILITER(S): at 06:50

## 2024-09-04 RX ADMIN — INSULIN GLARGINE 12 UNIT(S): 100 INJECTION, SOLUTION SUBCUTANEOUS at 22:09

## 2024-09-04 RX ADMIN — Medication 500 MILLILITER(S): at 06:21

## 2024-09-04 RX ADMIN — Medication 5000 UNIT(S): at 21:23

## 2024-09-04 RX ADMIN — Medication 40 MILLIGRAM(S): at 21:23

## 2024-09-04 RX ADMIN — Medication 4: at 12:23

## 2024-09-04 RX ADMIN — BUDESONIDE AND FORMOTEROL FUMARATE 2 PUFF(S): 80; 4.5 AEROSOL, METERED RESPIRATORY (INHALATION) at 07:07

## 2024-09-04 RX ADMIN — Medication 50 MILLILITER(S): at 12:10

## 2024-09-04 NOTE — PROGRESS NOTE ADULT - SUBJECTIVE AND OBJECTIVE BOX
24H events:    Patient is a 53y old Female who presents with a chief complaint of Bronchoscopy and BAL (03 Sep 2024 09:19)    Primary diagnosis of   Today is hospital day 1d. This morning patient was seen and examined at bedside, resting comfortably in bed.    No acute or major events overnight. Denies CP, SOB, cough, focal neurological deficits    Code Status: Full    PAST MEDICAL & SURGICAL HISTORY  Diabetes    Neuropathy    Hypertension    Hypercholesteremia    H/O  section      SOCIAL HISTORY:  Social History:      ALLERGIES:  No Known Allergies    MEDICATIONS:  STANDING MEDICATIONS  aspirin enteric coated 81 milliGRAM(s) Oral daily  atorvastatin 40 milliGRAM(s) Oral at bedtime  budesonide  80 MICROgram(s)/formoterol 4.5 MICROgram(s) Inhaler 2 Puff(s) Inhalation two times a day  dextrose 5%. 1000 milliLiter(s) IV Continuous <Continuous>  dextrose 5%. 1000 milliLiter(s) IV Continuous <Continuous>  dextrose 50% Injectable 25 Gram(s) IV Push once  dextrose 50% Injectable 12.5 Gram(s) IV Push once  dextrose 50% Injectable 25 Gram(s) IV Push once  glucagon  Injectable 1 milliGRAM(s) IntraMuscular once  heparin   Injectable 5000 Unit(s) SubCutaneous every 12 hours  insulin glargine Injectable (LANTUS) 10 Unit(s) SubCutaneous at bedtime  insulin lispro (ADMELOG) corrective regimen sliding scale   SubCutaneous three times a day before meals  lactated ringers. 1000 milliLiter(s) IV Continuous <Continuous>    PRN MEDICATIONS  dextrose Oral Gel 15 Gram(s) Oral once PRN    VITALS:   T(F): 97.8  HR: 91  BP: 96/51  RR: 16  SpO2: 97%    PHYSICAL EXAM:  GENERAL:   ( x ) NAD, lying in bed comfortably     (  ) obtunded     (  ) lethargic     (  ) somnolent    HEAD:   ( x ) Atraumatic     (  ) hematoma     (  ) laceration (specify location:       )     NECK:  ( x ) Supple     (  ) neck stiffness     (  ) nuchal rigidity     (  )  no JVD     (  ) JVD present ( -- cm)    HEART:  Rate -->     (  x) normal rate     (  ) bradycardic     (  ) tachycardic  Rhythm -->     ( x ) regular     (  ) regularly irregular     (  ) irregularly irregular  Murmurs -->     (x  ) normal s1s2     ( x ) AS systolic murmur irradiating to bilat carotids    (  ) diastolic murmur     (  ) continuous murmur      (  ) S3 present     (  ) S4 present    LUNGS:   ( x )Unlabored respirations     (  ) tachypnea  ( x ) B/L air entry     (  ) decreased breath sounds in:  (location     )    ( x ) no adventitious sound     (  ) crackles     (  ) wheezing      (  ) rhonchi      (specify location:       )  (  ) chest wall tenderness (specify location:       )    ABDOMEN:   ( x ) Soft     (  ) tense   |   ( x ) nondistended     (  ) distended   |   ( x ) +BS     (  ) hypoactive bowel sounds     (  ) hyperactive bowel sounds  ( x ) nontender     (  ) RUQ tenderness     (  ) RLQ tenderness     (  ) LLQ tenderness     (  ) epigastric tenderness     (  ) diffuse tenderness  (  ) Splenomegaly      (  ) Hepatomegaly      (  ) Jaundice     (  ) ecchymosis     EXTREMITIES:  ( x ) Normal     (  ) Rash     (  ) ecchymosis     (  ) varicose veins      (  ) pitting edema     (  ) non-pitting edema   (  ) ulceration     (  ) gangrene:     (location:     )    NERVOUS SYSTEM:    ( x ) A&Ox3     (  ) confused     (  ) lethargic  CN II-XII:     ( x ) Intact     (  ) deficits found     (Specify:     )   Upper extremities:     (x  ) no sensorimotor deficits     (  ) weakness     (  ) loss of proprioception/vibration     (  ) loss of touch/temperature (specify:    )  Lower extremities:     ( x ) no sensorimotor deficits     (  ) weakness     (  ) loss of proprioception/vibration     (  ) loss of touch/temperature (specify:    )    SKIN:   ( x ) No rashes or lesions     (  ) maculopapular rash     (  ) pustules     (  ) vesicles     (  ) ulcer     (  ) ecchymosis     (specify location:     )    LABS:                        10.4   12.39 )-----------( 237      ( 04 Sep 2024 04:12 )             31.3     09-04    141  |  102  |  35<H>  ----------------------------<  200<H>  4.6   |  27  |  1.5    Ca    9.2      04 Sep 2024 04:12  Mg     1.9     -    TPro  6.2  /  Alb  3.5  /  TBili  0.2  /  DBili  x   /  AST  19  /  ALT  17  /  AlkPhos  113  09-04      Urinalysis Basic - ( 04 Sep 2024 04:12 )    Color: x / Appearance: x / SG: x / pH: x  Gluc: 200 mg/dL / Ketone: x  / Bili: x / Urobili: x   Blood: x / Protein: x / Nitrite: x   Leuk Esterase: x / RBC: x / WBC x   Sq Epi: x / Non Sq Epi: x / Bacteria: x            Culture - Acid Fast - Bronchial w/Smear (collected 03 Sep 2024 12:20)  Source: Bronchial None    Culture - Fungal, Bronchial (collected 03 Sep 2024 12:20)  Source: Bronchial None  Preliminary Report (04 Sep 2024 08:03):    Testing in progress    Culture - Bronchial (collected 03 Sep 2024 12:20)  Source: Bronchial None  Gram Stain (03 Sep 2024 23:37):    Moderate polymorphonuclear leukocytes per low power field    No squamous epithelial cells per low power field    No organisms seen per oil power field    Culture - Acid Fast - Bronchial w/Smear (collected 03 Sep 2024 12:15)  Source: Bronchial None    Culture - Bronchial (collected 03 Sep 2024 12:15)  Source: Bronchial None  Gram Stain (04 Sep 2024 07:14):    Rare polymorphonuclear leukocytes seen per low power field    No squamous epithelial cells seen per low power field    Few Gram positive cocci in pairs seen per oil power field    Rare Gram Positive Rods seen per oil power field    Culture - Fungal, Bronchial (collected 03 Sep 2024 12:15)  Source: Bronchial None  Preliminary Report (04 Sep 2024 08:38):    Testing in progress          RADIOLOGY:

## 2024-09-04 NOTE — CONSULT NOTE ADULT - ASSESSMENT
54 y/o Female with PMH of HTN, HLD, mild CAD on cath 4/2023, DM2 dx in 2018, has been on insulin since 2022,  RA, latent TB treated (reported receiving 8 months of therapy), who had dyspnea and found to have RML opacification, was admitted for bronch BAL but her BP was high before and after the procedure.     Hypertensive  Urgency/ Emergency     VHD/Severe AS & moderate AR    Hx of latent TB reportedly s/p Isoniazid for 8 months   S/P BAL procedure (Right middle lobe atelectasis/opacification since 2020 possible MAC)    NICOLASA (Creatinine 1.5) off ACEIs  DM  CAD      Continue current care as per medicine and Pulmonary   DVT/GI prophylaxis   Serial Creatinine/Encourage PO fluids   Keep patient off ACEIs until creatinine to baseline then restart and recheck creatinine early after discharge   I Creatinine remains 1.5 then D/C home and will restart her ACEI as out patient once creatinine is back to baseline   SPRING as out patient to better assess her aortic valve   Will F/U

## 2024-09-04 NOTE — PROGRESS NOTE ADULT - ASSESSMENT
53 y o female with pmh of HTN, HLD, mild CAD on cath 4/2023, DM2 dx in 2018, has been on insulin since 2022, h/o RA, latent TB treated (reported receiving 8 months of therapy), who had dyspnea and found to have RML opacification, was admitted for bronch BAL but her BP was high before and after the procedure.     #Hypertension urgency - resolved   - BP pre/postprocedure 210/100  - s/p iv labetalol 10 mg iv once and hydralazine 10 mg IV once.   - Home lisinopril 10mg od but not compliant  - EKG Nl, CTH Nl, Trop 14-18   - Started on nifedipine 60 mg XR daily, labetalol 100 mg BID and hydralazine 25 mg TID --> BP dropped 90/50 OVN  - Crea 0.9 --> 1.5 (NICOLASA)  - Holding all BP meds, target -150  - C/w IV LR 50cc/h for 24h  - If crea improves tmw, resume home lisinopril and dc home    #NICOLASA likely 2/2 prerenal 2/2 dehydration vs acute drop in BP  - Crea 0.9 --> 1.5  - C/w IV LR 50cc/h for 24h  - FU urine lytes/ UA  - Holding BP meds for now  - Monitor BMP tmw    #Severe AS  - TTE done , severe AS  - Cardiology saw the patient bedside    #Hx of latent TB reportedly s/p Isoniazid for 8 months   - complaining of dyspnea on exertion and shoulder pain on inspiration for the last several months, weight loss and fatigue   - Never smoker  - CT A&P 04/2024 that showed a rt middle lobe atelectasis, RT middle lobe opacification noted since 2020.   - PFTs done and was normal.   - CT repeat: with RML atelectasis and bronchiectasis; much milder in lingula   - seen by Dr Michael gray who is suspecting MAC. So BAL was performed on 9/3: FU results    #DM2  - -210  - Will make lantus 12u SQ od for now + ISS    #CAD  - mild CAD on cath 4/2023  - resume aspirin and statin   - , Trigly 151    DVT ppx: heparin SQ  GI ppx: not indicated   AAT  Diet: DASH, diabetic  Dispo: telemetry, DC tmw if possible

## 2024-09-04 NOTE — PROGRESS NOTE ADULT - SUBJECTIVE AND OBJECTIVE BOX
Patient is a 53y old  Female who presents with a chief complaint of Bronchoscopy and BAL (24)      Pt seen and examined at bedside. No CP or SOB.          PAST MEDICAL & SURGICAL HISTORY:  Diabetes    Neuropathy    Hypertension    Hypercholesteremia    H/O  section        VITAL SIGNS (Last 24 hrs):  T(C): 36.6 (24 @ 08:54), Max: 37 (24 @ 00:00)  HR: 91 (24 @ 08:54) (80 - 97)  BP: 96/51 (24 @ 08:54) (85/52 - 215/102)  RR: 16 (24 @ 08:54) (12 - 23)  SpO2: 97% (24 @ 08:00) (94% - 99%)  Wt(kg): --  Daily Height in cm: 162.56 (04 Sep 2024 08:54)    Daily     I&O's Summary    03 Sep 2024 07:01  -  04 Sep 2024 07:00  --------------------------------------------------------  IN: 100 mL / OUT: 400 mL / NET: -300 mL    04 Sep 2024 07:01  -  04 Sep 2024 11:50  --------------------------------------------------------  IN: 100 mL / OUT: 0 mL / NET: 100 mL        PHYSICAL EXAM:  GENERAL: NAD, well-developed  HEAD:  Atraumatic, Normocephalic  EYES: EOMI, PERRLA, conjunctiva and sclera clear  NECK: Supple, No JVD  CHEST/LUNG: Clear to auscultation bilaterally; No wheeze  HEART: Regular rate and rhythm; No murmurs, rubs, or gallops  ABDOMEN: Soft, Nontender, Nondistended; Bowel sounds present  EXTREMITIES:  2+ Peripheral Pulses, No clubbing, cyanosis, or edema  PSYCH: AAOx3  NEUROLOGY: non-focal  SKIN: No rashes or lesions    Labs Reviewed  Spoke to patient in regards to abnormal labs.    CBC Full  -  ( 04 Sep 2024 04:12 )  WBC Count : 12.39 K/uL  Hemoglobin : 10.4 g/dL  Hematocrit : 31.3 %  Platelet Count - Automated : 237 K/uL  Mean Cell Volume : 87.9 fL  Mean Cell Hemoglobin : 29.2 pg  Mean Cell Hemoglobin Concentration : 33.2 g/dL  Auto Neutrophil # : 9.95 K/uL  Auto Lymphocyte # : 1.83 K/uL  Auto Monocyte # : 0.54 K/uL  Auto Eosinophil # : 0.00 K/uL  Auto Basophil # : 0.03 K/uL  Auto Neutrophil % : 80.3 %  Auto Lymphocyte % : 14.8 %  Auto Monocyte % : 4.4 %  Auto Eosinophil % : 0.0 %  Auto Basophil % : 0.2 %    BMP:     @ 04:12    Blood Urea Nitrogen - 35  Calcium - 9.2  Carbond Dioxide - 27  Chloride - 102  Creatinine - 1.5  Glucose - 200  Potassium - 4.6  Sodium - 141      Hemoglobin A1c -     Urine Culture:   @ 12:20 Urine culture: --    Culture Results:   Testing in progress  Method Type: --  Organism: --  Organism Identification: --  Specimen Source: Bronchial None   @ 12:15 Urine culture: --    Culture Results:   Testing in progress  Method Type: --  Organism: --  Organism Identification: --  Specimen Source: Bronchial None        COVID Labs  CRP:      D-Dimer:            Imaging reviewed independently and reviewed read        MEDICATIONS  (STANDING):  aspirin enteric coated 81 milliGRAM(s) Oral daily  atorvastatin 40 milliGRAM(s) Oral at bedtime  budesonide  80 MICROgram(s)/formoterol 4.5 MICROgram(s) Inhaler 2 Puff(s) Inhalation two times a day  dextrose 5%. 1000 milliLiter(s) (50 mL/Hr) IV Continuous <Continuous>  dextrose 5%. 1000 milliLiter(s) (100 mL/Hr) IV Continuous <Continuous>  dextrose 50% Injectable 25 Gram(s) IV Push once  dextrose 50% Injectable 12.5 Gram(s) IV Push once  dextrose 50% Injectable 25 Gram(s) IV Push once  glucagon  Injectable 1 milliGRAM(s) IntraMuscular once  heparin   Injectable 5000 Unit(s) SubCutaneous every 12 hours  insulin glargine Injectable (LANTUS) 10 Unit(s) SubCutaneous at bedtime  insulin lispro (ADMELOG) corrective regimen sliding scale   SubCutaneous three times a day before meals  lactated ringers. 1000 milliLiter(s) (100 mL/Hr) IV Continuous <Continuous>    MEDICATIONS  (PRN):  dextrose Oral Gel 15 Gram(s) Oral once PRN Blood Glucose LESS THAN 70 milliGRAM(s)/deciliter       Patient is a 53y old  Female who presents with a chief complaint of Bronchoscopy and BAL (24)      Pt seen and examined at bedside. No CP or SOB. pt is feeling better this morning           PAST MEDICAL & SURGICAL HISTORY:  Diabetes    Neuropathy    Hypertension    Hypercholesteremia    H/O  section        VITAL SIGNS (Last 24 hrs):  T(C): 36.6 (24 @ 08:54), Max: 37 (24 @ 00:00)  HR: 91 (24 @ 08:54) (80 - 97)  BP: 96/51 (24 @ 08:54) (85/52 - 215/102)  RR: 16 (24 @ 08:54) (12 - 23)  SpO2: 97% (24 @ 08:00) (94% - 99%)  Wt(kg): --  Daily Height in cm: 162.56 (04 Sep 2024 08:54)    Daily     I&O's Summary    03 Sep 2024 07:  -  04 Sep 2024 07:00  --------------------------------------------------------  IN: 100 mL / OUT: 400 mL / NET: -300 mL    04 Sep 2024 07:01  -  04 Sep 2024 11:50  --------------------------------------------------------  IN: 100 mL / OUT: 0 mL / NET: 100 mL        PHYSICAL EXAM:  GENERAL: NAD, well-developed  HEAD:  Atraumatic, Normocephalic  EYES: EOMI, PERRLA, conjunctiva and sclera clear  NECK: Supple, No JVD  CHEST/LUNG: Clear to auscultation bilaterally; No wheeze  HEART: Regular rate and rhythm; No murmurs, rubs, or gallops  ABDOMEN: Soft, Nontender, Nondistended; Bowel sounds present  EXTREMITIES:  2+ Peripheral Pulses, No clubbing, cyanosis, or edema  PSYCH: AAOx3  NEUROLOGY: non-focal  SKIN: No rashes or lesions    Labs Reviewed  Spoke to patient in regards to abnormal labs.    CBC Full  -  ( 04 Sep 2024 04:12 )  WBC Count : 12.39 K/uL  Hemoglobin : 10.4 g/dL  Hematocrit : 31.3 %  Platelet Count - Automated : 237 K/uL  Mean Cell Volume : 87.9 fL  Mean Cell Hemoglobin : 29.2 pg  Mean Cell Hemoglobin Concentration : 33.2 g/dL  Auto Neutrophil # : 9.95 K/uL  Auto Lymphocyte # : 1.83 K/uL  Auto Monocyte # : 0.54 K/uL  Auto Eosinophil # : 0.00 K/uL  Auto Basophil # : 0.03 K/uL  Auto Neutrophil % : 80.3 %  Auto Lymphocyte % : 14.8 %  Auto Monocyte % : 4.4 %  Auto Eosinophil % : 0.0 %  Auto Basophil % : 0.2 %    BMP:     @ 04:12    Blood Urea Nitrogen - 35  Calcium - 9.2  Carbond Dioxide - 27  Chloride - 102  Creatinine - 1.5  Glucose - 200  Potassium - 4.6  Sodium - 141      Hemoglobin A1c -     Urine Culture:   @ 12:20 Urine culture: --    Culture Results:   Testing in progress  Method Type: --  Organism: --  Organism Identification: --  Specimen Source: Bronchial None   @ 12:15 Urine culture: --    Culture Results:   Testing in progress  Method Type: --  Organism: --  Organism Identification: --  Specimen Source: Bronchial None        COVID Labs  CRP:      D-Dimer:            Imaging reviewed independently and reviewed read        MEDICATIONS  (STANDING):  aspirin enteric coated 81 milliGRAM(s) Oral daily  atorvastatin 40 milliGRAM(s) Oral at bedtime  budesonide  80 MICROgram(s)/formoterol 4.5 MICROgram(s) Inhaler 2 Puff(s) Inhalation two times a day  dextrose 5%. 1000 milliLiter(s) (50 mL/Hr) IV Continuous <Continuous>  dextrose 5%. 1000 milliLiter(s) (100 mL/Hr) IV Continuous <Continuous>  dextrose 50% Injectable 25 Gram(s) IV Push once  dextrose 50% Injectable 12.5 Gram(s) IV Push once  dextrose 50% Injectable 25 Gram(s) IV Push once  glucagon  Injectable 1 milliGRAM(s) IntraMuscular once  heparin   Injectable 5000 Unit(s) SubCutaneous every 12 hours  insulin glargine Injectable (LANTUS) 10 Unit(s) SubCutaneous at bedtime  insulin lispro (ADMELOG) corrective regimen sliding scale   SubCutaneous three times a day before meals  lactated ringers. 1000 milliLiter(s) (100 mL/Hr) IV Continuous <Continuous>    MEDICATIONS  (PRN):  dextrose Oral Gel 15 Gram(s) Oral once PRN Blood Glucose LESS THAN 70 milliGRAM(s)/deciliter

## 2024-09-04 NOTE — PROGRESS NOTE ADULT - ASSESSMENT
52 y/o Female with PMH of HTN, HLD, mild CAD on cath 4/2023, DM2 dx in 2018, has been on insulin since 2022,  RA, latent TB treated (reported receiving 8 months of therapy), who had dyspnea and found to have RML opacification, was admitted for bronch BAL but her BP was high before and after the procedure.     Suspected  Hypertensive  Urgency/ Emergency   Vs  Medication Rxn   Associated with nausea  Vomiting  -SBP is in the 170s 9/3 to low 100s systolic 9/4 (dropped by approximately 25% from 210)  patient Not on Home Antihypertensive  medication   Agree  with target SBP is 160s first 24 hours   f/u ECHO, ECG, Repeat Trop, CTH   24 Tele Monitoring   Started on Nifedipine   PRN  Zofran   Correct electrolytes    No acute intracranial abnormality. on CTH  EKG with no acute ischemic changes - some evidence of LVH  echo    1. Normal global left ventricular systolic function.   2. LV Ejection Fraction by Montes De Oca's Method with a biplane EF of 64 %.   3. Spectral Doppler shows impaired relaxation pattern of left   ventricular myocardial filling (Grade I diastolic dysfunction).   4. Normal right ventricular size and function.   5. Severe aortic valve stenosis (VMax 3.8 m/s, PG/MG 58/35 mmHg, HIRAM   0.73 cm2). Possible bicuspid aortic valve.   6. Moderate aortic regurgitation.   7. Mild tricuspid regurgitation.   8. Normal pulmonary artery pressure.  trops __    severe aortic stenosis   -no evidence of heart failure  -EF above 50%   -monitor outpatient      NICOLASA  -Cr bum from 0.9 to 1.5 overnight  -monitor BMP  -start fluids     Hx  DM   Hold oral meds;  check fs  -Start insulin  regimen if  serum Glucose IF FS >180,   -Adjust insulin  Lantus/Lispro,  for  Goal 140-180  -Hold for Hypoglycemia     Hx CAD -  - c/w  ASA  Statin , f/u Lipids    dvt ppx  dispo planning - hold pt for 1 more day as pt has Cr elevation, if downtrending,     I personally reviewed labs and imaging and ordered necessary testing/medications  I discussed care of the patient with licensed providers  I personally reviewed chart and consultant recommendations  Pt has complex medical issues that require extensive diagnosis and intervention / threat to life  I personally spent 50 minutes in care of patient.  54 y/o Female with PMH of HTN, HLD, mild CAD on cath 4/2023, DM2 dx in 2018, has been on insulin since 2022,  RA, latent TB treated (reported receiving 8 months of therapy), who had dyspnea and found to have RML opacification, was admitted for bronch BAL but her BP was high before and after the procedure.     Suspected  Hypertensive  Urgency/ Emergency   Vs  Medication Rxn   Associated with nausea  Vomiting  -SBP is in the 170s 9/3 to low 100s systolic 9/4 (dropped by approximately 25% from 210)  patient Not on Home Antihypertensive medication but was prescribed lisinopril outpatient  Agree with target SBP is 160s first 24 hours   24 Tele Monitoring   Started on Nifedipine and labetalol but BP improved   as of 9/4 holding antihypertensives at this point   PRN  Zofran   Correct electrolytes    No acute intracranial abnormality. on CTH  EKG with no acute ischemic changes - some evidence of LVH  echo    1. Normal global left ventricular systolic function.   2. LV Ejection Fraction by Montes De Oca's Method with a biplane EF of 64 %.   3. Spectral Doppler shows impaired relaxation pattern of left   ventricular myocardial filling (Grade I diastolic dysfunction).   4. Normal right ventricular size and function.   5. Severe aortic valve stenosis (VMax 3.8 m/s, PG/MG 58/35 mmHg, HIRAM   0.73 cm2). Possible bicuspid aortic valve.   6. Moderate aortic regurgitation.   7. Mild tricuspid regurgitation.   8. Normal pulmonary artery pressure.  trops 14 -> 18    severe aortic stenosis   -no evidence of heart failure  -EF above 50%   -cardio consult for AS     Hx of latent TB reportedly s/p Isoniazid for 8 months   Leukocytosis  likely related to BAL procedure   - complaining of dyspnea on exertion and shoulder pain on inspiration for the last several months.   - reports weight loss and fatigue throughout the day.   - Never smoker.   - CT A&P 04/2024 that showed a rt middle lobe atelectasis, RT middle lobe opacification noted since 2020.   - PFTs done and was normal.   - CT repeat: with RML atelectasis and bronchiectasis; much milder in lingula   - seen by Dr Michael gray who is suspecting MAC. So BAL was performed on 9/3  - f up studies     NICOLASA  -Cr bump from 0.9 to 1.5 overnight  -monitor BMP  -start fluids     Hx  DM   Hold oral meds;  check fs  -Start insulin  regimen if  serum Glucose IF FS >180,   -Adjust insulin  Lantus/Lispro,  for  Goal 140-180  -Hold for Hypoglycemia     Hx CAD -  - c/w  ASA  Statin , f/u Lipids    dvt ppx - heparin sub q  dispo planning - hold pt for 1 more day as pt has Cr elevation, if downtrending, pt can be discharged     I personally reviewed labs and imaging (Head CT) and ordered necessary testing/medications  I discussed care of the patient with licensed providers  I personally reviewed chart and consultant recommendations  Pt has complex medical issues that require extensive diagnosis and intervention / threat to life  I personally spent 50 minutes in care of patient.  54 y/o Female with PMH of HTN, HLD, mild CAD on cath 4/2023, DM2 dx in 2018, has been on insulin since 2022,  RA, latent TB treated (reported receiving 8 months of therapy), who had dyspnea and found to have RML opacification, was admitted for bronch BAL but her BP was high before and after the procedure.     Suspected  Hypertensive  Urgency/ Emergency   Vs  Medication Rxn   Associated with nausea  Vomiting  -SBP is in the 170s 9/3 to low 100s systolic 9/4 (dropped by approximately 25% from 210)  patient Not on Home Antihypertensive medication but was prescribed lisinopril outpatient  Agree with target SBP is 160s first 24 hours   24 Tele Monitoring   CTH No acute intracranial abnormality. on CTH  EKG with no acute ischemic changes - some evidence of LVH  echo    1. Normal global left ventricular systolic function.   2. LV Ejection Fraction by Montes De Oca's Method with a biplane EF of 64 %.   3. Spectral Doppler shows impaired relaxation pattern of left   ventricular myocardial filling (Grade I diastolic dysfunction).   4. Normal right ventricular size and function.   5. Severe aortic valve stenosis (VMax 3.8 m/s, PG/MG 58/35 mmHg, HIRAM   0.73 cm2). Possible bicuspid aortic valve.   6. Moderate aortic regurgitation.   7. Mild tricuspid regurgitation.   8. Normal pulmonary artery pressure.  trops 14 -> 18    Started on Nifedipine and labetalol but BP improved   as of 9/4 holding antihypertensives at this point - monitor BP for reintroduction of lisinopril pending Cr trend  PRN  Zofran   Correct electrolytes      severe aortic stenosis   -no evidence of heart failure  -EF above 50%   -cardio consult for AS     Hx of latent TB reportedly s/p Isoniazid for 8 months   Leukocytosis  likely related to BAL procedure   - complaining of dyspnea on exertion and shoulder pain on inspiration for the last several months.   - reports weight loss and fatigue throughout the day.   - Never smoker.   - CT A&P 04/2024 that showed a rt middle lobe atelectasis, RT middle lobe opacification noted since 2020.   - PFTs done and was normal.   - CT repeat: with RML atelectasis and bronchiectasis; much milder in lingula   - seen by Dr Michael gray who is suspecting MAC. So BAL was performed on 9/3  - f up studies     NICOLASA  -Cr bump from 0.9 to 1.5 overnight  -monitor BMP  -start fluids     Hx  DM   Hold oral meds;  check fs  -Start insulin  regimen if  serum Glucose IF FS >180,   -Adjust insulin  Lantus/Lispro,  for  Goal 140-180  -Hold for Hypoglycemia     Hx CAD -  - c/w  ASA  Statin , f/u Lipids    dvt ppx - heparin sub q  dispo planning - hold pt for 1 more day as pt has Cr elevation, if downtrending, pt can be discharged     I personally reviewed labs and imaging (Head CT) and ordered necessary testing/medications  I discussed care of the patient with licensed providers  I personally reviewed chart and consultant recommendations  Pt has complex medical issues that require extensive diagnosis and intervention / threat to life  I personally spent 50 minutes in care of patient.

## 2024-09-04 NOTE — PATIENT PROFILE ADULT - FALL HARM RISK - HARM RISK INTERVENTIONS

## 2024-09-04 NOTE — CONSULT NOTE ADULT - SUBJECTIVE AND OBJECTIVE BOX
Patient is a 53y old  Female who presents with a chief complaint of Bronchoscopy and BAL (03 Sep 2024 09:19)      HPI:  53 y o female with pmh of HTN, HLD, mild CAD on cath 2023, DM2 dx in , has been on insulin since , h/o RA, latent TB treated (reported receiving 8 months of therapy), who had dyspnea and found to have RML opacification, was admitted for bronch BAL but her BP was high before and after the procedure.   Prior to procedure it was 210; procedure was performed; after the procedure it remained 217/97. Was given hydralazine IV with slight improvement; then labetalol 10 mg IV given with improvement in BP to 170s.   Patient reports being dizzy.   No chest pain, no SOB.   Reports that usually on exertion she experiences SOB   Patient reports that she checks her BP few times per week and it is usually less than 130.   Patient was discharged last year on lisinopril 10, simvastatin 40 mg and aspirin however she reports that she is not taking any oral meds (she's only on the insulin)       As for the indication for the bronchoscopy: Patient was recently treated for latent TB and has finished her treatment several weeks ago. then she was complaining of dyspnea on exertion and shoulder pain on inspiration for the last several months. She also reports weight loss and fatigue throughout the day. Never smoker. Had a CT A&P 2024 that showed a rt middle lobe atelectasis, RT middle lobe opacification noted since . PFTs done and are normal. CT with RML atelectasis and bronchiectasis; much milder in lingula   high suspicion for MAC. so BAL was indicated      no blood work or imaging yet. (03 Sep 2024 13:08)      PAST MEDICAL & SURGICAL HISTORY:  Diabetes      Neuropathy      Hypertension      Hypercholesteremia      H/O  section          PREVIOUS DIAGNOSTIC TESTING:      ECHO  FINDINGS:    STRESS  FINDINGS:    CATHETERIZATION  FINDINGS:    MEDICATIONS  (STANDING):  aspirin enteric coated 81 milliGRAM(s) Oral daily  atorvastatin 40 milliGRAM(s) Oral at bedtime  budesonide  80 MICROgram(s)/formoterol 4.5 MICROgram(s) Inhaler 2 Puff(s) Inhalation two times a day  dextrose 5%. 1000 milliLiter(s) (100 mL/Hr) IV Continuous <Continuous>  dextrose 5%. 1000 milliLiter(s) (50 mL/Hr) IV Continuous <Continuous>  dextrose 50% Injectable 25 Gram(s) IV Push once  dextrose 50% Injectable 12.5 Gram(s) IV Push once  dextrose 50% Injectable 25 Gram(s) IV Push once  glucagon  Injectable 1 milliGRAM(s) IntraMuscular once  heparin   Injectable 5000 Unit(s) SubCutaneous every 12 hours  insulin glargine Injectable (LANTUS) 12 Unit(s) SubCutaneous at bedtime  insulin lispro (ADMELOG) corrective regimen sliding scale   SubCutaneous three times a day before meals  lactated ringers. 1000 milliLiter(s) (50 mL/Hr) IV Continuous <Continuous>    MEDICATIONS  (PRN):  dextrose Oral Gel 15 Gram(s) Oral once PRN Blood Glucose LESS THAN 70 milliGRAM(s)/deciliter      FAMILY HISTORY:  No pertinent family history in first degree relatives        SOCIAL HISTORY:  CIGARETTES:    ALCOHOL:    REVIEW OF SYSTEMS:  CONSTITUTIONAL: No fever, weight loss, or fatigue  NECK: No pain or stiffness  RESPIRATORY: No cough, wheezing, chills or hemoptysis; No shortness of breath  CARDIOVASCULAR: No chest pain, palpitations, dizziness, or leg swelling  GASTROINTESTINAL: No abdominal or epigastric pain. No nausea, vomiting, or hematemesis; No diarrhea or constipation. No melena or hematochezia.  GENITOURINARY: No dysuria, frequency, hematuria, or incontinence  NEUROLOGICAL: No headaches, memory loss, loss of strength, numbness, or tremors  SKIN: No itching, burning, rashes, or lesions   ENDOCRINE: No heat or cold intolerance; No hair loss  MUSCULOSKELETAL: No joint pain or swelling; No muscle, back, or extremity pain  HEME/LYMPH: No easy bruising, or bleeding gums          Vital Signs Last 24 Hrs  T(C): 37.1 (04 Sep 2024 19:30), Max: 37.1 (04 Sep 2024 12:26)  T(F): 98.7 (04 Sep 2024 19:30), Max: 98.7 (04 Sep 2024 12:26)  HR: 91 (04 Sep 2024 19:30) (80 - 97)  BP: 126/69 (04 Sep 2024 19:30) (85/52 - 138/68)  BP(mean): 88 (04 Sep 2024 19:30) (62 - 97)  RR: 16 (04 Sep 2024 12:26) (12 - 23)  SpO2: 96% (04 Sep 2024 12:26) (95% - 97%)    Parameters below as of 04 Sep 2024 08:00  Patient On (Oxygen Delivery Method): room air            PHYSICAL EXAM:  GENERAL: NAD, well-groomed, well-developed  HEAD:  Atraumatic, Normocephalic  NECK: Supple, No JVD, Normal thyroid  NERVOUS SYSTEM:  Alert & Oriented X3, Good concentration  CHEST/LUNG: Clear to percussion bilaterally; No rales, rhonchi, wheezing, or rubs  HEART: Regular rate and rhythm; No murmurs, rubs, or gallops  ABDOMEN: Soft, Nontender, Nondistended; Bowel sounds present  EXTREMITIES:  2+ Peripheral Pulses, No clubbing, cyanosis, or edema  SKIN: No rashes or lesions    INTERPRETATION OF TELEMETRY:    ECG:    I&O's Detail    03 Sep 2024 07:01  -  04 Sep 2024 07:00  --------------------------------------------------------  IN:    Lactated Ringers: 100 mL  Total IN: 100 mL    OUT:    Voided (mL): 400 mL  Total OUT: 400 mL    Total NET: -300 mL      04 Sep 2024 07:01  -  04 Sep 2024 20:05  --------------------------------------------------------  IN:    Lactated Ringers: 100 mL    Oral Fluid: 325 mL  Total IN: 425 mL    OUT:    Voided (mL): 250 mL  Total OUT: 250 mL    Total NET: 175 mL          LABS:                        10.4   12.39 )-----------( 237      ( 04 Sep 2024 04:12 )             31.3     09-04    141  |  102  |  35<H>  ----------------------------<  200<H>  4.6   |  27  |  1.5    Ca    9.2      04 Sep 2024 04:12  Mg     1.9     -    TPro  6.2  /  Alb  3.5  /  TBili  0.2  /  DBili  x   /  AST  19  /  ALT  17  /  AlkPhos  113  -          Urinalysis Basic - ( 04 Sep 2024 14:23 )    Color: Yellow / Appearance: Clear / S.014 / pH: x  Gluc: x / Ketone: Negative mg/dL  / Bili: Negative / Urobili: 0.2 mg/dL   Blood: x / Protein: 300 mg/dL / Nitrite: Negative   Leuk Esterase: Negative / RBC: 12 /HPF / WBC 2 /HPF   Sq Epi: x / Non Sq Epi: 1 /HPF / Bacteria: Negative /HPF      I&O's Summary    03 Sep 2024 07:  -  04 Sep 2024 07:00  --------------------------------------------------------  IN: 100 mL / OUT: 400 mL / NET: -300 mL    04 Sep 2024 07:01  -  04 Sep 2024 20:05  --------------------------------------------------------  IN: 425 mL / OUT: 250 mL / NET: 175 mL        RADIOLOGY & ADDITIONAL STUDIES:           Patient is a 53y old  Female who presents with a chief complaint of Bronchoscopy and BAL (03 Sep 2024 09:19)      HPI:  53 y o female with pmh of HTN, HLD, mild CAD on cath 2023, DM2 dx in , has been on insulin since , h/o RA, latent TB treated (reported receiving 8 months of therapy), who had dyspnea and found to have RML opacification, was admitted for bronch BAL but her BP was high before and after the procedure.   Prior to procedure it was 210; procedure was performed; after the procedure it remained 217/97. Was given hydralazine IV with slight improvement; then labetalol 10 mg IV given with improvement in BP to 170s.   Patient reports being dizzy.   No chest pain, no SOB.   Reports that usually on exertion she experiences SOB   Patient reports that she checks her BP few times per week and it is usually less than 130.   Patient was discharged last year on lisinopril 10, simvastatin 40 mg and aspirin however she reports that she is not taking any oral meds (she's only on the insulin)       As for the indication for the bronchoscopy: Patient was recently treated for latent TB and has finished her treatment several weeks ago. then she was complaining of dyspnea on exertion and shoulder pain on inspiration for the last several months. She also reports weight loss and fatigue throughout the day. Never smoker. Had a CT A&P 2024 that showed a rt middle lobe atelectasis, RT middle lobe opacification noted since . PFTs done and are normal. CT with RML atelectasis and bronchiectasis; much milder in lingula   high suspicion for MAC. so BAL was indicated      no blood work or imaging yet. (03 Sep 2024 13:08)      PAST MEDICAL & SURGICAL HISTORY:  Diabetes      Neuropathy      Hypertension      Hypercholesteremia      H/O  section          PREVIOUS DIAGNOSTIC TESTING:        CATHETERIZATION  FINDINGS: Non-obstructive CAD (images reviewed)     MEDICATIONS  (STANDING):  aspirin enteric coated 81 milliGRAM(s) Oral daily  atorvastatin 40 milliGRAM(s) Oral at bedtime  budesonide  80 MICROgram(s)/formoterol 4.5 MICROgram(s) Inhaler 2 Puff(s) Inhalation two times a day  dextrose 5%. 1000 milliLiter(s) (100 mL/Hr) IV Continuous <Continuous>  dextrose 5%. 1000 milliLiter(s) (50 mL/Hr) IV Continuous <Continuous>  dextrose 50% Injectable 25 Gram(s) IV Push once  dextrose 50% Injectable 12.5 Gram(s) IV Push once  dextrose 50% Injectable 25 Gram(s) IV Push once  glucagon  Injectable 1 milliGRAM(s) IntraMuscular once  heparin   Injectable 5000 Unit(s) SubCutaneous every 12 hours  insulin glargine Injectable (LANTUS) 12 Unit(s) SubCutaneous at bedtime  insulin lispro (ADMELOG) corrective regimen sliding scale   SubCutaneous three times a day before meals  lactated ringers. 1000 milliLiter(s) (50 mL/Hr) IV Continuous <Continuous>    MEDICATIONS  (PRN):  dextrose Oral Gel 15 Gram(s) Oral once PRN Blood Glucose LESS THAN 70 milliGRAM(s)/deciliter      FAMILY HISTORY:  No pertinent family history in first degree relatives        SOCIAL HISTORY:  CIGARETTES: Never smoke     ALCOHOL: Negative     REVIEW OF SYSTEMS:  CONSTITUTIONAL: No fever, weight loss, or fatigue  NECK: No pain or stiffness  RESPIRATORY: No cough, wheezing, chills or hemoptysis; No shortness of breath  CARDIOVASCULAR: No chest pain, palpitations, dizziness, or leg swelling  GASTROINTESTINAL: No abdominal or epigastric pain. No nausea, vomiting, or hematemesis; No diarrhea or constipation. No melena or hematochezia.  GENITOURINARY: No dysuria, frequency, hematuria, or incontinence  NEUROLOGICAL: No headaches, memory loss, loss of strength, numbness, or tremors  SKIN: No itching, burning, rashes, or lesions   ENDOCRINE: No heat or cold intolerance; No hair loss  MUSCULOSKELETAL: No joint pain or swelling; No muscle, back, or extremity pain  HEME/LYMPH: No easy bruising, or bleeding gums          Vital Signs Last 24 Hrs  T(C): 37.1 (04 Sep 2024 19:30), Max: 37.1 (04 Sep 2024 12:26)  T(F): 98.7 (04 Sep 2024 19:30), Max: 98.7 (04 Sep 2024 12:26)  HR: 91 (04 Sep 2024 19:30) (80 - 97)  BP: 126/69 (04 Sep 2024 19:30) (85/52 - 138/68)  BP(mean): 88 (04 Sep 2024 19:30) (62 - 97)  RR: 16 (04 Sep 2024 12:) (12 - 23)  SpO2: 96% (04 Sep 2024 12:26) (95% - 97%)    Parameters below as of 04 Sep 2024 08:00  Patient On (Oxygen Delivery Method): room air            PHYSICAL EXAM:  GENERAL: NAD, well-groomed, well-developed  HEAD:  Atraumatic, Normocephalic  NECK: Supple, No JVD, Normal thyroid  NERVOUS SYSTEM:  Alert & Oriented X3, Good concentration  CHEST/LUNG: Clear to percussion bilaterally; No rales, rhonchi, wheezing, or rubs  HEART: Regular rate and rhythm; + SE murmurs. No rubs, or gallops  ABDOMEN: Soft, Nontender, Nondistended; Bowel sounds present  EXTREMITIES:  2+ Peripheral Pulses, No clubbing, cyanosis, or edema  SKIN: No rashes or lesions    INTERPRETATION OF TELEMETRY: Sinus rhythm     ECG: Sinus     I&O's Detail    03 Sep 2024 07:01  -  04 Sep 2024 07:00  --------------------------------------------------------  IN:    Lactated Ringers: 100 mL  Total IN: 100 mL    OUT:    Voided (mL): 400 mL  Total OUT: 400 mL    Total NET: -300 mL      04 Sep 2024 07:01  -  04 Sep 2024 20:05  --------------------------------------------------------  IN:    Lactated Ringers: 100 mL    Oral Fluid: 325 mL  Total IN: 425 mL    OUT:    Voided (mL): 250 mL  Total OUT: 250 mL    Total NET: 175 mL          LABS:                        10.4   12.39 )-----------( 237      ( 04 Sep 2024 04:12 )             31.3         141  |  102  |  35<H>  ----------------------------<  200<H>  4.6   |  27  |  1.5    Ca    9.2      04 Sep 2024 04:12  Mg     1.9         TPro  6.2  /  Alb  3.5  /  TBili  0.2  /  DBili  x   /  AST  19  /  ALT  17  /  AlkPhos  113            Urinalysis Basic - ( 04 Sep 2024 14:23 )    Color: Yellow / Appearance: Clear / S.014 / pH: x  Gluc: x / Ketone: Negative mg/dL  / Bili: Negative / Urobili: 0.2 mg/dL   Blood: x / Protein: 300 mg/dL / Nitrite: Negative   Leuk Esterase: Negative / RBC: 12 /HPF / WBC 2 /HPF   Sq Epi: x / Non Sq Epi: 1 /HPF / Bacteria: Negative /HPF      I&O's Summary    03 Sep 2024 07:01  -  04 Sep 2024 07:00  --------------------------------------------------------  IN: 100 mL / OUT: 400 mL / NET: -300 mL    04 Sep 2024 07:01  -  04 Sep 2024 20:05  --------------------------------------------------------  IN: 425 mL / OUT: 250 mL / NET: 175 mL        RADIOLOGY & ADDITIONAL STUDIES:

## 2024-09-05 LAB
ANION GAP SERPL CALC-SCNC: 8 MMOL/L — SIGNIFICANT CHANGE UP (ref 7–14)
BASOPHILS # BLD AUTO: 0.03 K/UL — SIGNIFICANT CHANGE UP (ref 0–0.2)
BASOPHILS NFR BLD AUTO: 0.4 % — SIGNIFICANT CHANGE UP (ref 0–1)
BUN SERPL-MCNC: 39 MG/DL — HIGH (ref 10–20)
CALCIUM SERPL-MCNC: 9.2 MG/DL — SIGNIFICANT CHANGE UP (ref 8.4–10.5)
CHLORIDE SERPL-SCNC: 107 MMOL/L — SIGNIFICANT CHANGE UP (ref 98–110)
CO2 SERPL-SCNC: 27 MMOL/L — SIGNIFICANT CHANGE UP (ref 17–32)
CREAT SERPL-MCNC: 1.5 MG/DL — SIGNIFICANT CHANGE UP (ref 0.7–1.5)
CULTURE RESULTS: ABNORMAL
CULTURE RESULTS: ABNORMAL
EGFR: 41 ML/MIN/1.73M2 — LOW
EOSINOPHIL # BLD AUTO: 0.07 K/UL — SIGNIFICANT CHANGE UP (ref 0–0.7)
EOSINOPHIL NFR BLD AUTO: 0.8 % — SIGNIFICANT CHANGE UP (ref 0–8)
GLUCOSE BLDC GLUCOMTR-MCNC: 137 MG/DL — HIGH (ref 70–99)
GLUCOSE BLDC GLUCOMTR-MCNC: 182 MG/DL — HIGH (ref 70–99)
GLUCOSE BLDC GLUCOMTR-MCNC: 188 MG/DL — HIGH (ref 70–99)
GLUCOSE BLDC GLUCOMTR-MCNC: 66 MG/DL — LOW (ref 70–99)
GLUCOSE SERPL-MCNC: 68 MG/DL — LOW (ref 70–99)
GRAM STN FLD: ABNORMAL
HCT VFR BLD CALC: 29.9 % — LOW (ref 37–47)
HGB BLD-MCNC: 9.7 G/DL — LOW (ref 12–16)
IMM GRANULOCYTES NFR BLD AUTO: 0.2 % — SIGNIFICANT CHANGE UP (ref 0.1–0.3)
LYMPHOCYTES # BLD AUTO: 2.61 K/UL — SIGNIFICANT CHANGE UP (ref 1.2–3.4)
LYMPHOCYTES # BLD AUTO: 31.1 % — SIGNIFICANT CHANGE UP (ref 20.5–51.1)
MCHC RBC-ENTMCNC: 29.5 PG — SIGNIFICANT CHANGE UP (ref 27–31)
MCHC RBC-ENTMCNC: 32.4 G/DL — SIGNIFICANT CHANGE UP (ref 32–37)
MCV RBC AUTO: 90.9 FL — SIGNIFICANT CHANGE UP (ref 81–99)
MONOCYTES # BLD AUTO: 0.58 K/UL — SIGNIFICANT CHANGE UP (ref 0.1–0.6)
MONOCYTES NFR BLD AUTO: 6.9 % — SIGNIFICANT CHANGE UP (ref 1.7–9.3)
NEUTROPHILS # BLD AUTO: 5.09 K/UL — SIGNIFICANT CHANGE UP (ref 1.4–6.5)
NEUTROPHILS NFR BLD AUTO: 60.6 % — SIGNIFICANT CHANGE UP (ref 42.2–75.2)
NON-GYNECOLOGICAL CYTOLOGY STUDY: SIGNIFICANT CHANGE UP
NON-GYNECOLOGICAL CYTOLOGY STUDY: SIGNIFICANT CHANGE UP
NRBC # BLD: 0 /100 WBCS — SIGNIFICANT CHANGE UP (ref 0–0)
PLATELET # BLD AUTO: 217 K/UL — SIGNIFICANT CHANGE UP (ref 130–400)
PMV BLD: 10.3 FL — SIGNIFICANT CHANGE UP (ref 7.4–10.4)
POTASSIUM SERPL-MCNC: 4.5 MMOL/L — SIGNIFICANT CHANGE UP (ref 3.5–5)
POTASSIUM SERPL-SCNC: 4.5 MMOL/L — SIGNIFICANT CHANGE UP (ref 3.5–5)
RBC # BLD: 3.29 M/UL — LOW (ref 4.2–5.4)
RBC # FLD: 12.8 % — SIGNIFICANT CHANGE UP (ref 11.5–14.5)
SODIUM SERPL-SCNC: 142 MMOL/L — SIGNIFICANT CHANGE UP (ref 135–146)
SPECIMEN SOURCE: SIGNIFICANT CHANGE UP
SPECIMEN SOURCE: SIGNIFICANT CHANGE UP
WBC # BLD: 8.4 K/UL — SIGNIFICANT CHANGE UP (ref 4.8–10.8)
WBC # FLD AUTO: 8.4 K/UL — SIGNIFICANT CHANGE UP (ref 4.8–10.8)

## 2024-09-05 PROCEDURE — 99233 SBSQ HOSP IP/OBS HIGH 50: CPT

## 2024-09-05 RX ORDER — LISINOPRIL 10 MG/1
5 TABLET ORAL DAILY
Refills: 0 | Status: DISCONTINUED | OUTPATIENT
Start: 2024-09-05 | End: 2024-09-05

## 2024-09-05 RX ORDER — AMLODIPINE BESYLATE 10 MG/1
5 TABLET ORAL ONCE
Refills: 0 | Status: COMPLETED | OUTPATIENT
Start: 2024-09-05 | End: 2024-09-05

## 2024-09-05 RX ORDER — INSULIN GLARGINE 100 [IU]/ML
12 INJECTION, SOLUTION SUBCUTANEOUS
Refills: 0 | DISCHARGE

## 2024-09-05 RX ORDER — AMLODIPINE BESYLATE 10 MG/1
5 TABLET ORAL DAILY
Refills: 0 | Status: DISCONTINUED | OUTPATIENT
Start: 2024-09-05 | End: 2024-09-05

## 2024-09-05 RX ORDER — INSULIN DEGLUDEC INJECTION 100 U/ML
14 INJECTION, SOLUTION SUBCUTANEOUS
Refills: 0 | DISCHARGE

## 2024-09-05 RX ORDER — AMLODIPINE BESYLATE 10 MG/1
10 TABLET ORAL DAILY
Refills: 0 | Status: DISCONTINUED | OUTPATIENT
Start: 2024-09-06 | End: 2024-09-06

## 2024-09-05 RX ADMIN — Medication 10 MILLIGRAM(S): at 18:54

## 2024-09-05 RX ADMIN — AMLODIPINE BESYLATE 5 MILLIGRAM(S): 10 TABLET ORAL at 14:42

## 2024-09-05 RX ADMIN — BUDESONIDE AND FORMOTEROL FUMARATE 2 PUFF(S): 80; 4.5 AEROSOL, METERED RESPIRATORY (INHALATION) at 08:31

## 2024-09-05 RX ADMIN — BUDESONIDE AND FORMOTEROL FUMARATE 2 PUFF(S): 80; 4.5 AEROSOL, METERED RESPIRATORY (INHALATION) at 21:44

## 2024-09-05 RX ADMIN — Medication 5000 UNIT(S): at 08:31

## 2024-09-05 RX ADMIN — LISINOPRIL 5 MILLIGRAM(S): 10 TABLET ORAL at 14:42

## 2024-09-05 RX ADMIN — INSULIN GLARGINE 12 UNIT(S): 100 INJECTION, SOLUTION SUBCUTANEOUS at 21:44

## 2024-09-05 RX ADMIN — Medication 40 MILLIGRAM(S): at 21:44

## 2024-09-05 RX ADMIN — AMLODIPINE BESYLATE 5 MILLIGRAM(S): 10 TABLET ORAL at 17:57

## 2024-09-05 RX ADMIN — Medication 2: at 11:20

## 2024-09-05 NOTE — PROGRESS NOTE ADULT - SUBJECTIVE AND OBJECTIVE BOX
The patient is laying comfortably in bed  Denies any chest pain, SOB or palpitations   No acute events over last 24 hours       MEDICATIONS  (STANDING):  aspirin enteric coated 81 milliGRAM(s) Oral daily  atorvastatin 40 milliGRAM(s) Oral at bedtime  budesonide  80 MICROgram(s)/formoterol 4.5 MICROgram(s) Inhaler 2 Puff(s) Inhalation two times a day  dextrose 5%. 1000 milliLiter(s) (100 mL/Hr) IV Continuous <Continuous>  dextrose 5%. 1000 milliLiter(s) (50 mL/Hr) IV Continuous <Continuous>  dextrose 50% Injectable 25 Gram(s) IV Push once  dextrose 50% Injectable 12.5 Gram(s) IV Push once  dextrose 50% Injectable 25 Gram(s) IV Push once  glucagon  Injectable 1 milliGRAM(s) IntraMuscular once  heparin   Injectable 5000 Unit(s) SubCutaneous every 12 hours  insulin glargine Injectable (LANTUS) 12 Unit(s) SubCutaneous at bedtime  insulin lispro (ADMELOG) corrective regimen sliding scale   SubCutaneous three times a day before meals  lactated ringers. 1000 milliLiter(s) (50 mL/Hr) IV Continuous <Continuous>    MEDICATIONS  (PRN):  dextrose Oral Gel 15 Gram(s) Oral once PRN Blood Glucose LESS THAN 70 milliGRAM(s)/deciliter            Vital Signs Last 24 Hrs  T(C): 36.4 (05 Sep 2024 04:50), Max: 37.1 (04 Sep 2024 12:26)  T(F): 97.6 (05 Sep 2024 04:50), Max: 98.7 (04 Sep 2024 12:26)  HR: 81 (05 Sep 2024 04:50) (81 - 97)  BP: 131/75 (05 Sep 2024 04:50) (92/51 - 131/75)  BP(mean): 88 (04 Sep 2024 19:30) (66 - 88)  RR: 17 (05 Sep 2024 04:50) (15 - 17)  SpO2: 96% (04 Sep 2024 12:26) (95% - 97%)    Parameters below as of 04 Sep 2024 08:00  Patient On (Oxygen Delivery Method): room air         REVIEW OF SYSTEMS:  CONSTITUTIONAL: No fever, weight loss, or fatigue  CARDIOLOGY: PAtient denies chest pain, shortness of breath or syncopal episodes    RESPIRATORY: denies shortness of breath, wheezeing   NEUROLOGICAL: NO weakness, no focal deficits to report   GI: no BRBPR, no N,V,diarrhea     PSYCHIATRY: normal mood and affect  HEENT: no nasal discharge, no ecchymosis  SKIN: no ecchymosis, no breakdown  MUSCULOSKELETAL: Full range of motion x 4         PHYSICAL EXAM:  · CONSTITUTIONAL:	Well-developed, well nourished      ·RESPIRATORY:   airway patent; breath sounds equal; good air movement; respirations non-labored; clear to auscultation bilaterally   · CARDIOVASCULAR	regular rate and rhythm  no rub   SE murmur    · EXTREMITIES: No cyanosis, clubbing or edema  · VASCULAR: No JVD   	  TELEMETRY: Sinus     ECG: Sinus rhythm     TTE: Normal LVF/Severe AS     LABS:                        10.4   12.39 )-----------( 237      ( 04 Sep 2024 04:12 )             31.3     09-04    141  |  102  |  35<H>  ----------------------------<  200<H>  4.6   |  27  |  1.5    Ca    9.2      04 Sep 2024 04:12  Mg     1.9     09-04    TPro  6.2  /  Alb  3.5  /  TBili  0.2  /  DBili  x   /  AST  19  /  ALT  17  /  AlkPhos  113  09-04            I&O's Summary    03 Sep 2024 07:01  -  04 Sep 2024 07:00  --------------------------------------------------------  IN: 100 mL / OUT: 400 mL / NET: -300 mL    04 Sep 2024 07:01  -  05 Sep 2024 06:14  --------------------------------------------------------  IN: 545 mL / OUT: 500 mL / NET: 45 mL      BNP  RADIOLOGY & ADDITIONAL STUDIES:    IMPRESSION AND PLAN:         The patient is laying comfortably in bed  Denies any chest pain, SOB or palpitations   No acute events over last 24 hours       MEDICATIONS  (STANDING):  aspirin enteric coated 81 milliGRAM(s) Oral daily  atorvastatin 40 milliGRAM(s) Oral at bedtime  budesonide  80 MICROgram(s)/formoterol 4.5 MICROgram(s) Inhaler 2 Puff(s) Inhalation two times a day  dextrose 5%. 1000 milliLiter(s) (100 mL/Hr) IV Continuous <Continuous>  dextrose 5%. 1000 milliLiter(s) (50 mL/Hr) IV Continuous <Continuous>  dextrose 50% Injectable 25 Gram(s) IV Push once  dextrose 50% Injectable 12.5 Gram(s) IV Push once  dextrose 50% Injectable 25 Gram(s) IV Push once  glucagon  Injectable 1 milliGRAM(s) IntraMuscular once  heparin   Injectable 5000 Unit(s) SubCutaneous every 12 hours  insulin glargine Injectable (LANTUS) 12 Unit(s) SubCutaneous at bedtime  insulin lispro (ADMELOG) corrective regimen sliding scale   SubCutaneous three times a day before meals  lactated ringers. 1000 milliLiter(s) (50 mL/Hr) IV Continuous <Continuous>    MEDICATIONS  (PRN):  dextrose Oral Gel 15 Gram(s) Oral once PRN Blood Glucose LESS THAN 70 milliGRAM(s)/deciliter            Vital Signs Last 24 Hrs  T(C): 36.4 (05 Sep 2024 04:50), Max: 37.1 (04 Sep 2024 12:26)  T(F): 97.6 (05 Sep 2024 04:50), Max: 98.7 (04 Sep 2024 12:26)  HR: 81 (05 Sep 2024 04:50) (81 - 97)  BP: 131/75 (05 Sep 2024 04:50) (92/51 - 131/75)  BP(mean): 88 (04 Sep 2024 19:30) (66 - 88)  RR: 17 (05 Sep 2024 04:50) (15 - 17)  SpO2: 96% (04 Sep 2024 12:26) (95% - 97%)    Parameters below as of 04 Sep 2024 08:00  Patient On (Oxygen Delivery Method): room air         REVIEW OF SYSTEMS:  CONSTITUTIONAL: No fever, weight loss, or fatigue  CARDIOLOGY: Patient denies chest pain, shortness of breath or syncopal episodes    RESPIRATORY: denies shortness of breath, wheezeing   NEUROLOGICAL: NO weakness, no focal deficits to report   GI: no BRBPR, no N,V,diarrhea     PSYCHIATRY: normal mood and affect  HEENT: no nasal discharge, no ecchymosis  SKIN: no ecchymosis, no breakdown  MUSCULOSKELETAL: Full range of motion x 4         PHYSICAL EXAM:  · CONSTITUTIONAL:	Well-developed, well nourished      ·RESPIRATORY:   airway patent; breath sounds equal; good air movement; respirations non-labored; clear to auscultation bilaterally   · CARDIOVASCULAR	regular rate and rhythm  no rub   SE murmur    · EXTREMITIES: No cyanosis, clubbing or edema  · VASCULAR: No JVD   	  TELEMETRY: Sinus     ECG: Sinus rhythm     TTE: Normal LVF/Severe AS & moderate AR     LABS:                        10.4   12.39 )-----------( 237      ( 04 Sep 2024 04:12 )             31.3     09-04    141  |  102  |  35<H>  ----------------------------<  200<H>  4.6   |  27  |  1.5    Ca    9.2      04 Sep 2024 04:12  Mg     1.9     09-04    TPro  6.2  /  Alb  3.5  /  TBili  0.2  /  DBili  x   /  AST  19  /  ALT  17  /  AlkPhos  113  09-04            I&O's Summary    03 Sep 2024 07:01  -  04 Sep 2024 07:00  --------------------------------------------------------  IN: 100 mL / OUT: 400 mL / NET: -300 mL    04 Sep 2024 07:01  -  05 Sep 2024 06:14  --------------------------------------------------------  IN: 545 mL / OUT: 500 mL / NET: 45 mL      BNP  RADIOLOGY & ADDITIONAL STUDIES:    IMPRESSION AND PLAN:

## 2024-09-05 NOTE — PROGRESS NOTE ADULT - ASSESSMENT
53 y o female with pmh of HTN, HLD, mild CAD on cath 4/2023, DM2 dx in 2018, has been on insulin since 2022, h/o RA, latent TB treated (reported receiving 8 months of therapy), who had dyspnea and found to have RML opacification, was admitted for bronch BAL but her BP was high before and after the procedure.     Hypertension urgency    NICOLASA   Severe AS  DM2  HLP  CAD    Continue current care as per medicine team   Keep holding ACEI for now (creatinine 1.5)   GI/DVT prophylaxis   ASA 81   D/C Nifedipine and start low dose Amlodipine if SBP > 140   [facility] as out patient   Will F/U      53 y o female with pmh of HTN, HLD, mild CAD on cath 4/2023, DM2 dx in 2018, has been on insulin since 2022, h/o RA, latent TB treated (reported receiving 8 months of therapy), who had dyspnea and found to have RML opacification, was admitted for bronch BAL but her BP was high before and after the procedure.     Hypertension urgency    NICOLASA   Severe AS  DM2  HLP  CAD    Continue current care as per medicine team   Keep holding ACEI for now (creatinine 1.5)   GI/DVT prophylaxis   ASA 81   D/C Nifedipine and start low dose Amlodipine if SBP > 140   [facility] as out patient   Will review out patient TTE   Will F/U

## 2024-09-05 NOTE — PROGRESS NOTE ADULT - SUBJECTIVE AND OBJECTIVE BOX
SUBJECTIVE / OVERNIGHT EVENTS  Patient slept well overnight. No acute complaints this AM. Patient does not report fevers, chills, CP, SOB, or n/v/d    MEDICATIONS  aspirin enteric coated 81 milliGRAM(s) Oral daily  atorvastatin 40 milliGRAM(s) Oral at bedtime  budesonide  80 MICROgram(s)/formoterol 4.5 MICROgram(s) Inhaler 2 Puff(s) Inhalation two times a day  dextrose 5%. 1000 milliLiter(s) IV Continuous <Continuous>  dextrose 5%. 1000 milliLiter(s) IV Continuous <Continuous>  dextrose 50% Injectable 25 Gram(s) IV Push once  dextrose 50% Injectable 12.5 Gram(s) IV Push once  dextrose 50% Injectable 25 Gram(s) IV Push once  glucagon  Injectable 1 milliGRAM(s) IntraMuscular once  heparin   Injectable 5000 Unit(s) SubCutaneous every 12 hours  insulin glargine Injectable (LANTUS) 12 Unit(s) SubCutaneous at bedtime  insulin lispro (ADMELOG) corrective regimen sliding scale   SubCutaneous three times a day before meals  lactated ringers. 1000 milliLiter(s) IV Continuous <Continuous>    dextrose Oral Gel 15 Gram(s) Oral once PRN Blood Glucose LESS THAN 70 milliGRAM(s)/deciliter    VITALS /  EXAM    T(C): 36.4 (09-05-24 @ 04:50), Max: 37.1 (09-04-24 @ 19:30)  HR: 81 (09-05-24 @ 04:50) (81 - 91)  BP: 131/75 (09-05-24 @ 04:50) (126/69 - 131/75)  RR: 17 (09-05-24 @ 04:50) (17 - 17)  SpO2: --  POCT Blood Glucose.: 182 mg/dL (09-05-24 @ 11:17)  POCT Blood Glucose.: 188 mg/dL (09-05-24 @ 09:18)  POCT Blood Glucose.: 65 mg/dL (09-05-24 @ 07:38)  POCT Blood Glucose.: 156 mg/dL (09-04-24 @ 21:54)  POCT Blood Glucose.: 89 mg/dL (09-04-24 @ 16:52)    GENERAL: NAD, well-developed  CHEST/LUNG: decr air entry on R  HEART: has systolic murmur  ABDOMEN: Soft, Nontender, Nondistended; Bowel sounds present, no masses.  EXTREMITIES:  2+ Peripheral Pulses, No clubbing, cyanosis, or edema    I's & O's     09-04-24 @ 07:01  -  09-05-24 @ 07:00  --------------------------------------------------------  IN:    Lactated Ringers: 100 mL    Oral Fluid: 545 mL  Total IN: 645 mL    OUT:    Voided (mL): 700 mL  Total OUT: 700 mL    Total NET: -55 mL        LABS             9.7    8.40  )-----------( 217      ( 09-05-24 @ 07:05 )             29.9     142  |  107  |  39  -------------------------<  68   09-05-24 @ 07:05  4.5  |  27  |  1.5    Ca      9.2     09-05-24 @ 07:05  Mg     1.9     09-04-24 @ 04:12    TPro  6.2  /  Alb  3.5  /  TBili  0.2  /  DBili  x   /  AST  19  /  ALT  17  /  AlkPhos  113  /  GGT  x     09-04-24 @ 04:12      Troponin T, High Sensitivity Result: 18 ng/L (09-03-24 @ 19:48)  Troponin T, High Sensitivity Result: 14 ng/L (09-03-24 @ 14:18        Urinalysis Basic - ( 05 Sep 2024 07:05 )    Color: x / Appearance: x / SG: x / pH: x  Gluc: 68 mg/dL / Ketone: x  / Bili: x / Urobili: x   Blood: x / Protein: x / Nitrite: x   Leuk Esterase: x / RBC: x / WBC x   Sq Epi: x / Non Sq Epi: x / Bacteria: x      MICRO / IMAGING / CARDIOLOGY  Telemetry: Reviewed   EKG: Reviewed    CULTURES    Culture - Acid Fast - Bronchial w/Smear (collected 09-03-24 @ 12:20)  Source: Bronchial None  Preliminary Report:    Culture is being performed.    Culture - Fungal, Bronchial (collected 09-03-24 @ 12:20)  Source: Bronchial None  Preliminary Report:    Culture is being performed. Fungal cultures are held for 4 weeks.    Culture - Bronchial (collected 09-03-24 @ 12:20)  Source: Bronchial None  Gram Stain:    Moderate polymorphonuclear leukocytes per low power field    No squamous epithelial cells per low power field    No organisms seen per oil power field  Final Report:    Moderate Haemophilus influenzae "Susceptibilities not performed"    Normal Respiratory Amber present    Culture - Acid Fast - Bronchial w/Smear (collected 09-03-24 @ 12:15)  Source: Bronchial None  Preliminary Report:    Culture is being performed.    Culture - Bronchial (collected 09-03-24 @ 12:15)  Source: Bronchial None  Gram Stain:    Rare polymorphonuclear leukocytes seen per low power field    No squamous epithelial cells seen per low power field    Few Gram positive cocci in pairs seen per oil power field    Rare Gram Positive Rods seen per oil power field  Final Report:    Normal Respiratory Amber present    Culture - Fungal, Bronchial (collected 09-03-24 @ 12:15)  Source: Bronchial None  Preliminary Report:    Culture is being performed. Fungal cultures are held for 4 weeks.      IMAGING  PACS Image:  (09-03-24 @ 22:57)  PACS Image:  (09-03-24 @ 14:50)    CARDIOLOGY

## 2024-09-05 NOTE — PROGRESS NOTE ADULT - ASSESSMENT
53 y o female with pmh of HTN, HLD, mild CAD on cath 4/2023, DM2 dx in 2018, has been on insulin since 2022, h/o RA, latent TB treated (reported receiving 8 months of therapy), who had dyspnea and found to have RML opacification, was admitted for bronch BAL but her BP was high before and after the procedure.     #Hypertension urgency - resolved   - BP pre/postprocedure 210/100  - s/p iv labetalol 10 mg iv once and hydralazine 10 mg IV once.   - Home lisinopril 10mg od but not compliant  - EKG Nl, CTH Nl, Trop 14-18   - Started on nifedipine 60 mg XR daily, labetalol 100 mg BID and hydralazine 25 mg TID --> BP dropped 90/50 OVN>> - Holding all BP meds, target -150  - Crea 0.9 --> 1.5 (NICOLASA)> stable today at 1.5  - C/w IV LR 50cc/h  - If crea improves tmw, resume home lisinopril and dc home    #NICOLASA likely 2/2 prerenal 2/2 dehydration vs acute drop in BP  - Crea 0.9 --> 1.5--> 1.5  - C/w IV LR 50cc/h  - FU urine lytes/ UA  - Holding BP meds for now  - Monitor BMP tmw  -will get bladder US & do bladder scans    #Severe AS  - TTE done , severe AS, size of 0.7  - Cardiology saw the patient bedside, will f/u as outpt    #Hx of latent TB reportedly s/p Isoniazid for 8 months   - complaining of dyspnea on exertion and shoulder pain on inspiration for the last several months, weight loss and fatigue   - Never smoker  - CT A&P 04/2024 that showed a rt middle lobe atelectasis, RT middle lobe opacification noted since 2020.   - PFTs done and was normal.   - CT repeat: with RML atelectasis and bronchiectasis; much milder in lingula   - seen by Dr Michael gray who is suspecting MAC. So BAL was performed on 9/3: FU results    #DM2  - -210  - Will make lantus 12u SQ od for now + ISS    #CAD  - mild CAD on cath 4/2023  - resume aspirin and statin   - , Trigly 151    DVT ppx: heparin SQ  GI ppx: not indicated   AAT  Diet: DASH, diabetic    Plan: monitor creatinine

## 2024-09-05 NOTE — PROGRESS NOTE ADULT - ASSESSMENT
53 y o female with PMH of HTN, HLD, mild CAD on cath 4/2023, DM2 dx in 2018, has been on insulin since 2022, h/o RA, latent TB treated (reported receiving 8 months of therapy), who had dyspnea and found to have RML opacification, was admitted for bronch BAL but her BP was high before and after the procedure.     Hypertensive Urgency  DM-2 / HTN / DL  CAD  H/O Latent TB s/p treatment            PLAN:     ·	Tele reviewed by me  ·	EKG on admission:      53 y o female with PMH of HTN, HLD, mild CAD on cath 4/2023, DM2 dx in 2018, has been on insulin since 2022, h/o RA, latent TB treated (reported receiving 8 months of therapy), who had dyspnea and found to have RML opacification, was admitted for bronch BAL but her BP was high before and after the procedure.     Hypertensive Urgency  DM-2 / HTN / DL  CAD  H/O Latent TB s/p treatment  NICOLASA  Severe AS            PLAN:     ·	Tele reviewed by me. No events  ·	EKG on admission: NSR 87/min (Interpreted by me)  ·	BP is running high again. Start her on Amlodipine 5 mg po daily. Hold ACE-I for now due to NICOLASA  ·	Pt's Cr noted. Jumped up to 1.5 from 0.9. Could be due to ATN as pt dropped her BP  ·	Monitor renal function  ·	ECHO reviewed. EF is 64%. Severe AS. Mod AR  ·	Monitor FS. On Lantus 12 units qhs and Lispro corrective regimen.   ·	Cardiology f/u noted. [facility] as an out pt for severe AS  ·	Cont her other home meds.   ·	S/P bronch. Care d/w the pt's pulmonary. Check cxs    Progress Note Handoff    Pending (specify):  Consults_________, Tests________, Test Results_______, Other__AKI_______  Family discussion:  Disposition: Home___/SNF___/Other________/Unknown at this time________    Israel Scherer MD  Spectra: [id number]

## 2024-09-05 NOTE — PROGRESS NOTE ADULT - SUBJECTIVE AND OBJECTIVE BOX
IZA TIAN  53y Female    CHIEF COMPLAINT:    Patient is a 53y old  Female who presents with a chief complaint of VH/CAD (05 Sep 2024 06:14)      INTERVAL HPI/OVERNIGHT EVENTS:    Patient seen and examined.    ROS: All other systems are negative.    Vital Signs:    T(F): 97.6 (24 @ 04:50), Max: 98.7 (24 @ 12:26)  HR: 81 (24 @ 04:50) (81 - 97)  BP: 131/75 (24 @ 04:50) (96/51 - 131/75)  RR: 17 (24 @ 04:50) (15 - 17)  SpO2: 96% (24 @ 12:26) (96% - 97%)  I&O's Summary    04 Sep 2024 07:01  -  05 Sep 2024 07:00  --------------------------------------------------------  IN: 645 mL / OUT: 700 mL / NET: -55 mL      Daily Height in cm: 162.56 (04 Sep 2024 08:54)    Daily Weight in k (05 Sep 2024 04:50)  CAPILLARY BLOOD GLUCOSE      POCT Blood Glucose.: 156 mg/dL (04 Sep 2024 21:54)  POCT Blood Glucose.: 89 mg/dL (04 Sep 2024 16:52)  POCT Blood Glucose.: 210 mg/dL (04 Sep 2024 11:25)      PHYSICAL EXAM:    GENERAL:  NAD  SKIN: No rashes or lesions  HENT: Atraumatic. Normocephalic. PERRL. Moist membranes.  NECK: Supple, No JVD. No lymphadenopathy.  PULMONARY: CTA B/L. No wheezing. No rales  CVS: Normal S1, S2. Rate and Rhythm are regular. No murmurs.  ABDOMEN/GI: Soft, Nontender, Nondistended; BS present  EXTREMITIES: Peripheral pulses intact. No edema B/L LE.  NEUROLOGIC:  No motor or sensory deficit.  PSYCH: Alert & oriented x 3    Consultant(s) Notes Reviewed:  [x ] YES  [ ] NO  Care Discussed with Consultants/Other Providers [ x] YES  [ ] NO    EKG reviewed  Telemetry reviewed    LABS:                        10.4   12.39 )-----------( 237      ( 04 Sep 2024 04:12 )             31.3     09-04    141  |  102  |  35<H>  ----------------------------<  200<H>  4.6   |  27  |  1.5    Ca    9.2      04 Sep 2024 04:12  Mg     1.9     -    TPro  6.2  /  Alb  3.5  /  TBili  0.2  /  DBili  x   /  AST  19  /  ALT  17  /  AlkPhos  113  -04            Culture - Acid Fast - Bronchial w/Smear (collected 03 Sep 2024 12:20)  Source: Bronchial None  Preliminary Report (04 Sep 2024 23:09):    Culture is being performed.    Culture - Fungal, Bronchial (collected 03 Sep 2024 12:20)  Source: Bronchial None  Preliminary Report (04 Sep 2024 23:03):    Culture is being performed. Fungal cultures are held for 4 weeks.    Culture - Bronchial (collected 03 Sep 2024 12:20)  Source: Bronchial None  Gram Stain (03 Sep 2024 23:37):    Moderate polymorphonuclear leukocytes per low power field    No squamous epithelial cells per low power field    No organisms seen per oil power field  Preliminary Report (04 Sep 2024 15:00):    Normal Respiratory Amber present    Culture - Acid Fast - Bronchial w/Smear (collected 03 Sep 2024 12:15)  Source: Bronchial None  Preliminary Report (04 Sep 2024 23:09):    Culture is being performed.    Culture - Bronchial (collected 03 Sep 2024 12:15)  Source: Bronchial None  Gram Stain (04 Sep 2024 07:14):    Rare polymorphonuclear leukocytes seen per low power field    No squamous epithelial cells seen per low power field    Few Gram positive cocci in pairs seen per oil power field    Rare Gram Positive Rods seen per oil power field  Final Report (05 Sep 2024 06:58):    Normal Respiratory Amber present    Culture - Fungal, Bronchial (collected 03 Sep 2024 12:15)  Source: Bronchial None  Preliminary Report (04 Sep 2024 23:03):    Culture is being performed. Fungal cultures are held for 4 weeks.        RADIOLOGY & ADDITIONAL TESTS:      Imaging or report Personally Reviewed:  [ ] YES  [ ] NO    Medications:  Standing  aspirin enteric coated 81 milliGRAM(s) Oral daily  atorvastatin 40 milliGRAM(s) Oral at bedtime  budesonide  80 MICROgram(s)/formoterol 4.5 MICROgram(s) Inhaler 2 Puff(s) Inhalation two times a day  dextrose 5%. 1000 milliLiter(s) IV Continuous <Continuous>  dextrose 5%. 1000 milliLiter(s) IV Continuous <Continuous>  dextrose 50% Injectable 25 Gram(s) IV Push once  dextrose 50% Injectable 12.5 Gram(s) IV Push once  dextrose 50% Injectable 25 Gram(s) IV Push once  glucagon  Injectable 1 milliGRAM(s) IntraMuscular once  heparin   Injectable 5000 Unit(s) SubCutaneous every 12 hours  insulin glargine Injectable (LANTUS) 12 Unit(s) SubCutaneous at bedtime  insulin lispro (ADMELOG) corrective regimen sliding scale   SubCutaneous three times a day before meals  lactated ringers. 1000 milliLiter(s) IV Continuous <Continuous>    PRN Meds  dextrose Oral Gel 15 Gram(s) Oral once PRN      Case discussed with resident    Care discussed with pt/family           SATNAMANDRYIZA DRUMMOND  53y Female    CHIEF COMPLAINT:    Patient is a 53y old  Female who presents with a chief complaint of VH/CAD (05 Sep 2024 06:14)      INTERVAL HPI/OVERNIGHT EVENTS:    Patient seen and examined. Events noted. Events noted. Pt was admitted for hypertensive urgency and then dropped her BP. Now the BP is running high again. No sob.     ROS: All other systems are negative.    Vital Signs:    T(F): 97.6 (24 @ 04:50), Max: 98.7 (24 @ 12:26)  HR: 81 (24 @ 04:50) (81 - 97)  BP: 131/75 (24 @ 04:50) (96/51 - 131/75)  RR: 17 (24 @ 04:50) (15 - 17)  SpO2: 96% (24 @ 12:26) (96% - 97%)  I&O's Summary    04 Sep 2024 07:01  -  05 Sep 2024 07:00  --------------------------------------------------------  IN: 645 mL / OUT: 700 mL / NET: -55 mL      Daily Height in cm: 162.56 (04 Sep 2024 08:54)    Daily Weight in k (05 Sep 2024 04:50)  CAPILLARY BLOOD GLUCOSE      POCT Blood Glucose.: 156 mg/dL (04 Sep 2024 21:54)  POCT Blood Glucose.: 89 mg/dL (04 Sep 2024 16:52)  POCT Blood Glucose.: 210 mg/dL (04 Sep 2024 11:25)      PHYSICAL EXAM:    GENERAL:  NAD  SKIN: No rashes or lesions  HENT: Atraumatic. Normocephalic. PERRL. Moist membranes.  NECK: Supple, No JVD. No lymphadenopathy.  PULMONARY: CTA B/L. No wheezing. No rales  CVS: Normal S1, S2. Rate and Rhythm are regular. A IV/VI systolic mm on aortic area.   ABDOMEN/GI: Soft, Nontender, Nondistended; BS present  EXTREMITIES: Peripheral pulses intact. No edema B/L LE.  NEUROLOGIC:  No motor or sensory deficit.  PSYCH: Alert & oriented x 3    Consultant(s) Notes Reviewed:  [x ] YES  [ ] NO  Care Discussed with Consultants/Other Providers [ x] YES  [ ] NO    EKG reviewed  Telemetry reviewed    LABS:                        10.4   12.39 )-----------( 237      ( 04 Sep 2024 04:12 )             31.3     09-04    141  |  102  |  35<H>  ----------------------------<  200<H>   Creatinine Trend: 1.5<--, 1.5<--, 0.9<--  4.6   |  27  |  1.5    Ca    9.2      04 Sep 2024 04:12  Mg     1.9     09-04    TPro  6.2  /  Alb  3.5  /  TBili  0.2  /  DBili  x   /  AST  19  /  ALT  17  /  AlkPhos  113  09-04            Culture - Acid Fast - Bronchial w/Smear (collected 03 Sep 2024 12:20)  Source: Bronchial None  Preliminary Report (04 Sep 2024 23:09):    Culture is being performed.    Culture - Fungal, Bronchial (collected 03 Sep 2024 12:20)  Source: Bronchial None  Preliminary Report (04 Sep 2024 23:03):    Culture is being performed. Fungal cultures are held for 4 weeks.    Culture - Bronchial (collected 03 Sep 2024 12:20)  Source: Bronchial None  Gram Stain (03 Sep 2024 23:37):    Moderate polymorphonuclear leukocytes per low power field    No squamous epithelial cells per low power field    No organisms seen per oil power field  Preliminary Report (04 Sep 2024 15:00):    Normal Respiratory Amber present    Culture - Acid Fast - Bronchial w/Smear (collected 03 Sep 2024 12:15)  Source: Bronchial None  Preliminary Report (04 Sep 2024 23:09):    Culture is being performed.    Culture - Bronchial (collected 03 Sep 2024 12:15)  Source: Bronchial None  Gram Stain (04 Sep 2024 07:14):    Rare polymorphonuclear leukocytes seen per low power field    No squamous epithelial cells seen per low power field    Few Gram positive cocci in pairs seen per oil power field    Rare Gram Positive Rods seen per oil power field  Final Report (05 Sep 2024 06:58):    Normal Respiratory Amber present    Culture - Fungal, Bronchial (collected 03 Sep 2024 12:15)  Source: Bronchial None  Preliminary Report (04 Sep 2024 23:03):    Culture is being performed. Fungal cultures are held for 4 weeks.        RADIOLOGY & ADDITIONAL TESTS:    < from: Xray Chest 1 View-PORTABLE IMMEDIATE (Xray Chest 1 View-PORTABLE IMMEDIATE .) (24 @ 14:50) >  Impression:  No radiographic evidence of acute cardiopulmonary disease.    < end of copied text >  < from: CT Head No Cont (24 @ 22:57) >  Impression:    No acute intracranial abnormality.    < end of copied text >  < from: TTE Echo Complete w/ Contrast w/o Doppler (24 @ 07:30) >    Summary:   1. Normal global left ventricular systolic function.   2. LV Ejection Fraction by Montes De Oca's Method with a biplane EF of 64 %.   3. Spectral Doppler shows impaired relaxation pattern of left   ventricular myocardial filling (Grade I diastolic dysfunction).   4. Normal right ventricular size and function.   5. Severe aortic valve stenosis (VMax 3.8 m/s, PG/MG 58/35 mmHg, HIRAM   0.73 cm2). Possible bicuspid aortic valve.   6. Moderate aortic regurgitation.   7. Mild tricuspid regurgitation.   8. Normal pulmonary artery pressure.    < end of copied text >    Imaging or report Personally Reviewed:  [x ] YES  [ ] NO    Medications:  Standing  aspirin enteric coated 81 milliGRAM(s) Oral daily  atorvastatin 40 milliGRAM(s) Oral at bedtime  budesonide  80 MICROgram(s)/formoterol 4.5 MICROgram(s) Inhaler 2 Puff(s) Inhalation two times a day  dextrose 5%. 1000 milliLiter(s) IV Continuous <Continuous>  dextrose 5%. 1000 milliLiter(s) IV Continuous <Continuous>  dextrose 50% Injectable 25 Gram(s) IV Push once  dextrose 50% Injectable 12.5 Gram(s) IV Push once  dextrose 50% Injectable 25 Gram(s) IV Push once  glucagon  Injectable 1 milliGRAM(s) IntraMuscular once  heparin   Injectable 5000 Unit(s) SubCutaneous every 12 hours  insulin glargine Injectable (LANTUS) 12 Unit(s) SubCutaneous at bedtime  insulin lispro (ADMELOG) corrective regimen sliding scale   SubCutaneous three times a day before meals  lactated ringers. 1000 milliLiter(s) IV Continuous <Continuous>    PRN Meds  dextrose Oral Gel 15 Gram(s) Oral once PRN      Case discussed with resident    Care discussed with pt/family

## 2024-09-06 ENCOUNTER — TRANSCRIPTION ENCOUNTER (OUTPATIENT)
Age: 54
End: 2024-09-06

## 2024-09-06 VITALS
TEMPERATURE: 98 F | RESPIRATION RATE: 18 BRPM | HEART RATE: 91 BPM | SYSTOLIC BLOOD PRESSURE: 134 MMHG | DIASTOLIC BLOOD PRESSURE: 79 MMHG

## 2024-09-06 LAB
ANION GAP SERPL CALC-SCNC: 11 MMOL/L — SIGNIFICANT CHANGE UP (ref 7–14)
ANION GAP SERPL CALC-SCNC: 13 MMOL/L — SIGNIFICANT CHANGE UP (ref 7–14)
BASOPHILS # BLD AUTO: 0.03 K/UL — SIGNIFICANT CHANGE UP (ref 0–0.2)
BASOPHILS NFR BLD AUTO: 0.5 % — SIGNIFICANT CHANGE UP (ref 0–1)
BUN SERPL-MCNC: 36 MG/DL — HIGH (ref 10–20)
BUN SERPL-MCNC: 37 MG/DL — HIGH (ref 10–20)
CALCIUM SERPL-MCNC: 9.6 MG/DL — SIGNIFICANT CHANGE UP (ref 8.4–10.5)
CALCIUM SERPL-MCNC: 9.9 MG/DL — SIGNIFICANT CHANGE UP (ref 8.4–10.5)
CHLORIDE SERPL-SCNC: 100 MMOL/L — SIGNIFICANT CHANGE UP (ref 98–110)
CHLORIDE SERPL-SCNC: 100 MMOL/L — SIGNIFICANT CHANGE UP (ref 98–110)
CO2 SERPL-SCNC: 27 MMOL/L — SIGNIFICANT CHANGE UP (ref 17–32)
CO2 SERPL-SCNC: 28 MMOL/L — SIGNIFICANT CHANGE UP (ref 17–32)
CREAT SERPL-MCNC: 1.2 MG/DL — SIGNIFICANT CHANGE UP (ref 0.7–1.5)
CREAT SERPL-MCNC: 1.3 MG/DL — SIGNIFICANT CHANGE UP (ref 0.7–1.5)
EGFR: 49 ML/MIN/1.73M2 — LOW
EGFR: 53 ML/MIN/1.73M2 — LOW
EOSINOPHIL # BLD AUTO: 0.04 K/UL — SIGNIFICANT CHANGE UP (ref 0–0.7)
EOSINOPHIL NFR BLD AUTO: 0.6 % — SIGNIFICANT CHANGE UP (ref 0–8)
GLUCOSE BLDC GLUCOMTR-MCNC: 130 MG/DL — HIGH (ref 70–99)
GLUCOSE BLDC GLUCOMTR-MCNC: 57 MG/DL — LOW (ref 70–99)
GLUCOSE BLDC GLUCOMTR-MCNC: 90 MG/DL — SIGNIFICANT CHANGE UP (ref 70–99)
GLUCOSE SERPL-MCNC: 129 MG/DL — HIGH (ref 70–99)
GLUCOSE SERPL-MCNC: 163 MG/DL — HIGH (ref 70–99)
HCT VFR BLD CALC: 35.3 % — LOW (ref 37–47)
HGB BLD-MCNC: 11.6 G/DL — LOW (ref 12–16)
IMM GRANULOCYTES NFR BLD AUTO: 0.5 % — HIGH (ref 0.1–0.3)
LYMPHOCYTES # BLD AUTO: 1.33 K/UL — SIGNIFICANT CHANGE UP (ref 1.2–3.4)
LYMPHOCYTES # BLD AUTO: 20.4 % — LOW (ref 20.5–51.1)
MCHC RBC-ENTMCNC: 29.3 PG — SIGNIFICANT CHANGE UP (ref 27–31)
MCHC RBC-ENTMCNC: 32.9 G/DL — SIGNIFICANT CHANGE UP (ref 32–37)
MCV RBC AUTO: 89.1 FL — SIGNIFICANT CHANGE UP (ref 81–99)
MONOCYTES # BLD AUTO: 0.37 K/UL — SIGNIFICANT CHANGE UP (ref 0.1–0.6)
MONOCYTES NFR BLD AUTO: 5.7 % — SIGNIFICANT CHANGE UP (ref 1.7–9.3)
NEUTROPHILS # BLD AUTO: 4.71 K/UL — SIGNIFICANT CHANGE UP (ref 1.4–6.5)
NEUTROPHILS NFR BLD AUTO: 72.3 % — SIGNIFICANT CHANGE UP (ref 42.2–75.2)
NRBC # BLD: 0 /100 WBCS — SIGNIFICANT CHANGE UP (ref 0–0)
PLATELET # BLD AUTO: 257 K/UL — SIGNIFICANT CHANGE UP (ref 130–400)
PMV BLD: 10.4 FL — SIGNIFICANT CHANGE UP (ref 7.4–10.4)
POTASSIUM SERPL-MCNC: 4.2 MMOL/L — SIGNIFICANT CHANGE UP (ref 3.5–5)
POTASSIUM SERPL-MCNC: 4.4 MMOL/L — SIGNIFICANT CHANGE UP (ref 3.5–5)
POTASSIUM SERPL-SCNC: 4.2 MMOL/L — SIGNIFICANT CHANGE UP (ref 3.5–5)
POTASSIUM SERPL-SCNC: 4.4 MMOL/L — SIGNIFICANT CHANGE UP (ref 3.5–5)
RBC # BLD: 3.96 M/UL — LOW (ref 4.2–5.4)
RBC # FLD: 12.5 % — SIGNIFICANT CHANGE UP (ref 11.5–14.5)
SODIUM SERPL-SCNC: 138 MMOL/L — SIGNIFICANT CHANGE UP (ref 135–146)
SODIUM SERPL-SCNC: 141 MMOL/L — SIGNIFICANT CHANGE UP (ref 135–146)
WBC # BLD: 6.51 K/UL — SIGNIFICANT CHANGE UP (ref 4.8–10.8)
WBC # FLD AUTO: 6.51 K/UL — SIGNIFICANT CHANGE UP (ref 4.8–10.8)

## 2024-09-06 PROCEDURE — 99239 HOSP IP/OBS DSCHRG MGMT >30: CPT

## 2024-09-06 RX ORDER — ASPIRIN 81 MG
1 TABLET, DELAYED RELEASE (ENTERIC COATED) ORAL
Qty: 30 | Refills: 2
Start: 2024-09-06 | End: 2024-12-04

## 2024-09-06 RX ORDER — BUDESONIDE AND FORMOTEROL FUMARATE 80; 4.5 UG/1; UG/1
1 AEROSOL, METERED RESPIRATORY (INHALATION)
Qty: 2 | Refills: 0
Start: 2024-09-06 | End: 2024-10-05

## 2024-09-06 RX ORDER — NIFEDIPINE 60 MG/1
1 TABLET, FILM COATED, EXTENDED RELEASE ORAL
Qty: 30 | Refills: 2
Start: 2024-09-06 | End: 2024-12-04

## 2024-09-06 RX ORDER — BUDESONIDE AND FORMOTEROL FUMARATE 80; 4.5 UG/1; UG/1
2 AEROSOL, METERED RESPIRATORY (INHALATION)
Qty: 2 | Refills: 0
Start: 2024-09-06 | End: 2024-10-05

## 2024-09-06 RX ADMIN — AMLODIPINE BESYLATE 10 MILLIGRAM(S): 10 TABLET ORAL at 05:43

## 2024-09-06 RX ADMIN — Medication 81 MILLIGRAM(S): at 12:03

## 2024-09-06 RX ADMIN — Medication 5000 UNIT(S): at 08:16

## 2024-09-06 NOTE — DISCHARGE NOTE PROVIDER - CARE PROVIDER_API CALL
[facility] Medicine  [address] [address] - [address]  [place] [address]  Phone: [phone]  Fax: [phone]  Follow Up Time: 2 weeks    Duran Moody  Interventional Cardiology  [address] [place] [address]  Phone: [phone]  Fax: [phone]  Follow Up Time: 2 weeks    Chato Coronado  Pulmonary Disease  [address] [place] [address], [address]  [place] [address]  Phone: [phone]  Fax: [phone]  Follow Up Time: 2 weeks

## 2024-09-06 NOTE — PROGRESS NOTE ADULT - ASSESSMENT
52 y/o Female with PMH of HTN, HLD, mild CAD on cath 4/2023, DM2 dx in 2018, has been on insulin since 2022,  RA, latent TB treated (reported receiving 8 months of therapy), who had dyspnea and found to have RML opacification, was admitted for bronch BAL but her BP was high before and after the procedure.     Hypertensive  Urgency/ Emergency     VHD/Severe AS & moderate AR    Hx of latent TB reportedly s/p Isoniazid for 8 months   S/P BAL procedure (Right middle lobe atelectasis/opacification since 2020 possible MAC)    NICOLASA (Creatinine 1.5) off ACEIs  DM  CAD      Continue current care as per medicine and Pulmonary   DVT/GI prophylaxis   Daily ASA/Statin   Serial Creatinine/Encourage PO fluids   Keep patient off ACEIs until creatinine to baseline then restart and recheck creatinine early after discharge   I Creatinine remains 1.5 then D/C home and will restart her ACEI as out patient once creatinine is back to baseline   SPRING as out patient to better assess her aortic valve   Will F/U

## 2024-09-06 NOTE — DISCHARGE NOTE PROVIDER - HOSPITAL COURSE
53 y o female with pmh of HTN, HLD, mild CAD on cath 4/2023, DM2 dx in 2018, has been on insulin since 2022, h/o RA, latent TB treated (reported receiving 8 months of therapy), who had dyspnea and found to have RML opacification, was admitted for bronch BAL but her BP was high before and after the procedure.   Prior to procedure it was 210; procedure was performed; after the procedure it remained 217/97. Was given hydralazine IV with slight improvement; then labetalol 10 mg IV given with improvement in BP to 170s.   Patient reports being dizzy.   No chest pain, no SOB.   Reports that usually on exertion she experiences SOB   Patient reports that she checks her BP few times per week and it is usually less than 130.   Patient was discharged last year on lisinopril 10, simvastatin 40 mg and aspirin however she reports that she is not taking any oral meds (she's only on the insulin)     #Hypertension urgency - resolved   - BP pre/postprocedure 210/100  - s/p iv labetalol 10 mg iv once and hydralazine 10 mg IV once.   - Home lisinopril 10mg od but not compliant  - EKG Nl, CTH Nl, Trop 14-18   - Started on nifedipine 60 mg XR daily, labetalol 100 mg BID and hydralazine 25 mg TID --> BP dropped 90/50 OVN>> - held BP meds then resumed amlodipine 10mg  - Crea 0.9 --> 1.5 (NICOLASA)> stable at 1.5  - received LR IV hydration  -will dc on nifedipine 30mg as per cardiology until NICOLASA resolves, then can resume lisinopril    #NICOLASA likely 2/2 prerenal 2/2 dehydration vs acute drop in BP  - Crea 0.9 --> 1.5--> 1.5  -hydration  -bladder scan negative for retention    #Severe AS  - TTE done , severe AS, size of 0.7  - Cardiology saw the patient bedside, will f/u as outpt    #Hx of latent TB reportedly s/p Isoniazid for 8 months   - Never smoker  - CT A&P 04/2024 that showed a rt middle lobe atelectasis, RT middle lobe opacification noted since 2020.   - PFTs done and was normal.   - CT repeat: with RML atelectasis and bronchiectasis; much milder in lingula   - seen by Dr Michael gray who is suspecting MAC. So BAL was performed on 9/3: FU results    #DM2    #CAD  - mild CAD on cath 4/2023  - resume aspirin and statin   - , Trigly 151     53 y o female with pmh of HTN, HLD, mild CAD on cath 4/2023, DM2 dx in 2018, has been on insulin since 2022, h/o RA, latent TB treated (reported receiving 8 months of therapy), who had dyspnea and found to have RML opacification, was admitted for bronch BAL but her BP was high before and after the procedure.   Prior to procedure it was 210; procedure was performed; after the procedure it remained 217/97. Was given hydralazine IV with slight improvement; then labetalol 10 mg IV given with improvement in BP to 170s.   Patient reports being dizzy.   No chest pain, no SOB.   Reports that usually on exertion she experiences SOB   Patient reports that she checks her BP few times per week and it is usually less than 130.   Patient was discharged last year on lisinopril 10, simvastatin 40 mg and aspirin however she reports that she is not taking any oral meds (she's only on the insulin)     #Hypertension urgency - resolved   - BP pre/postprocedure 210/100  - s/p iv labetalol 10 mg iv once and hydralazine 10 mg IV once.   - Home lisinopril 10mg od but not compliant  - EKG Nl, CTH Nl, Trop 14-18   - Started on nifedipine 60 mg XR daily, labetalol 100 mg BID and hydralazine 25 mg TID --> BP dropped 90/50 OVN>> - held BP meds then resumed amlodipine 10mg  - Crea 0.9 --> 1.5 (NICOLASA)> stable at 1.5  - received LR IV hydration  -will dc on nifedipine 30mg as per cardiology until NICOLASA resolves, then can resume lisinopril    #NICOLASA likely 2/2 prerenal 2/2 dehydration vs acute drop in BP, resolving  - Crea 0.9 --> 1.5--> 1.5--> 1.3  -Hydration  -bladder scan negative for retention    #Severe AS  - TTE done , severe AS, size of 0.7  - Cardiology saw the patient bedside, will f/u as outpt    #Hx of latent TB reportedly s/p Isoniazid for 8 months   - Never smoker  - CT A&P 04/2024 that showed a rt middle lobe atelectasis, RT middle lobe opacification noted since 2020.   - PFTs done and was normal.   - CT repeat: with RML atelectasis and bronchiectasis; much milder in lingula   - seen by Dr Michael gray who is suspecting MAC. So BAL was performed on 9/3: FU results    #DM2    #CAD  - mild CAD on cath 4/2023  - resume aspirin and statin   - , Trigly 151

## 2024-09-06 NOTE — DISCHARGE NOTE NURSING/CASE MANAGEMENT/SOCIAL WORK - PATIENT PORTAL LINK FT
patient c/o SOB and vomited x3 and now feels better. patient has h/o cardiac with Afib, CHF  and defibrillator and being evaluated for LVAD. denies pain now. patient received 500 ml NS by EMS IV to left arm by EMS. PICC line to right arm patient was hypotensive at the scene You can access the FollowMyHealth Patient Portal offered by [facility] by registering at the following website: [email or web address]. By joining [employer]’s FollowMyHealth portal, you will also be able to view your health information using other applications (apps) compatible with our system.

## 2024-09-06 NOTE — PROGRESS NOTE ADULT - SUBJECTIVE AND OBJECTIVE BOX
SATNAMANDRYBHANU IZA  53y Female    CHIEF COMPLAINT:    Patient is a 53y old  Female who presents with a chief complaint of Bronchoscopy and BAL (06 Sep 2024 10:48)      INTERVAL HPI/OVERNIGHT EVENTS:    Patient seen and examined. No sob. No other complaint    ROS: All other systems are negative.    Vital Signs:    T(F): 97.9 (24 @ 04:29), Max: 97.9 (24 @ 19:02)  HR: 86 (24 @ 04:29) (84 - 107)  BP: 133/73 (24 @ 04:29) (130/64 - 214/101)  RR: 18 (24 @ 04:29) (17 - 20)  SpO2: 98% (24 @ 19:02) (98% - 98%)  I&O's Summary    06 Sep 2024 07:01  -  06 Sep 2024 12:23  --------------------------------------------------------  IN: 120 mL / OUT: 0 mL / NET: 120 mL      Daily     Daily Weight in k.7 (06 Sep 2024 04:21)  CAPILLARY BLOOD GLUCOSE      POCT Blood Glucose.: 130 mg/dL (06 Sep 2024 11:25)  POCT Blood Glucose.: 90 mg/dL (06 Sep 2024 08:16)  POCT Blood Glucose.: 57 mg/dL (06 Sep 2024 07:34)  POCT Blood Glucose.: 137 mg/dL (05 Sep 2024 21:03)  POCT Blood Glucose.: 66 mg/dL (05 Sep 2024 16:22)      PHYSICAL EXAM:    GENERAL:  NAD  SKIN: No rashes or lesions  HENT: Atraumatic. Normocephalic. PERRL. Moist membranes.  NECK: Supple, No JVD. No lymphadenopathy.  PULMONARY: CTA B/L. No wheezing. No rales  CVS: Normal S1, S2. Rate and Rhythm are regular. A IV/VI systolic mm over aortic area  ABDOMEN/GI: Soft, Nontender, Nondistended; BS present  EXTREMITIES: Peripheral pulses intact. No edema B/L LE.  NEUROLOGIC:  No motor or sensory deficit.  PSYCH: Alert & oriented x 3    Consultant(s) Notes Reviewed:  [x ] YES  [ ] NO  Care Discussed with Consultants/Other Providers [ x] YES  [ ] NO    EKG reviewed  Telemetry reviewed    LABS:                        9.7    8.40  )-----------( 217      ( 05 Sep 2024 07:05 )             29.9     09-06    141  |  100  |  37<H>  ----------------------------<  163<H> Creatinine Trend: 1.3<--, 1.5<--, 1.5<--, 0.9<--  4.2   |  28  |  1.3    Ca    9.9      06 Sep 2024 09:30                RADIOLOGY & ADDITIONAL TESTS:      Imaging or report Personally Reviewed:  [ ] YES  [ ] NO    Medications:  Standing  amLODIPine   Tablet 10 milliGRAM(s) Oral daily  aspirin enteric coated 81 milliGRAM(s) Oral daily  atorvastatin 40 milliGRAM(s) Oral at bedtime  budesonide  80 MICROgram(s)/formoterol 4.5 MICROgram(s) Inhaler 2 Puff(s) Inhalation two times a day  dextrose 5%. 1000 milliLiter(s) IV Continuous <Continuous>  dextrose 5%. 1000 milliLiter(s) IV Continuous <Continuous>  dextrose 50% Injectable 25 Gram(s) IV Push once  dextrose 50% Injectable 12.5 Gram(s) IV Push once  dextrose 50% Injectable 25 Gram(s) IV Push once  glucagon  Injectable 1 milliGRAM(s) IntraMuscular once  heparin   Injectable 5000 Unit(s) SubCutaneous every 12 hours  insulin glargine Injectable (LANTUS) 12 Unit(s) SubCutaneous at bedtime  insulin lispro (ADMELOG) corrective regimen sliding scale   SubCutaneous three times a day before meals  lactated ringers. 1000 milliLiter(s) IV Continuous <Continuous>    PRN Meds  dextrose Oral Gel 15 Gram(s) Oral once PRN      Case discussed with resident    Care discussed with pt/family

## 2024-09-06 NOTE — PROGRESS NOTE ADULT - ASSESSMENT
53 y o female with PMH of HTN, HLD, mild CAD on cath 4/2023, DM2 dx in 2018, has been on insulin since 2022, h/o RA, latent TB treated (reported receiving 8 months of therapy), who had dyspnea and found to have RML opacification, was admitted for bronch BAL but her BP was high before and after the procedure.     Hypertensive Urgency  DM-2 / HTN / DL  CAD  H/O Latent TB s/p treatment  NICOLASA  Severe AS            PLAN:     ·	Tele reviewed by me. No events  ·	EKG on admission: NSR 87/min (Interpreted by me)  ·	Labs noted. Cr is trending down  ·	Care d/w the pt's cardiologist. Recommended discharge her on Nifedipine xl 30 mg po daily. Hold ACE-I for now due to NICOLASA  ·	ECHO reviewed. EF is 64%. Severe AS. Mod AR  ·	SPRING as an out pt for severe AS as per pt's cardiologist  ·	Cont her other home meds.   ·	S/P bronch. Care d/w the pt's pulmonary. F/U as an out pt  ·	D/C home    * Med rec reviewed. Plan of care d/w the pt. Time spent 36 minutes.

## 2024-09-06 NOTE — DISCHARGE NOTE PROVIDER - NSDCCPCAREPLAN_GEN_ALL_CORE_FT
PRINCIPAL DISCHARGE DIAGNOSIS  Diagnosis: Hypertension  Assessment and Plan of Treatment: .You were evaluated in the hospital after being here for your bronchoscopy, and your blood pressure was found to be elevated  You recieved medications for your elevated blood pressure, and it improved, but you developed an acute kidney injury, which can happen with elevated blood pressures, or with a rapid drop of blood pressure.   You creatinine levels, which are the indicator of kidney function, have been stable, so byou will be discharged hoime. You need to take your nifedipine 30mg daily, and check your blood pressure readings at home.  After discharge, you will need to:   - Follow up with your primary care doctor within 1-2 weeks  - Follow up with the cardiologist & pulmonologist  - Take all the medications you were discharged with, unless otherwise instructed by your healthcare provider(s).   Please follow up with your providers by calling them to make an appointment so that you can see them in 1-2weeks; bring your paperwork from this hospital stay to that visit. You can access your visit information by signing up for an account for the patient portal at [email or web address]   Seek immediate medical attention if you develop fevers, chills, chest pain, shortness of breath, nausea and vomiting, abdominal pain, passing out, weakness or numbness or tingling on one side of your body, or any other concerning signs or symptoms.       PRINCIPAL DISCHARGE DIAGNOSIS  Diagnosis: Hypertension  Assessment and Plan of Treatment: .You were evaluated in the hospital after being here for your bronchoscopy, and your blood pressure was found to be elevated  You recieved medications for your elevated blood pressure, and it improved, but you developed an acute kidney injury, which can happen with elevated blood pressures, or with a rapid drop of blood pressure.   You creatinine levels, which are the indicator of kidney function, have been improving, so byou will be discharged hoime. You need to take your nifedipine 30mg daily, and check your blood pressure readings at home.  After discharge, you will need to:   - Follow up with your primary care doctor within 1-2 weeks  - Follow up with the cardiologist & pulmonologist  - Take all the medications you were discharged with, unless otherwise instructed by your healthcare provider(s).   Please follow up with your providers by calling them to make an appointment so that you can see them in 1-2weeks; bring your paperwork from this hospital stay to that visit. You can access your visit information by signing up for an account for the patient portal at [email or web address]   Seek immediate medical attention if you develop fevers, chills, chest pain, shortness of breath, nausea and vomiting, abdominal pain, passing out, weakness or numbness or tingling on one side of your body, or any other concerning signs or symptoms.

## 2024-09-06 NOTE — DISCHARGE NOTE PROVIDER - CARE PROVIDERS DIRECT ADDRESSES
,[email or web address]

## 2024-09-06 NOTE — DISCHARGE NOTE PROVIDER - NSDCMRMEDTOKEN_GEN_ALL_CORE_FT
Insulin Glargine: 12 unit(s) subcutaneous once a day (at bedtime)  insulin lispro 100 units/mL injectable solution: 4 unit(s) injectable once a day

## 2024-09-06 NOTE — PHARMACOTHERAPY INTERVENTION NOTE - COMMENTS
Patient planned for discharge today, new medications include aspirin 81 mg po daily, Symbicort, atorvastatin 40 mg po qhs, and nifedipine ER 30 mg po daily. Patient and  showed interest in having medications filled at [facility]. Prescriptions for new medications were sent to [facility]. Will remain on home insulin regimen, however patient did tell me that she needs pen needles and test strips. Asked Dr Guerra and Dr Albarran if scripts could be sent on discharge.    Discharge medication list:  aspirin 81 mg oral delayed release tablet: 1 tab(s) orally once a day  atorvastatin 40 mg oral tablet: 1 tab(s) orally once a day (at bedtime)  budesonide-formoterol 80 mcg-4.5 mcg/inh inhalation aerosol: 1 puff(s) inhaled 2 times a day  Insulin Glargine: 12 unit(s) subcutaneous once a day (at bedtime)  insulin lispro 100 units/mL injectable solution: 4 unit(s) injectable once a day  NIFEdipine 30 mg oral tablet, extended release: 1 tab(s) orally once a day  
Spoke to patient and patient's  to confirm home medications. Patient had picture on her phone of insulin products she uses- patient confirmed she uses insulin glargine 12 units qhs and humalog kwikpen (insulin lispro) 4 units once daily. Removed Tresiba from admission med rec and changed to insulin glargine. Patient also endorses she ran out of pen needles and test strips will need to send scripts on discharge.
Patient uses Lantus (insulin glargine) not Tresiba (insulin degludec). Updated admission med rec.

## 2024-09-06 NOTE — DISCHARGE NOTE PROVIDER - NSDCFUSCHEDAPPT_GEN_ALL_CORE_FT
[place]  [facility]  PULMMED [address]  Scheduled Appointment: 09/30/2024    Eugene Kauffman  [facility]  Scheduled Appointment: 10/10/2024    [facility]  [address]CRIN [address]  Scheduled Appointment: 10/10/2024    Aminata Billingsley  [facility]  RHEUM [address]  Scheduled Appointment: 11/20/2024

## 2024-09-06 NOTE — PROGRESS NOTE ADULT - SUBJECTIVE AND OBJECTIVE BOX
The patient was seen and examined   No acute events over the last 24 hours   Denies anychest pain, SOB or palpitations       MEDICATIONS  (STANDING):  amLODIPine   Tablet 10 milliGRAM(s) Oral daily  aspirin enteric coated 81 milliGRAM(s) Oral daily  atorvastatin 40 milliGRAM(s) Oral at bedtime  budesonide  80 MICROgram(s)/formoterol 4.5 MICROgram(s) Inhaler 2 Puff(s) Inhalation two times a day  dextrose 5%. 1000 milliLiter(s) (100 mL/Hr) IV Continuous <Continuous>  dextrose 5%. 1000 milliLiter(s) (50 mL/Hr) IV Continuous <Continuous>  dextrose 50% Injectable 25 Gram(s) IV Push once  dextrose 50% Injectable 12.5 Gram(s) IV Push once  dextrose 50% Injectable 25 Gram(s) IV Push once  glucagon  Injectable 1 milliGRAM(s) IntraMuscular once  heparin   Injectable 5000 Unit(s) SubCutaneous every 12 hours  insulin glargine Injectable (LANTUS) 12 Unit(s) SubCutaneous at bedtime  insulin lispro (ADMELOG) corrective regimen sliding scale   SubCutaneous three times a day before meals  lactated ringers. 1000 milliLiter(s) (50 mL/Hr) IV Continuous <Continuous>    MEDICATIONS  (PRN):  dextrose Oral Gel 15 Gram(s) Oral once PRN Blood Glucose LESS THAN 70 milliGRAM(s)/deciliter            Vital Signs Last 24 Hrs  T(C): 36.6 (06 Sep 2024 04:29), Max: 36.6 (05 Sep 2024 19:02)  T(F): 97.9 (06 Sep 2024 04:29), Max: 97.9 (05 Sep 2024 19:02)  HR: 86 (06 Sep 2024 04:29) (84 - 107)  BP: 133/73 (06 Sep 2024 04:29) (130/64 - 214/101)  BP(mean): --  RR: 18 (06 Sep 2024 04:29) (17 - 20)  SpO2: 98% (05 Sep 2024 19:02) (98% - 98%)         REVIEW OF SYSTEMS:  CONSTITUTIONAL: No fever, weight loss, or fatigue  CARDIOLOGY: PAtient denies chest pain, shortness of breath or syncopal episodes.   RESPIRATORY: denies shortness of breath, wheezeing.   NEUROLOGICAL: NO weakness, no focal deficits to report.  GI: no BRBPR, no N,V,diarrhea.    PSYCHIATRY: normal mood and affect  HEENT: no nasal discharge, no ecchymosis  SKIN: no ecchymosis, no breakdown  MUSCULOSKELETAL: Full range of motion x4.        PHYSICAL EXAM:  · CONSTITUTIONAL:	Well-developed, well nourished      ·RESPIRATORY:   airway patent; breath sounds equal; good air movement; respirations non-labored; clear to auscultation bilaterally   · CARDIOVASCULAR	regular rate and rhythm  no rub  + SE murmur  normal PMI  · EXTREMITIES: No cyanosis, clubbing or edema  · VASCULAR: No JVD   	  TELEMETRY: Sinus rhythm       TTE: Normal LVF/Severe AS     LABS:                        9.7    8.40  )-----------( 217      ( 05 Sep 2024 07:05 )             29.9     09-05    142  |  107  |  39<H>  ----------------------------<  68<L>  4.5   |  27  |  1.5    Ca    9.2      05 Sep 2024 07:05              I&O's Summary    BNP  RADIOLOGY & ADDITIONAL STUDIES:    IMPRESSION AND PLAN:         The patient was seen and examined   No acute events over the last 24 hours   Denies any chest pain, SOB or palpitations       MEDICATIONS  (STANDING):  amLODIPine   Tablet 10 milliGRAM(s) Oral daily  aspirin enteric coated 81 milliGRAM(s) Oral daily  atorvastatin 40 milliGRAM(s) Oral at bedtime  budesonide  80 MICROgram(s)/formoterol 4.5 MICROgram(s) Inhaler 2 Puff(s) Inhalation two times a day  dextrose 5%. 1000 milliLiter(s) (100 mL/Hr) IV Continuous <Continuous>  dextrose 5%. 1000 milliLiter(s) (50 mL/Hr) IV Continuous <Continuous>  dextrose 50% Injectable 25 Gram(s) IV Push once  dextrose 50% Injectable 12.5 Gram(s) IV Push once  dextrose 50% Injectable 25 Gram(s) IV Push once  glucagon  Injectable 1 milliGRAM(s) IntraMuscular once  heparin   Injectable 5000 Unit(s) SubCutaneous every 12 hours  insulin glargine Injectable (LANTUS) 12 Unit(s) SubCutaneous at bedtime  insulin lispro (ADMELOG) corrective regimen sliding scale   SubCutaneous three times a day before meals  lactated ringers. 1000 milliLiter(s) (50 mL/Hr) IV Continuous <Continuous>    MEDICATIONS  (PRN):  dextrose Oral Gel 15 Gram(s) Oral once PRN Blood Glucose LESS THAN 70 milliGRAM(s)/deciliter            Vital Signs Last 24 Hrs  T(C): 36.6 (06 Sep 2024 04:29), Max: 36.6 (05 Sep 2024 19:02)  T(F): 97.9 (06 Sep 2024 04:29), Max: 97.9 (05 Sep 2024 19:02)  HR: 86 (06 Sep 2024 04:29) (84 - 107)  BP: 133/73 (06 Sep 2024 04:29) (130/64 - 214/101)  BP(mean): --  RR: 18 (06 Sep 2024 04:29) (17 - 20)  SpO2: 98% (05 Sep 2024 19:02) (98% - 98%)         REVIEW OF SYSTEMS:  CONSTITUTIONAL: No fever, weight loss, or fatigue  CARDIOLOGY: Patient denies chest pain, shortness of breath or syncopal episodes    RESPIRATORY: denies shortness of breath, wheezeing.   NEUROLOGICAL: NO weakness, no focal deficits to report   GI: no BRBPR, no N,V,diarrhea.    PSYCHIATRY: normal mood and affect  HEENT: no nasal discharge, no ecchymosis  SKIN: no ecchymosis, no breakdown  MUSCULOSKELETAL: Full range of motion x 4        PHYSICAL EXAM:  · CONSTITUTIONAL:	Well-developed, well nourished      ·RESPIRATORY:   airway patent; breath sounds equal; good air movement; respirations non-labored; clear to auscultation bilaterally   · CARDIOVASCULAR	regular rate and rhythm  no rub  + SE murmur  normal PMI  · EXTREMITIES: No cyanosis, clubbing or edema  · VASCULAR: No JVD   	  TELEMETRY: Sinus rhythm       TTE: Normal LVF/Severe AS & moderate AR     LABS:                        9.7    8.40  )-----------( 217      ( 05 Sep 2024 07:05 )             29.9     09-05    142  |  107  |  39<H>  ----------------------------<  68<L>  4.5   |  27  |  1.5    Ca    9.2      05 Sep 2024 07:05              I&O's Summary    BNP  RADIOLOGY & ADDITIONAL STUDIES:    IMPRESSION AND PLAN:

## 2024-09-06 NOTE — DISCHARGE NOTE PROVIDER - PROVIDER TOKENS
PROVIDER:[TOKEN:[[id number]],FOLLOWUP:[2 weeks]],PROVIDER:[TOKEN:[[id number]],FOLLOWUP:[2 weeks]],PROVIDER:[TOKEN:[[id number]],FOLLOWUP:[2 weeks]]

## 2024-09-16 DIAGNOSIS — I35.0 NONRHEUMATIC AORTIC (VALVE) STENOSIS: ICD-10-CM

## 2024-09-16 DIAGNOSIS — E86.0 DEHYDRATION: ICD-10-CM

## 2024-09-16 DIAGNOSIS — Z86.15 PERSONAL HISTORY OF LATENT TUBERCULOSIS INFECTION: ICD-10-CM

## 2024-09-16 DIAGNOSIS — I10 ESSENTIAL (PRIMARY) HYPERTENSION: ICD-10-CM

## 2024-09-16 DIAGNOSIS — Z79.4 LONG TERM (CURRENT) USE OF INSULIN: ICD-10-CM

## 2024-09-16 DIAGNOSIS — E78.00 PURE HYPERCHOLESTEROLEMIA, UNSPECIFIED: ICD-10-CM

## 2024-09-16 DIAGNOSIS — Z79.899 OTHER LONG TERM (CURRENT) DRUG THERAPY: ICD-10-CM

## 2024-09-16 DIAGNOSIS — E11.40 TYPE 2 DIABETES MELLITUS WITH DIABETIC NEUROPATHY, UNSPECIFIED: ICD-10-CM

## 2024-09-16 DIAGNOSIS — J98.11 ATELECTASIS: ICD-10-CM

## 2024-09-16 DIAGNOSIS — N17.9 ACUTE KIDNEY FAILURE, UNSPECIFIED: ICD-10-CM

## 2024-09-16 DIAGNOSIS — I25.10 ATHEROSCLEROTIC HEART DISEASE OF NATIVE CORONARY ARTERY WITHOUT ANGINA PECTORIS: ICD-10-CM

## 2024-09-16 DIAGNOSIS — J98.4 OTHER DISORDERS OF LUNG: ICD-10-CM

## 2024-09-24 ENCOUNTER — APPOINTMENT (OUTPATIENT)
Dept: PULMONOLOGY | Facility: CLINIC | Age: 54
End: 2024-09-24
Payer: COMMERCIAL

## 2024-09-24 VITALS
RESPIRATION RATE: 14 BRPM | BODY MASS INDEX: 17.87 KG/M2 | HEIGHT: 60 IN | WEIGHT: 91 LBS | OXYGEN SATURATION: 96 % | DIASTOLIC BLOOD PRESSURE: 70 MMHG | SYSTOLIC BLOOD PRESSURE: 100 MMHG | HEART RATE: 85 BPM

## 2024-09-24 DIAGNOSIS — J98.11 ATELECTASIS: ICD-10-CM

## 2024-09-24 DIAGNOSIS — J47.9 BRONCHIECTASIS, UNCOMPLICATED: ICD-10-CM

## 2024-09-24 PROCEDURE — G2211 COMPLEX E/M VISIT ADD ON: CPT | Mod: NC

## 2024-09-24 PROCEDURE — 99214 OFFICE O/P EST MOD 30 MIN: CPT

## 2024-09-24 NOTE — ASSESSMENT
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

## 2024-09-24 NOTE — REVIEW OF SYSTEMS
[Cough] : cough [Negative] : Musculoskeletal [Pleuritic Pain] : no pleuritic pain [SOB on Exertion] : no sob on exertion

## 2024-09-24 NOTE — HISTORY OF PRESENT ILLNESS
[Never] : never [TextBox_4] : 54 y/o F with pmhx of RA, latent TB s/p treatment? Dm, presenting for shoulder pain and lung mass. Patient was recently treated for latent TB and has finished her treatment several weeks ago. She is now complaining of dyspnea on exertion and shoulder pain on inspiration for the last several months. She also reports weight loss and fatigue throughout the day. Never smoker. Had a CT A&P 04/2024 that showed a rt middle lobe atelectasis, RT middle lobe opacification noted since 2020   PFTs done and are normal  No wheezing on exam today  CT with RML atelectasis and bronchiectasis; much milder in lingula  high suspicion for MAC  Short of breath only with exertion   9/24/24: S/P bronch and BAL. Complicated by hypertension and stayed in the hospital for a day. BAL cultures so far negative other than haemophilus. AFB cultures so far negative as well. She has occasional cough, no wheezing, no fever or chills.

## 2024-09-30 ENCOUNTER — APPOINTMENT (OUTPATIENT)
Dept: PULMONOLOGY | Facility: CLINIC | Age: 54
End: 2024-09-30

## 2024-10-10 ENCOUNTER — OUTPATIENT (OUTPATIENT)
Dept: OUTPATIENT SERVICES | Facility: HOSPITAL | Age: 54
LOS: 1 days | End: 2024-10-10

## 2024-10-10 ENCOUNTER — APPOINTMENT (OUTPATIENT)
Dept: ENDOCRINOLOGY | Facility: CLINIC | Age: 54
End: 2024-10-10

## 2024-10-10 VITALS
DIASTOLIC BLOOD PRESSURE: 82 MMHG | BODY MASS INDEX: 18.16 KG/M2 | WEIGHT: 93 LBS | HEART RATE: 88 BPM | SYSTOLIC BLOOD PRESSURE: 151 MMHG

## 2024-10-10 DIAGNOSIS — Z00.00 ENCOUNTER FOR GENERAL ADULT MEDICAL EXAMINATION WITHOUT ABNORMAL FINDINGS: ICD-10-CM

## 2024-10-10 DIAGNOSIS — E11.00 TYPE 2 DIABETES MELLITUS WITH HYPEROSMOLARITY W/OUT NONKETOTIC HYPERGLYCEMIC-HYPEROSMOLAR COMA (NKHHC): ICD-10-CM

## 2024-10-10 DIAGNOSIS — E11.21 TYPE 2 DIABETES MELLITUS WITH DIABETIC NEPHROPATHY: ICD-10-CM

## 2024-10-10 DIAGNOSIS — Z86.39 PERSONAL HISTORY OF OTHER ENDOCRINE, NUTRITIONAL AND METABOLIC DISEASE: ICD-10-CM

## 2024-10-10 DIAGNOSIS — E78.00 PURE HYPERCHOLESTEROLEMIA, UNSPECIFIED: ICD-10-CM

## 2024-10-10 DIAGNOSIS — Z98.891 HISTORY OF UTERINE SCAR FROM PREVIOUS SURGERY: Chronic | ICD-10-CM

## 2024-10-10 DIAGNOSIS — E10.65 TYPE 1 DIABETES MELLITUS WITH HYPERGLYCEMIA: ICD-10-CM

## 2024-10-10 PROCEDURE — 99214 OFFICE O/P EST MOD 30 MIN: CPT

## 2024-10-10 RX ORDER — INSULIN LISPRO 100 [IU]/ML
100 INJECTION, SOLUTION INTRAVENOUS; SUBCUTANEOUS
Qty: 2 | Refills: 2 | Status: ACTIVE | COMMUNITY
Start: 2024-10-10 | End: 1900-01-01

## 2024-10-10 RX ORDER — LANCETS
EACH MISCELLANEOUS
Qty: 100 | Refills: 2 | Status: ACTIVE | COMMUNITY
Start: 2024-10-10 | End: 1900-01-01

## 2024-10-10 RX ORDER — INSULIN GLARGINE 300 U/ML
300 INJECTION, SOLUTION SUBCUTANEOUS DAILY
Qty: 2 | Refills: 3 | Status: ACTIVE | COMMUNITY
Start: 2024-10-10 | End: 1900-01-01

## 2024-10-10 RX ORDER — PEN NEEDLE, DIABETIC 32GX 5/32"
32G X 4 MM NEEDLE, DISPOSABLE MISCELLANEOUS
Qty: 100 | Refills: 3 | Status: ACTIVE | COMMUNITY
Start: 2024-10-10 | End: 1900-01-01

## 2024-10-10 RX ORDER — LOSARTAN POTASSIUM 50 MG/1
50 TABLET, FILM COATED ORAL DAILY
Qty: 90 | Refills: 3 | Status: ACTIVE | COMMUNITY
Start: 2024-10-10 | End: 1900-01-01

## 2024-10-10 NOTE — ASSESSMENT
[FreeTextEntry1] : patient has microvascular complications due to uncontrolled type 1 diabetes., with background diabetic retinopathy as well as heavy proteinuria, will try forCGM if affordable,finish lantus switch to toujeo. add losartan 50 mg. daily, refer diabetes educator..

## 2024-10-10 NOTE — HISTORY OF PRESENT ILLNESS
[FreeTextEntry1] : type 1 diabetes, poor DM control, now has private [employer] insurance, also has significant diabetic proteinuria, no C.G.M, but otherwise feels well, running out of lantus, but has a lot of humalog left, takes tiny amounts of insulin.

## 2024-10-11 ENCOUNTER — OUTPATIENT (OUTPATIENT)
Dept: OUTPATIENT SERVICES | Facility: HOSPITAL | Age: 54
LOS: 1 days | End: 2024-10-11
Payer: COMMERCIAL

## 2024-10-11 VITALS — WEIGHT: 89.95 LBS | HEIGHT: 63.78 IN

## 2024-10-11 DIAGNOSIS — I34.0 NONRHEUMATIC MITRAL (VALVE) INSUFFICIENCY: ICD-10-CM

## 2024-10-11 DIAGNOSIS — Z98.891 HISTORY OF UTERINE SCAR FROM PREVIOUS SURGERY: Chronic | ICD-10-CM

## 2024-10-11 LAB — GLUCOSE BLDC GLUCOMTR-MCNC: 189 MG/DL — HIGH (ref 70–99)

## 2024-10-11 PROCEDURE — 93325 DOPPLER ECHO COLOR FLOW MAPG: CPT

## 2024-10-11 PROCEDURE — 93320 DOPPLER ECHO COMPLETE: CPT

## 2024-10-11 PROCEDURE — 93312 ECHO TRANSESOPHAGEAL: CPT

## 2024-10-11 PROCEDURE — 82962 GLUCOSE BLOOD TEST: CPT

## 2024-10-11 NOTE — CHART NOTE - NSCHARTNOTEFT_GEN_A_CORE
POST OPERATIVE PROCEDURAL DOCUMENTATION    PRE-OP DIAGNOSIS: Atrial fibrillation. | Rule out Patent foramen ovale. | Rule out Infective Endocarditis. | Mitral regurgitation evaluation.    POST-OP DIAGNOSIS:     PROCEDURE: Transesophageal Echocardiogram and DCCV    Primary Physician:    Cardiology Fellow: Dr. Rose    ANESTHESIA TYPE: Refer to anesthesia note  CONDITION: Good    Specimen removed: N/A  Implants: None    Estimated blood loss: None  Complications: None    After risks and benefits of procedures were explained, informed consent was obtained and placed in chart. Refer to Anesthesia note for sedation details. The SPRING probe was passed into the esophagus without difficulty. Transesophageal and transgastric images were obtained. The SPRING probe was removed without difficulty and examined. There was no evidence for bleeding. The patient tolerated the procedure well without any immediate SPRING-related complications.      Preliminary Findings:    LA: Mildly enlarged.  ZEHRA: Left atrial appendage was clear of clot and smoke. Normal doppler velocities   LV: LVEF was estimated at 60-65%.  MV: No MR, No MS.  AV: Mod AS. Partial fusion of the Right and left cusp. Thick and calcified non-coronary and right cusp of the AV. HIRAM via planimetry 1.49cm2. MG/PG 23/41 mmHg. Vmax 3.2. DI 0.27.  RA: Mildly enlarged.  RV: Normal size and function.  TV: No TR.  PV: No KY, no PS.  IAS: No PFO or ASD. No R -> L shunt.  Aorta: There was mild, mobile atheroma seen in the thoracic aorta.    DIAGNOSIS/IMPRESSION: Mod AS    Full report to follow    PLAN OF CARE:  [X] Discharge home when stable and fully awake.  [X] No eating or drinking for 1 hour.  [X] No driving for 24 hours.    Results of procedure/ plan of care discussed with patient/  in detail.

## 2024-10-11 NOTE — H&P CARDIOLOGY - HISTORY OF PRESENT ILLNESS
52 y/o Female with PMH of HTN, HLD, mild CAD on cath 4/2023, DM2 dx in 2018, has been on insulin since 2022,  RA, latent TB treated (reported receiving 8 months of therapy), presents for SPRING for evaluation of AV.     REVIEW OF SYSTEMS:  CONSTITUTIONAL: No weakness, fevers or chills  EYES/ENT: No visual changes;  No vertigo or throat pain   NECK: No pain or stiffness  RESPIRATORY: SOB  CARDIOVASCULAR: SEE HPI  GASTROINTESTINAL: No abdominal or epigastric pain. No nausea, vomiting, or hematemesis; No diarrhea or constipation. No melena or hematochezia.  GENITOURINARY: No dysuria, frequency or hematuria  NEUROLOGICAL: No numbness or weakness  SKIN: No itching, rashes      PHYSICAL EXAM:  T(C): --  HR: --  BP: --  RR: --  SpO2: --  GENERAL: NAD  HEAD:  Atraumatic, Normocephalic  EYES: conjunctiva and sclera clear  NECK: No JVD  CHEST/LUNG: dec bs  HEART: Regular rate and rhythm; 3/6 systolic murmur   ABDOMEN: Soft, Nontender, Nondistended; Bowel sounds present  EXTREMITIES:  2+ Peripheral Pulses, No clubbing, cyanosis, or edema  NEUROLOGY:  A&Ox3, appropriate  SKIN: No rashes or lesions

## 2024-10-12 DIAGNOSIS — I34.0 NONRHEUMATIC MITRAL (VALVE) INSUFFICIENCY: ICD-10-CM

## 2024-10-14 DIAGNOSIS — E11.65 TYPE 2 DIABETES MELLITUS WITH HYPERGLYCEMIA: ICD-10-CM

## 2024-10-14 RX ORDER — BLOOD SUGAR DIAGNOSTIC
STRIP MISCELLANEOUS 3 TIMES DAILY
Qty: 100 | Refills: 5 | Status: ACTIVE | COMMUNITY
Start: 2024-10-14 | End: 1900-01-01

## 2024-10-14 RX ORDER — BLOOD-GLUCOSE METER
W/DEVICE EACH MISCELLANEOUS
Qty: 1 | Refills: 0 | Status: ACTIVE | COMMUNITY
Start: 2024-10-14 | End: 1900-01-01

## 2024-10-14 RX ORDER — LANCETS 33 GAUGE
EACH MISCELLANEOUS
Qty: 100 | Refills: 0 | Status: ACTIVE | COMMUNITY
Start: 2024-10-14 | End: 1900-01-01

## 2024-11-06 ENCOUNTER — OUTPATIENT (OUTPATIENT)
Dept: OUTPATIENT SERVICES | Facility: HOSPITAL | Age: 54
LOS: 1 days | End: 2024-11-06
Payer: COMMERCIAL

## 2024-11-06 ENCOUNTER — APPOINTMENT (OUTPATIENT)
Dept: NUTRITION | Facility: CLINIC | Age: 54
End: 2024-11-06

## 2024-11-06 ENCOUNTER — NON-APPOINTMENT (OUTPATIENT)
Age: 54
End: 2024-11-06

## 2024-11-06 DIAGNOSIS — E11.65 TYPE 2 DIABETES MELLITUS WITH HYPERGLYCEMIA: ICD-10-CM

## 2024-11-06 PROCEDURE — G0108: CPT

## 2024-11-08 DIAGNOSIS — E10.65 TYPE 1 DIABETES MELLITUS WITH HYPERGLYCEMIA: ICD-10-CM

## 2024-11-11 NOTE — ED ADULT NURSE NOTE - NSSEPSISSUSPECTED_ED_A_ED
No 2-year-old 1 month male no past medical history, up-to-date on vaccines presenting to emergency department with mother at bedside status post fall.  Mother states  the patient was standing in a plastic tub and shower floor started to jump and fell forward hit his head on the concrete floor.  Cried right away no change in mental status or vomiting.  Sustained laceration to the mid forehead.  No other complaints.

## 2024-11-20 ENCOUNTER — APPOINTMENT (OUTPATIENT)
Dept: PULMONOLOGY | Facility: CLINIC | Age: 54
End: 2024-11-20
Payer: COMMERCIAL

## 2024-11-20 VITALS
SYSTOLIC BLOOD PRESSURE: 112 MMHG | DIASTOLIC BLOOD PRESSURE: 84 MMHG | HEART RATE: 86 BPM | WEIGHT: 90 LBS | HEIGHT: 60 IN | BODY MASS INDEX: 17.67 KG/M2 | OXYGEN SATURATION: 99 %

## 2024-11-20 DIAGNOSIS — R05.9 COUGH, UNSPECIFIED: ICD-10-CM

## 2024-11-20 DIAGNOSIS — J47.9 BRONCHIECTASIS, UNCOMPLICATED: ICD-10-CM

## 2024-11-20 DIAGNOSIS — J45.909 UNSPECIFIED ASTHMA, UNCOMPLICATED: ICD-10-CM

## 2024-11-20 PROCEDURE — G2211 COMPLEX E/M VISIT ADD ON: CPT | Mod: NC

## 2024-11-20 PROCEDURE — 99214 OFFICE O/P EST MOD 30 MIN: CPT

## 2024-11-20 RX ORDER — NIFEDIPINE 30 MG/1
30 TABLET, EXTENDED RELEASE ORAL DAILY
Qty: 30 | Refills: 3 | Status: ACTIVE | COMMUNITY
Start: 2024-11-20 | End: 1900-01-01

## 2024-11-20 NOTE — HISTORY OF PRESENT ILLNESS
[Never] : never [TextBox_4] : 54 y/o F with pmhx of RA, latent TB s/p treatment? Dm, presenting for shoulder pain and lung mass. Patient was recently treated for latent TB and has finished her treatment several weeks ago. She is now complaining of dyspnea on exertion and shoulder pain on inspiration for the last several months. She also reports weight loss and fatigue throughout the day. Never smoker. Had a CT A&P 04/2024 that showed a rt middle lobe atelectasis, RT middle lobe opacification noted since 2020   PFTs done and are normal  No wheezing on exam today  CT with RML atelectasis and bronchiectasis; much milder in lingula  high suspicion for MAC  Short of breath only with exertion   S/P Bronch BAL; no AFB, haemophilus s/p treatment. No symptoms currently. Repeat CT in February.

## 2024-11-20 NOTE — REVIEW OF SYSTEMS
[Negative] : Musculoskeletal [Cough] : no cough [Dyspnea] : no dyspnea [Pleuritic Pain] : no pleuritic pain [SOB on Exertion] : no sob on exertion [TextBox_30] : symptoms better

## 2024-11-21 ENCOUNTER — APPOINTMENT (OUTPATIENT)
Dept: PODIATRY | Facility: CLINIC | Age: 54
End: 2024-11-21

## 2024-11-21 ENCOUNTER — OUTPATIENT (OUTPATIENT)
Dept: OUTPATIENT SERVICES | Facility: HOSPITAL | Age: 54
LOS: 1 days | End: 2024-11-21
Payer: COMMERCIAL

## 2024-11-21 DIAGNOSIS — Z98.891 HISTORY OF UTERINE SCAR FROM PREVIOUS SURGERY: Chronic | ICD-10-CM

## 2024-11-21 DIAGNOSIS — Z00.00 ENCOUNTER FOR GENERAL ADULT MEDICAL EXAMINATION WITHOUT ABNORMAL FINDINGS: ICD-10-CM

## 2024-11-21 DIAGNOSIS — M79.671 PAIN IN RIGHT FOOT: ICD-10-CM

## 2024-11-21 DIAGNOSIS — T69.1XXA CHILBLAINS, INITIAL ENCOUNTER: ICD-10-CM

## 2024-11-21 DIAGNOSIS — I73.9 PERIPHERAL VASCULAR DISEASE, UNSPECIFIED: ICD-10-CM

## 2024-11-21 DIAGNOSIS — M79.672 PAIN IN LEFT FOOT: ICD-10-CM

## 2024-11-21 PROCEDURE — 99213 OFFICE O/P EST LOW 20 MIN: CPT

## 2024-11-26 ENCOUNTER — APPOINTMENT (OUTPATIENT)
Dept: RHEUMATOLOGY | Facility: CLINIC | Age: 54
End: 2024-11-26
Payer: COMMERCIAL

## 2024-11-26 VITALS
OXYGEN SATURATION: 97 % | WEIGHT: 91 LBS | DIASTOLIC BLOOD PRESSURE: 80 MMHG | SYSTOLIC BLOOD PRESSURE: 128 MMHG | HEART RATE: 89 BPM | TEMPERATURE: 97.8 F | HEIGHT: 60 IN | BODY MASS INDEX: 17.87 KG/M2

## 2024-11-26 DIAGNOSIS — M06.9 RHEUMATOID ARTHRITIS, UNSPECIFIED: ICD-10-CM

## 2024-11-26 PROCEDURE — 99214 OFFICE O/P EST MOD 30 MIN: CPT

## 2024-11-26 RX ORDER — INSULIN LISPRO 100 [IU]/ML
100 INJECTION, SOLUTION INTRAVENOUS; SUBCUTANEOUS
Refills: 0 | Status: ACTIVE | COMMUNITY
Start: 2024-11-26

## 2024-11-26 RX ORDER — INSULIN GLARGINE 100 [IU]/ML
100 INJECTION, SOLUTION SUBCUTANEOUS
Refills: 0 | Status: ACTIVE | COMMUNITY
Start: 2024-11-26

## 2024-11-26 NOTE — ASSESSMENT
[FreeTextEntry1] : Seropositive erosive RA: with e/o synovitis on exam today. High +RF and CCP, with h/o non-adherence to HCQ or methotrexate therapy. s/p treatment for latent TB in 2023, pt also had e/o possible MAC on a CT chest in 8/2024, underwent bronch with BAL with negative cultures so far. - f/u repeat hep B titers, previously negative in 6/2023 - Referred pt for Orencia infusions as she would prefer to take an IV treatment, and Orencia is a/w less infection risk than other IV options: 500 mg at 0, 2, and 4 weeks, then every 4 weeks thereafter  f/u in 3 months, will call pt with lab results

## 2024-11-26 NOTE — HISTORY OF PRESENT ILLNESS
[FreeTextEntry1] : Pt continues to experience pain and swelling in her hands, also her shoulders and feet. After her last [facility] in June 2023, pt was treated for latent TB. She also saw pulm and had a CT chest which demonstrated RML near complete atelectasis with question of possible MAC infection. She underwent bronch with BAL, so far cultures have been negative for Mycobacterium. She reports SOB with walking.   Previous HPI: 53 y/o woman presents for f/u of seropositive erosive RA, diagnosed in 2019. Pt has a h/o non-adherence to medical treatment. She was previously taking hydroxychloroquine, methotrexate with improvement of her symptoms, but has not been taking them or following up consistently. She also previously expressed a desire to not take medications on a regular basis. Rituximab infusions were considered for her, but pt was found to be quantiferon positive on 6/2023 labs. Reportedly she was also quantiferon positive in 2019 on rheum testing, and was referred to ID but it is unclear what the outcome of this referral was. Today, pt says that she continues to experience significant pain in her shoulders, hands, wrist, elbows and knees, also with continued burning pain in her legs, especially around the knees. She has noticed an erythematous rash on her b/l LEs for approx the past 2 months.   Physical exam: GEN: AAO woman sitting on exam table in NAD SKIN: No rashes PULM: + Wheezing noted in RLL CV: Regular rate and rhythm, no murmurs MSK: Shoulders: Limited abduction to approx 90 degrees b/l Elbows: Full ROM b/l, no effusions Wrists: Pain with ROM with limited ROM to approx 60 degrees b/l Hands: + TTP in PIPs b/l, + effusions in b/l 2nd and 4th PIPs Hips: Full ROM b/l Knees: no effusions, full ROM b/l Ankles: no effusions, full ROM b/l Feet: + TTP in MTPs b/l EXT: No LE edema b/l

## 2024-11-29 NOTE — PHYSICAL EXAM
[General Appearance - Alert] : alert [General Appearance - In No Acute Distress] : in no acute distress [General Appearance - Well Nourished] : well nourished [General Appearance - Well Developed] : well developed [Ankle Swelling (On Exam)] : not present [Ankle Swelling Bilaterally] : bilaterally  [Varicose Veins Of Lower Extremities] : bilaterally [] : on both lower extremities [2+] : left foot dorsalis pedis 2+ [Normal Foot/Ankle] : Both lower extremities were exposed and visualized. Standing exam demonstrates normal foot posture and alignment. Hindfoot exam shows no hindfoot valgus or varus [de-identified] : No pain on BL feet [Skin Turgor] : normal skin turgor [Foot Ulcer] : no foot ulcer [Skin Induration] : no skin induration [FreeTextEntry1] : Mild xerosis on BL feet; \par  Erythema with scaling to Right medial ankle and posterior heel\par  Scattered papule-like lesions to dorsal toes with hyperpigmentation to periphery; [Sensation] : the sensory exam was normal to light touch and pinprick [No Focal Deficits] : no focal deficits [Deep Tendon Reflexes (DTR)] : deep tendon reflexes were 2+ and symmetric [Oriented To Time, Place, And Person] : oriented to person, place, and time [Impaired Insight] : insight and judgment were intact [Affect] : the affect was normal

## 2024-11-29 NOTE — ASSESSMENT
[FreeTextEntry1] : Patient evaluated history reviewed Discussed chilblains with patient Patient can continue antifungals terbinafine and topical clotrimazole Patient also given prescription for Nitropaste Patient referred to vascular surgery  Patient can follow-up in 1 month  [Verbal] : verbal [Good - alert, interested, motivated] : Good - alert, interested, motivated [Demonstrates independently] : demonstrates independently

## 2024-11-29 NOTE — HISTORY OF PRESENT ILLNESS
[Sneakers] : martha [FreeTextEntry1] : CC: "Redness and foot check-up"\par  HPI:\par  -50F c/o lesions to b/l feet; was eval 3 weeks ago, clotromazole, fluconazole and diclofenac sent to [facility]\par  -Per pt, pt could get diclofenac but was never be able to get fluconazole and clotromazole; \par  -Reports itchiness & pain 8-9/10, minor swelling\par  -No other pedal complaints

## 2024-12-17 ENCOUNTER — OUTPATIENT (OUTPATIENT)
Dept: OUTPATIENT SERVICES | Facility: HOSPITAL | Age: 54
LOS: 1 days | End: 2024-12-17

## 2024-12-17 DIAGNOSIS — H53.8 OTHER VISUAL DISTURBANCES: ICD-10-CM

## 2024-12-17 DIAGNOSIS — Z98.891 HISTORY OF UTERINE SCAR FROM PREVIOUS SURGERY: Chronic | ICD-10-CM

## 2024-12-23 ENCOUNTER — APPOINTMENT (OUTPATIENT)
Dept: INTERNAL MEDICINE | Facility: CLINIC | Age: 54
End: 2024-12-23
Payer: COMMERCIAL

## 2024-12-23 ENCOUNTER — OUTPATIENT (OUTPATIENT)
Dept: OUTPATIENT SERVICES | Facility: HOSPITAL | Age: 54
LOS: 1 days | End: 2024-12-23
Payer: COMMERCIAL

## 2024-12-23 VITALS
HEART RATE: 83 BPM | WEIGHT: 94 LBS | BODY MASS INDEX: 18.46 KG/M2 | HEIGHT: 60 IN | TEMPERATURE: 97.5 F | SYSTOLIC BLOOD PRESSURE: 180 MMHG | DIASTOLIC BLOOD PRESSURE: 93 MMHG | OXYGEN SATURATION: 97 %

## 2024-12-23 VITALS — DIASTOLIC BLOOD PRESSURE: 98 MMHG | SYSTOLIC BLOOD PRESSURE: 187 MMHG

## 2024-12-23 DIAGNOSIS — J47.9 BRONCHIECTASIS, UNCOMPLICATED: ICD-10-CM

## 2024-12-23 DIAGNOSIS — I48.0 PAROXYSMAL ATRIAL FIBRILLATION: ICD-10-CM

## 2024-12-23 DIAGNOSIS — I10 ESSENTIAL (PRIMARY) HYPERTENSION: ICD-10-CM

## 2024-12-23 DIAGNOSIS — I35.0 NONRHEUMATIC AORTIC (VALVE) STENOSIS: ICD-10-CM

## 2024-12-23 DIAGNOSIS — E11.42 TYPE 2 DIABETES MELLITUS WITH DIABETIC POLYNEUROPATHY: ICD-10-CM

## 2024-12-23 DIAGNOSIS — R25.2 CRAMP AND SPASM: ICD-10-CM

## 2024-12-23 DIAGNOSIS — Z98.891 HISTORY OF UTERINE SCAR FROM PREVIOUS SURGERY: Chronic | ICD-10-CM

## 2024-12-23 DIAGNOSIS — E78.00 PURE HYPERCHOLESTEROLEMIA, UNSPECIFIED: ICD-10-CM

## 2024-12-23 DIAGNOSIS — Z00.00 ENCOUNTER FOR GENERAL ADULT MEDICAL EXAMINATION WITHOUT ABNORMAL FINDINGS: ICD-10-CM

## 2024-12-23 DIAGNOSIS — M06.9 RHEUMATOID ARTHRITIS, UNSPECIFIED: ICD-10-CM

## 2024-12-23 PROCEDURE — G2211 COMPLEX E/M VISIT ADD ON: CPT | Mod: NC

## 2024-12-23 PROCEDURE — T1013: CPT

## 2024-12-23 PROCEDURE — 99214 OFFICE O/P EST MOD 30 MIN: CPT

## 2024-12-23 RX ORDER — ATORVASTATIN CALCIUM 40 MG/1
40 TABLET, FILM COATED ORAL
Qty: 1 | Refills: 0 | Status: ACTIVE | COMMUNITY
Start: 2024-12-23

## 2024-12-23 NOTE — INTERPRETER SERVICES
[[employer] ] : provided by [employer]   [Time Spent: ____ minutes] : Total time spent using  services: [unfilled] minutes. The patient's primary language is not English thus required  services. [Interpreters_IDNumber] : [id number] [Interpreters_FullName] : a [TWNoteComboBox1] : Lao

## 2024-12-23 NOTE — REVIEW OF SYSTEMS
[Muscle Pain] : muscle pain [Back Pain] : back pain [Negative] : Heme/Lymph [FreeTextEntry9] : right hand, and back pain at end of day

## 2024-12-23 NOTE — PHYSICAL EXAM
[Normal Rate] : normal rate  [Regular Rhythm] : with a regular rhythm [Normal S1, S2] : normal S1 and S2 [Normal] : normal gait, coordination grossly intact, no focal deficits and deep tendon reflexes were 2+ and symmetric [Alert and Oriented x3] : oriented to person, place, and time [No Rash] : no rash [No Focal Deficits] : no focal deficits [Normal Insight/Judgement] : insight and judgment were intact [de-identified] : temporal wasting [de-identified] : coarse without focal wheeze or rhonchi, comfortable on RA [de-identified] : Systolic murmur 4/6 aortic listening post

## 2024-12-23 NOTE — PHYSICAL EXAM
[Normal Rate] : normal rate  [Regular Rhythm] : with a regular rhythm [Normal S1, S2] : normal S1 and S2 [Normal] : normal gait, coordination grossly intact, no focal deficits and deep tendon reflexes were 2+ and symmetric [Alert and Oriented x3] : oriented to person, place, and time [No Rash] : no rash [No Focal Deficits] : no focal deficits [Normal Insight/Judgement] : insight and judgment were intact [de-identified] : temporal wasting [de-identified] : coarse without focal wheeze or rhonchi, comfortable on RA [de-identified] : Systolic murmur 4/6 aortic listening post

## 2024-12-23 NOTE — END OF VISIT
[] : Resident [FreeTextEntry3] : I saw the patient, examined the patient independently, reviewed medical record, and provided the medical services. Agree with resident note as personally edited above. its been since april since we had follow up bloodwork, perhaps glycemic control is a bit improved now underweight c signs of temporal wasting on exam bronchiectasis seems controlled RA likely uncontrolled based on symptoms, and elevated prior inflammatory markers, pending orencia tx c rheum bp markedly elevated in office today but did not take am meds, which patient reports adherence with no bp cuff at home, to likely undercontrolled rtc shortly c repeat labwork review and home bp logs had an interval shira c cardiology for ?afib and moderate AS- denies symptoms of angina, heart failure, or syncope at the moment not on therapeutic AC. recommended cardio f/u

## 2024-12-23 NOTE — HISTORY OF PRESENT ILLNESS
[FreeTextEntry1] : Follow up  [de-identified] : Patient is a 54 yo F w/ PMH of RA on Orencia infusions, latent TB currently treatment held, HTN and DM on insulin who presents for follow up.   In office BP  repeat . Didn't take BP meds in AM today as she was coming to the appointment; usually takes in AM. Does not have BP machine at home.   Recently diagnosed this past year with Afib (on aspirin) and moderate AS visualized on outpatient echo. Denies any syncope or LOC. No scheduled Following Dr. Martinez lungs are ok, no TB, repeat CT in 02/2025.  Have yet to start Orencia infusions as requires blood work prior to starting; next appointment with Dr. Kunz 03/2025.  Endorses resolution of shortness of breath with symbicort.   Endorses cold/hot feet at bedtime for 1 month, resolves with sleep, no associated burning/tingling sensation, no pain, Dorsal aspect of feet. Nothing to manage symptoms.   Ophthalmology appt 01/2025. Podiatry 2 months ago, relief of symptoms with anti fungals,  Last pap smear 2.5 yrs ago, and mammogram 1 yr ago. Had flu shot this year. Colonoscopy 1 year ago unremarkable, next in 2033.   LMP 3 yrs ago.

## 2024-12-23 NOTE — INTERPRETER SERVICES
[[employer] ] : provided by [employer]   [Time Spent: ____ minutes] : Total time spent using  services: [unfilled] minutes. The patient's primary language is not English thus required  services. [Interpreters_IDNumber] : [id number] [Interpreters_FullName] : a [TWNoteComboBox1] : Marshallese

## 2024-12-23 NOTE — PLAN
[FreeTextEntry1] : # Under controlled insulin dependent diabetes with neuropathy - suspect control may be improved based on home fs's report problem with medication affordability - following endo - was on insulin basal + bolus: 20 lantus, 5 lispro TID -BG 110s-130s Diabetes; Low sugar, low carbohydrate diet Exercise Counseled Patient given opportunity to discuss frequency and target blood sugar levels Patient educated on symptoms of hypo/hyperglycemic events Counseled on: Yearly Ophthalmology and Podiatry Exam a1c in 1 month Optho appt 01/2025 Podiatry appt 2 months ago, managed b/l tinea pedia  #Bronchiecstasis #RML syndrome #Concern for latent TB, resolved - following up with ID (next visit following week) - Was started on PO INH 200mg q24hrs (5mg/kg, 88lb = 40kg), PO vitamin B6 50mg q24hrs, and PO rifampin (10mg/kg) 450mg q24hrs for 8 weeks, was instructed to hold last friday's dose in light of possible LFT concerns - complaining of cough and chest pain this visit - no fever, no hemoptysis -Following Dr. Martinez: relief with SOB with symbicort inhaler, repeat CT in 03/2025; initial workup showed RML syndrome and bronchiecstasis, AFB/TB workup negative  #RA Following Dr. Kunz, pending starting Orencia infusions (500 mg 0, 2, 4, at every 4 weeks) once blood work done.   #neuralgia - off gabapentin - endorsing pain control off the medications for a month  #Hyperlipidemia; Low fat, low cholesterol diet. Discussed importance of eating a heart healthy diet Counseled on aerobic exercise and weight loss Fasting lipid panel every 3 months Treatment options and possible side effects discussed Patient counseled on effects of ETOH on lipid levels statin c/w Lipid panel prior to next visit  HTN; uncontrolled - c/w losartan and nifedipine -In office  repeat , pt did not take medications in AM but endorses compliance otherwise -Prescribed BP cuff and instructed to take daily BP readings for 2 weeks.  DASH diet discussed and recommended Exercise and weight loss counseled Frequency and target at home BP readings discussed Decrease caffeine intake Treatment options and possible side effects discussed Patient counseled on symptoms of hypo/hypertension Counseled: Yearly Ophthalmology exams  #HCM Last pap smear 2.5 yrs ago, and mammogram 1 yr ago. Had flu shot this year. Colonoscopy 1 year ago unremarkable, next in 2033.  Mammogram Cancer Screening; Patient counseled on the importance of a yearly mammogram screening and directed to have test performed or follow-up with OB/GYN. Failure to perform test can result in breast cancer and death Colon Cancer Screening; Patient counseled on the importance of colonoscopy screening and directed to follow-up with GI doctor. Failure to perform test can result in Colon Cancer and Death.    f/u in 5 weeks.

## 2024-12-23 NOTE — HISTORY OF PRESENT ILLNESS
[FreeTextEntry1] : Follow up  [de-identified] : Patient is a 52 yo F w/ PMH of RA on Orencia infusions, latent TB currently treatment held, HTN and DM on insulin who presents for follow up.   In office BP  repeat . Didn't take BP meds in AM today as she was coming to the appointment; usually takes in AM. Does not have BP machine at home.   Recently diagnosed this past year with Afib (on aspirin) and moderate AS visualized on outpatient echo. Denies any syncope or LOC. No scheduled Following Dr. Martinez lungs are ok, no TB, repeat CT in 02/2025.  Have yet to start Orencia infusions as requires blood work prior to starting; next appointment with Dr. Kunz 03/2025.  Endorses resolution of shortness of breath with symbicort.   Endorses cold/hot feet at bedtime for 1 month, resolves with sleep, no associated burning/tingling sensation, no pain, Dorsal aspect of feet. Nothing to manage symptoms.   Ophthalmology appt 01/2025. Podiatry 2 months ago, relief of symptoms with anti fungals,  Last pap smear 2.5 yrs ago, and mammogram 1 yr ago. Had flu shot this year. Colonoscopy 1 year ago unremarkable, next in 2033.   LMP 3 yrs ago.

## 2025-01-24 ENCOUNTER — APPOINTMENT (OUTPATIENT)
Dept: PODIATRY | Facility: CLINIC | Age: 55
End: 2025-01-24
Payer: COMMERCIAL

## 2025-01-24 ENCOUNTER — OUTPATIENT (OUTPATIENT)
Dept: OUTPATIENT SERVICES | Facility: HOSPITAL | Age: 55
LOS: 1 days | End: 2025-01-24
Payer: COMMERCIAL

## 2025-01-24 DIAGNOSIS — M79.671 PAIN IN RIGHT FOOT: ICD-10-CM

## 2025-01-24 DIAGNOSIS — B35.1 TINEA UNGUIUM: ICD-10-CM

## 2025-01-24 DIAGNOSIS — Z98.891 HISTORY OF UTERINE SCAR FROM PREVIOUS SURGERY: Chronic | ICD-10-CM

## 2025-01-24 DIAGNOSIS — Z00.00 ENCOUNTER FOR GENERAL ADULT MEDICAL EXAMINATION WITHOUT ABNORMAL FINDINGS: ICD-10-CM

## 2025-01-24 DIAGNOSIS — I73.9 PERIPHERAL VASCULAR DISEASE, UNSPECIFIED: ICD-10-CM

## 2025-01-24 PROCEDURE — 99213 OFFICE O/P EST LOW 20 MIN: CPT | Mod: 25

## 2025-01-24 PROCEDURE — 11721 DEBRIDE NAIL 6 OR MORE: CPT

## 2025-01-24 RX ORDER — CLOTRIMAZOLE 10 MG/ML
1 SOLUTION TOPICAL
Qty: 1 | Refills: 5 | Status: ACTIVE | COMMUNITY
Start: 2025-01-24 | End: 1900-01-01

## 2025-01-24 RX ORDER — AMMONIUM LACTATE 12 %
12 CREAM (GRAM) TOPICAL
Qty: 1 | Refills: 5 | Status: ACTIVE | COMMUNITY
Start: 2025-01-24 | End: 1900-01-01

## 2025-01-24 NOTE — PHYSICAL EXAM
[General Appearance - Alert] : alert [General Appearance - In No Acute Distress] : in no acute distress [General Appearance - Well Nourished] : well nourished [General Appearance - Well Developed] : well developed [Ankle Swelling Bilaterally] : bilaterally  [2+] : left foot dorsalis pedis 2+ [Normal Foot/Ankle] : Both lower extremities were exposed and visualized. Standing exam demonstrates normal foot posture and alignment. Hindfoot exam shows no hindfoot valgus or varus [Skin Turgor] : normal skin turgor [Sensation] : the sensory exam was normal to light touch and pinprick [No Focal Deficits] : no focal deficits [Deep Tendon Reflexes (DTR)] : deep tendon reflexes were 2+ and symmetric [Oriented To Time, Place, And Person] : oriented to person, place, and time [Impaired Insight] : insight and judgment were intact [Affect] : the affect was normal [Ankle Swelling (On Exam)] : not present [Varicose Veins Of Lower Extremities] : not present [] : not present [Delayed in the Right Toes] : capillary refills normal in right toes [Delayed in the Left Toes] : capillary refills normal in the left toes [de-identified] : No pain on BL feet [Foot Ulcer] : no foot ulcer [Skin Induration] : no skin induration [FreeTextEntry1] : Mild xerosis on BL feet;  Erythema with scaling to Right medial ankle and posterior heel Scattered papule-like lesions to dorsal toes with hyperpigmentation to periphery;

## 2025-01-24 NOTE — ASSESSMENT
[Verbal] : verbal [Good - alert, interested, motivated] : Good - alert, interested, motivated [Demonstrates independently] : demonstrates independently [FreeTextEntry1] : Patient evaluated history reviewed Rx clotrimazole and amlactin  Toenail 1-10 debrided with sterile nail nipper to patient tolerance Patient can follow-up in 2 months

## 2025-01-24 NOTE — HISTORY OF PRESENT ILLNESS
[Sneakers] : martha [FreeTextEntry1] : CC: "Redness and foot check-up" HPI: -54F c/o lesions to b/l feet with itching -Has been using topical creams with improvement noted -Reports itchiness & pain 8-9/10, minor swelling -No other pedal complaints

## 2025-01-28 ENCOUNTER — OUTPATIENT (OUTPATIENT)
Dept: OUTPATIENT SERVICES | Facility: HOSPITAL | Age: 55
LOS: 1 days | End: 2025-01-28

## 2025-01-28 ENCOUNTER — APPOINTMENT (OUTPATIENT)
Dept: OPHTHALMOLOGY | Facility: CLINIC | Age: 55
End: 2025-01-28

## 2025-01-28 DIAGNOSIS — Z98.891 HISTORY OF UTERINE SCAR FROM PREVIOUS SURGERY: Chronic | ICD-10-CM

## 2025-01-28 DIAGNOSIS — H53.8 OTHER VISUAL DISTURBANCES: ICD-10-CM

## 2025-01-28 PROCEDURE — 99214 OFFICE O/P EST MOD 30 MIN: CPT

## 2025-01-29 ENCOUNTER — APPOINTMENT (OUTPATIENT)
Dept: NUTRITION | Facility: CLINIC | Age: 55
End: 2025-01-29

## 2025-01-29 ENCOUNTER — OUTPATIENT (OUTPATIENT)
Dept: OUTPATIENT SERVICES | Facility: HOSPITAL | Age: 55
LOS: 1 days | End: 2025-01-29
Payer: COMMERCIAL

## 2025-01-29 DIAGNOSIS — E78.00 PURE HYPERCHOLESTEROLEMIA, UNSPECIFIED: ICD-10-CM

## 2025-01-29 DIAGNOSIS — E10.65 TYPE 1 DIABETES MELLITUS WITH HYPERGLYCEMIA: ICD-10-CM

## 2025-01-29 DIAGNOSIS — Z98.891 HISTORY OF UTERINE SCAR FROM PREVIOUS SURGERY: Chronic | ICD-10-CM

## 2025-01-29 PROCEDURE — 85027 COMPLETE CBC AUTOMATED: CPT

## 2025-01-29 PROCEDURE — 80053 COMPREHEN METABOLIC PANEL: CPT

## 2025-01-29 PROCEDURE — G0108: CPT

## 2025-01-29 PROCEDURE — 80061 LIPID PANEL: CPT

## 2025-01-29 PROCEDURE — 84443 ASSAY THYROID STIM HORMONE: CPT

## 2025-01-29 PROCEDURE — 83036 HEMOGLOBIN GLYCOSYLATED A1C: CPT

## 2025-01-30 ENCOUNTER — APPOINTMENT (OUTPATIENT)
Dept: INTERNAL MEDICINE | Facility: CLINIC | Age: 55
End: 2025-01-30
Payer: COMMERCIAL

## 2025-01-30 ENCOUNTER — OUTPATIENT (OUTPATIENT)
Dept: OUTPATIENT SERVICES | Facility: HOSPITAL | Age: 55
LOS: 1 days | End: 2025-01-30
Payer: COMMERCIAL

## 2025-01-30 VITALS
SYSTOLIC BLOOD PRESSURE: 180 MMHG | WEIGHT: 93 LBS | OXYGEN SATURATION: 99 % | BODY MASS INDEX: 18.26 KG/M2 | TEMPERATURE: 97.7 F | DIASTOLIC BLOOD PRESSURE: 100 MMHG | HEART RATE: 81 BPM | HEIGHT: 60 IN

## 2025-01-30 VITALS — DIASTOLIC BLOOD PRESSURE: 90 MMHG | SYSTOLIC BLOOD PRESSURE: 174 MMHG

## 2025-01-30 DIAGNOSIS — Z00.00 ENCOUNTER FOR GENERAL ADULT MEDICAL EXAMINATION W/OUT ABNORMAL FINDINGS: ICD-10-CM

## 2025-01-30 DIAGNOSIS — B35.1 TINEA UNGUIUM: ICD-10-CM

## 2025-01-30 DIAGNOSIS — J47.9 BRONCHIECTASIS, UNCOMPLICATED: ICD-10-CM

## 2025-01-30 DIAGNOSIS — I73.9 PERIPHERAL VASCULAR DISEASE, UNSPECIFIED: ICD-10-CM

## 2025-01-30 DIAGNOSIS — E11.21 TYPE 2 DIABETES MELLITUS WITH DIABETIC NEPHROPATHY: ICD-10-CM

## 2025-01-30 DIAGNOSIS — E11.42 TYPE 2 DIABETES MELLITUS WITH DIABETIC POLYNEUROPATHY: ICD-10-CM

## 2025-01-30 DIAGNOSIS — E78.00 PURE HYPERCHOLESTEROLEMIA, UNSPECIFIED: ICD-10-CM

## 2025-01-30 DIAGNOSIS — Z87.19 PERSONAL HISTORY OF OTHER DISEASES OF THE DIGESTIVE SYSTEM: ICD-10-CM

## 2025-01-30 DIAGNOSIS — M06.9 RHEUMATOID ARTHRITIS, UNSPECIFIED: ICD-10-CM

## 2025-01-30 DIAGNOSIS — J45.909 UNSPECIFIED ASTHMA, UNCOMPLICATED: ICD-10-CM

## 2025-01-30 DIAGNOSIS — I10 ESSENTIAL (PRIMARY) HYPERTENSION: ICD-10-CM

## 2025-01-30 DIAGNOSIS — E10.65 TYPE 1 DIABETES MELLITUS WITH HYPERGLYCEMIA: ICD-10-CM

## 2025-01-30 DIAGNOSIS — E11.65 TYPE 2 DIABETES MELLITUS WITH HYPERGLYCEMIA: ICD-10-CM
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
## 2025-01-30 DIAGNOSIS — Z00.00 ENCOUNTER FOR GENERAL ADULT MEDICAL EXAMINATION WITHOUT ABNORMAL FINDINGS: ICD-10-CM

## 2025-01-30 DIAGNOSIS — E11.41 TYPE 2 DIABETES MELLITUS WITH DIABETIC MONONEUROPATHY: ICD-10-CM

## 2025-01-30 DIAGNOSIS — Z98.891 HISTORY OF UTERINE SCAR FROM PREVIOUS SURGERY: Chronic | ICD-10-CM

## 2025-01-30 DIAGNOSIS — G57.90 TYPE 2 DIABETES MELLITUS WITH DIABETIC MONONEUROPATHY: ICD-10-CM

## 2025-01-30 DIAGNOSIS — B35.3 TINEA PEDIS: ICD-10-CM

## 2025-01-30 DIAGNOSIS — M79.671 PAIN IN RIGHT FOOT: ICD-10-CM

## 2025-01-30 DIAGNOSIS — S16.1XXA STRAIN OF MUSCLE, FASCIA AND TENDON AT NECK LEVEL, INITIAL ENCOUNTER: ICD-10-CM

## 2025-01-30 DIAGNOSIS — E10.43 TYPE 1 DIABETES MELLITUS WITH DIABETIC AUTONOMIC (POLY)NEUROPATHY: ICD-10-CM

## 2025-01-30 PROCEDURE — 99214 OFFICE O/P EST MOD 30 MIN: CPT

## 2025-01-30 PROCEDURE — G2211 COMPLEX E/M VISIT ADD ON: CPT | Mod: NC

## 2025-01-30 PROCEDURE — 99213 OFFICE O/P EST LOW 20 MIN: CPT

## 2025-01-30 RX ORDER — ATORVASTATIN CALCIUM 80 MG/1
80 TABLET, FILM COATED ORAL
Qty: 90 | Refills: 1 | Status: ACTIVE | COMMUNITY
Start: 2025-01-30 | End: 1900-01-01

## 2025-01-30 RX ORDER — LOSARTAN POTASSIUM 100 MG/1
100 TABLET, FILM COATED ORAL DAILY
Qty: 1 | Refills: 1 | Status: ACTIVE | COMMUNITY
Start: 2025-01-30 | End: 1900-01-01

## 2025-01-30 RX ORDER — CYCLOBENZAPRINE HYDROCHLORIDE 5 MG/1
5 TABLET, FILM COATED ORAL AT BEDTIME
Qty: 14 | Refills: 0 | Status: ACTIVE | COMMUNITY
Start: 2025-01-30 | End: 1900-01-01

## 2025-01-30 NOTE — PHYSICAL EXAM
[Normal Rate] : normal rate  [Regular Rhythm] : with a regular rhythm [Normal S1, S2] : normal S1 and S2 [No Rash] : no rash [No Focal Deficits] : no focal deficits [Alert and Oriented x3] : oriented to person, place, and time [Normal Insight/Judgement] : insight and judgment were intact [No Acute Distress] : no acute distress [Normal Sclera/Conjunctiva] : normal sclera/conjunctiva [PERRL] : pupils equal round and reactive to light [Normal Outer Ear/Nose] : the outer ears and nose were normal in appearance [Normal Oropharynx] : the oropharynx was normal [No JVD] : no jugular venous distention [No Lymphadenopathy] : no lymphadenopathy [No Respiratory Distress] : no respiratory distress  [No Accessory Muscle Use] : no accessory muscle use [No Edema] : there was no peripheral edema [Soft] : abdomen soft [Non Tender] : non-tender [No HSM] : no HSM [Normal Bowel Sounds] : normal bowel sounds [Normal Anterior Cervical Nodes] : no anterior cervical lymphadenopathy [No CVA Tenderness] : no CVA  tenderness [No Spinal Tenderness] : no spinal tenderness [No Joint Swelling] : no joint swelling [Grossly Normal Strength/Tone] : grossly normal strength/tone [de-identified] : temporal wasting [de-identified] : hypertonic scalenes, trapezius and tender occiput

## 2025-01-30 NOTE — END OF VISIT
[] : Resident [FreeTextEntry3] : I saw the patient, examined the patient independently, reviewed medical record, and provided the medical services. Agree with resident note as personally edited above. insulin education provided, was not taking her short acting tid, only once, and was only taking 12, rather than 20 units of long acting counselled patient on how/when to take medication and about side effects breathing stable despite hx c/o b/l hand pain, consistent c known ra bp undercontrolled, upping arb lipids undercontrolled upping statin for neck strain trial of cyclobenzaprine counselled patient on how/when to take medication and about side effects

## 2025-01-30 NOTE — REVIEW OF SYSTEMS
[Muscle Pain] : muscle pain [Back Pain] : back pain [Negative] : Heme/Lymph [Headache] : headache [FreeTextEntry9] : Neck pain and tenderness +nt [de-identified] : Occipital Headache

## 2025-01-30 NOTE — HISTORY OF PRESENT ILLNESS
[FreeTextEntry1] : Follow up.  [de-identified] : Patient is a 54 Y/F w/ PMH of RA, latent TB s/p treatment, HTN and DM on insulin who presents for follow up.  Have yet to start Orencia infusions as requires blood work prior to starting; next appointment with Dr. Kunz 03/2025. Patient endorses headache primarily in the occipital region, worsening on lateral movement with tenderness and increased tone present on physical examination.    LMP 3 yrs ago.

## 2025-01-30 NOTE — PLAN
[FreeTextEntry1] : # Uncontrolled Type I Insulin Dependent Diabetes.   - problem with medication affordability - following salvador, has a scheduled appointment  - is currently on insulin basal + bolus: 20 Lantus, 5 Lispro TID, patient was not taking the dosage appropriately, explained regarding correct dosage in great detail  counselled patient on how/when to take medication and about side effects - Ophthalmology appt  - A1c: 10.3 % () - Has been off gabapentin  - Podiatry: Being treated for Xerosis Cutis and Onychomycosis (Last appointment ) - UpToDate with Ophthalmology   # Severe Non-Proliferative Diabetic Retinopathy  - Follows up with ophthalmology  # Occipital Headache # Neck strain - Will be started on Cyclobenzaprine 5 mg PRN    # Bronchiectasis # RML Syndrome  - Complaining of cough and chest pain this visit - No fever, no hemoptysis - Following Dr. Martinez: relief with SOB with Symbicort inhaler, repeat CT in 2025; initial workup showed RML atelectasis and bronchiectasis, AFB/TB workup negative  # Rheumatoid Arthritis - High + RF and CCP, with persistent synovitis previously on hydroxychloroquine and methotrexate - Follows with Dr Weiss  - Referred pt for infliximab infusions, 3 mg/kg at weeks 0, 2 and 6, followed by every 8 weeks thereafter   # Hyperlipidemia. - On home: Atorvastatin 40 mg daily  - LDL-c: 320,  - Will be started on Atorvastatin 80 mg daily Hyperlipidemia; Low fat, low cholesterol diet. Discussed importance of eating a heart healthy diet Counseled on aerobic exercise and weight loss Fasting lipid panel every 3 months Treatment options and possible side effects discussed  Smoking cessation discussed, if applicable Patient counseled on effects of ETOH on lipid levels  # Uncontrolled HTN - BP this visit readin/100 mm hg - Patient states her home BP readings have been in the range of 135-150 in the systolic range - Home medication: losartan 50 mg and nifedipine 30 mg daily  - Will be started on Nifedipine 30 mg daily + Losartan 100 mg daily.  counselled patient on how/when to take medication and about side effects  # HCM - Pap Smear + HPV -ve in , will need repeat next year.  - Mammogram: Last one , will send referral today.  Mammogram Cancer Screening; Patient counseled on the importance of a yearly mammogram screening and directed to have test performed or follow-up with OB/GYN. Failure to perform test can result in breast cancer and death - Had flu shot this year. - Colonoscopy 1 year ago unremarkable, next in .  - f/u in 3 months w/ routine labs / PRN

## 2025-01-31 DIAGNOSIS — E11.9 TYPE 2 DIABETES MELLITUS WITHOUT COMPLICATIONS: ICD-10-CM

## 2025-02-06 DIAGNOSIS — R42 DIZZINESS AND GIDDINESS: ICD-10-CM

## 2025-02-06 DIAGNOSIS — R73.03 PREDIABETES: ICD-10-CM

## 2025-02-08 ENCOUNTER — RESULT REVIEW (OUTPATIENT)
Age: 55
End: 2025-02-08

## 2025-02-08 ENCOUNTER — OUTPATIENT (OUTPATIENT)
Dept: OUTPATIENT SERVICES | Facility: HOSPITAL | Age: 55
LOS: 1 days | End: 2025-02-08
Payer: COMMERCIAL

## 2025-02-08 DIAGNOSIS — Z00.8 ENCOUNTER FOR OTHER GENERAL EXAMINATION: ICD-10-CM

## 2025-02-08 DIAGNOSIS — J47.9 BRONCHIECTASIS, UNCOMPLICATED: ICD-10-CM

## 2025-02-08 DIAGNOSIS — Z98.891 HISTORY OF UTERINE SCAR FROM PREVIOUS SURGERY: Chronic | ICD-10-CM

## 2025-02-08 PROCEDURE — 71250 CT THORAX DX C-: CPT

## 2025-02-08 PROCEDURE — 71250 CT THORAX DX C-: CPT | Mod: 26

## 2025-02-09 DIAGNOSIS — J47.9 BRONCHIECTASIS, UNCOMPLICATED: ICD-10-CM

## 2025-02-19 ENCOUNTER — EMERGENCY (EMERGENCY)
Facility: HOSPITAL | Age: 55
LOS: 0 days | Discharge: HOME | End: 2025-02-19
Attending: STUDENT IN AN ORGANIZED HEALTH CARE EDUCATION/TRAINING PROGRAM
Payer: COMMERCIAL

## 2025-02-19 VITALS
WEIGHT: 94.8 LBS | OXYGEN SATURATION: 96 % | DIASTOLIC BLOOD PRESSURE: 105 MMHG | TEMPERATURE: 98 F | HEART RATE: 81 BPM | SYSTOLIC BLOOD PRESSURE: 201 MMHG | RESPIRATION RATE: 16 BRPM

## 2025-02-19 VITALS
OXYGEN SATURATION: 98 % | SYSTOLIC BLOOD PRESSURE: 171 MMHG | TEMPERATURE: 98 F | RESPIRATION RATE: 18 BRPM | DIASTOLIC BLOOD PRESSURE: 92 MMHG | HEART RATE: 80 BPM

## 2025-02-19 DIAGNOSIS — R51.9 HEADACHE, UNSPECIFIED: ICD-10-CM

## 2025-02-19 DIAGNOSIS — I10 ESSENTIAL (PRIMARY) HYPERTENSION: ICD-10-CM

## 2025-02-19 DIAGNOSIS — Z98.891 HISTORY OF UTERINE SCAR FROM PREVIOUS SURGERY: Chronic | ICD-10-CM

## 2025-02-19 DIAGNOSIS — R20.0 ANESTHESIA OF SKIN: ICD-10-CM

## 2025-02-19 DIAGNOSIS — E78.5 HYPERLIPIDEMIA, UNSPECIFIED: ICD-10-CM

## 2025-02-19 DIAGNOSIS — E11.9 TYPE 2 DIABETES MELLITUS WITHOUT COMPLICATIONS: ICD-10-CM

## 2025-02-19 DIAGNOSIS — M54.2 CERVICALGIA: ICD-10-CM

## 2025-02-19 DIAGNOSIS — I25.10 ATHEROSCLEROTIC HEART DISEASE OF NATIVE CORONARY ARTERY WITHOUT ANGINA PECTORIS: ICD-10-CM

## 2025-02-19 LAB
ALBUMIN SERPL ELPH-MCNC: 3.9 G/DL — SIGNIFICANT CHANGE UP (ref 3.5–5.2)
ALP SERPL-CCNC: 144 U/L — HIGH (ref 30–115)
ALT FLD-CCNC: 17 U/L — SIGNIFICANT CHANGE UP (ref 0–41)
ANION GAP SERPL CALC-SCNC: 11 MMOL/L — SIGNIFICANT CHANGE UP (ref 7–14)
AST SERPL-CCNC: 20 U/L — SIGNIFICANT CHANGE UP (ref 0–41)
BASOPHILS # BLD AUTO: 0.04 K/UL — SIGNIFICANT CHANGE UP (ref 0–0.2)
BASOPHILS NFR BLD AUTO: 0.5 % — SIGNIFICANT CHANGE UP (ref 0–1)
BILIRUB SERPL-MCNC: 0.2 MG/DL — SIGNIFICANT CHANGE UP (ref 0.2–1.2)
BUN SERPL-MCNC: 43 MG/DL — HIGH (ref 10–20)
CALCIUM SERPL-MCNC: 9.8 MG/DL — SIGNIFICANT CHANGE UP (ref 8.4–10.5)
CHLORIDE SERPL-SCNC: 105 MMOL/L — SIGNIFICANT CHANGE UP (ref 98–110)
CO2 SERPL-SCNC: 25 MMOL/L — SIGNIFICANT CHANGE UP (ref 17–32)
CREAT SERPL-MCNC: 1.2 MG/DL — SIGNIFICANT CHANGE UP (ref 0.7–1.5)
EGFR: 53 ML/MIN/1.73M2 — LOW
EOSINOPHIL # BLD AUTO: 0.1 K/UL — SIGNIFICANT CHANGE UP (ref 0–0.7)
EOSINOPHIL NFR BLD AUTO: 1.2 % — SIGNIFICANT CHANGE UP (ref 0–8)
GLUCOSE SERPL-MCNC: 227 MG/DL — HIGH (ref 70–99)
HCT VFR BLD CALC: 37.2 % — SIGNIFICANT CHANGE UP (ref 37–47)
HGB BLD-MCNC: 12.1 G/DL — SIGNIFICANT CHANGE UP (ref 12–16)
IMM GRANULOCYTES NFR BLD AUTO: 0.4 % — HIGH (ref 0.1–0.3)
LYMPHOCYTES # BLD AUTO: 1.84 K/UL — SIGNIFICANT CHANGE UP (ref 1.2–3.4)
LYMPHOCYTES # BLD AUTO: 21.8 % — SIGNIFICANT CHANGE UP (ref 20.5–51.1)
MAGNESIUM SERPL-MCNC: 2 MG/DL — SIGNIFICANT CHANGE UP (ref 1.8–2.4)
MCHC RBC-ENTMCNC: 28.6 PG — SIGNIFICANT CHANGE UP (ref 27–31)
MCHC RBC-ENTMCNC: 32.5 G/DL — SIGNIFICANT CHANGE UP (ref 32–37)
MCV RBC AUTO: 87.9 FL — SIGNIFICANT CHANGE UP (ref 81–99)
MONOCYTES # BLD AUTO: 0.46 K/UL — SIGNIFICANT CHANGE UP (ref 0.1–0.6)
MONOCYTES NFR BLD AUTO: 5.5 % — SIGNIFICANT CHANGE UP (ref 1.7–9.3)
NEUTROPHILS # BLD AUTO: 5.96 K/UL — SIGNIFICANT CHANGE UP (ref 1.4–6.5)
NEUTROPHILS NFR BLD AUTO: 70.6 % — SIGNIFICANT CHANGE UP (ref 42.2–75.2)
NRBC BLD AUTO-RTO: 0 /100 WBCS — SIGNIFICANT CHANGE UP (ref 0–0)
PLATELET # BLD AUTO: 299 K/UL — SIGNIFICANT CHANGE UP (ref 130–400)
PMV BLD: 10.2 FL — SIGNIFICANT CHANGE UP (ref 7.4–10.4)
POTASSIUM SERPL-MCNC: 4.9 MMOL/L — SIGNIFICANT CHANGE UP (ref 3.5–5)
POTASSIUM SERPL-SCNC: 4.9 MMOL/L — SIGNIFICANT CHANGE UP (ref 3.5–5)
PROT SERPL-MCNC: 7 G/DL — SIGNIFICANT CHANGE UP (ref 6–8)
RBC # BLD: 4.23 M/UL — SIGNIFICANT CHANGE UP (ref 4.2–5.4)
RBC # FLD: 12.5 % — SIGNIFICANT CHANGE UP (ref 11.5–14.5)
SODIUM SERPL-SCNC: 141 MMOL/L — SIGNIFICANT CHANGE UP (ref 135–146)
TROPONIN T, HIGH SENSITIVITY RESULT: 20 NG/L — HIGH (ref 6–13)
TROPONIN T, HIGH SENSITIVITY RESULT: 22 NG/L — HIGH (ref 6–13)
WBC # BLD: 8.43 K/UL — SIGNIFICANT CHANGE UP (ref 4.8–10.8)
WBC # FLD AUTO: 8.43 K/UL — SIGNIFICANT CHANGE UP (ref 4.8–10.8)

## 2025-02-19 PROCEDURE — 83735 ASSAY OF MAGNESIUM: CPT

## 2025-02-19 PROCEDURE — 70496 CT ANGIOGRAPHY HEAD: CPT | Mod: MC

## 2025-02-19 PROCEDURE — 70496 CT ANGIOGRAPHY HEAD: CPT | Mod: 26

## 2025-02-19 PROCEDURE — 93010 ELECTROCARDIOGRAM REPORT: CPT

## 2025-02-19 PROCEDURE — 71045 X-RAY EXAM CHEST 1 VIEW: CPT

## 2025-02-19 PROCEDURE — T1013: CPT

## 2025-02-19 PROCEDURE — 80053 COMPREHEN METABOLIC PANEL: CPT

## 2025-02-19 PROCEDURE — 84484 ASSAY OF TROPONIN QUANT: CPT

## 2025-02-19 PROCEDURE — 70450 CT HEAD/BRAIN W/O DYE: CPT | Mod: MC

## 2025-02-19 PROCEDURE — 96374 THER/PROPH/DIAG INJ IV PUSH: CPT | Mod: XU

## 2025-02-19 PROCEDURE — 70450 CT HEAD/BRAIN W/O DYE: CPT | Mod: 26,59

## 2025-02-19 PROCEDURE — 99285 EMERGENCY DEPT VISIT HI MDM: CPT

## 2025-02-19 PROCEDURE — 70498 CT ANGIOGRAPHY NECK: CPT | Mod: 26

## 2025-02-19 PROCEDURE — 36415 COLL VENOUS BLD VENIPUNCTURE: CPT

## 2025-02-19 PROCEDURE — 85025 COMPLETE CBC W/AUTO DIFF WBC: CPT

## 2025-02-19 PROCEDURE — 70498 CT ANGIOGRAPHY NECK: CPT | Mod: MC

## 2025-02-19 PROCEDURE — 99285 EMERGENCY DEPT VISIT HI MDM: CPT | Mod: 25

## 2025-02-19 PROCEDURE — 93005 ELECTROCARDIOGRAM TRACING: CPT

## 2025-02-19 PROCEDURE — 71045 X-RAY EXAM CHEST 1 VIEW: CPT | Mod: 26

## 2025-02-19 RX ORDER — DEXAMETHASONE SODIUM PHOSPHATE 4 MG/ML
10 INJECTION, SOLUTION INTRA-ARTICULAR; INTRALESIONAL; INTRAMUSCULAR; INTRAVENOUS; SOFT TISSUE ONCE
Refills: 0 | Status: COMPLETED | OUTPATIENT
Start: 2025-02-19 | End: 2025-02-19

## 2025-02-19 RX ORDER — LOSARTAN POTASSIUM 100 MG
100 TABLET ORAL DAILY
Refills: 0 | Status: DISCONTINUED | OUTPATIENT
Start: 2025-02-19 | End: 2025-02-19

## 2025-02-19 RX ORDER — METHOCARBAMOL 500 MG
1 TABLET ORAL
Qty: 9 | Refills: 0
Start: 2025-02-19 | End: 2025-02-21

## 2025-02-19 RX ORDER — LIDOCAINE HYDROCHLORIDE 30 MG/G
1 CREAM TOPICAL ONCE
Refills: 0 | Status: COMPLETED | OUTPATIENT
Start: 2025-02-19 | End: 2025-02-19

## 2025-02-19 RX ORDER — LOSARTAN POTASSIUM 100 MG
100 TABLET ORAL ONCE
Refills: 0 | Status: DISCONTINUED | OUTPATIENT
Start: 2025-02-19 | End: 2025-02-19

## 2025-02-19 RX ORDER — METHOCARBAMOL 500 MG
750 TABLET ORAL ONCE
Refills: 0 | Status: COMPLETED | OUTPATIENT
Start: 2025-02-19 | End: 2025-02-19

## 2025-02-19 RX ORDER — NIFEDIPINE 90 MG/1
30 TABLET, FILM COATED, EXTENDED RELEASE ORAL ONCE
Refills: 0 | Status: COMPLETED | OUTPATIENT
Start: 2025-02-19 | End: 2025-02-19

## 2025-02-19 RX ADMIN — Medication 750 MILLIGRAM(S): at 11:46

## 2025-02-19 RX ADMIN — NIFEDIPINE 30 MILLIGRAM(S): 90 TABLET, FILM COATED, EXTENDED RELEASE ORAL at 11:47

## 2025-02-19 RX ADMIN — LIDOCAINE HYDROCHLORIDE 1 PATCH: 30 CREAM TOPICAL at 11:45

## 2025-02-19 RX ADMIN — Medication 100 MILLIGRAM(S): at 17:15

## 2025-02-19 RX ADMIN — DEXAMETHASONE SODIUM PHOSPHATE 102 MILLIGRAM(S): 4 INJECTION, SOLUTION INTRA-ARTICULAR; INTRALESIONAL; INTRAMUSCULAR; INTRAVENOUS; SOFT TISSUE at 15:37

## 2025-02-19 NOTE — ED PROVIDER NOTE - ATTENDING CONTRIBUTION TO CARE
54F PMH  nonobs CAD, AS, DM, HTN, HL, RA, p/w 1 mo of intermittent posterior headache/neck pain radiates down LUE. sharp throbbing pain. no numbness, weakness, blurry vision slurred speech, ams. no fever, cough. no cp, sob. no trauma. no blood thinners. no loc. seen [facility] 1 mo ago and had BP meds adjusted and given rx flexiril. not taking anything for pain at home. has appt w rheumatology on 3/3 scheduled. since yesterday pain is worse so came to ED. worse w head movement. pt didn't take home bp meds today.     on exam, AFVSS, well aayush nad, ncat, eomi, perrla, mmm, lctab, rrr nl s1s2 no mrg, abd soft ntnd, aaox3, CN 2-12 intact, No nystagmus.  5/5 motor x 4 ext, SILT x 4 extremities, No facial droop or slurred speech. No pronator drift.  Normal rapid alternating movement and finger nose finger bilaterally. No midline C/T/L tenderness to palpation or step off. Normal gait, No ataxia.  , no le edema or calf ttp,     a/p;  Ha/neck pain, LUE pain, nv intact, risk factors and elevated BP noted, will do labs, ekg, CTH/CTA, pain control, home BP meds re-eval

## 2025-02-19 NOTE — ED PROVIDER NOTE - PROGRESS NOTE DETAILS
Neurology consulted, spoke to resident Dr. 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 Spoke to neuroendovascular who will see pt, recommend eventual BP goal <140  BP came down to 180s after nifedipine, will continue to monitor and consider additional medication BP continues to improve, pain improved however still with mild stiffness, neuro eval noted recommending treating BP/pain with neuroendovascular followup. Trop 22> 20 likely CKD patient does not have active chest pain and had cath in 04/2023 with non-obstructive CAD.   Will do decadron and discharge with outpatient followup ccruz - pt signed out to Dr Summers

## 2025-02-19 NOTE — ED PROVIDER NOTE - NSFOLLOWUPINSTRUCTIONS_ED_ALL_ED_FT
You came in with a headache, neck pain, and very high blood pressure. 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 Based on these tests and your physical exam, it is likely that your headache and neck pain are due to muscle spasm, arthritis, and high blood pressure. We treated you with pain medicine, anti-inflammatory medicine, and blood pressure medicine and you improved.    It is VERY important for you to take your blood pressure medicines and all of your prescribed medicines to prevent complications such as heart attack or stroke that could result from your chronic medical conditions.     Please follow up with your primary doctor, neuroendovascular (for the aneurysm), and a rheumatologist (for your RA)    Our Emergency Department Referral Coordinators will be reaching out to you in the next 24-48 hours from 9:00am to 5:00pm with a follow up appointment. Please expect a phone call from the hospital in that time frame. If you do not receive a call or if you have any questions or concerns, you can reach them at   [phone]      Hypertension, Adult  La presión arterial rakesh (hipertensión) se produce cuando la fuerza de la markos bombea a través de las arterias con mucha fuerza. Las arterias son los vasos sanguíneos que transportan la markos desde el corazón al hoa del cuerpo. La hipertensión hace que el corazón tamar más esfuerzo para bombear markos y puede provocar que las arterias se estrechen o endurezcan. La hipertensión no tratada o no controlada puede causar infarto de miocardio, insuficiencia cardíaca, accidente cerebrovascular, enfermedad renal y otros problemas.    Georgina lectura de la presión arterial consta de un número más alto sobre un número más bajo. En condiciones ideales, la presión arterial debe estar por debajo de 120/80. El primer número (“superior”) es la presión sistólica. Es la medida de la presión de las arterias cuando el corazón late. El cherie número (“inferior”) es la presión diastólica. Es la medida de la presión en las arterias cuando el corazón se relaja.    ¿Cuáles son las causas?  Se desconoce la causa exacta de esta afección. Hay algunas afecciones que causan presión arterial rakesh.    ¿Qué incrementa el riesgo?  Ciertos factores pueden hacer que georgina persona sea más propensa a desarrollar presión arterial rakesh. Algunos de estos factores de riesgo están bajo agarwal control, por ejemplo, los siguientes:  Fumar.  No hacer la cantidad suficiente de actividad física o ejercicio.  Tener sobrepeso.  Consumir mucha grasa, azúcar, calorías o sal (sodio) en agarwal dieta.  Beber alcohol en exceso.  Otros factores de riesgo son los siguientes:  Tener antecedentes personales de enfermedad cardíaca, diabetes, colesterol alto o enfermedad renal.  Estrés.  Tener antecedentes familiares de presión arterial rakesh y colesterol alto.  Tener apnea obstructiva del sueño.  Edad. El riesgo aumenta con la edad.  ¿Cuáles son los signos o síntomas?  Es posible que la presión arterial rakesh no cause síntomas. La presión arterial muy rakesh (crisis hipertensiva) puede provocar:  Dolor de rosaline.  Latidos cardíacos acelerados o irregulares (palpitaciones).  Falta de aire.  Hemorragia nasal.  Náuseas y vómitos.  Cambios en la visión.  Dolor intenso en el pecho, mareos y convulsiones.  ¿Cómo se diagnostica?  Esta afección se diagnostica al medir agarwal presión arterial mientras se encuentra sentado, con el brazo apoyado sobre georgina superficie plana, las piernas sin cruzar y los pies jenny apoyados en el piso. El brazalete del tensiómetro debe colocarse directamente sobre la piel de la parte superior del brazo y al nivel de agarwal corazón. La presión arterial debe medirse al menos dos veces en el mismo brazo. Determinadas condiciones pueden causar georgina diferencia de presión arterial entre el brazo tristin y el derecho.    Si tiene georgina lectura de presión arterial rakesh jenise georgina visita o si tiene presión arterial normal con otros factores de riesgo, se le podrá pedir que tamar lo siguiente:  Que regrese otro día para volver a controlar agarwal presión arterial nuevamente.  Que se controle la presión arterial en agarwal casa jenise 1 semana o más.  Si se le diagnostica hipertensión, es posible que se le realicen otros análisis de markos o estudios de diagnóstico por imágenes para ayudar a agarwal médico a comprender agarwal riesgo general de tener otras afecciones.    ¿Cómo se trata?  Esta afección se trata haciendo cambios saludables en el estilo de ozzie, tales jin ingerir alimentos saludables, realizar más ejercicio y reducir el consumo de alcohol. Es posible que lo deriven para que reciba asesoramiento sobre georgina dieta saludable y actividad física.    El médico puede recetarle medicamentos si los cambios en el estilo de ozzie no son suficientes para lograr controlar la presión arterial y si:  Agarwal presión arterial sistólica está por encima de 130.  Agarwal presión arterial diastólica está por encima de 80.  La presión arterial deseada puede variar en función de las enfermedades, la edad y otros factores personales.    Siga estas instrucciones en agarwal casa:  Comida y bebida    A plate with examples of foods in a healthy diet.  Siga georgina dieta con alto contenido de fibras y potasio, y con bajo contenido de sodio, azúcar agregada y grasas. Un ejemplo de marlen plan de alimentación se denomina dieta DASH. DASH es la sigla en inglés de “Enfoques alimentarios para detener la hipertensión”. Para alimentarse de esta manera:  Coma mucha fruta y verdura fresca. Trate de que la mitad del plato de cada comida sea de frutas y verduras.  Coma cereales integrales, jin pasta integral, arroz integral o pan integral. Llene aproximadamente un cuarto del plato con cereales integrales.  Coma y bennett productos lácteos con bajo contenido de grasa, jin leche descremada o yogur bajo en grasas.  Evite la ingesta de snyder de carne grasa, carne procesada o curada, y carne de ave con piel. Llene aproximadamente un cuarto del plato con proteínas magras, jin pescado, yung sin piel, frijoles, huevos o tofu.  Evite ingerir alimentos prehechos y procesados. En general, estos tienen mayor cantidad de sodio, azúcar agregada y grasa.  Reduzca agarwal ingesta diaria de sodio. Muchas personas que tienen hipertensión deben comer menos de 1,500 mg de sodio por día.  No bennett alcohol si:  Agarwal médico le indica no hacerlo.  Está embarazada, puede estar embarazada o está tratando de quedar embarazada.  Si elis alcohol:  Limite la cantidad que elis a lo siguiente:  De 0 a 1 medida por día para las mujeres.  De 0 a 2 medidas por día para los hombres.  Sepa cuánta cantidad de alcohol hay en las bebidas que sol. En [place], georgina medida equivale a georgina botella de cerveza de 12 oz (355 ml), un vaso de vino de 5 oz (148 ml) o un vaso de georgina bebida alcohólica de rakesh graduación de 1½ oz (44 ml).  Estilo de ozzie    A blood pressure monitor and cuff.   Trabaje con agarwal médico para mantener un peso saludable o perder peso. Pregúntele cuál es el peso recomendado para usted.  Realice al menos 30 minutos de ejercicio que tamar que se acelere agarwal corazón (ejercicio aeróbico) la mayoría de los días de la semana. Estas actividades pueden incluir caminar, nadar o andar en bicicleta.  Incluya ejercicios para fortalecer henna músculos (ejercicios de resistencia), jin Pilates o levantamiento de pesas, jin parte de agarwal rutina semanal de ejercicios. Intente realizar 30 minutos de marlen tipo de ejercicios al menos maria isabel días a la semana.  No consuma ningún producto que contenga nicotina o tabaco. Estos productos incluyen cigarrillos, tabaco para mascar y aparatos de vapeo, jin los cigarrillos electrónicos. Si necesita ayuda para dejar de fumar, consulte al médico.  Contrólese la presión arterial en agarwal casa según las indicaciones del médico.  Concurra a todas las visitas de seguimiento. Loleta es importante.  Medicamentos    Use los medicamentos de venta verónica y los recetados solamente jin se lo haya indicado el médico. Siga cuidadosamente las indicaciones. Los medicamentos para la presión arterial deben tomarse según las indicaciones.  No omita las dosis de medicamentos para la presión arterial. Si lo hace, estará en riesgo de tener problemas y puede hacer que los medicamentos joana menos eficaces.  Pregúntele a agarwal médico a qué efectos secundarios o reacciones a los medicamentos debe prestar atención.  Comuníquese con un médico si:  Piensa que tiene georgina reacción a un medicamento que está tomando.  Tiene marilia de rosaline frecuentes (recurrentes).  Se siente mareado.  Tiene hinchazón en los tobillos.  Tiene problemas de visión.  Solicite ayuda inmediatamente si:  Siente un dolor de rosaline intenso o confusión.  Siente debilidad inusual o adormecimiento.  Siente que va a desmayarse.  Siente un dolor intenso en el pecho o el abdomen.  Vomita repetidas veces.  Tiene dificultad para respirar.  Estos síntomas pueden indicar georgina emergencia. Solicite ayuda de inmediato. Llame al 911.  No espere a dolly si los síntomas desaparecen.  No conduzca por henna propios medios hasta el hospital.  Resumen  La hipertensión se produce cuando la markos bombea en las arterias con mucha fuerza. Si esta afección no se controla, podría correr riesgo de tener complicaciones graves.  La presión arterial deseada puede variar en función de las enfermedades, la edad y otros factores personales. Para la mayoría de las personas, georgina presión arterial normal es mercedes que 120/80.  La hipertensión se trata con cambios en el estilo de ozzie, medicamentos o georgina combinación de ambos. Los cambios en el estilo de ozzie incluyen pérdida de peso, ingerir alimentos sanos, seguir georgina dieta baja en sodio, hacer más ejercicio y limitar el consumo de alcohol.  Esta información no tiene jin fin reemplazar el consejo del médico. Asegúrese de hacerle al médico cualquier pregunta que tenga.

## 2025-02-19 NOTE — ED PROVIDER NOTE - PATIENT PORTAL LINK FT
You can access the FollowMyHealth Patient Portal offered by [facility] by registering at the following website: [email or web address]. By joining [employer]’s FollowMyHealth portal, you will also be able to view your health information using other applications (apps) compatible with our system. You can access the FollowMyHealth Patient Portal offered by [facility] by registering at the following website: [email or web address]. By joining [employer]’s FollowMyHealth portal, you will also be able to view your health information using other applications (apps) compatible with our system.

## 2025-02-19 NOTE — ED PROVIDER NOTE - TOBACCO USE
Never smoker Infant ALL with neutropenia 1 lumen of broviac not drawing back will try TPA will need new broviac when counts recover  Pustules on bilateral ear lobes will get dermatology consult

## 2025-02-19 NOTE — CONSULT NOTE ADULT - SUBJECTIVE AND OBJECTIVE BOX
Neurology Consult    Patient is a 54y old  Female who presents with a chief complaint of Headache and neck pain w/ left arm pins and needles  LLS: Croatian - KATHERIN #[id number]    HPI:      PAST MEDICAL & SURGICAL HISTORY:  Diabetes      Neuropathy      Hypertension      Hypercholesteremia      Mild CAD      Rheumatoid arthritis      Latent tuberculosis  treated      Aortic stenosis      H/O  section          FAMILY HISTORY:      Social History: (-) x 3    Allergies    No Known Allergies    Intolerances        MEDICATIONS  (STANDING):    MEDICATIONS  (PRN):      Review of systems:    Constitutional: as per HPI  Eyes: No eye pain or discharge  ENMT:  No difficulty hearing; No sinus or throat pain  Neck: No pain or stiffness  Respiratory: No cough, wheezing, chills or hemoptysis  Cardiovascular: No chest pain, palpitations, shortness of breath, dyspnea on exertion  Gastrointestinal: No abdominal pain, nausea, vomiting or hematemesis; No diarrhea or constipation.   Genitourinary: No dysuria, frequency, hematuria or incontinence  Neurological: As per HPI  Skin: No rashes or lesions   Endocrine: No heat or cold intolerance; No hair loss  Musculoskeletal: No joint pain or swelling  Psychiatric: No depression, anxiety, mood swings  Heme/Lymph: No easy bruising or bleeding gums    Vital Signs Last 24 Hrs  T(C): 36.9 (2025 12:01), Max: 36.9 (2025 12:01)  T(F): 98.4 (2025 12:01), Max: 98.4 (2025 12:01)  HR: 90 (2025 12:01) (81 - 90)  BP: 181/89 (2025 12:01) (181/89 - 201/105)  BP(mean): 119 (2025 12:01) (119 - 119)  RR: 17 (2025 12:01) (16 - 17)  SpO2: 96% (2025 12:01) (96% - 96%)    Parameters below as of 2025 12:01  Patient On (Oxygen Delivery Method): room air        Examination:  General:  Appearance is consistent with chronologic age.  No abnormal facies.  Gross skin survey within normal limits.    Cognitive/Language:  The patient is oriented to person, place, time and date.  Recent and remote memory intact.  Fund of knowledge is intact and normal.  Language with normal repetition, comprehension and naming.  Nondysarthric.    Eyes: intact VA, VFF.  EOMI w/o nystagmus, skew or reported double vision.  PERRL.  No ptosis/weakness of eyelid closure.    Face:  Facial sensation normal V1 - 3, no facial asymmetry.    Ears/Nose/Throat:  Hearing grossly intact b/l.  Palate elevates midline.  Tongue and uvula midline.   Motor examination:   Normal tone, bulk and range of motion.  No tenderness, twitching, tremors or involuntary movements.  Formal Muscle Strength Testing: (MRC grade R/L) 5/5 UE; 5/5 LE.  No observable drift.  Reflexes:   2+ b/l pectoralis, biceps, triceps, brachioradialis, patella and Achilles.  Plantar response downgoing b/l.  Jaw jerk, Suhas, clonus absent.  Sensory examination:   Intact to light touch and pinprick, pain, temperature and proprioception and vibration in all extremities.  Cerebellum:   FTN/HKS intact with normal LUCAS in all limbs.  No dysmetria or dysdiadokinesia.  Gait narrow based and normal.    Respiratory:  no audible wheezing or inspiratory stridor.  no use of accessory muscles.   Cardiac: pulse palpable, no audible bruits  Abdomen: supple, no guarding, no TTP    Labs:   CBC Full  -  ( 2025 11:05 )  WBC Count : 8.43 K/uL  RBC Count : 4.23 M/uL  Hemoglobin : 12.1 g/dL  Hematocrit : 37.2 %  Platelet Count - Automated : 299 K/uL  Mean Cell Volume : 87.9 fL  Mean Cell Hemoglobin : 28.6 pg  Mean Cell Hemoglobin Concentration : 32.5 g/dL  Auto Neutrophil # : x  Auto Lymphocyte # : x  Auto Monocyte # : x  Auto Eosinophil # : x  Auto Basophil # : x  Auto Neutrophil % : x  Auto Lymphocyte % : x  Auto Monocyte % : x  Auto Eosinophil % : x  Auto Basophil % : x    -    141  |  105  |  43[H]  ----------------------------<  227[H]  4.9   |  25  |  1.2    Ca    9.8      2025 11:05  Mg     2.0     -    TPro  7.0  /  Alb  3.9  /  TBili  0.2  /  DBili  x   /  AST  20  /  ALT  17  /  AlkPhos  144[H]      LIVER FUNCTIONS - ( 2025 11:05 )  Alb: 3.9 g/dL / Pro: 7.0 g/dL / ALK PHOS: 144 U/L / ALT: 17 U/L / AST: 20 U/L / GGT: x             Urinalysis Basic - ( 2025 11:05 )    Color: x / Appearance: x / SG: x / pH: x  Gluc: 227 mg/dL / Ketone: x  / Bili: x / Urobili: x   Blood: x / Protein: x / Nitrite: x   Leuk Esterase: x / RBC: x / WBC x   Sq Epi: x / Non Sq Epi: x / Bacteria: x          Neuroimaging:  NCHCT: CT Head No Cont:   ACC: [id number] EXAM:  CT BRAIN   ORDERED BY: MIKE GORDON     PROCEDURE DATE:  2025          INTERPRETATION:  HISTORY: Headache and neck pain.    PRIOR STUDIES: CT head 9/3/2024.    TECHNIQUE: Contiguous unenhanced CT axial images of the head with coronal   and sagittal reformats without the administration of IV contrast.      FINDINGS:    The ventricles and cerebral sulci are appropriate for the patient's   stated age. There is no evidence of hydrocephalus.    There is no evidence of acute intra-axial or extra-axial hemorrhage.   Gray-white matter differentiation is maintained. There is no midline   shift.    Chronic left cerebellar infarct, stable.    There is no depressed skull fracture. The mastoid air cells are aerated   bilaterally. The visualized paranasal sinuses are unremarkable. The   visualized orbits are unremarkable.      IMPRESSION:    No CT evidence of acute intracranial pathology.    Stable small chronic left cerebellar infarct.    --- End of Report ---          NIKKY SCHUMACHER MD; Resident Radiologist  This document has been electronically signed.  SAMIRA LUGO MD; Attending Radiologist  This document has been electronically signed. 2025 11:39AM (25 @ 11:14)      25 @ 12:53       Neurology Consult    Patient is a 54y old  Female who presents with a chief complaint of Headache and neck pain w/ left arm pins and needles. She is accompanied by her .  LLS: Karley PANDEY #[id number]    HPI:  Ms. Díaz is a 53yo Slovenian speaking female with h/o left cerebellar hemisphere infarct, HTN, DM, HLD, AS, RA and Latent TB.   She reported onset of her headache was suddenly 1 month ago after bending over at the waist. Headache 8-9/10 of the back of the head and neck of 5 mins duration. Headaches are associated w/ radiating symptoms of numbness w/ feeling of ants crawling on the skin her left hand. After resolution of her headache it would return the next day after getting up from bed and better after she gets up and walks around. Headaches are exacerbated when supine. She denied changes to her vision, hearing, dizziness, nausea or vomiting. Symptoms has been intermittent over the last month however, yesterday became constant. She had headaches in her past but not as severe or constant as yesterdays episode. During her symptom her recorded systolic blood pressure was no greater than 150mmhg.   No neck stiffness, trauma, fall, recent change in medication, illness or ill contact. She does not smoke or drink. Currently employed as a school bus paraprofessional.     PAST MEDICAL & SURGICAL HISTORY:  Diabetes  Neuropathy  Hypertension  Hypercholesteremia  Mild CAD  Rheumatoid arthritis  Latent tuberculosis treated  Aortic stenosis    H/O  section    FAMILY HISTORY: no h/o cerebral hemorrhage, aneurysm or primary headache       Social History: (-) x 3    Allergies: No Known Allergies    Intolerances    MEDICATIONS  (STANDING):  Losartan 50mg   Atorvastatin 40mg   Aspirin 81mg   Insulin Glargine and Lispro     MEDICATIONS  (PRN):      Review of systems:    Constitutional: as per HPI  Eyes: No eye pain or discharge  ENMT:  No difficulty hearing; No sinus or throat pain  Neck: pain. No stiffness  Respiratory: No cough, wheezing, chills or hemoptysis  Cardiovascular: No chest pain, palpitations, shortness of breath, dyspnea on exertion  Gastrointestinal: No abdominal pain, nausea, vomiting.    Neurological: As per HPI  Skin: No rashes or lesions   Musculoskeletal: No joint pain or swelling    Vital Signs Last 24 Hrs  T(C): 36.9 (2025 12:01), Max: 36.9 (2025 12:01)  T(F): 98.4 (2025 12:01), Max: 98.4 (2025 12:01)  HR: 90 (2025 12:01) (81 - 90)  BP: 181/89 (2025 12:01) (181/89 - 201/105)  BP(mean): 119 (2025 12:01) (119 - 119)  RR: 17 (2025 12:01) (16 - 17)  SpO2: 96% (2025 12:01) (96% - 96%)      Examination:  General:  Appearance is consistent with chronologic age.  No abnormal facies.  Gross skin survey within normal limits.    Cognitive/Language:  The patient is oriented to person, place, time and date.  Recent and remote memory intact.  Intact language and comprehension. Nondysarthric.    Eyes: VFF.  EOMI w/o nystagmus or reported double vision.  PERRL.  No ptosis/weakness of eyelid closure.    Face:  Facial sensation normal V1 - 3, no facial asymmetry.    Ears/Nose/Throat:  Hearing grossly intact b/l.  Palate elevates midline.  Tongue and uvula midline.   Motor examination:   Normal tone and bulk. Decrease shoulder ROM. Unable to passively elevate UE beyond 90 degrees. Tender trapezius b/l R > L to palpation. Tender cervical ROM. Neg Lhermitte and b/l Spurling. No tremors or involuntary movements.  Formal Muscle Strength Testing: (MRC grade R/L) 5/5 UE; 5/5 LE.  NPD  Reflexes:   2+ b/l biceps, triceps, brachioradialis, patella and Achilles. neg Suhas, clonus absent.  Sensory examination:   Intact to light touch and pinprick in all extremities. neg Romberg  Cerebellum:   FTN intact No dysmetria.  Gait narrow based and normal.    tender b/l TMJ w/ molar sheering  Tender occipital and bitemporal region  palpable temporal artery   no frontal or ethmoid sinus tenderness    Labs:   CBC Full  -  ( 2025 11:05 )  WBC Count : 8.43 K/uL  RBC Count : 4.23 M/uL  Hemoglobin : 12.1 g/dL  Hematocrit : 37.2 %  Platelet Count - Automated : 299 K/uL  Mean Cell Volume : 87.9 fL  Mean Cell Hemoglobin : 28.6 pg  Mean Cell Hemoglobin Concentration : 32.5 g/dL          141  |  105  |  43[H]  ----------------------------<  227[H]  4.9   |  25  |  1.2    Ca    9.8      2025 11:05  Mg     2.0         TPro  7.0  /  Alb  3.9  /  TBili  0.2  /  DBili  x   /  AST  20  /  ALT  17  /  AlkPhos  144[H]      LIVER FUNCTIONS - ( 2025 11:05 )  Alb: 3.9 g/dL / Pro: 7.0 g/dL / ALK PHOS: 144 U/L / ALT: 17 U/L / AST: 20 U/L / GGT: x             Neuroimaging:  No CT evidence of acute intracranial pathology.   Stable small chronic left cerebellar infarct.    CT Angio Neck w/ IV Cont (25 @ 11:34) > 2-3 mm aneurysm in the clinoid segment of the left ICA.

## 2025-02-19 NOTE — ED PROVIDER NOTE - CLINICAL SUMMARY MEDICAL DECISION MAKING FREE TEXT BOX
Patient endorsed to me by Dr. Merchant pending neuroendovascular evaluation.   54F PMH  nonobs CAD, AS, DM, HTN, HL, RA, p/w 1 mo of intermittent posterior headache/neck pain radiates down LUE. sharp throbbing pain, exam as documented. labs unremarkable. CT imaging noted for incidental small aneurism. neurology and neuroendovascular evaluated patient- recommended outpatient follow up. stable for dc. return precautions discussed.

## 2025-02-19 NOTE — ED PROVIDER NOTE - NSPTACCESSSVCSAPPTDETAILS_ED_ALL_ED_FT
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

## 2025-02-19 NOTE — ED ADULT TRIAGE NOTE - CHIEF COMPLAINT QUOTE
headache x 3 weeks, worsening over the las month; denies trauma or fall; hx of HTN did not take her BP meds today

## 2025-02-19 NOTE — ED PROVIDER NOTE - OBJECTIVE STATEMENT
55yo F with PMHx mild CAD, moderate AS, IDDM1 (poorly controlled), HTN (poorly controlled), HLD, RA (not on meds), latent TB s/p treatment presenting with subacute posterior headache and neck pain that radiates down the left arm for the past 3-4 weeks. 1 month ago, patient describes bending down to pick something up and feeling dizzy and since then has noted intermittent 5-minute episodes of posterior headache and neck pain that radiates down the left arm for the past 3 weeks. Since yesterday, pain became constant and unremitting which led her to come to the ER. Laying down and turning head side-to-side makes it worse. States has not tried medications for the pain. Did not take BP meds this AM, could not recall name of medication. Denies fevers, chills, blurry vision, photophobia, phonophobia, dizziness, change in hearing, weakness/decreased sensation, chest pain, SOB, abdominal pain, n/v/d/c.     Per outpatient records, patient was seen by PCP with similar complaints 01/30/2025 and prescribed flexeril. BP meds nifedipine 30mg ER qd, losartan 100mg qd.

## 2025-02-19 NOTE — ED PROVIDER NOTE - PROVIDER TOKENS
PROVIDER:[TOKEN:[[id number]],FOLLOWUP:[1-3 Days]],PROVIDER:[TOKEN:[[id number]],FOLLOWUP:[4-6 Days]],PROVIDER:[TOKEN:[[id number]],FOLLOWUP:[Routine]]

## 2025-02-19 NOTE — CONSULT NOTE ADULT - ASSESSMENT
55yo Bel[place]ian speaking female with h/o left cerebellar hemisphere infarct, HTN, DM, HLD, AS, RA and Latent TB. presenting to ED w/ intermittent supine posterior HA and Neck pain over1 month worsened over the last 24hrs associated w/ LUE paresthesia. Exam w/ pain limited cervical and shoulder ROM w/ tenderness of the paracervical muscles. Possible cervicalgia - inflammatory joint disease of the cervical vertebra and cervical nerves given her hx of RA. Potential hypertensive encephaloopathy - HA w/ SBP > 200mg worsened when supine however, does not explain her paresthesia. Other potential etiology such as SAH vs RCVS w/ neg CTH and no nunchal rigidity. No signs of cervical radiculopathy.  CTA w/ left ICA stenosis is incidental and does not correlate w/ chief complaint.       Recommendation  Control blood pressure - decrease by 20% in first hour  Give Ibuprofen 800mg for neck and shoulder pain  Give low dose muscle relaxer for tension of neck muscles  Outpatient f/u w/ her Rheumatologist  No further Neurology intervention  reconsult for any neurological changes

## 2025-02-19 NOTE — ED ADULT NURSE NOTE - NS ED NOTE ABUSE RESPONSE YN
Detail Level: Zone Other (Free Text): Continue Enstilar Foam when flaring and CeraVe cream Note Text (......Xxx Chief Complaint.): This diagnosis correlates with the Yes

## 2025-02-19 NOTE — ED PROVIDER NOTE - CARE PLAN
1 Principal Discharge DX:	Occipital headache  Secondary Diagnosis:	Neck pain  Secondary Diagnosis:	Uncontrolled hypertension

## 2025-02-19 NOTE — ED PROVIDER NOTE - PHYSICAL EXAMINATION
Gen: NAD, non-toxic appearing, able to ambulate without assistance  HEENT: NCAT, normal conjunctiva, oral mucosa moist, +occipital/paracervical/trapezius tenderness to palpation, no LAD of anterior/posterior lymph nodes  Lung: CTAB, no respiratory distress, no wheezes/rhonchi/rales B/L, speaking in full sentences  CV: RRR  Abd: soft, NT, ND, no guarding  Neuro/MSK: CN II-XII intact, no focal sensory or motor deficits w/ 5/5 strength in b/l UE/LE, no spinal tenderness  Extremities: no lower extremity edema bilaterally, cap refill <2s  Skin: Warm, well perfused, no rashes

## 2025-02-19 NOTE — ED PROVIDER NOTE - CARE PROVIDER_API CALL
[place]  Internal Medicine  [address] [facility], Admin - [address]  [place] [address]  Phone: [phone]  Fax: [phone]  Follow Up Time: 1-3 Days    Duran Moody  Interventional Cardiology  [address] [place] [address]  Phone: [phone]  Fax: [phone]  Follow Up Time: 4-6 Days    Aminata Billingsley  Rheumatology  [address] [place] [address]  Phone: [phone]  Fax: [phone]  Follow Up Time: Routine

## 2025-02-19 NOTE — ED PROVIDER NOTE - NSICDXPASTMEDICALHX_GEN_ALL_CORE_FT
PAST MEDICAL HISTORY:  Aortic stenosis     Diabetes     Hypercholesteremia     Hypertension     Latent tuberculosis treated    Mild CAD     Neuropathy     Rheumatoid arthritis

## 2025-02-19 NOTE — CONSULT NOTE ADULT - SUBJECTIVE AND OBJECTIVE BOX
Neuroendovascular Consult:   Consulted for:      HPI:  The patient is a 54-year-old, [place] Leonean-speaking female with a past medial history of HTN (poorly controlled), left cerebellar infarct, HLD, AS, RA, and latent TB. The patient presented with an occipital headache x 1 month, which worsened last night and was associated with left arm numbness. BP was in the 180's systolic in the ED. on exam, she reports the headache has improved somewhat, and is associated with left neck and shoulder pain. She denies having light sensitivity, nausea/  vomiting, numbness/tingling, and vision changes.  #[id number] was used for this encounter.    PAST MEDICAL & SURGICAL HISTORY:  Diabetes  Neuropathy  Hypertension  Hypercholesteremia  Mild CAD  Rheumatoid arthritis  Latent tuberculosis  treated  Aortic stenosis  H/O  section    Pertinent PMHx     MEDICATIONS  (STANDING):    MEDICATIONS  (PRN):    Allergies  No Known Allergies    Intolerances    Social History:   Smoking: Yes [ ]  No [X]   ______pk yrs  ETOH  Yes [ ]  No [X]  Social [ ]  DRUGS:  Yes [ ]  No [X]  if so what______________    Physical Exam:   Vital Signs Last 24 Hrs  T(C): 36.6 (2025 15:50), Max: 36.9 (2025 12:01)  T(F): 97.9 (2025 15:50), Max: 98.4 (2025 12:01)  HR: 80 (2025 15:50) (80 - 90)  BP: 171/92 (2025 15:50) (158/79 - 201/105)  BP(mean): 119 (2025 12:01) (119 - 119)  RR: 18 (2025 15:50) (16 - 18)  SpO2: 98% (2025 15:50) (96% - 98%)    Parameters below as of 2025 15:50  Patient On (Oxygen Delivery Method): room air    General: NAD  Neuro: AAOx3, following commands, moving all extremities antigravity without drift, sensation intact to light touch throughout, no dysmetria on FTN or HTS, no dysarthria, no aphasia, face symmetrical, PERRLA, visual fields full, EOMI.     Labs:                         12.1   8.43  )-----------( 299      ( 2025 11:05 )             37.2     02-19    141  |  105  |  43[H]  ----------------------------<  227[H]  4.9   |  25  |  1.2    Ca    9.8      2025 11:05  Mg     2.0     02-    TPro  7.0  /  Alb  3.9  /  TBili  0.2  /  DBili  x   /  AST  20  /  ALT  17  /  AlkPhos  144[H]          Pertinent labs:                      12.1   8.43  )-----------( 299      ( 2025 11:05 )             37.2       02-19    141  |  105  |  43[H]  ----------------------------<  227[H]  4.9   |  25  |  1.2    Ca    9.8      2025 11:05  Mg     2.0     -    TPro  7.0  /  Alb  3.9  /  TBili  0.2  /  DBili  x   /  AST  20  /  ALT  17  /  AlkPhos  144[H]      Radiology & Additional Studies:   Radiology imaging reviewed.     Assessment:   54-year-old, [place]an-speaking female with a past medial history of HTN (poorly controlled), left cerebellar infarct, HLD, AS, RA, and latent TB. The patient presented with an occipital headache x 1 month, which worsened last night and was associated with left arm numbness. BP was in the 180's systolic in the ED. on exam, she reports the headache has improved somewhat, and is associated with left neck and shoulder pain. She denies having light sensitivity, nausea/  vomiting, numbness/tingling, and vision changes.     Suggestions:   - Patient should follow up outpatient with Dr. Parker Nunn within 1-2 weeks of discharge  - Outpatient MRI should be arranged  - SBP <140/80 with home BP monitoring encouraged   - Patient was advised to return to the ED immediately if new or worsening symptoms were to arise  [id number] Neuroendovascular with additional questions/ concerns  Neuroendovascular Consult:   Consulted for:  ICA aneurysm     HPI:  The patient is a 54-year-old, [place] Islander-speaking female with a past medial history of HTN (poorly controlled), left cerebellar infarct, HLD, AS, RA, and latent TB. The patient presented with an occipital headache x 1 month, which worsened last night and was associated with left arm numbness. BP was in the 180's systolic in the ED. on exam, she reports the headache has improved somewhat, and is associated with left neck and shoulder pain. She denies having light sensitivity, nausea/  vomiting, numbness/tingling, and vision changes.  #[id number] was used for this encounter.    PAST MEDICAL & SURGICAL HISTORY:  Diabetes  Neuropathy  Hypertension  Hypercholesteremia  Mild CAD  Rheumatoid arthritis  Latent tuberculosis  treated  Aortic stenosis  H/O  section    Pertinent PMHx     MEDICATIONS  (STANDING):    MEDICATIONS  (PRN):    Allergies  No Known Allergies    Intolerances    Social History:   Smoking: Yes [ ]  No [X]   ______pk yrs  ETOH  Yes [ ]  No [X]  Social [ ]  DRUGS:  Yes [ ]  No [X]  if so what______________    Physical Exam:   Vital Signs Last 24 Hrs  T(C): 36.6 (2025 15:50), Max: 36.9 (2025 12:01)  T(F): 97.9 (2025 15:50), Max: 98.4 (2025 12:01)  HR: 80 (2025 15:50) (80 - 90)  BP: 171/92 (2025 15:50) (158/79 - 201/105)  BP(mean): 119 (2025 12:01) (119 - 119)  RR: 18 (2025 15:50) (16 - 18)  SpO2: 98% (2025 15:50) (96% - 98%)    Parameters below as of 2025 15:50  Patient On (Oxygen Delivery Method): room air    General: NAD  Neuro: AAOx3, following commands, moving all extremities antigravity without drift, sensation intact to light touch throughout, no dysmetria on FTN or HTS, no dysarthria, no aphasia, face symmetrical, PERRLA, visual fields full, EOMI.     Labs:                         12.1   8.43  )-----------( 299      ( 2025 11:05 )             37.2     02-19    141  |  105  |  43[H]  ----------------------------<  227[H]  4.9   |  25  |  1.2    Ca    9.8      2025 11:05  Mg     2.0     02-    TPro  7.0  /  Alb  3.9  /  TBili  0.2  /  DBili  x   /  AST  20  /  ALT  17  /  AlkPhos  144[H]          Pertinent labs:                      12.1   8.43  )-----------( 299      ( 2025 11:05 )             37.2       02-19    141  |  105  |  43[H]  ----------------------------<  227[H]  4.9   |  25  |  1.2    Ca    9.8      2025 11:05  Mg     2.0     -    TPro  7.0  /  Alb  3.9  /  TBili  0.2  /  DBili  x   /  AST  20  /  ALT  17  /  AlkPhos  144[H]      Radiology & Additional Studies:   Radiology imaging reviewed.     Assessment:   54-year-old, [place] Islander-speaking female with a past medial history of HTN (poorly controlled), left cerebellar infarct, HLD, AS, RA, and latent TB. The patient presented with an occipital headache x 1 month, which worsened last night and was associated with left arm numbness. BP was in the 180's systolic in the ED. on exam, she reports the headache has improved somewhat, and is associated with left neck and shoulder pain. She denies having light sensitivity, nausea/  vomiting, numbness/tingling, and vision changes.     Suggestions:   - Patient should follow up outpatient with Dr. Parker Nunn within 1-2 weeks of discharge  - Outpatient MRI should be arranged  - SBP <140/80 with home BP monitoring encouraged   - Patient was advised to return to the ED immediately if new or worsening symptoms were to arise  [id number] Neuroendovascular with additional questions/ concerns

## 2025-02-19 NOTE — ED PROVIDER NOTE - NS ED ROS FT
CONSTITUTIONAL: No fevers, no chills, no dizziness  HEENT: no blurry vision, no change in hearing, +neck pain  CV: No chest pain, no palpitations  RESP: No SOB, no cough  GI: No abdominal pain, no nausea, no vomiting, no diarrhea, no constipation  MSK: no swelling of extremities, no back pain, no extremity pain  SKIN: No rashes  NEURO: +headache, no focal weakness, no decreased sensation/paresthesias

## 2025-02-24 PROBLEM — I35.0 NONRHEUMATIC AORTIC (VALVE) STENOSIS: Chronic | Status: ACTIVE | Noted: 2025-02-19

## 2025-02-24 PROBLEM — M06.9 RHEUMATOID ARTHRITIS, UNSPECIFIED: Chronic | Status: ACTIVE | Noted: 2025-02-19

## 2025-02-24 PROBLEM — I25.10 ATHEROSCLEROTIC HEART DISEASE OF NATIVE CORONARY ARTERY WITHOUT ANGINA PECTORIS: Chronic | Status: ACTIVE | Noted: 2025-02-19

## 2025-02-24 PROBLEM — Z22.7 LATENT TUBERCULOSIS: Chronic | Status: ACTIVE | Noted: 2025-02-19

## 2025-02-28 ENCOUNTER — APPOINTMENT (OUTPATIENT)
Dept: NEUROSURGERY | Facility: CLINIC | Age: 55
End: 2025-02-28

## 2025-02-28 VITALS
BODY MASS INDEX: 18.26 KG/M2 | DIASTOLIC BLOOD PRESSURE: 80 MMHG | SYSTOLIC BLOOD PRESSURE: 136 MMHG | WEIGHT: 93 LBS | HEIGHT: 60 IN

## 2025-02-28 DIAGNOSIS — I72.0 ANEURYSM OF CAROTID ARTERY: ICD-10-CM

## 2025-02-28 PROCEDURE — 99203 OFFICE O/P NEW LOW 30 MIN: CPT

## 2025-02-28 NOTE — HISTORY OF PRESENT ILLNESS
[de-identified] : 55 yo F with PMHx CAD, moderate AS, IDDM1 (poorly controlled), HTN (poorly controlled), and HLD presenting for neurosurgical consultation with regards to an incidental aneurysm found on diagnostic imaging.  Patient presented to the ED on 2/19/2025 with a subacute posterior headache and neck pain that radiates down the left upper extremity for the past 3-4 weeks. Denies dizziness, headache, visual disturbances, weakness, chest pain, nausea/vomiting at this time.  CTA Neck/Brain demonstrates a 2-3 mm aneurysm in the clinoid segment of the left ICA.

## 2025-02-28 NOTE — ASSESSMENT
[FreeTextEntry1] : 55 yo F with PMHx CAD, moderate AS, IDDM1 (poorly controlled), HTN (poorly controlled), and HLD presenting for neurosurgical consultation with regards to an incidental aneurysm found on diagnostic imaging.  CTA Neck/Brain demonstrates a 2-3 mm aneurysm in the clinoid segment of the left ICA.  PLAN: CTA Head and Neck w/wo IV contrast in 6 months for surveillance study.   Hayley Zuniga MS P-BC Nurse Practitioner [facility]   Chao Russell MD, FRCS

## 2025-03-25 NOTE — H&P ADULT - NSICDXPASTMEDICALHX_GEN_ALL_CORE_FT
The clinical goals for the shift include pt remain hemodymanically stable    Over the shift, the patient did make progress toward the following goals. Pt arrived from ED to . Totally AxO, VSS RA. CIWA checked Q 2hours with 0 scare. Ambulated to bathroom, steady gait with SBA. C/o right back pain and Tylenol PRN given. Continue IV NS @ 100ml/hr. Explain and understanding POC. No sign of acute distress and call light within reach.   PAST MEDICAL HISTORY:  Diabetes     Hypercholesteremia     Hypertension     Neuropathy

## 2025-03-28 ENCOUNTER — APPOINTMENT (OUTPATIENT)
Dept: PODIATRY | Facility: CLINIC | Age: 55
End: 2025-03-28

## 2025-03-31 ENCOUNTER — OUTPATIENT (OUTPATIENT)
Dept: OUTPATIENT SERVICES | Facility: HOSPITAL | Age: 55
LOS: 1 days | End: 2025-03-31
Payer: COMMERCIAL

## 2025-03-31 ENCOUNTER — APPOINTMENT (OUTPATIENT)
Dept: NUTRITION | Facility: CLINIC | Age: 55
End: 2025-03-31

## 2025-03-31 DIAGNOSIS — E11.9 TYPE 2 DIABETES MELLITUS WITHOUT COMPLICATIONS: ICD-10-CM

## 2025-03-31 DIAGNOSIS — E11.65 TYPE 2 DIABETES MELLITUS WITH HYPERGLYCEMIA: ICD-10-CM

## 2025-03-31 DIAGNOSIS — Z98.891 HISTORY OF UTERINE SCAR FROM PREVIOUS SURGERY: Chronic | ICD-10-CM

## 2025-03-31 PROCEDURE — G0108: CPT

## 2025-03-31 RX ORDER — LANCETS 33 GAUGE
EACH MISCELLANEOUS
Qty: 100 | Refills: 0 | Status: ACTIVE | COMMUNITY
Start: 2025-03-31 | End: 1900-01-01

## 2025-03-31 RX ORDER — BLOOD-GLUCOSE METER
W/DEVICE EACH MISCELLANEOUS
Qty: 1 | Refills: 0 | Status: ACTIVE | COMMUNITY
Start: 2025-03-31 | End: 1900-01-01

## 2025-03-31 RX ORDER — BLOOD SUGAR DIAGNOSTIC
STRIP MISCELLANEOUS 3 TIMES DAILY
Qty: 300 | Refills: 2 | Status: ACTIVE | COMMUNITY
Start: 2025-03-31 | End: 1900-01-01

## 2025-04-01 ENCOUNTER — APPOINTMENT (OUTPATIENT)
Dept: RHEUMATOLOGY | Facility: CLINIC | Age: 55
End: 2025-04-01

## 2025-04-03 DIAGNOSIS — E11.21 TYPE 2 DIABETES MELLITUS WITH DIABETIC NEPHROPATHY: ICD-10-CM

## 2025-04-12 RX ORDER — BLOOD-GLUCOSE METER
31G X 5 MM EACH MISCELLANEOUS
Qty: 100 | Refills: 2 | Status: ACTIVE | COMMUNITY
Start: 2025-04-12 | End: 1900-01-01

## 2025-04-14 ENCOUNTER — APPOINTMENT (OUTPATIENT)
Dept: NUTRITION | Facility: CLINIC | Age: 55
End: 2025-04-14

## 2025-04-14 ENCOUNTER — OUTPATIENT (OUTPATIENT)
Dept: OUTPATIENT SERVICES | Facility: HOSPITAL | Age: 55
LOS: 1 days | End: 2025-04-14
Payer: COMMERCIAL

## 2025-04-14 DIAGNOSIS — Z00.00 ENCOUNTER FOR GENERAL ADULT MEDICAL EXAMINATION WITHOUT ABNORMAL FINDINGS: ICD-10-CM

## 2025-04-14 DIAGNOSIS — Z98.891 HISTORY OF UTERINE SCAR FROM PREVIOUS SURGERY: Chronic | ICD-10-CM

## 2025-04-14 PROCEDURE — G0108: CPT

## 2025-04-16 DIAGNOSIS — E11.65 TYPE 2 DIABETES MELLITUS WITH HYPERGLYCEMIA: ICD-10-CM

## 2025-04-24 ENCOUNTER — OUTPATIENT (OUTPATIENT)
Dept: OUTPATIENT SERVICES | Facility: HOSPITAL | Age: 55
LOS: 1 days | End: 2025-04-24
Payer: COMMERCIAL

## 2025-04-24 ENCOUNTER — APPOINTMENT (OUTPATIENT)
Dept: ENDOCRINOLOGY | Facility: CLINIC | Age: 55
End: 2025-04-24

## 2025-04-24 VITALS
BODY MASS INDEX: 19.04 KG/M2 | HEART RATE: 84 BPM | HEIGHT: 60 IN | DIASTOLIC BLOOD PRESSURE: 83 MMHG | TEMPERATURE: 97 F | OXYGEN SATURATION: 97 % | WEIGHT: 97 LBS | SYSTOLIC BLOOD PRESSURE: 157 MMHG

## 2025-04-24 DIAGNOSIS — Z00.00 ENCOUNTER FOR GENERAL ADULT MEDICAL EXAMINATION WITHOUT ABNORMAL FINDINGS: ICD-10-CM

## 2025-04-24 DIAGNOSIS — E11.65 TYPE 2 DIABETES MELLITUS WITH HYPERGLYCEMIA: ICD-10-CM

## 2025-04-24 DIAGNOSIS — Z98.891 HISTORY OF UTERINE SCAR FROM PREVIOUS SURGERY: Chronic | ICD-10-CM

## 2025-04-24 PROCEDURE — 99214 OFFICE O/P EST MOD 30 MIN: CPT

## 2025-04-24 NOTE — PHYSICAL EXAM
[Alert] : alert [Supple] : the neck was supple [No Respiratory Distress] : no respiratory distress [No Accessory Muscle Use] : no accessory muscle use [Clear to Auscultation] : lungs were clear to auscultation bilaterally [Normal PMI] : the apical impulse was normal [Normal S1, S2] : normal S1 and S2 [Normal Rate] : heart rate was normal [Not Tender] : non-tender [Soft] : abdomen soft [Oriented x3] : oriented to person, place, and time

## 2025-04-26 NOTE — ASSESSMENT
[FreeTextEntry1] : Pt is a 53 yo F type I DM presents for FU. Reports AM glucose ranges from up to 150. Pt reports she takes 6u at AM and 12u at bedtime   - Pt UTD on foot and eye doctor screening - Meds renewed - Labs today  PATIENT HAS HEALTH INSURANCE, BUT NO PRESCRIPTION PLAN.

## 2025-04-26 NOTE — HISTORY OF PRESENT ILLNESS
[FreeTextEntry1] : Pt is a 53 yo F type I DM presents for FU. Reports AM glucose ranges from up to 150. Pt reports she takes 6u at AM and 12u at bedtime

## 2025-04-26 NOTE — REVIEW OF SYSTEMS
[Fatigue] : no fatigue [Neck Pain] : no neck pain [Slow Heart Rate] : heart rate is not slow [Palpitations] : no palpitations [Cough] : no cough [Nausea] : no nausea [Vomiting] : no vomiting [Polyuria] : no polyuria [Cold Intolerance] : no cold intolerance

## 2025-04-26 NOTE — HISTORY OF PRESENT ILLNESS
[FreeTextEntry1] : Pt is a 55 yo F type I DM presents for FU. Reports AM glucose ranges from up to 150. Pt reports she takes 6u at AM and 12u at bedtime

## 2025-04-30 ENCOUNTER — OUTPATIENT (OUTPATIENT)
Dept: OUTPATIENT SERVICES | Facility: HOSPITAL | Age: 55
LOS: 1 days | End: 2025-04-30
Payer: COMMERCIAL

## 2025-04-30 ENCOUNTER — APPOINTMENT (OUTPATIENT)
Dept: INTERNAL MEDICINE | Facility: CLINIC | Age: 55
End: 2025-04-30

## 2025-04-30 VITALS
WEIGHT: 94.44 LBS | HEART RATE: 87 BPM | TEMPERATURE: 98.1 F | BODY MASS INDEX: 18.54 KG/M2 | SYSTOLIC BLOOD PRESSURE: 123 MMHG | DIASTOLIC BLOOD PRESSURE: 74 MMHG | OXYGEN SATURATION: 95 % | HEIGHT: 60 IN

## 2025-04-30 DIAGNOSIS — E10.65 TYPE 1 DIABETES MELLITUS WITH HYPERGLYCEMIA: ICD-10-CM

## 2025-04-30 DIAGNOSIS — Z98.891 HISTORY OF UTERINE SCAR FROM PREVIOUS SURGERY: Chronic | ICD-10-CM

## 2025-04-30 DIAGNOSIS — Z86.15 PERSONAL HISTORY OF LATENT TUBERCULOSIS INFECTION: ICD-10-CM

## 2025-04-30 DIAGNOSIS — E78.00 PURE HYPERCHOLESTEROLEMIA, UNSPECIFIED: ICD-10-CM

## 2025-04-30 DIAGNOSIS — R10.13 EPIGASTRIC PAIN: ICD-10-CM

## 2025-04-30 DIAGNOSIS — E11.41 TYPE 2 DIABETES MELLITUS WITH DIABETIC MONONEUROPATHY: ICD-10-CM

## 2025-04-30 DIAGNOSIS — J47.9 BRONCHIECTASIS, UNCOMPLICATED: ICD-10-CM

## 2025-04-30 DIAGNOSIS — I10 ESSENTIAL (PRIMARY) HYPERTENSION: ICD-10-CM

## 2025-04-30 DIAGNOSIS — E11.21 TYPE 2 DIABETES MELLITUS WITH DIABETIC NEPHROPATHY: ICD-10-CM

## 2025-04-30 DIAGNOSIS — M06.9 RHEUMATOID ARTHRITIS, UNSPECIFIED: ICD-10-CM

## 2025-04-30 DIAGNOSIS — G57.90 TYPE 2 DIABETES MELLITUS WITH DIABETIC MONONEUROPATHY: ICD-10-CM

## 2025-04-30 DIAGNOSIS — Z00.00 ENCOUNTER FOR GENERAL ADULT MEDICAL EXAMINATION WITHOUT ABNORMAL FINDINGS: ICD-10-CM

## 2025-04-30 PROCEDURE — 99214 OFFICE O/P EST MOD 30 MIN: CPT

## 2025-04-30 PROCEDURE — G2211 COMPLEX E/M VISIT ADD ON: CPT | Mod: NC

## 2025-04-30 RX ORDER — DICLOFENAC SODIUM 10 MG/G
1 GEL TOPICAL DAILY
Qty: 1 | Refills: 0 | Status: ACTIVE | COMMUNITY
Start: 2025-04-30 | End: 1900-01-01

## 2025-04-30 NOTE — PHYSICAL EXAM
[No Acute Distress] : no acute distress [Well Nourished] : well nourished [Normal Sclera/Conjunctiva] : normal sclera/conjunctiva [Normal Outer Ear/Nose] : the outer ears and nose were normal in appearance [No JVD] : no jugular venous distention [No Respiratory Distress] : no respiratory distress  [Normal Rate] : normal rate  [No Carotid Bruits] : no carotid bruits [No Edema] : there was no peripheral edema [Soft] : abdomen soft [No HSM] : no HSM [Normal Anterior Cervical Nodes] : no anterior cervical lymphadenopathy [No CVA Tenderness] : no CVA  tenderness [No Rash] : no rash [Coordination Grossly Intact] : coordination grossly intact [No Focal Deficits] : no focal deficits [Normal Insight/Judgement] : insight and judgment were intact [Normal S1, S2] : normal S1 and S2 [de-identified] : temporal wasting appreciated [de-identified] : systolic murmur 4/6 appreciated at aortic area [de-identified] : painful PIP joints and 1st MCP joint b/l hands

## 2025-04-30 NOTE — HISTORY OF PRESENT ILLNESS
[FreeTextEntry1] : follow up.  [de-identified] : Patient is a 54 Y/F w/ PMH of RA, latent TB s/p treatment, HTN and DM on insulin who presents for follow up.  Patient was last seen in clinic 01/30/25. For her T1DM she was utd with Pod and Optho appts, had poorly controlled DM with A1c of 10.3, endorsed difficulty with availability of medications. She endorses an occipital HA in s/o neck strain and was started on Flexril 5mg qd prn. For RA, pt was previously on hydoxy and MTX w/ persistent synovitis, was referred for infliximab infusions (Dr. Kunz). Her LDL-c was 320 and her lipitor was increased from 40 to 80mg qhs. Her bp was uncontrolled 180/100, had losartan increased from 50 to 100mg qd.   Today patient is feeling well, but endorses continued pain in hands and neck. Vitally wnl. Fingersticks well controlled, current regimen consists of 6 units Humalog am 12 units Toujeo pm. No repeat labs available. Patient recently seen by Dalton Tinoco, had labs ordered.

## 2025-04-30 NOTE — END OF VISIT
[] : Resident [FreeTextEntry3] : I saw the patient, examined the patient independently, reviewed medical record, and provided the medical services. Agree with resident note as personally edited above. due for repeat labs, a1c previously poorly controlled only taking short acting insulin once rather than tid of late denies elevated fs's at home or hypoglycemic episodes bps better on current arb dosage

## 2025-04-30 NOTE — ASSESSMENT
[FreeTextEntry1] : Patient is a 54 Y/F w/ PMH of RA, latent TB s/p treatment, HTN and DM on insulin who presents for follow up.  #Heart Murmur - hx as and paroxysmal afib on asa - systolic murmur appreciated at aortic area, 4/6 severity - patient endorses presycnopal sxs on exertion ekg this visit unremarkable, no injury pattern - TTE  # Uncontrolled Type I Insulin Dependent Diabetes. - problem with medication affordability - following salvador, last seen April 2025 - is currently on insulin 6 units Humalog am and 12 units Toujeo pm counselled patient on how/when to take medication and about side effects - UTD w/ optho and Pod - A1c: 10.3 % (01/29), repeat labs ordered  # Severe Non-Proliferative Diabetic Retinopathy - Follows up with ophthalmology  # Occipital Headache # Neck strain - Will be started on Cyclobenzaprine 5 mg PRN - in s/o RA, will order Cervical x-ray with flexion and extension views  # Bronchiectasis # RML Syndrome - Complaining of cough and chest pain this visit - No fever, no hemoptysis - Following Dr. Martinez: next appt 06/25  # Rheumatoid Arthritis - High + RF and CCP, with persistent synovitis previously on hydroxychloroquine and methotrexate - Follows with Dr Weiss - Referred pt for infliximab infusions, 3 mg/kg at weeks 0, 2 and 6, followed by every 8 weeks thereafter - needs to follow up with Dr. Weiss - diclofenac bid prn for mild/mod pain + tylenol 650mg q8 for severe pain  # Hyperlipidemia. - On home: Atorvastatin 80mg qhs - LDL-c: 320,  Hyperlipidemia; Low fat, low cholesterol diet. Discussed importance of eating a heart healthy diet Counseled on aerobic exercise and weight loss Fasting lipid panel every 3 months Treatment options and possible side effects discussed Smoking cessation discussed, if applicable Patient counseled on effects of ETOH on lipid levels  # HTN - improved - BP this visit reading much improved - c/w Nifedipine 30 mg daily + Losartan 100 mg daily. counselled patient on how/when to take medication and about side effects  # HCM - Pap Smear + HPV -ve in 2021, will need repeat next year. - Mammogram: Last one 2021, will send additional referral Mammogram Cancer Screening; Patient counseled on the importance of a yearly mammogram screening and directed to have test performed or follow-up with OB/GYN. Failure to perform test can result in breast cancer and death - Had flu shot this year. - Colonoscopy 1 year ago unremarkable, next in 2033. - f/u in 3 months w/ routine labs + TTE results

## 2025-05-01 DIAGNOSIS — E10.65 TYPE 1 DIABETES MELLITUS WITH HYPERGLYCEMIA: ICD-10-CM

## 2025-05-01 DIAGNOSIS — E78.00 PURE HYPERCHOLESTEROLEMIA, UNSPECIFIED: ICD-10-CM

## 2025-05-01 DIAGNOSIS — E11.41 TYPE 2 DIABETES MELLITUS WITH DIABETIC MONONEUROPATHY: ICD-10-CM

## 2025-05-01 DIAGNOSIS — Z86.15 PERSONAL HISTORY OF LATENT TUBERCULOSIS INFECTION: ICD-10-CM

## 2025-05-01 DIAGNOSIS — J47.9 BRONCHIECTASIS, UNCOMPLICATED: ICD-10-CM

## 2025-05-01 DIAGNOSIS — I10 ESSENTIAL (PRIMARY) HYPERTENSION: ICD-10-CM

## 2025-05-01 DIAGNOSIS — R10.13 EPIGASTRIC PAIN: ICD-10-CM

## 2025-05-08 DIAGNOSIS — E10.65 TYPE 1 DIABETES MELLITUS WITH HYPERGLYCEMIA: ICD-10-CM

## 2025-05-14 ENCOUNTER — APPOINTMENT (OUTPATIENT)
Dept: OPHTHALMOLOGY | Facility: CLINIC | Age: 55
End: 2025-05-14

## 2025-05-17 ENCOUNTER — NON-APPOINTMENT (OUTPATIENT)
Age: 55
End: 2025-05-17

## 2025-06-09 ENCOUNTER — APPOINTMENT (OUTPATIENT)
Dept: NUTRITION | Facility: CLINIC | Age: 55
End: 2025-06-09

## 2025-06-16 ENCOUNTER — APPOINTMENT (OUTPATIENT)
Dept: PULMONOLOGY | Facility: CLINIC | Age: 55
End: 2025-06-16

## 2025-06-19 NOTE — ED PROVIDER NOTE - NS ED MD DISPO DISCHARGE
"      PSYCHIATRIC DISCHARGE SUMMARY     Patient Identification:  Name:  Joel Stone  Age:  42 y.o.  Sex:  male  :  1982  MRN:  [id number]  Visit Number:  [id number]    Date of Admission:2025   Date of Discharge: 2025     Discharge Diagnosis:  Principal Problem:    Schizoaffective disorder, bipolar type  Active Problems:    Post traumatic stress disorder (PTSD)    Hepatitis C    Opioid use disorder, severe, on maintenance therapy, dependence    Seizure disorder    Polysubstance abuse    Cluster B personality disorder      Admission Diagnosis:  Psychosis [F29]     Hospital Course  Patient is a 42 y.o. male presented with SI and hallucinations.  Admitted for crisis stabilization.  Admission labs with glucose 121, UDS + buprenorphine.  Home medications continued.  Quetiapine increased to 300 mg nightly) and increased to 5 mg nightly.  Suboxone was reinstated 8 mg daily.  Medication changes were well tolerated.  Patient reported improvement in symptom burden over the course of his stay, denying further hallucinations or SI.  Patient often preoccupied with dietary restrictions, but redirectable and appropriate overall.  Patient accepted at a [facility] in [place] and will be discharged to that facility today.  Patient appropriate for discharge at this time.    By the conclusion of this hospitalization, patient is exhibiting no acutely concerning symptoms of mood, psychotic or thought disorder that would necessitate further inpatient care. Patient is also denying SI, HI, and AVH. Patient has shown improvement of presenting symptoms, exhibited no behavior concerning for harm to self or others, and is considered appropriate for discharge to a lower level of care today. Treatment and safe discharge planning completed. Outpatient care ascertained.     Mental Status Exam upon discharge:   Mood \"better\"   Affect-congruent, appropriate, stable  Thought Content-goal directed, no delusional " Home material present  Thought process-linear, organized.  Suicidality: No SI  Homicidality: No HI  Perception: No AH/VH    Procedures Performed         Consults:   Consults       No orders found from 5/18/2025 to 6/17/2025.            Pertinent Test Results:   Lab Results (last 7 days)       Procedure Component Value Units Date/Time    POC Glucose Once [[id number]]  (Normal) Collected: 06/17/25 0619    Specimen: Blood Updated: 06/17/25 0627     Glucose 121 mg/dL     POC Glucose Once [[id number]]  (Normal) Collected: 06/16/25 2108    Specimen: Blood Updated: 06/16/25 2114     Glucose 117 mg/dL     POC Glucose Once [[id number]]  (Normal) Collected: 06/16/25 1630    Specimen: Blood Updated: 06/16/25 1637     Glucose 102 mg/dL             Condition on Discharge:  improved    Vital Signs  Temp:  [96.7 °F (35.9 °C)-98.1 °F (36.7 °C)] 96.7 °F (35.9 °C)  Heart Rate:  [70-81] 70  Resp:  [16-18] 18  BP: (110-117)/(64-78) 110/64    Discharge Disposition:  Rehab Facility or Unit (DC - External)    Discharge Medications:     Discharge Medications        New Medications        Instructions Start Date   buprenorphine-naloxone 8-2 MG per SL tablet  Commonly known as: SUBOXONE   1 tablet, Sublingual, Daily             Changes to Medications        Instructions Start Date   dicyclomine 10 MG capsule  Commonly known as: BENTYL  What changed: additional instructions   10 mg, Oral, 4 Times Daily      prazosin 5 MG capsule  Commonly known as: MINIPRESS  What changed:   medication strength  how much to take   5 mg, Oral, Nightly      QUEtiapine 300 MG tablet  Commonly known as: SEROquel  What changed:   medication strength  how much to take   300 mg, Oral, Nightly      Ventolin  (90 Base) MCG/ACT inhaler  Generic drug: albuterol sulfate HFA  What changed: when to take this   Inhale 2 puffs by mouth  Every 6 (Six) Hours As Needed for Shortness of Air or Wheezing. Indications: Chronic Obstructive Lung Disease             Continue These  Medications        Instructions Start Date   ARIPiprazole 15 MG tablet  Commonly known as: ABILIFY   15 mg, Oral, Daily      budesonide-formoterol 160-4.5 MCG/ACT inhaler  Commonly known as: Symbicort   2 puffs, Inhalation, 2 Times Daily - RT      hydroCHLOROthiazide 25 MG tablet   25 mg, Oral, Daily      levETIRAcetam 500 MG tablet  Commonly known as: KEPPRA   500 mg, Oral, 2 Times Daily      metFORMIN 500 MG tablet  Commonly known as: GLUCOPHAGE   500 mg, Oral, 2 Times Daily With Meals             Stop These Medications      famotidine 40 MG tablet  Commonly known as: PEPCID     HumaLOG KwikPen 100 UNIT/ML solution pen-injector  Generic drug: Insulin Lispro (1 Unit Dial)     metroNIDAZOLE 500 MG tablet  Commonly known as: FLAGYL     ondansetron ODT 4 MG disintegrating tablet  Commonly known as: ZOFRAN-ODT              Discharge Diet: Normal  Diet Instructions       Diet: Regular/House Diet; Soft to Chew (NDD 3); Chopped Meat; Thin (IDDSI 0)      Discharge Diet: Regular/House Diet    Texture: Soft to Chew (NDD 3)    Soft to Chew: Chopped Meat    Fluid Consistency: Thin (IDDSI 0)      Regular            Activity at Discharge: Normal  Activity Instructions    As tolerated            Follow-up Appointments  No future appointments.      Test Results Pending at Discharge  None     Time: I spent greater than 30 minutes on this discharge activity which included: face-to-face encounter with the patient, reviewing the data in the system, coordination of the care with the nursing staff as well as consultants, documentation, and entering orders.      Clinician:   Gary Singh MD  06/19/25  12:58 EDT

## 2025-06-20 ENCOUNTER — OUTPATIENT (OUTPATIENT)
Dept: OUTPATIENT SERVICES | Facility: HOSPITAL | Age: 55
LOS: 1 days | End: 2025-06-20
Payer: COMMERCIAL

## 2025-06-20 ENCOUNTER — NON-APPOINTMENT (OUTPATIENT)
Age: 55
End: 2025-06-20

## 2025-06-20 ENCOUNTER — APPOINTMENT (OUTPATIENT)
Dept: NUTRITION | Facility: CLINIC | Age: 55
End: 2025-06-20

## 2025-06-20 DIAGNOSIS — E11.65 TYPE 2 DIABETES MELLITUS WITH HYPERGLYCEMIA: ICD-10-CM

## 2025-06-20 DIAGNOSIS — Z98.891 HISTORY OF UTERINE SCAR FROM PREVIOUS SURGERY: Chronic | ICD-10-CM

## 2025-06-20 PROCEDURE — G0108: CPT

## 2025-06-26 DIAGNOSIS — E11.65 TYPE 2 DIABETES MELLITUS WITH HYPERGLYCEMIA: ICD-10-CM

## 2025-07-01 ENCOUNTER — APPOINTMENT (OUTPATIENT)
Dept: NUTRITION | Facility: CLINIC | Age: 55
End: 2025-07-01

## 2025-07-01 ENCOUNTER — OUTPATIENT (OUTPATIENT)
Dept: OUTPATIENT SERVICES | Facility: HOSPITAL | Age: 55
LOS: 1 days | End: 2025-07-01
Payer: COMMERCIAL

## 2025-07-01 DIAGNOSIS — Z98.891 HISTORY OF UTERINE SCAR FROM PREVIOUS SURGERY: Chronic | ICD-10-CM

## 2025-07-01 DIAGNOSIS — Z00.00 ENCOUNTER FOR GENERAL ADULT MEDICAL EXAMINATION WITHOUT ABNORMAL FINDINGS: ICD-10-CM

## 2025-07-01 PROCEDURE — G0108: CPT

## 2025-07-03 DIAGNOSIS — E11.65 TYPE 2 DIABETES MELLITUS WITH HYPERGLYCEMIA: ICD-10-CM

## 2025-07-07 NOTE — ED PROVIDER NOTE - DISCHARGE REVIEW MATERIAL PRESENTED
Medication(s) Requested: Amphetamine-Dextroamphetamine Oral Tablet 20 MG   Last office visit: 2/27/25  Last refill: 7/16/24  Is the patient due for refill of this medication(s): Yes  PDMP review: Criteria met. Forwarded to Physician/LUZ ELENA for signature.      .

## 2025-07-15 NOTE — HISTORY OF PRESENT ILLNESS
[FreeTextEntry1] : elevated PSA UA UCX MRI prostate f/u 4-6 weeks to review [FreeTextEntry1] : 51 y/o female presents for evaluation of bilateral cold feet, leg swelling and pain at times. She had an arterial duplex in November 2022 that showed no arterial disease in the lower extremities bilaterally.

## 2025-07-25 ENCOUNTER — INPATIENT (INPATIENT)
Facility: HOSPITAL | Age: 55
LOS: 5 days | Discharge: HOME | DRG: 313 | End: 2025-07-31
Attending: INTERNAL MEDICINE | Admitting: STUDENT IN AN ORGANIZED HEALTH CARE EDUCATION/TRAINING PROGRAM
Payer: COMMERCIAL

## 2025-07-25 ENCOUNTER — RESULT REVIEW (OUTPATIENT)
Age: 55
End: 2025-07-25

## 2025-07-25 VITALS
WEIGHT: 95.9 LBS | HEIGHT: 60 IN | DIASTOLIC BLOOD PRESSURE: 91 MMHG | TEMPERATURE: 98 F | HEART RATE: 90 BPM | SYSTOLIC BLOOD PRESSURE: 188 MMHG | OXYGEN SATURATION: 100 %

## 2025-07-25 DIAGNOSIS — Z98.891 HISTORY OF UTERINE SCAR FROM PREVIOUS SURGERY: Chronic | ICD-10-CM

## 2025-07-25 DIAGNOSIS — R07.9 CHEST PAIN, UNSPECIFIED: ICD-10-CM

## 2025-07-25 LAB
A1C WITH ESTIMATED AVERAGE GLUCOSE RESULT: 9.1 % — HIGH (ref 4–5.6)
ALBUMIN SERPL ELPH-MCNC: 4.1 G/DL — SIGNIFICANT CHANGE UP (ref 3.5–5.2)
ALP SERPL-CCNC: 142 U/L — HIGH (ref 30–115)
ALT FLD-CCNC: 15 U/L — SIGNIFICANT CHANGE UP (ref 0–41)
ANION GAP SERPL CALC-SCNC: 12 MMOL/L — SIGNIFICANT CHANGE UP (ref 7–14)
ANION GAP SERPL CALC-SCNC: 9 MMOL/L — SIGNIFICANT CHANGE UP (ref 7–14)
AST SERPL-CCNC: 18 U/L — SIGNIFICANT CHANGE UP (ref 0–41)
BASOPHILS # BLD AUTO: 0.04 K/UL — SIGNIFICANT CHANGE UP (ref 0–0.2)
BASOPHILS NFR BLD AUTO: 0.4 % — SIGNIFICANT CHANGE UP (ref 0–1)
BILIRUB SERPL-MCNC: <0.2 MG/DL — SIGNIFICANT CHANGE UP (ref 0.2–1.2)
BUN SERPL-MCNC: 40 MG/DL — HIGH (ref 10–20)
BUN SERPL-MCNC: 52 MG/DL — HIGH (ref 10–20)
CALCIUM SERPL-MCNC: 9 MG/DL — SIGNIFICANT CHANGE UP (ref 8.4–10.5)
CALCIUM SERPL-MCNC: 9.4 MG/DL — SIGNIFICANT CHANGE UP (ref 8.4–10.5)
CHLORIDE SERPL-SCNC: 103 MMOL/L — SIGNIFICANT CHANGE UP (ref 98–110)
CHLORIDE SERPL-SCNC: 108 MMOL/L — SIGNIFICANT CHANGE UP (ref 98–110)
CHOLEST SERPL-MCNC: 325 MG/DL — HIGH
CO2 SERPL-SCNC: 22 MMOL/L — SIGNIFICANT CHANGE UP (ref 17–32)
CO2 SERPL-SCNC: 23 MMOL/L — SIGNIFICANT CHANGE UP (ref 17–32)
CREAT SERPL-MCNC: 1.7 MG/DL — HIGH (ref 0.7–1.5)
CREAT SERPL-MCNC: 2.2 MG/DL — HIGH (ref 0.7–1.5)
D DIMER BLD IA.RAPID-MCNC: 516 NG/ML DDU — HIGH
EGFR: 26 ML/MIN/1.73M2 — LOW
EGFR: 26 ML/MIN/1.73M2 — LOW
EGFR: 35 ML/MIN/1.73M2 — LOW
EGFR: 35 ML/MIN/1.73M2 — LOW
EOSINOPHIL # BLD AUTO: 0.06 K/UL — SIGNIFICANT CHANGE UP (ref 0–0.7)
EOSINOPHIL NFR BLD AUTO: 0.6 % — SIGNIFICANT CHANGE UP (ref 0–8)
ESTIMATED AVERAGE GLUCOSE: 214 MG/DL — HIGH (ref 68–114)
GLUCOSE BLDC GLUCOMTR-MCNC: 127 MG/DL — HIGH (ref 70–99)
GLUCOSE BLDC GLUCOMTR-MCNC: 195 MG/DL — HIGH (ref 70–99)
GLUCOSE BLDC GLUCOMTR-MCNC: 86 MG/DL — SIGNIFICANT CHANGE UP (ref 70–99)
GLUCOSE SERPL-MCNC: 132 MG/DL — HIGH (ref 70–99)
GLUCOSE SERPL-MCNC: 352 MG/DL — HIGH (ref 70–99)
HCT VFR BLD CALC: 30.9 % — LOW (ref 37–47)
HCT VFR BLD CALC: 35.1 % — LOW (ref 37–47)
HDLC SERPL-MCNC: 77 MG/DL — SIGNIFICANT CHANGE UP
HGB BLD-MCNC: 11.4 G/DL — LOW (ref 12–16)
HGB BLD-MCNC: 9.9 G/DL — LOW (ref 12–16)
IMM GRANULOCYTES NFR BLD AUTO: 0.3 % — SIGNIFICANT CHANGE UP (ref 0.1–0.3)
LDLC SERPL-MCNC: 226 MG/DL — HIGH
LIPID PNL WITH DIRECT LDL SERPL: 226 MG/DL — HIGH
LYMPHOCYTES # BLD AUTO: 1.1 K/UL — LOW (ref 1.2–3.4)
LYMPHOCYTES # BLD AUTO: 10.5 % — LOW (ref 20.5–51.1)
MCHC RBC-ENTMCNC: 28.3 PG — SIGNIFICANT CHANGE UP (ref 27–31)
MCHC RBC-ENTMCNC: 28.6 PG — SIGNIFICANT CHANGE UP (ref 27–31)
MCHC RBC-ENTMCNC: 32 G/DL — SIGNIFICANT CHANGE UP (ref 32–37)
MCHC RBC-ENTMCNC: 32.5 G/DL — SIGNIFICANT CHANGE UP (ref 32–37)
MCV RBC AUTO: 88.2 FL — SIGNIFICANT CHANGE UP (ref 81–99)
MCV RBC AUTO: 88.3 FL — SIGNIFICANT CHANGE UP (ref 81–99)
MONOCYTES # BLD AUTO: 0.51 K/UL — SIGNIFICANT CHANGE UP (ref 0.1–0.6)
MONOCYTES NFR BLD AUTO: 4.9 % — SIGNIFICANT CHANGE UP (ref 1.7–9.3)
NEUTROPHILS # BLD AUTO: 8.76 K/UL — HIGH (ref 1.4–6.5)
NEUTROPHILS NFR BLD AUTO: 83.3 % — HIGH (ref 42.2–75.2)
NONHDLC SERPL-MCNC: 248 MG/DL — HIGH
NRBC BLD AUTO-RTO: 0 /100 WBCS — SIGNIFICANT CHANGE UP (ref 0–0)
NRBC BLD AUTO-RTO: 0 /100 WBCS — SIGNIFICANT CHANGE UP (ref 0–0)
NT-PROBNP SERPL-SCNC: 2118 PG/ML — HIGH (ref 0–300)
PLATELET # BLD AUTO: 231 K/UL — SIGNIFICANT CHANGE UP (ref 130–400)
PLATELET # BLD AUTO: 250 K/UL — SIGNIFICANT CHANGE UP (ref 130–400)
PMV BLD: 10.1 FL — SIGNIFICANT CHANGE UP (ref 7.4–10.4)
PMV BLD: 10.1 FL — SIGNIFICANT CHANGE UP (ref 7.4–10.4)
POTASSIUM SERPL-MCNC: 4.7 MMOL/L — SIGNIFICANT CHANGE UP (ref 3.5–5)
POTASSIUM SERPL-MCNC: 4.9 MMOL/L — SIGNIFICANT CHANGE UP (ref 3.5–5)
POTASSIUM SERPL-SCNC: 4.7 MMOL/L — SIGNIFICANT CHANGE UP (ref 3.5–5)
POTASSIUM SERPL-SCNC: 4.9 MMOL/L — SIGNIFICANT CHANGE UP (ref 3.5–5)
PROT SERPL-MCNC: 7.5 G/DL — SIGNIFICANT CHANGE UP (ref 6–8)
RBC # BLD: 3.5 M/UL — LOW (ref 4.2–5.4)
RBC # BLD: 3.98 M/UL — LOW (ref 4.2–5.4)
RBC # FLD: 13.1 % — SIGNIFICANT CHANGE UP (ref 11.5–14.5)
RBC # FLD: 13.2 % — SIGNIFICANT CHANGE UP (ref 11.5–14.5)
SODIUM SERPL-SCNC: 137 MMOL/L — SIGNIFICANT CHANGE UP (ref 135–146)
SODIUM SERPL-SCNC: 140 MMOL/L — SIGNIFICANT CHANGE UP (ref 135–146)
TRIGL SERPL-MCNC: 127 MG/DL — SIGNIFICANT CHANGE UP
TROPONIN T, HIGH SENSITIVITY RESULT: 26 NG/L — HIGH (ref 6–13)
TROPONIN T, HIGH SENSITIVITY RESULT: 28 NG/L — HIGH (ref 6–13)
TSH SERPL-MCNC: 1.46 UIU/ML — SIGNIFICANT CHANGE UP (ref 0.27–4.2)
WBC # BLD: 10.36 K/UL — SIGNIFICANT CHANGE UP (ref 4.8–10.8)
WBC # BLD: 10.5 K/UL — SIGNIFICANT CHANGE UP (ref 4.8–10.8)
WBC # FLD AUTO: 10.36 K/UL — SIGNIFICANT CHANGE UP (ref 4.8–10.8)
WBC # FLD AUTO: 10.5 K/UL — SIGNIFICANT CHANGE UP (ref 4.8–10.8)

## 2025-07-25 PROCEDURE — 71045 X-RAY EXAM CHEST 1 VIEW: CPT | Mod: 26

## 2025-07-25 PROCEDURE — 82570 ASSAY OF URINE CREATININE: CPT

## 2025-07-25 PROCEDURE — 93306 TTE W/DOPPLER COMPLETE: CPT | Mod: 26

## 2025-07-25 PROCEDURE — 93010 ELECTROCARDIOGRAM REPORT: CPT | Mod: 77

## 2025-07-25 PROCEDURE — 85027 COMPLETE CBC AUTOMATED: CPT

## 2025-07-25 PROCEDURE — 81001 URINALYSIS AUTO W/SCOPE: CPT

## 2025-07-25 PROCEDURE — 83550 IRON BINDING TEST: CPT

## 2025-07-25 PROCEDURE — 84300 ASSAY OF URINE SODIUM: CPT

## 2025-07-25 PROCEDURE — 80048 BASIC METABOLIC PNL TOTAL CA: CPT

## 2025-07-25 PROCEDURE — 83970 ASSAY OF PARATHORMONE: CPT

## 2025-07-25 PROCEDURE — 93010 ELECTROCARDIOGRAM REPORT: CPT

## 2025-07-25 PROCEDURE — 94640 AIRWAY INHALATION TREATMENT: CPT

## 2025-07-25 PROCEDURE — 93307 TTE W/O DOPPLER COMPLETE: CPT

## 2025-07-25 PROCEDURE — 83880 ASSAY OF NATRIURETIC PEPTIDE: CPT

## 2025-07-25 PROCEDURE — 85025 COMPLETE CBC W/AUTO DIFF WBC: CPT

## 2025-07-25 PROCEDURE — 71275 CT ANGIOGRAPHY CHEST: CPT | Mod: 26

## 2025-07-25 PROCEDURE — 73030 X-RAY EXAM OF SHOULDER: CPT | Mod: 26,LT

## 2025-07-25 PROCEDURE — 84443 ASSAY THYROID STIM HORMONE: CPT

## 2025-07-25 PROCEDURE — 99285 EMERGENCY DEPT VISIT HI MDM: CPT

## 2025-07-25 PROCEDURE — 36415 COLL VENOUS BLD VENIPUNCTURE: CPT

## 2025-07-25 PROCEDURE — 83036 HEMOGLOBIN GLYCOSYLATED A1C: CPT

## 2025-07-25 PROCEDURE — 83735 ASSAY OF MAGNESIUM: CPT

## 2025-07-25 PROCEDURE — 76770 US EXAM ABDO BACK WALL COMP: CPT

## 2025-07-25 PROCEDURE — 83540 ASSAY OF IRON: CPT

## 2025-07-25 PROCEDURE — 80061 LIPID PANEL: CPT

## 2025-07-25 PROCEDURE — 73030 X-RAY EXAM OF SHOULDER: CPT | Mod: LT

## 2025-07-25 PROCEDURE — 93005 ELECTROCARDIOGRAM TRACING: CPT

## 2025-07-25 PROCEDURE — 84100 ASSAY OF PHOSPHORUS: CPT

## 2025-07-25 PROCEDURE — 85045 AUTOMATED RETICULOCYTE COUNT: CPT

## 2025-07-25 PROCEDURE — 82962 GLUCOSE BLOOD TEST: CPT

## 2025-07-25 PROCEDURE — 82310 ASSAY OF CALCIUM: CPT

## 2025-07-25 PROCEDURE — 99223 1ST HOSP IP/OBS HIGH 75: CPT

## 2025-07-25 PROCEDURE — 73020 X-RAY EXAM OF SHOULDER: CPT | Mod: LT

## 2025-07-25 RX ORDER — NIFEDIPINE 30 MG
30 TABLET, EXTENDED RELEASE 24 HR ORAL DAILY
Refills: 0 | Status: DISCONTINUED | OUTPATIENT
Start: 2025-07-25 | End: 2025-07-29

## 2025-07-25 RX ORDER — ATORVASTATIN CALCIUM 80 MG/1
80 TABLET, FILM COATED ORAL AT BEDTIME
Refills: 0 | Status: DISCONTINUED | OUTPATIENT
Start: 2025-07-25 | End: 2025-07-31

## 2025-07-25 RX ORDER — DEXTROSE 50 % IN WATER 50 %
12.5 SYRINGE (ML) INTRAVENOUS ONCE
Refills: 0 | Status: DISCONTINUED | OUTPATIENT
Start: 2025-07-25 | End: 2025-07-31

## 2025-07-25 RX ORDER — METHYLPREDNISOLONE ACETATE 80 MG/ML
4 INJECTION, SUSPENSION INTRA-ARTICULAR; INTRALESIONAL; INTRAMUSCULAR; SOFT TISSUE
Refills: 0 | Status: COMPLETED | OUTPATIENT
Start: 2025-07-26 | End: 2025-07-27

## 2025-07-25 RX ORDER — GLUCAGON 3 MG/1
1 POWDER NASAL ONCE
Refills: 0 | Status: DISCONTINUED | OUTPATIENT
Start: 2025-07-25 | End: 2025-07-31

## 2025-07-25 RX ORDER — LABETALOL HYDROCHLORIDE 200 MG/1
10 TABLET, FILM COATED ORAL ONCE
Refills: 0 | Status: COMPLETED | OUTPATIENT
Start: 2025-07-25 | End: 2025-07-25

## 2025-07-25 RX ORDER — HEPARIN SODIUM 1000 [USP'U]/ML
5000 INJECTION INTRAVENOUS; SUBCUTANEOUS EVERY 12 HOURS
Refills: 0 | Status: DISCONTINUED | OUTPATIENT
Start: 2025-07-25 | End: 2025-07-31

## 2025-07-25 RX ORDER — ASPIRIN 325 MG
81 TABLET ORAL DAILY
Refills: 0 | Status: DISCONTINUED | OUTPATIENT
Start: 2025-07-25 | End: 2025-07-31

## 2025-07-25 RX ORDER — INSULIN LISPRO 100 U/ML
INJECTION, SOLUTION INTRAVENOUS; SUBCUTANEOUS
Refills: 0 | Status: DISCONTINUED | OUTPATIENT
Start: 2025-07-25 | End: 2025-07-31

## 2025-07-25 RX ORDER — METHYLPREDNISOLONE ACETATE 80 MG/ML
8 INJECTION, SUSPENSION INTRA-ARTICULAR; INTRALESIONAL; INTRAMUSCULAR; SOFT TISSUE AT BEDTIME
Refills: 0 | Status: COMPLETED | OUTPATIENT
Start: 2025-07-26 | End: 2025-07-26

## 2025-07-25 RX ORDER — NIFEDIPINE 30 MG
30 TABLET, EXTENDED RELEASE 24 HR ORAL ONCE
Refills: 0 | Status: COMPLETED | OUTPATIENT
Start: 2025-07-25 | End: 2025-07-25

## 2025-07-25 RX ORDER — SODIUM CHLORIDE 9 G/1000ML
1000 INJECTION, SOLUTION INTRAVENOUS
Refills: 0 | Status: DISCONTINUED | OUTPATIENT
Start: 2025-07-25 | End: 2025-07-27

## 2025-07-25 RX ORDER — METHYLPREDNISOLONE ACETATE 80 MG/ML
INJECTION, SUSPENSION INTRA-ARTICULAR; INTRALESIONAL; INTRAMUSCULAR; SOFT TISSUE
Refills: 0 | Status: DISCONTINUED | OUTPATIENT
Start: 2025-07-25 | End: 2025-07-31

## 2025-07-25 RX ORDER — NIFEDIPINE 30 MG
30 TABLET, EXTENDED RELEASE 24 HR ORAL ONCE
Refills: 0 | Status: DISCONTINUED | OUTPATIENT
Start: 2025-07-25 | End: 2025-07-25

## 2025-07-25 RX ORDER — METHYLPREDNISOLONE ACETATE 80 MG/ML
4 INJECTION, SUSPENSION INTRA-ARTICULAR; INTRALESIONAL; INTRAMUSCULAR; SOFT TISSUE
Refills: 0 | Status: COMPLETED | OUTPATIENT
Start: 2025-07-26 | End: 2025-07-28

## 2025-07-25 RX ORDER — METHYLPREDNISOLONE ACETATE 80 MG/ML
4 INJECTION, SUSPENSION INTRA-ARTICULAR; INTRALESIONAL; INTRAMUSCULAR; SOFT TISSUE
Refills: 0 | Status: COMPLETED | OUTPATIENT
Start: 2025-07-26 | End: 2025-07-30

## 2025-07-25 RX ORDER — DEXTROSE 50 % IN WATER 50 %
25 SYRINGE (ML) INTRAVENOUS ONCE
Refills: 0 | Status: DISCONTINUED | OUTPATIENT
Start: 2025-07-25 | End: 2025-07-31

## 2025-07-25 RX ORDER — DEXTROSE 50 % IN WATER 50 %
15 SYRINGE (ML) INTRAVENOUS ONCE
Refills: 0 | Status: DISCONTINUED | OUTPATIENT
Start: 2025-07-25 | End: 2025-07-31

## 2025-07-25 RX ORDER — SODIUM CHLORIDE 9 G/1000ML
1000 INJECTION, SOLUTION INTRAVENOUS
Refills: 0 | Status: DISCONTINUED | OUTPATIENT
Start: 2025-07-25 | End: 2025-07-31

## 2025-07-25 RX ORDER — INSULIN GLARGINE-YFGN 100 [IU]/ML
12 INJECTION, SOLUTION SUBCUTANEOUS AT BEDTIME
Refills: 0 | Status: DISCONTINUED | OUTPATIENT
Start: 2025-07-25 | End: 2025-07-31

## 2025-07-25 RX ORDER — LISINOPRIL 5 MG/1
1 TABLET ORAL
Refills: 0 | DISCHARGE

## 2025-07-25 RX ORDER — LOSARTAN POTASSIUM 100 MG/1
1 TABLET, FILM COATED ORAL
Refills: 0 | DISCHARGE

## 2025-07-25 RX ORDER — LABETALOL HYDROCHLORIDE 200 MG/1
5 TABLET, FILM COATED ORAL ONCE
Refills: 0 | Status: DISCONTINUED | OUTPATIENT
Start: 2025-07-25 | End: 2025-07-25

## 2025-07-25 RX ORDER — ATORVASTATIN CALCIUM 80 MG/1
1 TABLET, FILM COATED ORAL
Refills: 0 | DISCHARGE

## 2025-07-25 RX ORDER — METHYLPREDNISOLONE ACETATE 80 MG/ML
24 INJECTION, SUSPENSION INTRA-ARTICULAR; INTRALESIONAL; INTRAMUSCULAR; SOFT TISSUE ONCE
Refills: 0 | Status: DISCONTINUED | OUTPATIENT
Start: 2025-07-25 | End: 2025-07-31

## 2025-07-25 RX ORDER — METHYLPREDNISOLONE ACETATE 80 MG/ML
4 INJECTION, SUSPENSION INTRA-ARTICULAR; INTRALESIONAL; INTRAMUSCULAR; SOFT TISSUE AT BEDTIME
Refills: 0 | Status: COMPLETED | OUTPATIENT
Start: 2025-07-27 | End: 2025-07-29

## 2025-07-25 RX ADMIN — SODIUM CHLORIDE 80 MILLILITER(S): 9 INJECTION, SOLUTION INTRAVENOUS at 05:56

## 2025-07-25 RX ADMIN — Medication 30 MILLIGRAM(S): at 05:56

## 2025-07-25 RX ADMIN — ATORVASTATIN CALCIUM 80 MILLIGRAM(S): 80 TABLET, FILM COATED ORAL at 21:54

## 2025-07-25 RX ADMIN — Medication 81 MILLIGRAM(S): at 11:12

## 2025-07-25 RX ADMIN — INSULIN LISPRO 2: 100 INJECTION, SOLUTION INTRAVENOUS; SUBCUTANEOUS at 17:51

## 2025-07-25 RX ADMIN — LABETALOL HYDROCHLORIDE 10 MILLIGRAM(S): 200 TABLET, FILM COATED ORAL at 10:48

## 2025-07-25 RX ADMIN — Medication 1000 MILLILITER(S): at 00:47

## 2025-07-26 LAB
A1C WITH ESTIMATED AVERAGE GLUCOSE RESULT: 9.4 % — HIGH (ref 4–5.6)
ANION GAP SERPL CALC-SCNC: 8 MMOL/L — SIGNIFICANT CHANGE UP (ref 7–14)
BASOPHILS # BLD AUTO: 0.04 K/UL — SIGNIFICANT CHANGE UP (ref 0–0.2)
BASOPHILS NFR BLD AUTO: 0.6 % — SIGNIFICANT CHANGE UP (ref 0–1)
BUN SERPL-MCNC: 40 MG/DL — HIGH (ref 10–20)
CALCIUM SERPL-MCNC: 8.9 MG/DL — SIGNIFICANT CHANGE UP (ref 8.4–10.5)
CHLORIDE SERPL-SCNC: 110 MMOL/L — SIGNIFICANT CHANGE UP (ref 98–110)
CO2 SERPL-SCNC: 23 MMOL/L — SIGNIFICANT CHANGE UP (ref 17–32)
CREAT SERPL-MCNC: 1.9 MG/DL — HIGH (ref 0.7–1.5)
EGFR: 31 ML/MIN/1.73M2 — LOW
EGFR: 31 ML/MIN/1.73M2 — LOW
EOSINOPHIL # BLD AUTO: 0.11 K/UL — SIGNIFICANT CHANGE UP (ref 0–0.7)
EOSINOPHIL NFR BLD AUTO: 1.6 % — SIGNIFICANT CHANGE UP (ref 0–8)
ESTIMATED AVERAGE GLUCOSE: 223 MG/DL — HIGH (ref 68–114)
GLUCOSE BLDC GLUCOMTR-MCNC: 125 MG/DL — HIGH (ref 70–99)
GLUCOSE BLDC GLUCOMTR-MCNC: 157 MG/DL — HIGH (ref 70–99)
GLUCOSE BLDC GLUCOMTR-MCNC: 170 MG/DL — HIGH (ref 70–99)
GLUCOSE BLDC GLUCOMTR-MCNC: 308 MG/DL — HIGH (ref 70–99)
GLUCOSE SERPL-MCNC: 121 MG/DL — HIGH (ref 70–99)
HCT VFR BLD CALC: 30.3 % — LOW (ref 37–47)
HGB BLD-MCNC: 9.6 G/DL — LOW (ref 12–16)
IMM GRANULOCYTES NFR BLD AUTO: 0.1 % — SIGNIFICANT CHANGE UP (ref 0.1–0.3)
IRON SATN MFR SERPL: 20 % — SIGNIFICANT CHANGE UP (ref 15–50)
IRON SATN MFR SERPL: 51 UG/DL — SIGNIFICANT CHANGE UP (ref 35–150)
LYMPHOCYTES # BLD AUTO: 2.17 K/UL — SIGNIFICANT CHANGE UP (ref 1.2–3.4)
LYMPHOCYTES # BLD AUTO: 31 % — SIGNIFICANT CHANGE UP (ref 20.5–51.1)
MCHC RBC-ENTMCNC: 28.5 PG — SIGNIFICANT CHANGE UP (ref 27–31)
MCHC RBC-ENTMCNC: 31.7 G/DL — LOW (ref 32–37)
MCV RBC AUTO: 89.9 FL — SIGNIFICANT CHANGE UP (ref 81–99)
MONOCYTES # BLD AUTO: 0.45 K/UL — SIGNIFICANT CHANGE UP (ref 0.1–0.6)
MONOCYTES NFR BLD AUTO: 6.4 % — SIGNIFICANT CHANGE UP (ref 1.7–9.3)
NEUTROPHILS # BLD AUTO: 4.23 K/UL — SIGNIFICANT CHANGE UP (ref 1.4–6.5)
NEUTROPHILS NFR BLD AUTO: 60.3 % — SIGNIFICANT CHANGE UP (ref 42.2–75.2)
NRBC BLD AUTO-RTO: 0 /100 WBCS — SIGNIFICANT CHANGE UP (ref 0–0)
PLATELET # BLD AUTO: 221 K/UL — SIGNIFICANT CHANGE UP (ref 130–400)
PMV BLD: 10.1 FL — SIGNIFICANT CHANGE UP (ref 7.4–10.4)
POTASSIUM SERPL-MCNC: 5.2 MMOL/L — HIGH (ref 3.5–5)
POTASSIUM SERPL-SCNC: 5.2 MMOL/L — HIGH (ref 3.5–5)
RBC # BLD: 3.37 M/UL — LOW (ref 4.2–5.4)
RBC # BLD: 3.37 M/UL — LOW (ref 4.2–5.4)
RBC # FLD: 13.4 % — SIGNIFICANT CHANGE UP (ref 11.5–14.5)
RETICS #: 48.9 K/UL — SIGNIFICANT CHANGE UP (ref 25–125)
RETICS/RBC NFR: 1.5 % — SIGNIFICANT CHANGE UP (ref 0.5–1.5)
SODIUM SERPL-SCNC: 141 MMOL/L — SIGNIFICANT CHANGE UP (ref 135–146)
TIBC SERPL-MCNC: 249 UG/DL — SIGNIFICANT CHANGE UP (ref 220–430)
UIBC SERPL-MCNC: 198 UG/DL — SIGNIFICANT CHANGE UP (ref 110–370)
WBC # BLD: 7.01 K/UL — SIGNIFICANT CHANGE UP (ref 4.8–10.8)
WBC # FLD AUTO: 7.01 K/UL — SIGNIFICANT CHANGE UP (ref 4.8–10.8)

## 2025-07-26 PROCEDURE — 99233 SBSQ HOSP IP/OBS HIGH 50: CPT

## 2025-07-26 PROCEDURE — 93010 ELECTROCARDIOGRAM REPORT: CPT

## 2025-07-26 RX ADMIN — INSULIN LISPRO 2: 100 INJECTION, SOLUTION INTRAVENOUS; SUBCUTANEOUS at 17:34

## 2025-07-26 RX ADMIN — INSULIN LISPRO 8: 100 INJECTION, SOLUTION INTRAVENOUS; SUBCUTANEOUS at 12:34

## 2025-07-26 RX ADMIN — INSULIN GLARGINE-YFGN 12 UNIT(S): 100 INJECTION, SOLUTION SUBCUTANEOUS at 21:28

## 2025-07-26 RX ADMIN — Medication 1 APPLICATION(S): at 12:35

## 2025-07-26 RX ADMIN — Medication 30 MILLIGRAM(S): at 05:35

## 2025-07-26 RX ADMIN — SODIUM CHLORIDE 80 MILLILITER(S): 9 INJECTION, SOLUTION INTRAVENOUS at 02:14

## 2025-07-26 RX ADMIN — METHYLPREDNISOLONE ACETATE 4 MILLIGRAM(S): 80 INJECTION, SUSPENSION INTRA-ARTICULAR; INTRALESIONAL; INTRAMUSCULAR; SOFT TISSUE at 06:49

## 2025-07-26 RX ADMIN — ATORVASTATIN CALCIUM 80 MILLIGRAM(S): 80 TABLET, FILM COATED ORAL at 21:28

## 2025-07-26 RX ADMIN — METHYLPREDNISOLONE ACETATE 4 MILLIGRAM(S): 80 INJECTION, SUSPENSION INTRA-ARTICULAR; INTRALESIONAL; INTRAMUSCULAR; SOFT TISSUE at 17:34

## 2025-07-26 RX ADMIN — METHYLPREDNISOLONE ACETATE 8 MILLIGRAM(S): 80 INJECTION, SUSPENSION INTRA-ARTICULAR; INTRALESIONAL; INTRAMUSCULAR; SOFT TISSUE at 23:20

## 2025-07-26 RX ADMIN — Medication 1 DOSE(S): at 22:00

## 2025-07-26 RX ADMIN — Medication 81 MILLIGRAM(S): at 12:35

## 2025-07-26 RX ADMIN — Medication 40 MILLIGRAM(S): at 06:23

## 2025-07-26 RX ADMIN — METHYLPREDNISOLONE ACETATE 4 MILLIGRAM(S): 80 INJECTION, SUSPENSION INTRA-ARTICULAR; INTRALESIONAL; INTRAMUSCULAR; SOFT TISSUE at 13:08

## 2025-07-26 RX ADMIN — HEPARIN SODIUM 5000 UNIT(S): 1000 INJECTION INTRAVENOUS; SUBCUTANEOUS at 05:35

## 2025-07-27 LAB
ANION GAP SERPL CALC-SCNC: 12 MMOL/L — SIGNIFICANT CHANGE UP (ref 7–14)
BASOPHILS # BLD AUTO: 0.03 K/UL — SIGNIFICANT CHANGE UP (ref 0–0.2)
BASOPHILS NFR BLD AUTO: 0.4 % — SIGNIFICANT CHANGE UP (ref 0–1)
BUN SERPL-MCNC: 53 MG/DL — HIGH (ref 10–20)
CALCIUM SERPL-MCNC: 8.9 MG/DL — SIGNIFICANT CHANGE UP (ref 8.4–10.5)
CHLORIDE SERPL-SCNC: 107 MMOL/L — SIGNIFICANT CHANGE UP (ref 98–110)
CO2 SERPL-SCNC: 22 MMOL/L — SIGNIFICANT CHANGE UP (ref 17–32)
CREAT SERPL-MCNC: 2.2 MG/DL — HIGH (ref 0.7–1.5)
EGFR: 26 ML/MIN/1.73M2 — LOW
EGFR: 26 ML/MIN/1.73M2 — LOW
EOSINOPHIL # BLD AUTO: 0 K/UL — SIGNIFICANT CHANGE UP (ref 0–0.7)
EOSINOPHIL NFR BLD AUTO: 0 % — SIGNIFICANT CHANGE UP (ref 0–8)
GLUCOSE BLDC GLUCOMTR-MCNC: 176 MG/DL — HIGH (ref 70–99)
GLUCOSE BLDC GLUCOMTR-MCNC: 191 MG/DL — HIGH (ref 70–99)
GLUCOSE BLDC GLUCOMTR-MCNC: 201 MG/DL — HIGH (ref 70–99)
GLUCOSE BLDC GLUCOMTR-MCNC: 244 MG/DL — HIGH (ref 70–99)
GLUCOSE SERPL-MCNC: 183 MG/DL — HIGH (ref 70–99)
HCT VFR BLD CALC: 30.1 % — LOW (ref 37–47)
HGB BLD-MCNC: 9.5 G/DL — LOW (ref 12–16)
IMM GRANULOCYTES NFR BLD AUTO: 0.2 % — SIGNIFICANT CHANGE UP (ref 0.1–0.3)
LYMPHOCYTES # BLD AUTO: 1.47 K/UL — SIGNIFICANT CHANGE UP (ref 1.2–3.4)
LYMPHOCYTES # BLD AUTO: 17.8 % — LOW (ref 20.5–51.1)
MCHC RBC-ENTMCNC: 28.4 PG — SIGNIFICANT CHANGE UP (ref 27–31)
MCHC RBC-ENTMCNC: 31.6 G/DL — LOW (ref 32–37)
MCV RBC AUTO: 90.1 FL — SIGNIFICANT CHANGE UP (ref 81–99)
MONOCYTES # BLD AUTO: 0.32 K/UL — SIGNIFICANT CHANGE UP (ref 0.1–0.6)
MONOCYTES NFR BLD AUTO: 3.9 % — SIGNIFICANT CHANGE UP (ref 1.7–9.3)
NEUTROPHILS # BLD AUTO: 6.43 K/UL — SIGNIFICANT CHANGE UP (ref 1.4–6.5)
NEUTROPHILS NFR BLD AUTO: 77.7 % — HIGH (ref 42.2–75.2)
NRBC BLD AUTO-RTO: 0 /100 WBCS — SIGNIFICANT CHANGE UP (ref 0–0)
PLATELET # BLD AUTO: 215 K/UL — SIGNIFICANT CHANGE UP (ref 130–400)
PMV BLD: 10.8 FL — HIGH (ref 7.4–10.4)
POTASSIUM SERPL-MCNC: 5.6 MMOL/L — HIGH (ref 3.5–5)
POTASSIUM SERPL-SCNC: 5.6 MMOL/L — HIGH (ref 3.5–5)
RBC # BLD: 3.34 M/UL — LOW (ref 4.2–5.4)
RBC # FLD: 13.2 % — SIGNIFICANT CHANGE UP (ref 11.5–14.5)
SODIUM SERPL-SCNC: 141 MMOL/L — SIGNIFICANT CHANGE UP (ref 135–146)
WBC # BLD: 8.27 K/UL — SIGNIFICANT CHANGE UP (ref 4.8–10.8)
WBC # FLD AUTO: 8.27 K/UL — SIGNIFICANT CHANGE UP (ref 4.8–10.8)

## 2025-07-27 PROCEDURE — 99233 SBSQ HOSP IP/OBS HIGH 50: CPT

## 2025-07-27 RX ORDER — SODIUM ZIRCONIUM CYCLOSILICATE 5 G/5G
10 POWDER, FOR SUSPENSION ORAL THREE TIMES A DAY
Refills: 0 | Status: DISCONTINUED | OUTPATIENT
Start: 2025-07-27 | End: 2025-07-29

## 2025-07-27 RX ADMIN — HEPARIN SODIUM 5000 UNIT(S): 1000 INJECTION INTRAVENOUS; SUBCUTANEOUS at 17:08

## 2025-07-27 RX ADMIN — METHYLPREDNISOLONE ACETATE 4 MILLIGRAM(S): 80 INJECTION, SUSPENSION INTRA-ARTICULAR; INTRALESIONAL; INTRAMUSCULAR; SOFT TISSUE at 21:30

## 2025-07-27 RX ADMIN — INSULIN LISPRO 2: 100 INJECTION, SOLUTION INTRAVENOUS; SUBCUTANEOUS at 08:32

## 2025-07-27 RX ADMIN — INSULIN GLARGINE-YFGN 12 UNIT(S): 100 INJECTION, SOLUTION SUBCUTANEOUS at 21:29

## 2025-07-27 RX ADMIN — INSULIN LISPRO 4: 100 INJECTION, SOLUTION INTRAVENOUS; SUBCUTANEOUS at 17:03

## 2025-07-27 RX ADMIN — SODIUM ZIRCONIUM CYCLOSILICATE 10 GRAM(S): 5 POWDER, FOR SUSPENSION ORAL at 21:30

## 2025-07-27 RX ADMIN — SODIUM ZIRCONIUM CYCLOSILICATE 10 GRAM(S): 5 POWDER, FOR SUSPENSION ORAL at 13:42

## 2025-07-27 RX ADMIN — ATORVASTATIN CALCIUM 80 MILLIGRAM(S): 80 TABLET, FILM COATED ORAL at 21:29

## 2025-07-27 RX ADMIN — METHYLPREDNISOLONE ACETATE 4 MILLIGRAM(S): 80 INJECTION, SUSPENSION INTRA-ARTICULAR; INTRALESIONAL; INTRAMUSCULAR; SOFT TISSUE at 13:42

## 2025-07-27 RX ADMIN — Medication 1 APPLICATION(S): at 13:44

## 2025-07-27 RX ADMIN — INSULIN LISPRO 4: 100 INJECTION, SOLUTION INTRAVENOUS; SUBCUTANEOUS at 12:04

## 2025-07-27 RX ADMIN — METHYLPREDNISOLONE ACETATE 4 MILLIGRAM(S): 80 INJECTION, SUSPENSION INTRA-ARTICULAR; INTRALESIONAL; INTRAMUSCULAR; SOFT TISSUE at 06:22

## 2025-07-27 RX ADMIN — METHYLPREDNISOLONE ACETATE 4 MILLIGRAM(S): 80 INJECTION, SUSPENSION INTRA-ARTICULAR; INTRALESIONAL; INTRAMUSCULAR; SOFT TISSUE at 17:07

## 2025-07-27 RX ADMIN — Medication 40 MILLIGRAM(S): at 06:21

## 2025-07-27 RX ADMIN — Medication 30 MILLIGRAM(S): at 06:22

## 2025-07-27 RX ADMIN — Medication 1 DOSE(S): at 21:34

## 2025-07-27 RX ADMIN — Medication 81 MILLIGRAM(S): at 13:41

## 2025-07-27 RX ADMIN — HEPARIN SODIUM 5000 UNIT(S): 1000 INJECTION INTRAVENOUS; SUBCUTANEOUS at 06:21

## 2025-07-28 LAB
ANION GAP SERPL CALC-SCNC: 13 MMOL/L — SIGNIFICANT CHANGE UP (ref 7–14)
APPEARANCE UR: CLEAR — SIGNIFICANT CHANGE UP
BACTERIA # UR AUTO: NEGATIVE /HPF — SIGNIFICANT CHANGE UP
BASOPHILS # BLD AUTO: 0.02 K/UL — SIGNIFICANT CHANGE UP (ref 0–0.2)
BASOPHILS NFR BLD AUTO: 0.2 % — SIGNIFICANT CHANGE UP (ref 0–1)
BILIRUB UR-MCNC: NEGATIVE — SIGNIFICANT CHANGE UP
BUN SERPL-MCNC: 55 MG/DL — HIGH (ref 10–20)
CALCIUM SERPL-MCNC: 9.1 MG/DL — SIGNIFICANT CHANGE UP (ref 8.4–10.5)
CAST: 0 /LPF — SIGNIFICANT CHANGE UP (ref 0–4)
CHLORIDE SERPL-SCNC: 105 MMOL/L — SIGNIFICANT CHANGE UP (ref 98–110)
CO2 SERPL-SCNC: 21 MMOL/L — SIGNIFICANT CHANGE UP (ref 17–32)
COLOR SPEC: YELLOW — SIGNIFICANT CHANGE UP
CREAT ?TM UR-MCNC: 36 MG/DL — SIGNIFICANT CHANGE UP
CREAT SERPL-MCNC: 2.2 MG/DL — HIGH (ref 0.7–1.5)
DIFF PNL FLD: ABNORMAL
EGFR: 26 ML/MIN/1.73M2 — LOW
EGFR: 26 ML/MIN/1.73M2 — LOW
EOSINOPHIL # BLD AUTO: 0 K/UL — SIGNIFICANT CHANGE UP (ref 0–0.7)
EOSINOPHIL NFR BLD AUTO: 0 % — SIGNIFICANT CHANGE UP (ref 0–8)
GLUCOSE BLDC GLUCOMTR-MCNC: 159 MG/DL — HIGH (ref 70–99)
GLUCOSE BLDC GLUCOMTR-MCNC: 189 MG/DL — HIGH (ref 70–99)
GLUCOSE BLDC GLUCOMTR-MCNC: 224 MG/DL — HIGH (ref 70–99)
GLUCOSE BLDC GLUCOMTR-MCNC: 228 MG/DL — HIGH (ref 70–99)
GLUCOSE SERPL-MCNC: 163 MG/DL — HIGH (ref 70–99)
GLUCOSE UR QL: 250 MG/DL
HCT VFR BLD CALC: 31.2 % — LOW (ref 37–47)
HGB BLD-MCNC: 10.1 G/DL — LOW (ref 12–16)
IMM GRANULOCYTES NFR BLD AUTO: 0.4 % — HIGH (ref 0.1–0.3)
KETONES UR QL: NEGATIVE MG/DL — SIGNIFICANT CHANGE UP
LEUKOCYTE ESTERASE UR-ACNC: ABNORMAL
LYMPHOCYTES # BLD AUTO: 1.43 K/UL — SIGNIFICANT CHANGE UP (ref 1.2–3.4)
LYMPHOCYTES # BLD AUTO: 15.9 % — LOW (ref 20.5–51.1)
MCHC RBC-ENTMCNC: 28.8 PG — SIGNIFICANT CHANGE UP (ref 27–31)
MCHC RBC-ENTMCNC: 32.4 G/DL — SIGNIFICANT CHANGE UP (ref 32–37)
MCV RBC AUTO: 88.9 FL — SIGNIFICANT CHANGE UP (ref 81–99)
MONOCYTES # BLD AUTO: 0.39 K/UL — SIGNIFICANT CHANGE UP (ref 0.1–0.6)
MONOCYTES NFR BLD AUTO: 4.3 % — SIGNIFICANT CHANGE UP (ref 1.7–9.3)
NEUTROPHILS # BLD AUTO: 7.13 K/UL — HIGH (ref 1.4–6.5)
NEUTROPHILS NFR BLD AUTO: 79.2 % — HIGH (ref 42.2–75.2)
NITRITE UR-MCNC: NEGATIVE — SIGNIFICANT CHANGE UP
NRBC BLD AUTO-RTO: 0 /100 WBCS — SIGNIFICANT CHANGE UP (ref 0–0)
PH UR: 6.5 — SIGNIFICANT CHANGE UP (ref 5–8)
PLATELET # BLD AUTO: 251 K/UL — SIGNIFICANT CHANGE UP (ref 130–400)
PMV BLD: 10.5 FL — HIGH (ref 7.4–10.4)
POTASSIUM SERPL-MCNC: 4.7 MMOL/L — SIGNIFICANT CHANGE UP (ref 3.5–5)
POTASSIUM SERPL-SCNC: 4.7 MMOL/L — SIGNIFICANT CHANGE UP (ref 3.5–5)
PROT UR-MCNC: 300 MG/DL
RBC # BLD: 3.51 M/UL — LOW (ref 4.2–5.4)
RBC # FLD: 13.1 % — SIGNIFICANT CHANGE UP (ref 11.5–14.5)
RBC CASTS # UR COMP ASSIST: 4 /HPF — SIGNIFICANT CHANGE UP (ref 0–4)
SODIUM SERPL-SCNC: 139 MMOL/L — SIGNIFICANT CHANGE UP (ref 135–146)
SODIUM UR-SCNC: 102 MMOL/L — SIGNIFICANT CHANGE UP
SP GR SPEC: 1.01 — SIGNIFICANT CHANGE UP (ref 1–1.03)
SQUAMOUS # UR AUTO: 2 /HPF — SIGNIFICANT CHANGE UP (ref 0–5)
UROBILINOGEN FLD QL: 0.2 MG/DL — SIGNIFICANT CHANGE UP (ref 0.2–1)
WBC # BLD: 9.01 K/UL — SIGNIFICANT CHANGE UP (ref 4.8–10.8)
WBC # FLD AUTO: 9.01 K/UL — SIGNIFICANT CHANGE UP (ref 4.8–10.8)
WBC UR QL: 38 /HPF — HIGH (ref 0–5)

## 2025-07-28 PROCEDURE — 99233 SBSQ HOSP IP/OBS HIGH 50: CPT

## 2025-07-28 PROCEDURE — 76770 US EXAM ABDO BACK WALL COMP: CPT | Mod: 26

## 2025-07-28 RX ADMIN — METHYLPREDNISOLONE ACETATE 4 MILLIGRAM(S): 80 INJECTION, SUSPENSION INTRA-ARTICULAR; INTRALESIONAL; INTRAMUSCULAR; SOFT TISSUE at 12:05

## 2025-07-28 RX ADMIN — HEPARIN SODIUM 5000 UNIT(S): 1000 INJECTION INTRAVENOUS; SUBCUTANEOUS at 06:09

## 2025-07-28 RX ADMIN — INSULIN LISPRO 4: 100 INJECTION, SOLUTION INTRAVENOUS; SUBCUTANEOUS at 17:09

## 2025-07-28 RX ADMIN — Medication 40 MILLIGRAM(S): at 06:08

## 2025-07-28 RX ADMIN — INSULIN GLARGINE-YFGN 12 UNIT(S): 100 INJECTION, SOLUTION SUBCUTANEOUS at 22:02

## 2025-07-28 RX ADMIN — SODIUM ZIRCONIUM CYCLOSILICATE 10 GRAM(S): 5 POWDER, FOR SUSPENSION ORAL at 12:08

## 2025-07-28 RX ADMIN — ATORVASTATIN CALCIUM 80 MILLIGRAM(S): 80 TABLET, FILM COATED ORAL at 22:03

## 2025-07-28 RX ADMIN — Medication 1 DOSE(S): at 08:53

## 2025-07-28 RX ADMIN — Medication 81 MILLIGRAM(S): at 11:59

## 2025-07-28 RX ADMIN — HEPARIN SODIUM 5000 UNIT(S): 1000 INJECTION INTRAVENOUS; SUBCUTANEOUS at 17:11

## 2025-07-28 RX ADMIN — METHYLPREDNISOLONE ACETATE 4 MILLIGRAM(S): 80 INJECTION, SUSPENSION INTRA-ARTICULAR; INTRALESIONAL; INTRAMUSCULAR; SOFT TISSUE at 06:17

## 2025-07-28 RX ADMIN — Medication 1 APPLICATION(S): at 12:04

## 2025-07-28 RX ADMIN — SODIUM ZIRCONIUM CYCLOSILICATE 10 GRAM(S): 5 POWDER, FOR SUSPENSION ORAL at 22:03

## 2025-07-28 RX ADMIN — Medication 30 MILLIGRAM(S): at 06:08

## 2025-07-28 RX ADMIN — METHYLPREDNISOLONE ACETATE 4 MILLIGRAM(S): 80 INJECTION, SUSPENSION INTRA-ARTICULAR; INTRALESIONAL; INTRAMUSCULAR; SOFT TISSUE at 22:03

## 2025-07-28 RX ADMIN — INSULIN LISPRO 2: 100 INJECTION, SOLUTION INTRAVENOUS; SUBCUTANEOUS at 08:38

## 2025-07-28 RX ADMIN — INSULIN LISPRO 2: 100 INJECTION, SOLUTION INTRAVENOUS; SUBCUTANEOUS at 11:57

## 2025-07-28 RX ADMIN — SODIUM ZIRCONIUM CYCLOSILICATE 10 GRAM(S): 5 POWDER, FOR SUSPENSION ORAL at 06:08

## 2025-07-29 LAB
ANION GAP SERPL CALC-SCNC: 10 MMOL/L — SIGNIFICANT CHANGE UP (ref 7–14)
BASOPHILS # BLD AUTO: 0.03 K/UL — SIGNIFICANT CHANGE UP (ref 0–0.2)
BASOPHILS NFR BLD AUTO: 0.3 % — SIGNIFICANT CHANGE UP (ref 0–1)
BUN SERPL-MCNC: 50 MG/DL — HIGH (ref 10–20)
CALCIUM SERPL-MCNC: 9 MG/DL — SIGNIFICANT CHANGE UP (ref 8.4–10.5)
CHLORIDE SERPL-SCNC: 108 MMOL/L — SIGNIFICANT CHANGE UP (ref 98–110)
CO2 SERPL-SCNC: 26 MMOL/L — SIGNIFICANT CHANGE UP (ref 17–32)
CREAT SERPL-MCNC: 2 MG/DL — HIGH (ref 0.7–1.5)
EGFR: 29 ML/MIN/1.73M2 — LOW
EGFR: 29 ML/MIN/1.73M2 — LOW
EOSINOPHIL # BLD AUTO: 0.02 K/UL — SIGNIFICANT CHANGE UP (ref 0–0.7)
EOSINOPHIL NFR BLD AUTO: 0.2 % — SIGNIFICANT CHANGE UP (ref 0–8)
GLUCOSE BLDC GLUCOMTR-MCNC: 123 MG/DL — HIGH (ref 70–99)
GLUCOSE BLDC GLUCOMTR-MCNC: 146 MG/DL — HIGH (ref 70–99)
GLUCOSE BLDC GLUCOMTR-MCNC: 155 MG/DL — HIGH (ref 70–99)
GLUCOSE BLDC GLUCOMTR-MCNC: 89 MG/DL — SIGNIFICANT CHANGE UP (ref 70–99)
GLUCOSE SERPL-MCNC: 83 MG/DL — SIGNIFICANT CHANGE UP (ref 70–99)
HCT VFR BLD CALC: 31.8 % — LOW (ref 37–47)
HGB BLD-MCNC: 10.3 G/DL — LOW (ref 12–16)
IMM GRANULOCYTES NFR BLD AUTO: 0.2 % — SIGNIFICANT CHANGE UP (ref 0.1–0.3)
LYMPHOCYTES # BLD AUTO: 2.04 K/UL — SIGNIFICANT CHANGE UP (ref 1.2–3.4)
LYMPHOCYTES # BLD AUTO: 23.4 % — SIGNIFICANT CHANGE UP (ref 20.5–51.1)
MAGNESIUM SERPL-MCNC: 1.9 MG/DL — SIGNIFICANT CHANGE UP (ref 1.8–2.4)
MCHC RBC-ENTMCNC: 28.8 PG — SIGNIFICANT CHANGE UP (ref 27–31)
MCHC RBC-ENTMCNC: 32.4 G/DL — SIGNIFICANT CHANGE UP (ref 32–37)
MCV RBC AUTO: 88.8 FL — SIGNIFICANT CHANGE UP (ref 81–99)
MONOCYTES # BLD AUTO: 0.42 K/UL — SIGNIFICANT CHANGE UP (ref 0.1–0.6)
MONOCYTES NFR BLD AUTO: 4.8 % — SIGNIFICANT CHANGE UP (ref 1.7–9.3)
NEUTROPHILS # BLD AUTO: 6.2 K/UL — SIGNIFICANT CHANGE UP (ref 1.4–6.5)
NEUTROPHILS NFR BLD AUTO: 71.1 % — SIGNIFICANT CHANGE UP (ref 42.2–75.2)
NRBC BLD AUTO-RTO: 0 /100 WBCS — SIGNIFICANT CHANGE UP (ref 0–0)
PLATELET # BLD AUTO: 249 K/UL — SIGNIFICANT CHANGE UP (ref 130–400)
PMV BLD: 10.9 FL — HIGH (ref 7.4–10.4)
POTASSIUM SERPL-MCNC: 4.3 MMOL/L — SIGNIFICANT CHANGE UP (ref 3.5–5)
POTASSIUM SERPL-SCNC: 4.3 MMOL/L — SIGNIFICANT CHANGE UP (ref 3.5–5)
RBC # BLD: 3.58 M/UL — LOW (ref 4.2–5.4)
RBC # FLD: 13.2 % — SIGNIFICANT CHANGE UP (ref 11.5–14.5)
SODIUM SERPL-SCNC: 144 MMOL/L — SIGNIFICANT CHANGE UP (ref 135–146)
WBC # BLD: 8.73 K/UL — SIGNIFICANT CHANGE UP (ref 4.8–10.8)
WBC # FLD AUTO: 8.73 K/UL — SIGNIFICANT CHANGE UP (ref 4.8–10.8)

## 2025-07-29 PROCEDURE — 99232 SBSQ HOSP IP/OBS MODERATE 35: CPT

## 2025-07-29 RX ORDER — NIFEDIPINE 30 MG
60 TABLET, EXTENDED RELEASE 24 HR ORAL DAILY
Refills: 0 | Status: DISCONTINUED | OUTPATIENT
Start: 2025-07-29 | End: 2025-07-31

## 2025-07-29 RX ORDER — NIFEDIPINE 30 MG
30 TABLET, EXTENDED RELEASE 24 HR ORAL ONCE
Refills: 0 | Status: COMPLETED | OUTPATIENT
Start: 2025-07-29 | End: 2025-07-29

## 2025-07-29 RX ADMIN — ATORVASTATIN CALCIUM 80 MILLIGRAM(S): 80 TABLET, FILM COATED ORAL at 22:07

## 2025-07-29 RX ADMIN — HEPARIN SODIUM 5000 UNIT(S): 1000 INJECTION INTRAVENOUS; SUBCUTANEOUS at 17:17

## 2025-07-29 RX ADMIN — HEPARIN SODIUM 5000 UNIT(S): 1000 INJECTION INTRAVENOUS; SUBCUTANEOUS at 06:22

## 2025-07-29 RX ADMIN — Medication 1 DOSE(S): at 09:08

## 2025-07-29 RX ADMIN — Medication 40 MILLIGRAM(S): at 06:27

## 2025-07-29 RX ADMIN — METHYLPREDNISOLONE ACETATE 4 MILLIGRAM(S): 80 INJECTION, SUSPENSION INTRA-ARTICULAR; INTRALESIONAL; INTRAMUSCULAR; SOFT TISSUE at 06:23

## 2025-07-29 RX ADMIN — METHYLPREDNISOLONE ACETATE 4 MILLIGRAM(S): 80 INJECTION, SUSPENSION INTRA-ARTICULAR; INTRALESIONAL; INTRAMUSCULAR; SOFT TISSUE at 22:06

## 2025-07-29 RX ADMIN — Medication 30 MILLIGRAM(S): at 09:06

## 2025-07-29 RX ADMIN — Medication 30 MILLIGRAM(S): at 06:23

## 2025-07-29 RX ADMIN — INSULIN LISPRO 2: 100 INJECTION, SOLUTION INTRAVENOUS; SUBCUTANEOUS at 17:17

## 2025-07-29 RX ADMIN — INSULIN GLARGINE-YFGN 12 UNIT(S): 100 INJECTION, SOLUTION SUBCUTANEOUS at 22:07

## 2025-07-29 RX ADMIN — SODIUM ZIRCONIUM CYCLOSILICATE 10 GRAM(S): 5 POWDER, FOR SUSPENSION ORAL at 06:23

## 2025-07-29 RX ADMIN — Medication 81 MILLIGRAM(S): at 12:23

## 2025-07-29 RX ADMIN — Medication 10 MILLIGRAM(S): at 10:04

## 2025-07-29 RX ADMIN — Medication 1 APPLICATION(S): at 12:24

## 2025-07-29 RX ADMIN — Medication 1 DOSE(S): at 20:10

## 2025-07-30 ENCOUNTER — TRANSCRIPTION ENCOUNTER (OUTPATIENT)
Age: 55
End: 2025-07-30

## 2025-07-30 LAB
ANION GAP SERPL CALC-SCNC: 15 MMOL/L — HIGH (ref 7–14)
BASOPHILS # BLD AUTO: 0.04 K/UL — SIGNIFICANT CHANGE UP (ref 0–0.2)
BASOPHILS NFR BLD AUTO: 0.4 % — SIGNIFICANT CHANGE UP (ref 0–1)
BUN SERPL-MCNC: 44 MG/DL — HIGH (ref 10–20)
CALCIUM SERPL-MCNC: 9.3 MG/DL — SIGNIFICANT CHANGE UP (ref 8.4–10.5)
CHLORIDE SERPL-SCNC: 106 MMOL/L — SIGNIFICANT CHANGE UP (ref 98–110)
CO2 SERPL-SCNC: 24 MMOL/L — SIGNIFICANT CHANGE UP (ref 17–32)
CREAT SERPL-MCNC: 2 MG/DL — HIGH (ref 0.7–1.5)
EGFR: 29 ML/MIN/1.73M2 — LOW
EGFR: 29 ML/MIN/1.73M2 — LOW
EOSINOPHIL # BLD AUTO: 0.01 K/UL — SIGNIFICANT CHANGE UP (ref 0–0.7)
EOSINOPHIL NFR BLD AUTO: 0.1 % — SIGNIFICANT CHANGE UP (ref 0–8)
GLUCOSE BLDC GLUCOMTR-MCNC: 173 MG/DL — HIGH (ref 70–99)
GLUCOSE BLDC GLUCOMTR-MCNC: 227 MG/DL — HIGH (ref 70–99)
GLUCOSE BLDC GLUCOMTR-MCNC: 63 MG/DL — LOW (ref 70–99)
GLUCOSE BLDC GLUCOMTR-MCNC: 78 MG/DL — SIGNIFICANT CHANGE UP (ref 70–99)
GLUCOSE SERPL-MCNC: 50 MG/DL — CRITICAL LOW (ref 70–99)
HCT VFR BLD CALC: 34.7 % — LOW (ref 37–47)
HGB BLD-MCNC: 11 G/DL — LOW (ref 12–16)
IMM GRANULOCYTES NFR BLD AUTO: 0.7 % — HIGH (ref 0.1–0.3)
LYMPHOCYTES # BLD AUTO: 2.35 K/UL — SIGNIFICANT CHANGE UP (ref 1.2–3.4)
LYMPHOCYTES # BLD AUTO: 24 % — SIGNIFICANT CHANGE UP (ref 20.5–51.1)
MAGNESIUM SERPL-MCNC: 1.9 MG/DL — SIGNIFICANT CHANGE UP (ref 1.8–2.4)
MCHC RBC-ENTMCNC: 28.4 PG — SIGNIFICANT CHANGE UP (ref 27–31)
MCHC RBC-ENTMCNC: 31.7 G/DL — LOW (ref 32–37)
MCV RBC AUTO: 89.7 FL — SIGNIFICANT CHANGE UP (ref 81–99)
MONOCYTES # BLD AUTO: 0.41 K/UL — SIGNIFICANT CHANGE UP (ref 0.1–0.6)
MONOCYTES NFR BLD AUTO: 4.2 % — SIGNIFICANT CHANGE UP (ref 1.7–9.3)
NEUTROPHILS # BLD AUTO: 6.9 K/UL — HIGH (ref 1.4–6.5)
NEUTROPHILS NFR BLD AUTO: 70.6 % — SIGNIFICANT CHANGE UP (ref 42.2–75.2)
NRBC BLD AUTO-RTO: 0 /100 WBCS — SIGNIFICANT CHANGE UP (ref 0–0)
PHOSPHATE SERPL-MCNC: 4.9 MG/DL — SIGNIFICANT CHANGE UP (ref 2.1–4.9)
PLATELET # BLD AUTO: 287 K/UL — SIGNIFICANT CHANGE UP (ref 130–400)
PMV BLD: 10.4 FL — SIGNIFICANT CHANGE UP (ref 7.4–10.4)
POTASSIUM SERPL-MCNC: 4.2 MMOL/L — SIGNIFICANT CHANGE UP (ref 3.5–5)
POTASSIUM SERPL-SCNC: 4.2 MMOL/L — SIGNIFICANT CHANGE UP (ref 3.5–5)
RBC # BLD: 3.87 M/UL — LOW (ref 4.2–5.4)
RBC # FLD: 13.4 % — SIGNIFICANT CHANGE UP (ref 11.5–14.5)
SODIUM SERPL-SCNC: 145 MMOL/L — SIGNIFICANT CHANGE UP (ref 135–146)
WBC # BLD: 9.78 K/UL — SIGNIFICANT CHANGE UP (ref 4.8–10.8)
WBC # FLD AUTO: 9.78 K/UL — SIGNIFICANT CHANGE UP (ref 4.8–10.8)

## 2025-07-30 PROCEDURE — 99232 SBSQ HOSP IP/OBS MODERATE 35: CPT

## 2025-07-30 RX ADMIN — ATORVASTATIN CALCIUM 80 MILLIGRAM(S): 80 TABLET, FILM COATED ORAL at 21:49

## 2025-07-30 RX ADMIN — Medication 81 MILLIGRAM(S): at 12:20

## 2025-07-30 RX ADMIN — Medication 1 APPLICATION(S): at 16:14

## 2025-07-30 RX ADMIN — INSULIN GLARGINE-YFGN 12 UNIT(S): 100 INJECTION, SOLUTION SUBCUTANEOUS at 21:49

## 2025-07-30 RX ADMIN — Medication 40 MILLIGRAM(S): at 05:36

## 2025-07-30 RX ADMIN — METHYLPREDNISOLONE ACETATE 4 MILLIGRAM(S): 80 INJECTION, SUSPENSION INTRA-ARTICULAR; INTRALESIONAL; INTRAMUSCULAR; SOFT TISSUE at 05:35

## 2025-07-30 RX ADMIN — Medication 1 DOSE(S): at 20:33

## 2025-07-30 RX ADMIN — INSULIN LISPRO 4: 100 INJECTION, SOLUTION INTRAVENOUS; SUBCUTANEOUS at 12:20

## 2025-07-30 RX ADMIN — Medication 60 MILLIGRAM(S): at 05:35

## 2025-07-31 ENCOUNTER — TRANSCRIPTION ENCOUNTER (OUTPATIENT)
Age: 55
End: 2025-07-31

## 2025-07-31 VITALS
TEMPERATURE: 98 F | HEART RATE: 83 BPM | RESPIRATION RATE: 17 BRPM | DIASTOLIC BLOOD PRESSURE: 79 MMHG | SYSTOLIC BLOOD PRESSURE: 150 MMHG

## 2025-07-31 LAB
ANION GAP SERPL CALC-SCNC: 9 MMOL/L — SIGNIFICANT CHANGE UP (ref 7–14)
BASOPHILS # BLD AUTO: 0.04 K/UL — SIGNIFICANT CHANGE UP (ref 0–0.2)
BASOPHILS NFR BLD AUTO: 0.5 % — SIGNIFICANT CHANGE UP (ref 0–1)
BUN SERPL-MCNC: 40 MG/DL — HIGH (ref 10–20)
CALCIUM SERPL-MCNC: 8.8 MG/DL — SIGNIFICANT CHANGE UP (ref 8.4–10.5)
CALCIUM SERPL-MCNC: 9.1 MG/DL — SIGNIFICANT CHANGE UP (ref 8.4–10.5)
CHLORIDE SERPL-SCNC: 109 MMOL/L — SIGNIFICANT CHANGE UP (ref 98–110)
CO2 SERPL-SCNC: 26 MMOL/L — SIGNIFICANT CHANGE UP (ref 17–32)
CREAT SERPL-MCNC: 1.6 MG/DL — HIGH (ref 0.7–1.5)
EGFR: 38 ML/MIN/1.73M2 — LOW
EGFR: 38 ML/MIN/1.73M2 — LOW
EOSINOPHIL # BLD AUTO: 0.1 K/UL — SIGNIFICANT CHANGE UP (ref 0–0.7)
EOSINOPHIL NFR BLD AUTO: 1.2 % — SIGNIFICANT CHANGE UP (ref 0–8)
GLUCOSE BLDC GLUCOMTR-MCNC: 169 MG/DL — HIGH (ref 70–99)
GLUCOSE BLDC GLUCOMTR-MCNC: 63 MG/DL — LOW (ref 70–99)
GLUCOSE SERPL-MCNC: 51 MG/DL — CRITICAL LOW (ref 70–99)
HCT VFR BLD CALC: 32.8 % — LOW (ref 37–47)
HGB BLD-MCNC: 10.5 G/DL — LOW (ref 12–16)
IMM GRANULOCYTES NFR BLD AUTO: 0.2 % — SIGNIFICANT CHANGE UP (ref 0.1–0.3)
LYMPHOCYTES # BLD AUTO: 2.97 K/UL — SIGNIFICANT CHANGE UP (ref 1.2–3.4)
LYMPHOCYTES # BLD AUTO: 34.2 % — SIGNIFICANT CHANGE UP (ref 20.5–51.1)
MAGNESIUM SERPL-MCNC: 1.8 MG/DL — SIGNIFICANT CHANGE UP (ref 1.8–2.4)
MCHC RBC-ENTMCNC: 28.3 PG — SIGNIFICANT CHANGE UP (ref 27–31)
MCHC RBC-ENTMCNC: 32 G/DL — SIGNIFICANT CHANGE UP (ref 32–37)
MCV RBC AUTO: 88.4 FL — SIGNIFICANT CHANGE UP (ref 81–99)
MONOCYTES # BLD AUTO: 0.67 K/UL — HIGH (ref 0.1–0.6)
MONOCYTES NFR BLD AUTO: 7.7 % — SIGNIFICANT CHANGE UP (ref 1.7–9.3)
NEUTROPHILS # BLD AUTO: 4.88 K/UL — SIGNIFICANT CHANGE UP (ref 1.4–6.5)
NEUTROPHILS NFR BLD AUTO: 56.2 % — SIGNIFICANT CHANGE UP (ref 42.2–75.2)
NRBC BLD AUTO-RTO: 0 /100 WBCS — SIGNIFICANT CHANGE UP (ref 0–0)
PLATELET # BLD AUTO: 260 K/UL — SIGNIFICANT CHANGE UP (ref 130–400)
PMV BLD: 10.4 FL — SIGNIFICANT CHANGE UP (ref 7.4–10.4)
POTASSIUM SERPL-MCNC: 4 MMOL/L — SIGNIFICANT CHANGE UP (ref 3.5–5)
POTASSIUM SERPL-SCNC: 4 MMOL/L — SIGNIFICANT CHANGE UP (ref 3.5–5)
PTH-INTACT FLD-MCNC: 41 PG/ML — SIGNIFICANT CHANGE UP (ref 15–65)
RBC # BLD: 3.71 M/UL — LOW (ref 4.2–5.4)
RBC # FLD: 13.2 % — SIGNIFICANT CHANGE UP (ref 11.5–14.5)
SODIUM SERPL-SCNC: 144 MMOL/L — SIGNIFICANT CHANGE UP (ref 135–146)
WBC # BLD: 8.68 K/UL — SIGNIFICANT CHANGE UP (ref 4.8–10.8)
WBC # FLD AUTO: 8.68 K/UL — SIGNIFICANT CHANGE UP (ref 4.8–10.8)

## 2025-07-31 PROCEDURE — 99239 HOSP IP/OBS DSCHRG MGMT >30: CPT

## 2025-07-31 RX ORDER — INSULIN GLARGINE-YFGN 100 [IU]/ML
12 INJECTION, SOLUTION SUBCUTANEOUS
Qty: 1 | Refills: 0
Start: 2025-07-31 | End: 2025-08-29

## 2025-07-31 RX ORDER — INSULIN LISPRO 100 U/ML
6 INJECTION, SOLUTION INTRAVENOUS; SUBCUTANEOUS
Refills: 0 | DISCHARGE

## 2025-07-31 RX ORDER — INSULIN LISPRO 100 U/ML
6 INJECTION, SOLUTION INTRAVENOUS; SUBCUTANEOUS
Qty: 1 | Refills: 0
Start: 2025-07-31 | End: 2025-08-29

## 2025-07-31 RX ADMIN — INSULIN LISPRO 2: 100 INJECTION, SOLUTION INTRAVENOUS; SUBCUTANEOUS at 11:42

## 2025-07-31 RX ADMIN — Medication 60 MILLIGRAM(S): at 05:11

## 2025-07-31 RX ADMIN — Medication 81 MILLIGRAM(S): at 11:42

## 2025-07-31 RX ADMIN — Medication 1 APPLICATION(S): at 11:43

## 2025-07-31 RX ADMIN — Medication 40 MILLIGRAM(S): at 05:12

## 2025-07-31 RX ADMIN — HEPARIN SODIUM 5000 UNIT(S): 1000 INJECTION INTRAVENOUS; SUBCUTANEOUS at 05:11

## 2025-07-31 RX ADMIN — Medication 1 DOSE(S): at 11:09

## 2025-08-01 LAB — PTH-INTACT FLD-MCNC: 38 PG/ML — SIGNIFICANT CHANGE UP (ref 15–65)

## 2025-08-07 DIAGNOSIS — Z79.82 LONG TERM (CURRENT) USE OF ASPIRIN: ICD-10-CM

## 2025-08-07 DIAGNOSIS — J47.9 BRONCHIECTASIS, UNCOMPLICATED: ICD-10-CM

## 2025-08-07 DIAGNOSIS — I12.9 HYPERTENSIVE CHRONIC KIDNEY DISEASE WITH STAGE 1 THROUGH STAGE 4 CHRONIC KIDNEY DISEASE, OR UNSPECIFIED CHRONIC KIDNEY DISEASE: ICD-10-CM

## 2025-08-07 DIAGNOSIS — E11.22 TYPE 2 DIABETES MELLITUS WITH DIABETIC CHRONIC KIDNEY DISEASE: ICD-10-CM

## 2025-08-07 DIAGNOSIS — E78.5 HYPERLIPIDEMIA, UNSPECIFIED: ICD-10-CM

## 2025-08-07 DIAGNOSIS — I25.10 ATHEROSCLEROTIC HEART DISEASE OF NATIVE CORONARY ARTERY WITHOUT ANGINA PECTORIS: ICD-10-CM

## 2025-08-07 DIAGNOSIS — E11.40 TYPE 2 DIABETES MELLITUS WITH DIABETIC NEUROPATHY, UNSPECIFIED: ICD-10-CM

## 2025-08-07 DIAGNOSIS — R91.8 OTHER NONSPECIFIC ABNORMAL FINDING OF LUNG FIELD: ICD-10-CM

## 2025-08-07 DIAGNOSIS — T50.8X5A ADVERSE EFFECT OF DIAGNOSTIC AGENTS, INITIAL ENCOUNTER: ICD-10-CM

## 2025-08-07 DIAGNOSIS — I35.0 NONRHEUMATIC AORTIC (VALVE) STENOSIS: ICD-10-CM
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
## 2025-08-07 DIAGNOSIS — Z22.7 LATENT TUBERCULOSIS: ICD-10-CM

## 2025-08-07 DIAGNOSIS — M19.09 PRIMARY OSTEOARTHRITIS, OTHER SPECIFIED SITE: ICD-10-CM

## 2025-08-07 DIAGNOSIS — Z79.4 LONG TERM (CURRENT) USE OF INSULIN: ICD-10-CM

## 2025-08-07 DIAGNOSIS — N17.9 ACUTE KIDNEY FAILURE, UNSPECIFIED: ICD-10-CM

## 2025-09-19 ENCOUNTER — APPOINTMENT (OUTPATIENT)
Dept: NEUROSURGERY | Facility: CLINIC | Age: 55
End: 2025-09-19